# Patient Record
Sex: MALE | Race: WHITE | NOT HISPANIC OR LATINO | Employment: OTHER | URBAN - NONMETROPOLITAN AREA
[De-identification: names, ages, dates, MRNs, and addresses within clinical notes are randomized per-mention and may not be internally consistent; named-entity substitution may affect disease eponyms.]

---

## 2017-05-01 ENCOUNTER — OFFICE VISIT (OUTPATIENT)
Dept: GASTROENTEROLOGY | Facility: CLINIC | Age: 67
End: 2017-05-01

## 2017-05-01 VITALS
BODY MASS INDEX: 32.29 KG/M2 | WEIGHT: 218 LBS | DIASTOLIC BLOOD PRESSURE: 80 MMHG | HEIGHT: 69 IN | OXYGEN SATURATION: 94 % | SYSTOLIC BLOOD PRESSURE: 138 MMHG | HEART RATE: 62 BPM

## 2017-05-01 DIAGNOSIS — I10 HTN (HYPERTENSION), BENIGN: ICD-10-CM

## 2017-05-01 DIAGNOSIS — E11.9 CONTROLLED TYPE 2 DIABETES MELLITUS WITHOUT COMPLICATION, WITHOUT LONG-TERM CURRENT USE OF INSULIN (HCC): ICD-10-CM

## 2017-05-01 DIAGNOSIS — Z86.010 HX OF COLONIC POLYPS: ICD-10-CM

## 2017-05-01 DIAGNOSIS — Z85.038 HX OF COLON CANCER, STAGE I: Primary | ICD-10-CM

## 2017-05-01 PROCEDURE — S0260 H&P FOR SURGERY: HCPCS | Performed by: CLINICAL NURSE SPECIALIST

## 2017-05-01 RX ORDER — LISINOPRIL AND HYDROCHLOROTHIAZIDE 25; 20 MG/1; MG/1
1 TABLET ORAL DAILY
COMMUNITY
Start: 2017-04-13 | End: 2019-01-01 | Stop reason: HOSPADM

## 2017-05-01 RX ORDER — LOSARTAN POTASSIUM 100 MG/1
100 TABLET ORAL DAILY
COMMUNITY
Start: 2017-04-13 | End: 2019-01-01 | Stop reason: HOSPADM

## 2017-05-01 RX ORDER — METOPROLOL TARTRATE 50 MG/1
TABLET, FILM COATED ORAL
COMMUNITY
Start: 2017-04-13 | End: 2018-05-14

## 2017-05-01 RX ORDER — ESOMEPRAZOLE MAGNESIUM 40 MG/1
40 CAPSULE, DELAYED RELEASE ORAL
COMMUNITY
Start: 2017-03-30 | End: 2019-01-01 | Stop reason: HOSPADM

## 2017-05-01 RX ORDER — ATORVASTATIN CALCIUM 40 MG/1
40 TABLET, FILM COATED ORAL DAILY
COMMUNITY
Start: 2017-04-13

## 2017-05-01 RX ORDER — SITAGLIPTIN 50 MG/1
50 TABLET, FILM COATED ORAL DAILY
COMMUNITY
Start: 2017-04-21

## 2017-05-05 ENCOUNTER — TELEPHONE (OUTPATIENT)
Dept: GASTROENTEROLOGY | Facility: CLINIC | Age: 67
End: 2017-05-05

## 2017-06-26 ENCOUNTER — HOSPITAL ENCOUNTER (OUTPATIENT)
Facility: HOSPITAL | Age: 67
Setting detail: HOSPITAL OUTPATIENT SURGERY
Discharge: HOME | End: 2017-06-26
Attending: INTERNAL MEDICINE | Admitting: INTERNAL MEDICINE

## 2017-06-26 ENCOUNTER — ANESTHESIA EVENT (OUTPATIENT)
Dept: GASTROENTEROLOGY | Facility: HOSPITAL | Age: 67
End: 2017-06-26

## 2017-06-26 ENCOUNTER — ANESTHESIA (OUTPATIENT)
Dept: GASTROENTEROLOGY | Facility: HOSPITAL | Age: 67
End: 2017-06-26

## 2017-06-26 VITALS
HEART RATE: 70 BPM | OXYGEN SATURATION: 93 % | TEMPERATURE: 98 F | HEIGHT: 69 IN | RESPIRATION RATE: 14 BRPM | SYSTOLIC BLOOD PRESSURE: 121 MMHG | DIASTOLIC BLOOD PRESSURE: 58 MMHG

## 2017-06-26 DIAGNOSIS — Z86.010 HX OF COLONIC POLYPS: ICD-10-CM

## 2017-06-26 PROCEDURE — 88305 TISSUE EXAM BY PATHOLOGIST: CPT | Performed by: INTERNAL MEDICINE

## 2017-06-26 PROCEDURE — 45385 COLONOSCOPY W/LESION REMOVAL: CPT | Performed by: INTERNAL MEDICINE

## 2017-06-26 PROCEDURE — 25010000002 PROPOFOL 10 MG/ML EMULSION: Performed by: NURSE ANESTHETIST, CERTIFIED REGISTERED

## 2017-06-26 PROCEDURE — 25010000002 GLUCAGON (HUMAN RECOMBINANT) 1 MG RECONSTITUTED SOLUTION: Performed by: INTERNAL MEDICINE

## 2017-06-26 RX ORDER — PROPOFOL 10 MG/ML
VIAL (ML) INTRAVENOUS AS NEEDED
Status: DISCONTINUED | OUTPATIENT
Start: 2017-06-26 | End: 2017-06-26 | Stop reason: SURG

## 2017-06-26 RX ORDER — MELATONIN
1000 DAILY
COMMUNITY
End: 2019-01-01 | Stop reason: DRUGHIGH

## 2017-06-26 RX ORDER — SODIUM CHLORIDE 9 MG/ML
500 INJECTION, SOLUTION INTRAVENOUS CONTINUOUS PRN
Status: DISCONTINUED | OUTPATIENT
Start: 2017-06-26 | End: 2017-06-26 | Stop reason: HOSPADM

## 2017-06-26 RX ORDER — LIDOCAINE HYDROCHLORIDE 10 MG/ML
0.5 INJECTION, SOLUTION INFILTRATION; PERINEURAL ONCE AS NEEDED
Status: DISCONTINUED | OUTPATIENT
Start: 2017-06-26 | End: 2017-06-26 | Stop reason: HOSPADM

## 2017-06-26 RX ORDER — SODIUM CHLORIDE 0.9 % (FLUSH) 0.9 %
3 SYRINGE (ML) INJECTION AS NEEDED
Status: DISCONTINUED | OUTPATIENT
Start: 2017-06-26 | End: 2017-06-26 | Stop reason: HOSPADM

## 2017-06-26 RX ADMIN — PROPOFOL 25 MG: 10 INJECTION, EMULSION INTRAVENOUS at 08:38

## 2017-06-26 RX ADMIN — SODIUM CHLORIDE: 0.9 INJECTION, SOLUTION INTRAVENOUS at 08:19

## 2017-06-26 RX ADMIN — PROPOFOL 25 MG: 10 INJECTION, EMULSION INTRAVENOUS at 08:33

## 2017-06-26 RX ADMIN — PROPOFOL 25 MG: 10 INJECTION, EMULSION INTRAVENOUS at 08:36

## 2017-06-26 RX ADMIN — PROPOFOL 25 MG: 10 INJECTION, EMULSION INTRAVENOUS at 08:27

## 2017-06-26 RX ADMIN — PROPOFOL 50 MG: 10 INJECTION, EMULSION INTRAVENOUS at 08:24

## 2017-06-26 RX ADMIN — PROPOFOL 50 MG: 10 INJECTION, EMULSION INTRAVENOUS at 08:23

## 2017-06-26 RX ADMIN — PROPOFOL 25 MG: 10 INJECTION, EMULSION INTRAVENOUS at 08:30

## 2017-06-26 RX ADMIN — PROPOFOL 25 MG: 10 INJECTION, EMULSION INTRAVENOUS at 08:41

## 2017-06-26 RX ADMIN — PROPOFOL 50 MG: 10 INJECTION, EMULSION INTRAVENOUS at 08:21

## 2017-06-26 NOTE — ANESTHESIA PREPROCEDURE EVALUATION
Anesthesia Evaluation     NPO Solid Status: > 8 hours  NPO Liquid Status: > 2 hours     Airway   Mallampati: II  TM distance: >3 FB  Neck ROM: full  no difficulty expected  Dental    (+) edentulous    Pulmonary - normal exam   Cardiovascular - normal exam  Exercise tolerance: good (4-7 METS)    (+) hypertension,       Neuro/Psych  GI/Hepatic/Renal/Endo    (+) obesity,  GERD well controlled, PUD, diabetes mellitus well controlled,     Musculoskeletal     Abdominal  - normal exam   Substance History      OB/GYN          Other                                        Anesthesia Plan    ASA 3     MAC     intravenous induction   Anesthetic plan and risks discussed with patient.    Plan discussed with CRNA.

## 2017-06-26 NOTE — ANESTHESIA POSTPROCEDURE EVALUATION
Patient: Romel Rosario    Procedure Summary     Date Anesthesia Start Anesthesia Stop Room / Location    06/26/17 0819 0847  [facility] /  [facility]       Procedure Diagnosis Surgeon Provider    COLONOSCOPY WITH ANESTHESIA (N/A ) Hx of colonic polyps  (Hx of colonic polyps [Z86.010]) MD Stephon Ochoa CRNA          Anesthesia Type: MAC  Last vitals  /58 (06/26/17 0916)    Temp      Pulse 70 (06/26/17 0916)   Resp 14 (06/26/17 0916)    SpO2 93 % (06/26/17 0916)      Post Anesthesia Care and Evaluation    Patient location during evaluation: PHASE II  Patient participation: complete - patient participated  Level of consciousness: awake and sleepy but conscious  Pain score: 0  Pain management: adequate  Airway patency: patent  Anesthetic complications: No anesthetic complications    Cardiovascular status: acceptable  Respiratory status: acceptable  Hydration status: acceptable

## 2017-06-26 NOTE — H&P
"[facility]  Pre Procedure History & Physical    Chief Complaint:   History of polyps    Subjective     HPI:   Here for colonoscopy.  History of polyps.  See office note dated 5/1/2017    Past Medical History:   Past Medical History:   Diagnosis Date   • Cancer     Colon cancer   • Cervical disc disease    • GERD (gastroesophageal reflux disease)    • Hx of colon cancer, stage I    • Hx of colonic polyps    • Hypertension    • PUD (peptic ulcer disease)        Past Surgical History:  [unfilled]    Family History:  Family History   Problem Relation Age of Onset   • Heart disease Mother    • Diabetes Mother    • Alzheimer's disease Father    • Colon cancer Neg Hx    • Colon polyps Neg Hx        Social History:   reports that he has quit smoking. He has never used smokeless tobacco. He reports that he does not drink alcohol or use illicit drugs.    Medications:   Prior to Admission medications    Medication Sig Start Date End Date Taking? Authorizing Provider   atorvastatin (LIPITOR) 40 MG tablet  4/13/17  Yes Historical Provider, MD   cholecalciferol (VITAMIN D3) 1000 UNITS tablet Take 1,000 Units by mouth Daily.   Yes Historical Provider, MD   esomeprazole (nexIUM) 40 MG capsule  3/30/17  Yes Historical Provider, MD   JANUVIA 50 MG tablet  4/21/17  Yes Historical Provider, MD   lisinopril-hydrochlorothiazide (PRINZIDE,ZESTORETIC) 20-25 MG per tablet  4/13/17  Yes Historical Provider, MD   losartan (COZAAR) 100 MG tablet  4/13/17  Yes Historical Provider, MD   metoprolol tartrate (LOPRESSOR) 50 MG tablet  4/13/17  Yes Historical Provider, MD   polyethylene glycol (GoLYTELY) 236 G solution As directed 5/1/17  Yes MING Fritz       Allergies:  Review of patient's allergies indicates no known allergies.    Objective     Blood pressure 126/66, pulse 68, temperature 98 °F (36.7 °C), temperature source Temporal Artery , resp. rate 22, height 69\" (175.3 cm), SpO2 93 %.    Physical Exam "   Constitutional: Pt is oriented to person, place, and in no distress.   HENT: Mouth/Throat: Oropharynx is clear.   Cardiovascular: Normal rate, regular rhythm.    Pulmonary/Chest: Effort normal. No respiratory distress. No  wheezes.   Abdominal: Soft. Non-distended.  Skin: Skin is warm and dry.   Psychiatric: Mood, memory, affect and judgment appear normal.     Assessment/Plan     Diagnosis:  History of polyps    Anticipated Surgical Procedure:    Proceed with colonoscopy as scheduled    The risks, benefits, and alternatives of this procedure have been discussed with the patient or the responsible party- the patient understands and agrees to proceed.    EMR Dragon/transcription disclaimer: Much of this encounter note is an electronic transcription/translation of spoken language to printed text.  The electronic translation of spoken language may permit erroneous, or at times, nonsensical words or phrases to be inadvertently transcribed.  Although I have reviewed the note for such errors, some may still exist.    Barry Banks MD  8:17 AM  6/26/2017

## 2017-06-26 NOTE — PLAN OF CARE
Problem: Patient Care Overview (Adult)  Goal: Plan of Care Review  Outcome: Ongoing (interventions implemented as appropriate)    06/26/17 0832   Coping/Psychosocial Response Interventions   Plan Of Care Reviewed With patient   Patient Care Overview   Progress no change   Outcome Evaluation   Outcome Summary/Follow up Plan tolerating well         Problem: GI Endoscopy (Adult)  Goal: Signs and Symptoms of Listed Potential Problems Will be Absent or Manageable (GI Endoscopy)  Outcome: Ongoing (interventions implemented as appropriate)    06/26/17 0832   GI Endoscopy   Problems Assessed (GI Endoscopy) all   Problems Present (GI Endoscopy) none

## 2017-06-26 NOTE — PLAN OF CARE
Problem: GI Endoscopy (Adult)  Goal: Signs and Symptoms of Listed Potential Problems Will be Absent or Manageable (GI Endoscopy)  Outcome: Outcome(s) achieved Date Met:  06/26/17 06/26/17 0805   GI Endoscopy   Problems Assessed (GI Endoscopy) all   Problems Present (GI Endoscopy) none

## 2017-06-26 NOTE — PLAN OF CARE
Problem: Patient Care Overview (Adult)  Goal: Adult Individualization and Mutuality  Outcome: Ongoing (interventions implemented as appropriate)    06/26/17 0724   Individualization   Patient Specific Preferences NONE   Patient Specific Goals NONE   Patient Specific Interventions NONE   Mutuality/Individual Preferences   What Anxieties, Fears or Concerns Do You Have About Your Health or Care? NONE   What Questions Do You Have About Your Health or Care? NONE   What Information Would Help Us Give You More Personalized Care? NONE

## 2017-06-26 NOTE — PLAN OF CARE
Problem: Patient Care Overview (Adult)  Goal: Plan of Care Review  Outcome: Outcome(s) achieved Date Met:  06/26/17 06/26/17 0855   Coping/Psychosocial Response Interventions   Plan Of Care Reviewed With patient;spouse   Patient Care Overview   Progress improving   Outcome Evaluation   Outcome Summary/Follow up Plan discharge nima meet

## 2017-06-27 LAB
CYTO UR: NORMAL
LAB AP CASE REPORT: NORMAL
LAB AP CLINICAL INFORMATION: NORMAL
Lab: NORMAL
PATH REPORT.FINAL DX SPEC: NORMAL
PATH REPORT.GROSS SPEC: NORMAL

## 2018-05-14 ENCOUNTER — PROCEDURE VISIT (OUTPATIENT)
Dept: OTOLARYNGOLOGY | Facility: CLINIC | Age: 68
End: 2018-05-14

## 2018-05-14 ENCOUNTER — OFFICE VISIT (OUTPATIENT)
Dept: OTOLARYNGOLOGY | Facility: CLINIC | Age: 68
End: 2018-05-14

## 2018-05-14 VITALS
WEIGHT: 217 LBS | DIASTOLIC BLOOD PRESSURE: 81 MMHG | TEMPERATURE: 98.3 F | SYSTOLIC BLOOD PRESSURE: 148 MMHG | HEIGHT: 69 IN | BODY MASS INDEX: 32.14 KG/M2 | HEART RATE: 65 BPM

## 2018-05-14 DIAGNOSIS — H90.3 SENSORINEURAL HEARING LOSS (SNHL) OF BOTH EARS: ICD-10-CM

## 2018-05-14 DIAGNOSIS — H69.81 DYSFUNCTION OF RIGHT EUSTACHIAN TUBE: Primary | ICD-10-CM

## 2018-05-14 DIAGNOSIS — H69.81 DYSFUNCTION OF RIGHT EUSTACHIAN TUBE: ICD-10-CM

## 2018-05-14 DIAGNOSIS — IMO0001 ASYMMETRICAL RIGHT SENSORINEURAL HEARING LOSS: Primary | ICD-10-CM

## 2018-05-14 PROCEDURE — 99203 OFFICE O/P NEW LOW 30 MIN: CPT | Performed by: NURSE PRACTITIONER

## 2018-05-14 RX ORDER — FLUTICASONE PROPIONATE 50 MCG
2 SPRAY, SUSPENSION (ML) NASAL DAILY
Qty: 1 BOTTLE | Refills: 5 | Status: SHIPPED | OUTPATIENT
Start: 2018-05-14 | End: 2019-01-01

## 2018-05-14 RX ORDER — FLUTICASONE PROPIONATE 50 MCG
SPRAY, SUSPENSION (ML) NASAL
COMMUNITY
Start: 2018-04-10 | End: 2018-05-14 | Stop reason: SDUPTHER

## 2018-05-14 RX ORDER — AZELASTINE 1 MG/ML
2 SPRAY, METERED NASAL 2 TIMES DAILY
Qty: 30 ML | Refills: 5 | Status: SHIPPED | OUTPATIENT
Start: 2018-05-14 | End: 2019-01-01

## 2018-05-14 NOTE — PROGRESS NOTES
YOB: 1950  Location: [facility]  Location Address: [address] [place] [address], [facility], [address] [place] [address]  Location Phone: [phone]    Chief Complaint   Patient presents with   • Ear Problem       History of Present Illness  Romel Rosario is a 67 y.o. male.  Romel Rosario is here for evaluation of ENT complaints. The patient has had problems with ear pressure  The symptoms are localized to the right side. The patient has had moderate symptoms. The symptoms have been present for the last 1 month The symptoms are aggravated by  no identifiable factors. The symptoms are improved by no identifiable factors.  He reports right sided ear pressure.  The pressure seems to resolve when he lies down.  When he pops his ear, it temporarily relieves it.  He was on Flonase.  Denies vertigo.  He reports that he sneezes a lot with allergies.    Procedure visit     2018  [facility]   LATRICE Trotter   Audiology   Asymmetrical right sensorineural hearing loss +1 more   Dx   Ear Problem, Hearing Loss   Reason for Visit    Progress Notes                     Past Medical History:   Diagnosis Date   • Cancer     Colon cancer   • Cerumen impaction    • Cervical disc disease    • Diabetes    • GERD (gastroesophageal reflux disease)    • Hx of colonic polyps    • Hypertension    • PUD (peptic ulcer disease)        Past Surgical History:   Procedure Laterality Date   • COLON SURGERY      Due to colon cancer   • COLONOSCOPY N/A 2017    Procedure: COLONOSCOPY WITH ANESTHESIA;  Surgeon: Barry Banks MD;  Location: [facility] END[facility]COPY;  Service:    • COLONOSCOPY W/ POLYPECTOMY  2013    Tubular adenoma transverse x 2, Diverticulosis repeat exam in 3 years   • ENDOSCOPY  2013    HH, 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     • esomeprazole (nexIUM) 40 MG capsule      • fluticasone (FLONASE) 50 MCG/ACT nasal spray 2 sprays into each nostril Daily. 1 bottle 5   • JANUVIA 50 MG tablet      • lisinopril-hydrochlorothiazide (PRINZIDE,ZESTORETIC) 20-25 MG per tablet      • losartan (COZAAR) 100 MG tablet      • [DISCONTINUED] fluticasone (FLONASE) 50 MCG/ACT nasal spray          Review of patient's allergies indicates no known allergies.    Family History   Problem Relation Age of Onset   • Heart disease Mother    • Diabetes Mother    • Alzheimer's disease Father    • Colon cancer Neg Hx    • Colon polyps Neg Hx        Social History     Social History   • Marital status:      Spouse name: N/A   • Number of children: N/A   • Years of education: N/A     Occupational History   • Not on file.     Social History Main Topics   • Smoking status: Former Smoker   • Smokeless tobacco: Never Used   • Alcohol use No   • Drug use: No   • Sexual activity: Defer     Other Topics Concern   • Not on file     Social History Narrative   • No narrative on file       Review of Systems   Constitutional: Negative.    HENT:        SEE HPI   Eyes: Negative.    Respiratory: Negative.    Cardiovascular: Negative.    Gastrointestinal: Negative.    Endocrine: Negative.    Genitourinary: Negative.    Musculoskeletal: Negative.    Skin: Negative.    Allergic/Immunologic: Negative.    Neurological: Negative.    Hematological: Negative.    Psychiatric/Behavioral: Negative.        Vitals:    05/14/18 1021   BP: 148/81   Pulse: 65   Temp: 98.3 °F (36.8 °C)       Body mass index is 32.05 kg/m².    Objective     Physical Exam  CONSTITUTIONAL: well nourished, alert, oriented, in no acute distress     COMMUNICATION AND VOICE: able to communicate normally, normal voice quality    HEAD:  normocephalic, no lesions, atraumatic, no tenderness, no masses     FACE: appearance normal, no lesions, no tenderness, no deformities, facial motion symmetric    SALIVARY GLANDS: parotid glands with no tenderness, no swelling, no masses, submandibular glands with normal size, nontender    EYES: ocular motility normal, eyelids normal, orbits normal, no proptosis, conjunctiva normal , pupils equal, round     EARS:  Hearing: response to conversational voice normal bilaterally   External Ears: auricles without lesions  Otoscopic: tympanic membrane appearance normal, no lesions, no perforation, normal mobility, no fluid    NOSE:  External Nose: structure normal, no tenderness on palpation, no nasal discharge, no lesions, no evidence of trauma, nostrils patent   Intranasal Exam: nasal mucosa  edema, vestibule within normal limits, inferior turbinate hypertrophy, nasal septum midline     ORAL:  Lips: upper and lower lips without lesion   Teeth: dentition within normal limits for age   Gums: gingivae healthy   Oral Mucosa: oral mucosa normal, no mucosal lesions   Floor of Mouth: Warthin’s duct patent, mucosa normal  Tongue: lingual mucosa normal without lesions, normal tongue mobility   Palate: soft and hard palates with normal mucosa and structure  Oropharynx: oropharyngeal mucosa normal    NECK: neck appearance normal, no mass,  noted without erythema or tenderness    THYROID: no overt thyromegaly, no tenderness, nodules or mass present on palpation, position midline     LYMPH NODES: no lymphadenopathy    CHEST/RESPIRATORY: respiratory effort normal,     CARDIOVASCULAR: extremities without cyanosis or edema      NEUROLOGIC/PSYCHIATRIC: oriented to time, place and person, mood normal, affect appropriate, CN II-XII intact grossly    Assessment/Plan   Romel was seen today for ear problem.    Diagnoses and all orders for this visit:    Dysfunction of right eustachian tube    Sensorineural hearing loss (SNHL) of both  ears    Other orders  -     azelastine (ASTELIN) 0.1 % nasal spray; 2 sprays into each nostril 2 (Two) Times a Day. Use in each nostril as directed  -     fluticasone (FLONASE) 50 MCG/ACT nasal spray; 2 sprays into each nostril Daily.      * Surgery not found *  No orders of the defined types were placed in this encounter.    Return in about 3 months (around 8/14/2018).       Patient Instructions   Call for problems or worsening symptoms    Medical vs surgical options discussed    Add Carlos    Cannot conclude that placement of a tube may improve his symptoms, but will add Astelin to Flonase and reevaluate.

## 2018-05-14 NOTE — PATIENT INSTRUCTIONS
Call for problems or worsening symptoms    Medical vs surgical options discussed    Add Carlos    Cannot conclude that placement of a tube may improve his symptoms, but will add Astelin to Flonase and reevaluate.

## 2018-05-14 NOTE — PATIENT INSTRUCTIONS
(1) See the medical provider as scheduled due to patient complaints.  (2) Receive audiological testing as needed.  (3) Amplification was briefly discussed as a possible option for the hearing loss. Pt did not want to pursue at this time.

## 2018-08-20 ENCOUNTER — OFFICE VISIT (OUTPATIENT)
Dept: OTOLARYNGOLOGY | Facility: CLINIC | Age: 68
End: 2018-08-20

## 2018-08-20 VITALS
TEMPERATURE: 98.2 F | BODY MASS INDEX: 32.29 KG/M2 | SYSTOLIC BLOOD PRESSURE: 147 MMHG | HEIGHT: 69 IN | DIASTOLIC BLOOD PRESSURE: 87 MMHG | WEIGHT: 218 LBS | HEART RATE: 86 BPM

## 2018-08-20 DIAGNOSIS — H90.3 SENSORINEURAL HEARING LOSS (SNHL) OF BOTH EARS: Primary | ICD-10-CM

## 2018-08-20 DIAGNOSIS — H69.83 DYSFUNCTION OF BOTH EUSTACHIAN TUBES: ICD-10-CM

## 2018-08-20 PROCEDURE — 99213 OFFICE O/P EST LOW 20 MIN: CPT | Performed by: NURSE PRACTITIONER

## 2018-08-20 RX ORDER — MECLIZINE HYDROCHLORIDE 25 MG/1
TABLET ORAL
COMMUNITY
Start: 2018-08-13 | End: 2019-01-01

## 2018-08-20 RX ORDER — METOPROLOL TARTRATE 50 MG/1
50 TABLET, FILM COATED ORAL 2 TIMES DAILY
COMMUNITY
Start: 2018-08-13 | End: 2019-01-01 | Stop reason: HOSPADM

## 2018-08-20 NOTE — PROGRESS NOTES
YOB: 1950  Location: [facility]  Location Address: [address] [place] [address], [facility] [address], [address] [place] [address]  Location Phone: [phone]    Chief Complaint   Patient presents with   • Ear Problem       History of Present Illness  Romel Rosario is a 68 y.o. male.  Romel Rosario is here for follow up of ENT complaints. The patient has had problems with ear fullness, ear pressure and popping and cracking of the ear  The symptoms are localized to the right side. The patient has had worsening symptoms. The symptoms have been present for the last several months The symptoms are aggravated by  no identifiable factors. The symptoms are improved by no identifiable factors.  Nasal sprays have not helped.  He is kindly upset and becoming more frustrated.       Past Medical History:   Diagnosis Date   • Cancer (CMS/HCC)     Colon cancer   • Cerumen impaction    • Cervical disc disease    • Diabetes (CMS/HCC)    • Eustachian tube dysfunction    • GERD (gastroesophageal reflux disease)    • Hx of colonic polyps    • Hypertension    • PUD (peptic ulcer disease)    • Sensorineural hearing loss        Past Surgical History:   Procedure Laterality Date   • COLON SURGERY      Due to colon cancer   • COLONOSCOPY N/A 2017    Procedure: COLONOSCOPY WITH ANESTHESIA;  Surgeon: Barry Banks MD;  Location: [facility] ENDOSCOPY;  Service:    • COLONOSCOPY W/ POLYPECTOMY  2013    Tubular adenoma transverse x 2, Diverticulosis repeat exam in 3 years   • ENDOSCOPY  2013    HH, 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 Use in each nostril as directed 30 mL 5   • cholecalciferol  (VITAMIN D3) 1000 UNITS tablet Take 1,000 Units by mouth Daily.     • esomeprazole (nexIUM) 40 MG capsule      • fluticasone (FLONASE) 50 MCG/ACT nasal spray 2 sprays into each nostril Daily. 1 bottle 5   • JANUVIA 50 MG tablet      • lisinopril-hydrochlorothiazide (PRINZIDE,ZESTORETIC) 20-25 MG per tablet      • losartan (COZAAR) 100 MG tablet      • meclizine (ANTIVERT) 25 MG tablet      • metoprolol tartrate (LOPRESSOR) 50 MG tablet          Patient has no known allergies.    Family History   Problem Relation Age of Onset   • Heart disease Mother    • Diabetes Mother    • Alzheimer's disease Father    • Colon cancer Neg Hx    • Colon polyps Neg Hx        Social History     Social History   • Marital status:      Spouse name: N/A   • Number of children: N/A   • Years of education: N/A     Occupational History   • Not on file.     Social History Main Topics   • Smoking status: Former Smoker   • Smokeless tobacco: Never Used   • Alcohol use No   • Drug use: No   • Sexual activity: Defer     Other Topics Concern   • Not on file     Social History Narrative   • No narrative on file       Review of Systems   Constitutional: Negative.    HENT:        SEE HPI   Eyes: Negative.    Respiratory: Negative.    Cardiovascular: Negative.    Gastrointestinal: Negative.    Endocrine: Negative.    Genitourinary: Negative.    Musculoskeletal: Negative.    Skin: Negative.    Allergic/Immunologic: Negative.    Neurological: Negative.    Hematological: Negative.    Psychiatric/Behavioral: Negative.        Vitals:    08/20/18 0858   BP: 147/87   Pulse: 86   Temp: 98.2 °F (36.8 °C)       Body mass index is 32.19 kg/m².    Objective     Physical Exam  CONSTITUTIONAL: well nourished, alert, oriented, in no acute distress     COMMUNICATION AND VOICE: able to communicate normally, normal voice quality    HEAD: normocephalic, no lesions, atraumatic, no tenderness, no masses     FACE: appearance normal, no lesions, no tenderness, no  deformities, facial motion symmetric    SALIVARY GLANDS: parotid glands with no tenderness, no swelling, no masses, submandibular glands with normal size, nontender    EYES: ocular motility normal, eyelids normal, orbits normal, no proptosis, conjunctiva normal , pupils equal, round     EARS:  Hearing: response to conversational voice normal bilaterally   External Ears: auricles without lesions  Otoscopic: tympanic membrane appearance normal, no lesions, no perforation, normal mobility, no fluid  Right eac with cerumen removed with suction    NOSE:  External Nose: structure normal, no tenderness on palpation, no nasal discharge, no lesions, no evidence of trauma, nostrils patent   Intranasal Exam: nasal mucosa normal, vestibule within normal limits, inferior turbinate normal, nasal septum midline     ORAL:  Lips: upper and lower lips without lesion   Teeth: dentition within normal limits for age   Gums: gingivae healthy   Oral Mucosa: oral mucosa normal, no mucosal lesions   Floor of Mouth: Warthin’s duct patent, mucosa normal  Tongue: lingual mucosa normal without lesions, normal tongue mobility   Palate: soft and hard palates with normal mucosa and structure  Oropharynx: oropharyngeal mucosa normal    NECK: neck appearance normal, no mass,  noted without erythema or tenderness    LYMPH NODES: no lymphadenopathy    CHEST/RESPIRATORY: respiratory effort normal    CARDIOVASCULAR:  extremities without cyanosis or edema      NEUROLOGIC/PSYCHIATRIC: oriented to time, place and person, mood normal, affect appropriate, CN II-XII intact grossly    Assessment/Plan   Romel was seen today for ear problem.    Diagnoses and all orders for this visit:    Sensorineural hearing loss (SNHL) of both ears    Dysfunction of both eustachian tubes      * Surgery not found *  No orders of the defined types were placed in this encounter.    Return if symptoms worsen or fail to improve.       Patient Instructions   Medical vs surgical  "options discussed    We have discussed a possible tube, however I suspect that this potentially will reduce his hearing to a level that will be much more frustrating than what he is experiencing now.  Our Dr of Audiology has offered to do further testing possible repeat audiogram however he is too upset to continue.  He wishes to see the \"Boss\".  We will make him an appt with Dr Reginaldo morales to discuss further options.    "

## 2018-08-20 NOTE — PATIENT INSTRUCTIONS
"Medical vs surgical options discussed    We have discussed a possible tube, however I suspect that this potentially will reduce his hearing to a level that will be much more frustrating than what he is experiencing now.  Our Dr of Audiology has offered to do further testing possible repeat audiogram however he is too upset to continue.  He wishes to see the \"Boss\".  We will make him an appt with Dr Reginaldo morales to discuss further options.  "

## 2018-10-02 ENCOUNTER — OFFICE VISIT (OUTPATIENT)
Dept: OTOLARYNGOLOGY | Facility: CLINIC | Age: 68
End: 2018-10-02

## 2018-10-02 VITALS
SYSTOLIC BLOOD PRESSURE: 120 MMHG | HEIGHT: 69 IN | BODY MASS INDEX: 31.84 KG/M2 | TEMPERATURE: 98 F | DIASTOLIC BLOOD PRESSURE: 80 MMHG | WEIGHT: 215 LBS

## 2018-10-02 DIAGNOSIS — H93.11 TINNITUS OF RIGHT EAR: ICD-10-CM

## 2018-10-02 DIAGNOSIS — H69.81 DYSFUNCTION OF RIGHT EUSTACHIAN TUBE: ICD-10-CM

## 2018-10-02 DIAGNOSIS — H90.3 SENSORINEURAL HEARING LOSS (SNHL) OF BOTH EARS: Primary | ICD-10-CM

## 2018-10-02 PROBLEM — H90.5 SENSORINEURAL HEARING LOSS: Status: ACTIVE | Noted: 2018-10-02

## 2018-10-02 PROCEDURE — 99213 OFFICE O/P EST LOW 20 MIN: CPT | Performed by: OTOLARYNGOLOGY

## 2018-10-02 NOTE — PATIENT INSTRUCTIONS
Risks benefits and options regarding mindfulness and an acceptance approach to his changes that he perceives in the right ear were discussed with the patient at length    Recommended things he continues such as chewing, and other options that would possibly improve his eustachian tube dysfunction and have his symptoms alleviated would allow him to detach emotionally from the response he is having to the change in his right ear status    He was recalcitrant at first but later softened to the suggestions and would give this a try as opposed to considering surgical intervention with the tube which is unlikely to improve his symptoms    Recommended he continue intranasal steroid sprays and follow up in 1-2 months with repeat audiometric evaluation to ensure that he is not having a progression of his hearing loss  Will be followed up in 2-3 months with audio

## 2019-01-01 ENCOUNTER — HOSPITAL ENCOUNTER (OUTPATIENT)
Dept: RADIATION ONCOLOGY | Facility: HOSPITAL | Age: 69
Setting detail: RADIATION/ONCOLOGY SERIES
End: 2019-01-01

## 2019-01-01 ENCOUNTER — ANESTHESIA (OUTPATIENT)
Dept: PERIOP | Facility: HOSPITAL | Age: 69
End: 2019-01-01

## 2019-01-01 ENCOUNTER — APPOINTMENT (OUTPATIENT)
Dept: GENERAL RADIOLOGY | Facility: HOSPITAL | Age: 69
End: 2019-01-01

## 2019-01-01 ENCOUNTER — HOSPITAL ENCOUNTER (INPATIENT)
Facility: HOSPITAL | Age: 69
LOS: 8 days | Discharge: HOME | End: 2019-07-25
Attending: FAMILY MEDICINE | Admitting: INTERNAL MEDICINE

## 2019-01-01 ENCOUNTER — HOSPITAL ENCOUNTER (OUTPATIENT)
Dept: MRI IMAGING | Facility: HOSPITAL | Age: 69
Discharge: HOME | End: 2019-08-09
Admitting: PHYSICIAN ASSISTANT

## 2019-01-01 ENCOUNTER — APPOINTMENT (OUTPATIENT)
Dept: RADIATION ONCOLOGY | Facility: HOSPITAL | Age: 69
End: 2019-01-01

## 2019-01-01 ENCOUNTER — HOSPITAL ENCOUNTER (OUTPATIENT)
Dept: CT IMAGING | Facility: HOSPITAL | Age: 69
Discharge: HOME | End: 2019-09-04
Admitting: INTERNAL MEDICINE

## 2019-01-01 ENCOUNTER — READMISSION MANAGEMENT (OUTPATIENT)
Dept: CALL CENTER | Facility: HOSPITAL | Age: 69
End: 2019-01-01

## 2019-01-01 ENCOUNTER — APPOINTMENT (OUTPATIENT)
Dept: CARDIOLOGY | Facility: HOSPITAL | Age: 69
End: 2019-01-01

## 2019-01-01 ENCOUNTER — CONSULT (OUTPATIENT)
Dept: RADIATION ONCOLOGY | Facility: HOSPITAL | Age: 69
End: 2019-01-01

## 2019-01-01 ENCOUNTER — HOSPITAL ENCOUNTER (OUTPATIENT)
Dept: NUCLEAR MEDICINE | Facility: HOSPITAL | Age: 69
Discharge: HOME | End: 2019-07-03

## 2019-01-01 ENCOUNTER — APPOINTMENT (OUTPATIENT)
Dept: PREADMISSION TESTING | Facility: HOSPITAL | Age: 69
End: 2019-01-01

## 2019-01-01 ENCOUNTER — HOSPITAL ENCOUNTER (OUTPATIENT)
Facility: HOSPITAL | Age: 69
Discharge: HOME | End: 2019-08-09
Attending: INTERNAL MEDICINE | Admitting: INTERNAL MEDICINE

## 2019-01-01 ENCOUNTER — TRANSCRIBE ORDERS (OUTPATIENT)
Dept: ADMINISTRATIVE | Facility: HOSPITAL | Age: 69
End: 2019-01-01

## 2019-01-01 ENCOUNTER — APPOINTMENT (OUTPATIENT)
Dept: CT IMAGING | Facility: HOSPITAL | Age: 69
End: 2019-01-01

## 2019-01-01 ENCOUNTER — ANESTHESIA EVENT (OUTPATIENT)
Dept: PERIOP | Facility: HOSPITAL | Age: 69
End: 2019-01-01

## 2019-01-01 ENCOUNTER — HOSPITAL ENCOUNTER (OUTPATIENT)
Dept: CT IMAGING | Facility: HOSPITAL | Age: 69
Discharge: HOME | End: 2019-07-03
Admitting: INTERNAL MEDICINE

## 2019-01-01 ENCOUNTER — HOSPITAL ENCOUNTER (OUTPATIENT)
Dept: CT IMAGING | Facility: HOSPITAL | Age: 69
End: 2019-01-01

## 2019-01-01 ENCOUNTER — HOSPITAL ENCOUNTER (OUTPATIENT)
Facility: HOSPITAL | Age: 69
Setting detail: HOSPITAL OUTPATIENT SURGERY
Discharge: HOME | End: 2019-09-03
Attending: SPECIALIST | Admitting: SPECIALIST

## 2019-01-01 ENCOUNTER — TELEPHONE (OUTPATIENT)
Dept: PULMONOLOGY | Facility: CLINIC | Age: 69
End: 2019-01-01

## 2019-01-01 ENCOUNTER — DOCUMENTATION (OUTPATIENT)
Dept: RADIATION ONCOLOGY | Facility: HOSPITAL | Age: 69
End: 2019-01-01

## 2019-01-01 ENCOUNTER — HOSPITAL ENCOUNTER (OUTPATIENT)
Dept: CT IMAGING | Facility: HOSPITAL | Age: 69
Discharge: HOME | End: 2019-06-13
Admitting: NURSE PRACTITIONER

## 2019-01-01 ENCOUNTER — NURSE TRIAGE (OUTPATIENT)
Dept: CALL CENTER | Facility: HOSPITAL | Age: 69
End: 2019-01-01

## 2019-01-01 ENCOUNTER — HOSPITAL ENCOUNTER (INPATIENT)
Facility: HOSPITAL | Age: 69
LOS: 6 days | Discharge: HOME HEALTH | End: 2019-12-06
Attending: INTERNAL MEDICINE | Admitting: INTERNAL MEDICINE

## 2019-01-01 VITALS
RESPIRATION RATE: 20 BRPM | DIASTOLIC BLOOD PRESSURE: 45 MMHG | OXYGEN SATURATION: 92 % | SYSTOLIC BLOOD PRESSURE: 129 MMHG | TEMPERATURE: 98.1 F | HEART RATE: 80 BPM | HEIGHT: 69 IN | WEIGHT: 212.4 LBS | BODY MASS INDEX: 31.46 KG/M2

## 2019-01-01 VITALS
OXYGEN SATURATION: 92 % | HEIGHT: 69 IN | SYSTOLIC BLOOD PRESSURE: 158 MMHG | DIASTOLIC BLOOD PRESSURE: 76 MMHG | BODY MASS INDEX: 30.96 KG/M2 | WEIGHT: 209 LBS

## 2019-01-01 VITALS
DIASTOLIC BLOOD PRESSURE: 55 MMHG | RESPIRATION RATE: 18 BRPM | HEART RATE: 72 BPM | OXYGEN SATURATION: 93 % | BODY MASS INDEX: 30.35 KG/M2 | SYSTOLIC BLOOD PRESSURE: 105 MMHG | WEIGHT: 193.34 LBS | HEIGHT: 67 IN

## 2019-01-01 VITALS
DIASTOLIC BLOOD PRESSURE: 69 MMHG | SYSTOLIC BLOOD PRESSURE: 140 MMHG | TEMPERATURE: 98.9 F | OXYGEN SATURATION: 94 % | HEART RATE: 55 BPM | RESPIRATION RATE: 20 BRPM

## 2019-01-01 VITALS
BODY MASS INDEX: 28.07 KG/M2 | SYSTOLIC BLOOD PRESSURE: 121 MMHG | OXYGEN SATURATION: 91 % | RESPIRATION RATE: 18 BRPM | DIASTOLIC BLOOD PRESSURE: 62 MMHG | WEIGHT: 189.5 LBS | HEART RATE: 74 BPM | HEIGHT: 69 IN | TEMPERATURE: 97.8 F

## 2019-01-01 VITALS
WEIGHT: 196 LBS | SYSTOLIC BLOOD PRESSURE: 108 MMHG | HEIGHT: 67 IN | DIASTOLIC BLOOD PRESSURE: 60 MMHG | BODY MASS INDEX: 30.76 KG/M2

## 2019-01-01 VITALS — BODY MASS INDEX: 28.8 KG/M2 | WEIGHT: 195 LBS

## 2019-01-01 VITALS — BODY MASS INDEX: 31.04 KG/M2 | WEIGHT: 210.2 LBS

## 2019-01-01 DIAGNOSIS — R06.02 SHORTNESS OF BREATH: Primary | ICD-10-CM

## 2019-01-01 DIAGNOSIS — Z74.09 IMPAIRED MOBILITY: ICD-10-CM

## 2019-01-01 DIAGNOSIS — R09.02 HYPOXIA: Primary | ICD-10-CM

## 2019-01-01 DIAGNOSIS — C34.12 MALIGNANT NEOPLASM OF UPPER LOBE OF LEFT LUNG (HCC): Primary | ICD-10-CM

## 2019-01-01 DIAGNOSIS — C34.90 SMALL CELL LUNG CANCER (HCC): Primary | ICD-10-CM

## 2019-01-01 DIAGNOSIS — Z87.891 FORMER SMOKER: ICD-10-CM

## 2019-01-01 DIAGNOSIS — R51.9 PERSISTENT HEADACHES: Primary | ICD-10-CM

## 2019-01-01 DIAGNOSIS — R91.8 LUNG MASS: Primary | ICD-10-CM

## 2019-01-01 DIAGNOSIS — R91.8 MASS OF UPPER LOBE OF LEFT LUNG: ICD-10-CM

## 2019-01-01 DIAGNOSIS — Z85.038 HX OF COLON CANCER, STAGE I: ICD-10-CM

## 2019-01-01 DIAGNOSIS — R93.89 ABNORMAL FINDINGS ON DIAGNOSTIC IMAGING OF OTHER SPECIFIED BODY STRUCTURES: ICD-10-CM

## 2019-01-01 DIAGNOSIS — C34.90 ADENOCARCINOMA OF LUNG, UNSPECIFIED LATERALITY (HCC): ICD-10-CM

## 2019-01-01 DIAGNOSIS — R51.9 PERSISTENT HEADACHES: ICD-10-CM

## 2019-01-01 DIAGNOSIS — R09.02 HYPOXIA: ICD-10-CM

## 2019-01-01 DIAGNOSIS — J96.21 ACUTE ON CHRONIC RESPIRATORY FAILURE WITH HYPOXIA (HCC): ICD-10-CM

## 2019-01-01 DIAGNOSIS — J90 PLEURAL EFFUSION: ICD-10-CM

## 2019-01-01 DIAGNOSIS — R06.02 SHORTNESS OF BREATH: ICD-10-CM

## 2019-01-01 DIAGNOSIS — Z71.9 ENCOUNTER FOR CONSULTATION: ICD-10-CM

## 2019-01-01 DIAGNOSIS — R93.89 ABNORMAL FINDINGS ON DIAGNOSTIC IMAGING OF OTHER SPECIFIED BODY STRUCTURES: Primary | ICD-10-CM

## 2019-01-01 DIAGNOSIS — R91.8 LUNG MASS: ICD-10-CM

## 2019-01-01 DIAGNOSIS — D64.9 ANEMIA, UNSPECIFIED TYPE: ICD-10-CM

## 2019-01-01 DIAGNOSIS — R05.9 COUGH: ICD-10-CM

## 2019-01-01 DIAGNOSIS — J18.9 PNEUMONIA OF RIGHT LUNG DUE TO INFECTIOUS ORGANISM, UNSPECIFIED PART OF LUNG: ICD-10-CM

## 2019-01-01 DIAGNOSIS — R91.8 MASS OF UPPER LOBE OF LEFT LUNG: Primary | ICD-10-CM

## 2019-01-01 DIAGNOSIS — R06.03 RESPIRATORY DISTRESS, ACUTE: ICD-10-CM

## 2019-01-01 LAB
A-A DO2: 544.5 MMHG
ABO + RH BLD: NORMAL
ABO GROUP BLD: NORMAL
ADV 40+41 DNA STL QL NAA+NON-PROBE: NOT DETECTED
ALBUMIN SERPL-MCNC: 2.8 G/DL (ref 3.5–5)
ALBUMIN SERPL-MCNC: 3.1 G/DL (ref 3.5–5)
ALBUMIN SERPL-MCNC: 3.3 G/DL (ref 3.5–5.2)
ALBUMIN SERPL-MCNC: 3.4 G/DL (ref 3.5–5)
ALBUMIN SERPL-MCNC: 3.9 G/DL (ref 3.5–5)
ALBUMIN SERPL-MCNC: 3.9 G/DL (ref 3.5–5)
ALBUMIN/GLOB SERPL: 0.9 G/DL (ref 1.1–2.5)
ALBUMIN/GLOB SERPL: 1 G/DL (ref 1.1–2.5)
ALBUMIN/GLOB SERPL: 1 G/DL (ref 1.1–2.5)
ALBUMIN/GLOB SERPL: 1.1 G/DL
ALBUMIN/GLOB SERPL: 1.1 G/DL (ref 1.1–2.5)
ALP SERPL-CCNC: 102 U/L (ref 24–120)
ALP SERPL-CCNC: 104 U/L (ref 39–117)
ALP SERPL-CCNC: 105 U/L (ref 24–120)
ALP SERPL-CCNC: 106 U/L (ref 24–120)
ALP SERPL-CCNC: 110 U/L (ref 24–120)
ALP SERPL-CCNC: 114 U/L (ref 24–120)
ALP SERPL-CCNC: 130 U/L (ref 24–120)
ALP SERPL-CCNC: 85 U/L (ref 24–120)
ALT SERPL W P-5'-P-CCNC: 12 U/L (ref 1–41)
ALT SERPL W P-5'-P-CCNC: 18 U/L (ref 0–54)
ALT SERPL W P-5'-P-CCNC: 18 U/L (ref 0–54)
ALT SERPL W P-5'-P-CCNC: 19 U/L (ref 0–54)
ALT SERPL W P-5'-P-CCNC: 21 U/L (ref 0–54)
ALT SERPL W P-5'-P-CCNC: 23 U/L (ref 0–54)
ALT SERPL W P-5'-P-CCNC: 29 U/L (ref 0–54)
ALT SERPL W P-5'-P-CCNC: 35 U/L (ref 0–54)
AMMONIA BLD-SCNC: 13 UMOL/L (ref 9–33)
ANION GAP SERPL CALCULATED.3IONS-SCNC: 10 MMOL/L (ref 4–13)
ANION GAP SERPL CALCULATED.3IONS-SCNC: 10 MMOL/L (ref 5–15)
ANION GAP SERPL CALCULATED.3IONS-SCNC: 11 MMOL/L (ref 5–15)
ANION GAP SERPL CALCULATED.3IONS-SCNC: 13 MMOL/L (ref 5–15)
ANION GAP SERPL CALCULATED.3IONS-SCNC: 14 MMOL/L (ref 5–15)
ANION GAP SERPL CALCULATED.3IONS-SCNC: 4 MMOL/L (ref 4–13)
ANION GAP SERPL CALCULATED.3IONS-SCNC: 5 MMOL/L (ref 4–13)
ANION GAP SERPL CALCULATED.3IONS-SCNC: 6 MMOL/L (ref 4–13)
ANION GAP SERPL CALCULATED.3IONS-SCNC: 6 MMOL/L (ref 4–13)
ANION GAP SERPL CALCULATED.3IONS-SCNC: 6 MMOL/L (ref 5–15)
ANION GAP SERPL CALCULATED.3IONS-SCNC: 8 MMOL/L (ref 5–15)
ANION GAP SERPL CALCULATED.3IONS-SCNC: 9 MMOL/L (ref 4–13)
ANION GAP SERPL CALCULATED.3IONS-SCNC: 9 MMOL/L (ref 5–15)
ANION GAP SERPL CALCULATED.3IONS-SCNC: ABNORMAL MMOL/L (ref 4–13)
ANISOCYTOSIS BLD QL: ABNORMAL
ARTERIAL PATENCY WRIST A: POSITIVE
ARTICHOKE IGE QN: 67 MG/DL (ref 0–99)
AST SERPL-CCNC: 12 U/L (ref 1–40)
AST SERPL-CCNC: 13 U/L (ref 7–45)
AST SERPL-CCNC: 15 U/L (ref 7–45)
AST SERPL-CCNC: 16 U/L (ref 7–45)
AST SERPL-CCNC: 17 U/L (ref 7–45)
AST SERPL-CCNC: 19 U/L (ref 7–45)
AST SERPL-CCNC: 19 U/L (ref 7–45)
AST SERPL-CCNC: 24 U/L (ref 7–45)
ASTRO TYP 1-8 RNA STL QL NAA+NON-PROBE: NOT DETECTED
ATMOSPHERIC PRESS: 741 MMHG
ATMOSPHERIC PRESS: 748 MMHG
ATMOSPHERIC PRESS: 751 MMHG
BACTERIA SPEC AEROBE CULT: NORMAL
BACTERIA SPEC RESP CULT: NORMAL
BASE EXCESS BLDA CALC-SCNC: 1.3 MMOL/L (ref 0–2)
BASE EXCESS BLDA CALC-SCNC: 1.8 MMOL/L (ref 0–2)
BASE EXCESS BLDA CALC-SCNC: 3.8 MMOL/L (ref 0–2)
BASE EXCESS BLDA CALC-SCNC: 4.5 MMOL/L (ref 0–2)
BASE EXCESS BLDA CALC-SCNC: 9.2 MMOL/L (ref 0–2)
BASOPHILS # BLD AUTO: 0.01 10*3/MM3 (ref 0–0.2)
BASOPHILS # BLD AUTO: 0.04 10*3/MM3 (ref 0–0.2)
BASOPHILS # BLD AUTO: 0.05 10*3/MM3 (ref 0–0.2)
BASOPHILS # BLD AUTO: 0.05 10*3/MM3 (ref 0–0.2)
BASOPHILS # BLD AUTO: 0.06 10*3/MM3 (ref 0–0.2)
BASOPHILS # BLD AUTO: 0.08 10*3/MM3 (ref 0–0.2)
BASOPHILS # BLD MANUAL: 0.05 10*3/MM3 (ref 0–0.2)
BASOPHILS NFR BLD AUTO: 0.1 % (ref 0–2)
BASOPHILS NFR BLD AUTO: 0.4 % (ref 0–1.5)
BASOPHILS NFR BLD AUTO: 0.4 % (ref 0–1.5)
BASOPHILS NFR BLD AUTO: 0.4 % (ref 0–2)
BASOPHILS NFR BLD AUTO: 0.5 % (ref 0–2)
BASOPHILS NFR BLD AUTO: 0.6 % (ref 0–2)
BASOPHILS NFR BLD AUTO: 1 % (ref 0–1.5)
BDY SITE: ABNORMAL
BH BB BLOOD EXPIRATION DATE: NORMAL
BH BB BLOOD TYPE BARCODE: 9500
BH BB DISPENSE STATUS: NORMAL
BH BB PRODUCT CODE: NORMAL
BH BB UNIT NUMBER: NORMAL
BH CV ECHO MEAS - AO MAX PG (FULL): 1.5 MMHG
BH CV ECHO MEAS - AO MAX PG (FULL): 2.7 MMHG
BH CV ECHO MEAS - AO MAX PG: 6.5 MMHG
BH CV ECHO MEAS - AO MAX PG: 9.1 MMHG
BH CV ECHO MEAS - AO MEAN PG (FULL): 0 MMHG
BH CV ECHO MEAS - AO MEAN PG (FULL): 2 MMHG
BH CV ECHO MEAS - AO MEAN PG: 4 MMHG
BH CV ECHO MEAS - AO MEAN PG: 4 MMHG
BH CV ECHO MEAS - AO ROOT AREA (BSA CORRECTED): 1.5
BH CV ECHO MEAS - AO ROOT AREA (BSA CORRECTED): 1.9
BH CV ECHO MEAS - AO ROOT AREA: 10.8 CM^2
BH CV ECHO MEAS - AO ROOT AREA: 8 CM^2
BH CV ECHO MEAS - AO ROOT DIAM: 3.2 CM
BH CV ECHO MEAS - AO ROOT DIAM: 3.7 CM
BH CV ECHO MEAS - AO V2 MAX: 127 CM/SEC
BH CV ECHO MEAS - AO V2 MAX: 151 CM/SEC
BH CV ECHO MEAS - AO V2 MEAN: 87.2 CM/SEC
BH CV ECHO MEAS - AO V2 MEAN: 91.8 CM/SEC
BH CV ECHO MEAS - AO V2 VTI: 23.9 CM
BH CV ECHO MEAS - AO V2 VTI: 24.6 CM
BH CV ECHO MEAS - AVA(I,A): 2.5 CM^2
BH CV ECHO MEAS - AVA(I,A): 2.8 CM^2
BH CV ECHO MEAS - AVA(I,D): 2.5 CM^2
BH CV ECHO MEAS - AVA(I,D): 2.8 CM^2
BH CV ECHO MEAS - AVA(V,A): 2.4 CM^2
BH CV ECHO MEAS - AVA(V,A): 2.9 CM^2
BH CV ECHO MEAS - AVA(V,D): 2.4 CM^2
BH CV ECHO MEAS - AVA(V,D): 2.9 CM^2
BH CV ECHO MEAS - BSA(HAYCOCK): 2 M^2
BH CV ECHO MEAS - BSA(HAYCOCK): 2.2 M^2
BH CV ECHO MEAS - BSA: 1.9 M^2
BH CV ECHO MEAS - BSA: 2.1 M^2
BH CV ECHO MEAS - BZI_BMI: 27.2 KILOGRAMS/M^2
BH CV ECHO MEAS - BZI_BMI: 30.4 KILOGRAMS/M^2
BH CV ECHO MEAS - BZI_METRIC_HEIGHT: 172.7 CM
BH CV ECHO MEAS - BZI_METRIC_HEIGHT: 175.3 CM
BH CV ECHO MEAS - BZI_METRIC_WEIGHT: 81.2 KG
BH CV ECHO MEAS - BZI_METRIC_WEIGHT: 93.4 KG
BH CV ECHO MEAS - EDV(CUBED): 106.5 ML
BH CV ECHO MEAS - EDV(CUBED): 116.9 ML
BH CV ECHO MEAS - EDV(MOD-SP4): 130 ML
BH CV ECHO MEAS - EDV(MOD-SP4): 79 ML
BH CV ECHO MEAS - EDV(TEICH): 104.4 ML
BH CV ECHO MEAS - EDV(TEICH): 112.3 ML
BH CV ECHO MEAS - EF(CUBED): 69 %
BH CV ECHO MEAS - EF(CUBED): 73.1 %
BH CV ECHO MEAS - EF(MOD-SP4): 61.3 %
BH CV ECHO MEAS - EF(MOD-SP4): 73.3 %
BH CV ECHO MEAS - EF(TEICH): 60.4 %
BH CV ECHO MEAS - EF(TEICH): 64.8 %
BH CV ECHO MEAS - ESV(CUBED): 28.7 ML
BH CV ECHO MEAS - ESV(CUBED): 36.3 ML
BH CV ECHO MEAS - ESV(MOD-SP4): 21.1 ML
BH CV ECHO MEAS - ESV(MOD-SP4): 50.3 ML
BH CV ECHO MEAS - ESV(TEICH): 36.7 ML
BH CV ECHO MEAS - ESV(TEICH): 44.5 ML
BH CV ECHO MEAS - FS: 32.3 %
BH CV ECHO MEAS - FS: 35.4 %
BH CV ECHO MEAS - IVS/LVPW: 1.3
BH CV ECHO MEAS - IVS/LVPW: 1.4
BH CV ECHO MEAS - IVSD: 0.94 CM
BH CV ECHO MEAS - IVSD: 1.5 CM
BH CV ECHO MEAS - LA DIMENSION: 3.3 CM
BH CV ECHO MEAS - LA DIMENSION: 3.5 CM
BH CV ECHO MEAS - LA/AO: 0.89
BH CV ECHO MEAS - LA/AO: 1.1
BH CV ECHO MEAS - LAT PEAK E' VEL: 5.6 CM/SEC
BH CV ECHO MEAS - LAT PEAK E' VEL: 6.7 CM/SEC
BH CV ECHO MEAS - LV DIASTOLIC VOL/BSA (35-75): 37.8 ML/M^2
BH CV ECHO MEAS - LV DIASTOLIC VOL/BSA (35-75): 66.7 ML/M^2
BH CV ECHO MEAS - LV MASS(C)D: 131.5 GRAMS
BH CV ECHO MEAS - LV MASS(C)D: 248.7 GRAMS
BH CV ECHO MEAS - LV MASS(C)DI: 127.6 GRAMS/M^2
BH CV ECHO MEAS - LV MASS(C)DI: 62.9 GRAMS/M^2
BH CV ECHO MEAS - LV MAX PG: 3.8 MMHG
BH CV ECHO MEAS - LV MAX PG: 7.6 MMHG
BH CV ECHO MEAS - LV MEAN PG: 2 MMHG
BH CV ECHO MEAS - LV MEAN PG: 4 MMHG
BH CV ECHO MEAS - LV SYSTOLIC VOL/BSA (12-30): 10.1 ML/M^2
BH CV ECHO MEAS - LV SYSTOLIC VOL/BSA (12-30): 25.8 ML/M^2
BH CV ECHO MEAS - LV V1 MAX: 138 CM/SEC
BH CV ECHO MEAS - LV V1 MAX: 97 CM/SEC
BH CV ECHO MEAS - LV V1 MEAN: 63.1 CM/SEC
BH CV ECHO MEAS - LV V1 MEAN: 88.7 CM/SEC
BH CV ECHO MEAS - LV V1 VTI: 19.2 CM
BH CV ECHO MEAS - LV V1 VTI: 21.8 CM
BH CV ECHO MEAS - LVIDD: 4.7 CM
BH CV ECHO MEAS - LVIDD: 4.9 CM
BH CV ECHO MEAS - LVIDS: 3.1 CM
BH CV ECHO MEAS - LVIDS: 3.3 CM
BH CV ECHO MEAS - LVLD AP4: 6.7 CM
BH CV ECHO MEAS - LVLD AP4: 7.6 CM
BH CV ECHO MEAS - LVLS AP4: 5.7 CM
BH CV ECHO MEAS - LVLS AP4: 6.4 CM
BH CV ECHO MEAS - LVOT AREA (M): 3.1 CM^2
BH CV ECHO MEAS - LVOT AREA (M): 3.1 CM^2
BH CV ECHO MEAS - LVOT AREA: 3.1 CM^2
BH CV ECHO MEAS - LVOT AREA: 3.1 CM^2
BH CV ECHO MEAS - LVOT DIAM: 2 CM
BH CV ECHO MEAS - LVOT DIAM: 2 CM
BH CV ECHO MEAS - LVPWD: 0.73 CM
BH CV ECHO MEAS - LVPWD: 1.2 CM
BH CV ECHO MEAS - MED PEAK E' VEL: 5.44 CM/SEC
BH CV ECHO MEAS - MED PEAK E' VEL: 7.4 CM/SEC
BH CV ECHO MEAS - MV A MAX VEL: 78.3 CM/SEC
BH CV ECHO MEAS - MV A MAX VEL: 82.6 CM/SEC
BH CV ECHO MEAS - MV DEC SLOPE: 391 CM/SEC^2
BH CV ECHO MEAS - MV DEC TIME: 0.27 SEC
BH CV ECHO MEAS - MV DEC TIME: 0.31 SEC
BH CV ECHO MEAS - MV E MAX VEL: 105 CM/SEC
BH CV ECHO MEAS - MV E MAX VEL: 86.7 CM/SEC
BH CV ECHO MEAS - MV E/A: 1
BH CV ECHO MEAS - MV E/A: 1.3
BH CV ECHO MEAS - RAP SYSTOLE: 5 MMHG
BH CV ECHO MEAS - RAP SYSTOLE: 5 MMHG
BH CV ECHO MEAS - RVSP: 12.2 MMHG
BH CV ECHO MEAS - RVSP: 24.5 MMHG
BH CV ECHO MEAS - SI(AO): 131.8 ML/M^2
BH CV ECHO MEAS - SI(AO): 94.6 ML/M^2
BH CV ECHO MEAS - SI(CUBED): 37.2 ML/M^2
BH CV ECHO MEAS - SI(CUBED): 41.4 ML/M^2
BH CV ECHO MEAS - SI(LVOT): 30.9 ML/M^2
BH CV ECHO MEAS - SI(LVOT): 32.7 ML/M^2
BH CV ECHO MEAS - SI(MOD-SP4): 27.7 ML/M^2
BH CV ECHO MEAS - SI(MOD-SP4): 40.9 ML/M^2
BH CV ECHO MEAS - SI(TEICH): 32.4 ML/M^2
BH CV ECHO MEAS - SI(TEICH): 34.8 ML/M^2
BH CV ECHO MEAS - SV(AO): 197.8 ML
BH CV ECHO MEAS - SV(AO): 257 ML
BH CV ECHO MEAS - SV(CUBED): 77.8 ML
BH CV ECHO MEAS - SV(CUBED): 80.7 ML
BH CV ECHO MEAS - SV(LVOT): 60.3 ML
BH CV ECHO MEAS - SV(LVOT): 68.5 ML
BH CV ECHO MEAS - SV(MOD-SP4): 57.9 ML
BH CV ECHO MEAS - SV(MOD-SP4): 79.7 ML
BH CV ECHO MEAS - SV(TEICH): 67.7 ML
BH CV ECHO MEAS - SV(TEICH): 67.8 ML
BH CV ECHO MEAS - TR MAX VEL: 134 CM/SEC
BH CV ECHO MEAS - TR MAX VEL: 221 CM/SEC
BH CV ECHO MEASUREMENTS AVERAGE E/E' RATIO: 13.34
BH CV ECHO MEASUREMENTS AVERAGE E/E' RATIO: 17.3
BILIRUB SERPL-MCNC: 0.3 MG/DL (ref 0.1–1)
BILIRUB SERPL-MCNC: 0.3 MG/DL (ref 0.2–1.2)
BILIRUB SERPL-MCNC: 0.4 MG/DL (ref 0.1–1)
BILIRUB SERPL-MCNC: 0.5 MG/DL (ref 0.1–1)
BILIRUB SERPL-MCNC: 0.5 MG/DL (ref 0.1–1)
BILIRUB SERPL-MCNC: 0.6 MG/DL (ref 0.1–1)
BILIRUB SERPL-MCNC: 0.6 MG/DL (ref 0.1–1)
BILIRUB SERPL-MCNC: 0.7 MG/DL (ref 0.1–1)
BLD GP AB SCN SERPL QL: NEGATIVE
BODY TEMPERATURE: 37 C
BUN BLD-MCNC: 12 MG/DL (ref 8–23)
BUN BLD-MCNC: 13 MG/DL (ref 8–23)
BUN BLD-MCNC: 13 MG/DL (ref 8–23)
BUN BLD-MCNC: 15 MG/DL (ref 5–21)
BUN BLD-MCNC: 15 MG/DL (ref 5–21)
BUN BLD-MCNC: 15 MG/DL (ref 8–23)
BUN BLD-MCNC: 16 MG/DL (ref 5–21)
BUN BLD-MCNC: 16 MG/DL (ref 8–23)
BUN BLD-MCNC: 17 MG/DL (ref 5–21)
BUN BLD-MCNC: 18 MG/DL (ref 5–21)
BUN BLD-MCNC: 18 MG/DL (ref 8–23)
BUN BLD-MCNC: 19 MG/DL (ref 8–23)
BUN BLD-MCNC: 24 MG/DL (ref 5–21)
BUN BLD-MCNC: 24 MG/DL (ref 5–21)
BUN BLD-MCNC: 25 MG/DL (ref 5–21)
BUN BLD-MCNC: 30 MG/DL (ref 5–21)
BUN BLD-MCNC: 32 MG/DL (ref 5–21)
BUN BLD-MCNC: 41 MG/DL (ref 5–21)
BUN/CREAT SERPL: 14.5 (ref 7–25)
BUN/CREAT SERPL: 14.6 (ref 7–25)
BUN/CREAT SERPL: 16.3 (ref 7–25)
BUN/CREAT SERPL: 17.2 (ref 7–25)
BUN/CREAT SERPL: 17.6 (ref 7–25)
BUN/CREAT SERPL: 17.9 (ref 7–25)
BUN/CREAT SERPL: 18.4 (ref 7–25)
BUN/CREAT SERPL: 18.9 (ref 7–25)
BUN/CREAT SERPL: 19.3 (ref 7–25)
BUN/CREAT SERPL: 19.5 (ref 7–25)
BUN/CREAT SERPL: 20.7 (ref 7–25)
BUN/CREAT SERPL: 21 (ref 7–25)
BUN/CREAT SERPL: 21.4 (ref 7–25)
BUN/CREAT SERPL: 22.2 (ref 7–25)
BUN/CREAT SERPL: 24.4 (ref 7–25)
BUN/CREAT SERPL: 24.7 (ref 7–25)
BUN/CREAT SERPL: 26.6 (ref 7–25)
BUN/CREAT SERPL: 28.8 (ref 7–25)
BUN/CREAT SERPL: 29.9 (ref 7–25)
BUN/CREAT SERPL: 30.8 (ref 7–25)
C CAYETANENSIS DNA STL QL NAA+NON-PROBE: NOT DETECTED
C DIFF TOX GENS STL QL NAA+PROBE: NORMAL
CALCIUM SPEC-SCNC: 10 MG/DL (ref 8.6–10.5)
CALCIUM SPEC-SCNC: 10.1 MG/DL (ref 8.4–10.4)
CALCIUM SPEC-SCNC: 10.1 MG/DL (ref 8.6–10.5)
CALCIUM SPEC-SCNC: 10.2 MG/DL (ref 8.4–10.4)
CALCIUM SPEC-SCNC: 10.3 MG/DL (ref 8.4–10.4)
CALCIUM SPEC-SCNC: 10.5 MG/DL (ref 8.4–10.4)
CALCIUM SPEC-SCNC: 9.2 MG/DL (ref 8.6–10.5)
CALCIUM SPEC-SCNC: 9.4 MG/DL (ref 8.4–10.4)
CALCIUM SPEC-SCNC: 9.4 MG/DL (ref 8.6–10.5)
CALCIUM SPEC-SCNC: 9.6 MG/DL (ref 8.4–10.4)
CALCIUM SPEC-SCNC: 9.7 MG/DL (ref 8.4–10.4)
CALCIUM SPEC-SCNC: 9.7 MG/DL (ref 8.4–10.4)
CALCIUM SPEC-SCNC: 9.7 MG/DL (ref 8.6–10.5)
CALCIUM SPEC-SCNC: 9.8 MG/DL (ref 8.4–10.4)
CALCIUM SPEC-SCNC: 9.8 MG/DL (ref 8.4–10.4)
CALCIUM SPEC-SCNC: 9.8 MG/DL (ref 8.6–10.5)
CALCIUM SPEC-SCNC: 9.9 MG/DL (ref 8.4–10.4)
CALCIUM SPEC-SCNC: 9.9 MG/DL (ref 8.6–10.5)
CAMPY SP DNA.DIARRHEA STL QL NAA+PROBE: NOT DETECTED
CHLORIDE SERPL-SCNC: 100 MMOL/L (ref 98–110)
CHLORIDE SERPL-SCNC: 100 MMOL/L (ref 98–110)
CHLORIDE SERPL-SCNC: 101 MMOL/L (ref 98–110)
CHLORIDE SERPL-SCNC: 104 MMOL/L (ref 98–110)
CHLORIDE SERPL-SCNC: 87 MMOL/L (ref 98–110)
CHLORIDE SERPL-SCNC: 89 MMOL/L (ref 98–110)
CHLORIDE SERPL-SCNC: 91 MMOL/L (ref 98–110)
CHLORIDE SERPL-SCNC: 93 MMOL/L (ref 98–107)
CHLORIDE SERPL-SCNC: 94 MMOL/L (ref 98–107)
CHLORIDE SERPL-SCNC: 94 MMOL/L (ref 98–110)
CHLORIDE SERPL-SCNC: 95 MMOL/L (ref 98–107)
CHLORIDE SERPL-SCNC: 95 MMOL/L (ref 98–107)
CHLORIDE SERPL-SCNC: 97 MMOL/L (ref 98–110)
CHLORIDE SERPL-SCNC: 97 MMOL/L (ref 98–110)
CHLORIDE SERPL-SCNC: 98 MMOL/L (ref 98–107)
CHLORIDE SERPL-SCNC: 98 MMOL/L (ref 98–110)
CHLORIDE SERPL-SCNC: 98 MMOL/L (ref 98–110)
CHLORIDE SERPL-SCNC: 99 MMOL/L (ref 98–110)
CHOLEST SERPL-MCNC: 101 MG/DL (ref 130–200)
CLUMPED PLATELETS: PRESENT
CO2 SERPL-SCNC: 27 MMOL/L (ref 24–31)
CO2 SERPL-SCNC: 28 MMOL/L (ref 22–29)
CO2 SERPL-SCNC: 30 MMOL/L (ref 22–29)
CO2 SERPL-SCNC: 30 MMOL/L (ref 24–31)
CO2 SERPL-SCNC: 32 MMOL/L (ref 22–29)
CO2 SERPL-SCNC: 32 MMOL/L (ref 24–31)
CO2 SERPL-SCNC: 33 MMOL/L (ref 22–29)
CO2 SERPL-SCNC: 33 MMOL/L (ref 22–29)
CO2 SERPL-SCNC: 34 MMOL/L (ref 22–29)
CO2 SERPL-SCNC: 34 MMOL/L (ref 24–31)
CO2 SERPL-SCNC: 34 MMOL/L (ref 24–31)
CO2 SERPL-SCNC: 35 MMOL/L (ref 24–31)
CO2 SERPL-SCNC: 35 MMOL/L (ref 24–31)
CO2 SERPL-SCNC: 36 MMOL/L (ref 24–31)
CO2 SERPL-SCNC: 37 MMOL/L (ref 22–29)
CO2 SERPL-SCNC: 39 MMOL/L (ref 24–31)
CO2 SERPL-SCNC: 39 MMOL/L (ref 24–31)
CO2 SERPL-SCNC: >40 MMOL/L (ref 24–31)
COHGB MFR BLD: 0.5 % (ref 0–5)
CREAT BLD-MCNC: 0.74 MG/DL (ref 0.76–1.27)
CREAT BLD-MCNC: 0.78 MG/DL (ref 0.5–1.4)
CREAT BLD-MCNC: 0.78 MG/DL (ref 0.76–1.27)
CREAT BLD-MCNC: 0.81 MG/DL (ref 0.5–1.4)
CREAT BLD-MCNC: 0.81 MG/DL (ref 0.5–1.4)
CREAT BLD-MCNC: 0.83 MG/DL (ref 0.5–1.4)
CREAT BLD-MCNC: 0.83 MG/DL (ref 0.76–1.27)
CREAT BLD-MCNC: 0.84 MG/DL (ref 0.76–1.27)
CREAT BLD-MCNC: 0.84 MG/DL (ref 0.76–1.27)
CREAT BLD-MCNC: 0.87 MG/DL (ref 0.5–1.4)
CREAT BLD-MCNC: 0.87 MG/DL (ref 0.5–1.4)
CREAT BLD-MCNC: 0.87 MG/DL (ref 0.76–1.27)
CREAT BLD-MCNC: 0.89 MG/DL (ref 0.76–1.27)
CREAT BLD-MCNC: 0.9 MG/DL (ref 0.5–1.4)
CREAT BLD-MCNC: 0.92 MG/DL (ref 0.5–1.4)
CREAT BLD-MCNC: 0.97 MG/DL (ref 0.5–1.4)
CREAT BLD-MCNC: 1.04 MG/DL (ref 0.5–1.4)
CREAT BLD-MCNC: 1.07 MG/DL (ref 0.5–1.4)
CREAT BLD-MCNC: 1.21 MG/DL (ref 0.5–1.4)
CREAT BLD-MCNC: 1.54 MG/DL (ref 0.5–1.4)
CREAT BLDA-MCNC: 1.1 MG/DL (ref 0.6–1.3)
CREAT BLDA-MCNC: 1.5 MG/DL (ref 0.6–1.3)
CROSSMATCH INTERPRETATION: NORMAL
CRYPTOSP STL CULT: NOT DETECTED
D-LACTATE SERPL-SCNC: 1 MMOL/L (ref 0.5–2)
D-LACTATE SERPL-SCNC: 1.7 MMOL/L (ref 0.5–2)
D-LACTATE SERPL-SCNC: 2.1 MMOL/L (ref 0.5–2)
DACRYOCYTES BLD QL SMEAR: ABNORMAL
DEPRECATED RDW RBC AUTO: 43.3 FL (ref 40–54)
DEPRECATED RDW RBC AUTO: 43.8 FL (ref 40–54)
DEPRECATED RDW RBC AUTO: 45.5 FL (ref 40–54)
DEPRECATED RDW RBC AUTO: 45.9 FL (ref 40–54)
DEPRECATED RDW RBC AUTO: 46.2 FL (ref 40–54)
DEPRECATED RDW RBC AUTO: 46.2 FL (ref 40–54)
DEPRECATED RDW RBC AUTO: 46.3 FL (ref 40–54)
DEPRECATED RDW RBC AUTO: 46.7 FL (ref 37–54)
DEPRECATED RDW RBC AUTO: 46.9 FL (ref 40–54)
DEPRECATED RDW RBC AUTO: 47.3 FL (ref 40–54)
DEPRECATED RDW RBC AUTO: 48 FL (ref 40–54)
DEPRECATED RDW RBC AUTO: 48.3 FL (ref 37–54)
DEPRECATED RDW RBC AUTO: 48.7 FL (ref 40–54)
DEPRECATED RDW RBC AUTO: 59.4 FL (ref 37–54)
DEPRECATED RDW RBC AUTO: 59.9 FL (ref 37–54)
DEPRECATED RDW RBC AUTO: 62.3 FL (ref 37–54)
DEPRECATED RDW RBC AUTO: 63.4 FL (ref 37–54)
DEPRECATED RDW RBC AUTO: 66.2 FL (ref 37–54)
DEPRECATED RDW RBC AUTO: 67.4 FL (ref 37–54)
E COLI DNA SPEC QL NAA+PROBE: NOT DETECTED
E HISTOLYT AG STL-ACNC: NOT DETECTED
EAEC PAA PLAS AGGR+AATA ST NAA+NON-PRB: NOT DETECTED
EC STX1 + STX2 GENES STL NAA+PROBE: NOT DETECTED
EOSINOPHIL # BLD AUTO: 0 10*3/MM3 (ref 0–0.7)
EOSINOPHIL # BLD AUTO: 0.08 10*3/MM3 (ref 0–0.7)
EOSINOPHIL # BLD AUTO: 0.11 10*3/MM3 (ref 0–0.4)
EOSINOPHIL # BLD AUTO: 0.11 10*3/MM3 (ref 0–0.7)
EOSINOPHIL # BLD AUTO: 0.18 10*3/MM3 (ref 0–0.7)
EOSINOPHIL # BLD AUTO: 0.19 10*3/MM3 (ref 0–0.4)
EOSINOPHIL # BLD AUTO: 0.23 10*3/MM3 (ref 0–0.7)
EOSINOPHIL # BLD AUTO: 0.24 10*3/MM3 (ref 0–0.7)
EOSINOPHIL # BLD AUTO: 0.28 10*3/MM3 (ref 0–0.7)
EOSINOPHIL # BLD AUTO: 0.3 10*3/MM3 (ref 0–0.7)
EOSINOPHIL # BLD AUTO: 0.36 10*3/MM3 (ref 0–0.7)
EOSINOPHIL # BLD AUTO: 0.49 10*3/MM3 (ref 0–0.7)
EOSINOPHIL # BLD MANUAL: 0.1 10*3/MM3 (ref 0–0.4)
EOSINOPHIL # BLD MANUAL: 0.11 10*3/MM3 (ref 0–0.4)
EOSINOPHIL NFR BLD AUTO: 0 % (ref 0–4)
EOSINOPHIL NFR BLD AUTO: 0.8 % (ref 0–4)
EOSINOPHIL NFR BLD AUTO: 0.9 % (ref 0.3–6.2)
EOSINOPHIL NFR BLD AUTO: 0.9 % (ref 0–4)
EOSINOPHIL NFR BLD AUTO: 1.8 % (ref 0.3–6.2)
EOSINOPHIL NFR BLD AUTO: 1.8 % (ref 0–4)
EOSINOPHIL NFR BLD AUTO: 2 % (ref 0–4)
EOSINOPHIL NFR BLD AUTO: 2.2 % (ref 0–4)
EOSINOPHIL NFR BLD AUTO: 2.4 % (ref 0–4)
EOSINOPHIL NFR BLD AUTO: 3.3 % (ref 0–4)
EOSINOPHIL NFR BLD AUTO: 3.6 % (ref 0–4)
EOSINOPHIL NFR BLD AUTO: 4.5 % (ref 0–4)
EOSINOPHIL NFR BLD MANUAL: 2 % (ref 0.3–6.2)
EOSINOPHIL NFR BLD MANUAL: 2 % (ref 0.3–6.2)
EPAP: 8
EPEC EAE GENE STL QL NAA+NON-PROBE: NOT DETECTED
ERYTHROCYTE [DISTWIDTH] IN BLOOD BY AUTOMATED COUNT: 13.7 % (ref 12–15)
ERYTHROCYTE [DISTWIDTH] IN BLOOD BY AUTOMATED COUNT: 13.7 % (ref 12–15)
ERYTHROCYTE [DISTWIDTH] IN BLOOD BY AUTOMATED COUNT: 13.9 % (ref 12–15)
ERYTHROCYTE [DISTWIDTH] IN BLOOD BY AUTOMATED COUNT: 14 % (ref 12–15)
ERYTHROCYTE [DISTWIDTH] IN BLOOD BY AUTOMATED COUNT: 14.1 % (ref 12–15)
ERYTHROCYTE [DISTWIDTH] IN BLOOD BY AUTOMATED COUNT: 14.2 % (ref 12–15)
ERYTHROCYTE [DISTWIDTH] IN BLOOD BY AUTOMATED COUNT: 14.3 % (ref 12–15)
ERYTHROCYTE [DISTWIDTH] IN BLOOD BY AUTOMATED COUNT: 14.6 % (ref 12.3–15.4)
ERYTHROCYTE [DISTWIDTH] IN BLOOD BY AUTOMATED COUNT: 15.1 % (ref 12.3–15.4)
ERYTHROCYTE [DISTWIDTH] IN BLOOD BY AUTOMATED COUNT: 18.6 % (ref 12.3–15.4)
ERYTHROCYTE [DISTWIDTH] IN BLOOD BY AUTOMATED COUNT: 18.6 % (ref 12.3–15.4)
ERYTHROCYTE [DISTWIDTH] IN BLOOD BY AUTOMATED COUNT: 18.7 % (ref 12.3–15.4)
ERYTHROCYTE [DISTWIDTH] IN BLOOD BY AUTOMATED COUNT: 19 % (ref 12.3–15.4)
ERYTHROCYTE [DISTWIDTH] IN BLOOD BY AUTOMATED COUNT: 19.1 % (ref 12.3–15.4)
ERYTHROCYTE [DISTWIDTH] IN BLOOD BY AUTOMATED COUNT: 19.3 % (ref 12.3–15.4)
ETEC LTA+ST1A+ST1B TOX ST NAA+NON-PROBE: NOT DETECTED
FUNGUS WND CULT: NORMAL
G LAMBLIA DNA SPEC QL NAA+PROBE: NOT DETECTED
GAS FLOW AIRWAY: 15 LPM
GAS FLOW AIRWAY: 4 LPM
GAS FLOW AIRWAY: 8 LPM
GFR SERPL CREATININE-BSD FRML MDRD: 104 ML/MIN/1.73
GFR SERPL CREATININE-BSD FRML MDRD: 45 ML/MIN/1.73
GFR SERPL CREATININE-BSD FRML MDRD: 59 ML/MIN/1.73
GFR SERPL CREATININE-BSD FRML MDRD: 68 ML/MIN/1.73
GFR SERPL CREATININE-BSD FRML MDRD: 71 ML/MIN/1.73
GFR SERPL CREATININE-BSD FRML MDRD: 77 ML/MIN/1.73
GFR SERPL CREATININE-BSD FRML MDRD: 81 ML/MIN/1.73
GFR SERPL CREATININE-BSD FRML MDRD: 83 ML/MIN/1.73
GFR SERPL CREATININE-BSD FRML MDRD: 84 ML/MIN/1.73
GFR SERPL CREATININE-BSD FRML MDRD: 87 ML/MIN/1.73
GFR SERPL CREATININE-BSD FRML MDRD: 90 ML/MIN/1.73
GFR SERPL CREATININE-BSD FRML MDRD: 90 ML/MIN/1.73
GFR SERPL CREATININE-BSD FRML MDRD: 91 ML/MIN/1.73
GFR SERPL CREATININE-BSD FRML MDRD: 92 ML/MIN/1.73
GFR SERPL CREATININE-BSD FRML MDRD: 94 ML/MIN/1.73
GFR SERPL CREATININE-BSD FRML MDRD: 94 ML/MIN/1.73
GFR SERPL CREATININE-BSD FRML MDRD: 98 ML/MIN/1.73
GFR SERPL CREATININE-BSD FRML MDRD: 98 ML/MIN/1.73
GIANT PLATELETS: ABNORMAL
GIANT PLATELETS: ABNORMAL
GLOBULIN UR ELPH-MCNC: 2.6 GM/DL
GLOBULIN UR ELPH-MCNC: 2.7 GM/DL
GLOBULIN UR ELPH-MCNC: 2.8 GM/DL
GLOBULIN UR ELPH-MCNC: 3 GM/DL
GLOBULIN UR ELPH-MCNC: 3.1 GM/DL
GLOBULIN UR ELPH-MCNC: 3.3 GM/DL
GLOBULIN UR ELPH-MCNC: 3.5 GM/DL
GLOBULIN UR ELPH-MCNC: 3.6 GM/DL
GLUCOSE BLD-MCNC: 108 MG/DL (ref 70–100)
GLUCOSE BLD-MCNC: 133 MG/DL (ref 70–100)
GLUCOSE BLD-MCNC: 134 MG/DL (ref 65–99)
GLUCOSE BLD-MCNC: 134 MG/DL (ref 70–100)
GLUCOSE BLD-MCNC: 134 MG/DL (ref 70–100)
GLUCOSE BLD-MCNC: 135 MG/DL (ref 70–100)
GLUCOSE BLD-MCNC: 137 MG/DL (ref 70–100)
GLUCOSE BLD-MCNC: 138 MG/DL (ref 70–100)
GLUCOSE BLD-MCNC: 142 MG/DL (ref 65–99)
GLUCOSE BLD-MCNC: 143 MG/DL (ref 70–100)
GLUCOSE BLD-MCNC: 153 MG/DL (ref 65–99)
GLUCOSE BLD-MCNC: 165 MG/DL (ref 65–99)
GLUCOSE BLD-MCNC: 174 MG/DL (ref 65–99)
GLUCOSE BLD-MCNC: 175 MG/DL (ref 65–99)
GLUCOSE BLD-MCNC: 187 MG/DL (ref 70–100)
GLUCOSE BLD-MCNC: 194 MG/DL (ref 70–100)
GLUCOSE BLD-MCNC: 196 MG/DL (ref 70–100)
GLUCOSE BLD-MCNC: 237 MG/DL (ref 70–100)
GLUCOSE BLD-MCNC: 254 MG/DL (ref 70–100)
GLUCOSE BLD-MCNC: 379 MG/DL (ref 65–99)
GLUCOSE BLDC GLUCOMTR-MCNC: 112 MG/DL (ref 70–130)
GLUCOSE BLDC GLUCOMTR-MCNC: 115 MG/DL (ref 70–130)
GLUCOSE BLDC GLUCOMTR-MCNC: 117 MG/DL (ref 70–130)
GLUCOSE BLDC GLUCOMTR-MCNC: 120 MG/DL (ref 70–130)
GLUCOSE BLDC GLUCOMTR-MCNC: 122 MG/DL (ref 70–130)
GLUCOSE BLDC GLUCOMTR-MCNC: 127 MG/DL (ref 70–130)
GLUCOSE BLDC GLUCOMTR-MCNC: 128 MG/DL (ref 70–130)
GLUCOSE BLDC GLUCOMTR-MCNC: 128 MG/DL (ref 70–130)
GLUCOSE BLDC GLUCOMTR-MCNC: 131 MG/DL (ref 70–130)
GLUCOSE BLDC GLUCOMTR-MCNC: 131 MG/DL (ref 70–130)
GLUCOSE BLDC GLUCOMTR-MCNC: 133 MG/DL (ref 70–130)
GLUCOSE BLDC GLUCOMTR-MCNC: 133 MG/DL (ref 70–130)
GLUCOSE BLDC GLUCOMTR-MCNC: 137 MG/DL (ref 70–130)
GLUCOSE BLDC GLUCOMTR-MCNC: 140 MG/DL (ref 70–130)
GLUCOSE BLDC GLUCOMTR-MCNC: 141 MG/DL (ref 70–130)
GLUCOSE BLDC GLUCOMTR-MCNC: 142 MG/DL (ref 70–130)
GLUCOSE BLDC GLUCOMTR-MCNC: 142 MG/DL (ref 70–130)
GLUCOSE BLDC GLUCOMTR-MCNC: 145 MG/DL (ref 70–130)
GLUCOSE BLDC GLUCOMTR-MCNC: 145 MG/DL (ref 70–130)
GLUCOSE BLDC GLUCOMTR-MCNC: 146 MG/DL (ref 70–130)
GLUCOSE BLDC GLUCOMTR-MCNC: 147 MG/DL (ref 70–130)
GLUCOSE BLDC GLUCOMTR-MCNC: 148 MG/DL (ref 70–130)
GLUCOSE BLDC GLUCOMTR-MCNC: 149 MG/DL (ref 70–130)
GLUCOSE BLDC GLUCOMTR-MCNC: 150 MG/DL (ref 70–130)
GLUCOSE BLDC GLUCOMTR-MCNC: 151 MG/DL (ref 70–130)
GLUCOSE BLDC GLUCOMTR-MCNC: 153 MG/DL (ref 70–130)
GLUCOSE BLDC GLUCOMTR-MCNC: 154 MG/DL (ref 70–130)
GLUCOSE BLDC GLUCOMTR-MCNC: 154 MG/DL (ref 70–130)
GLUCOSE BLDC GLUCOMTR-MCNC: 155 MG/DL (ref 70–130)
GLUCOSE BLDC GLUCOMTR-MCNC: 155 MG/DL (ref 70–130)
GLUCOSE BLDC GLUCOMTR-MCNC: 156 MG/DL (ref 70–130)
GLUCOSE BLDC GLUCOMTR-MCNC: 158 MG/DL (ref 70–130)
GLUCOSE BLDC GLUCOMTR-MCNC: 161 MG/DL (ref 70–130)
GLUCOSE BLDC GLUCOMTR-MCNC: 161 MG/DL (ref 70–130)
GLUCOSE BLDC GLUCOMTR-MCNC: 162 MG/DL (ref 70–130)
GLUCOSE BLDC GLUCOMTR-MCNC: 163 MG/DL (ref 70–130)
GLUCOSE BLDC GLUCOMTR-MCNC: 163 MG/DL (ref 70–130)
GLUCOSE BLDC GLUCOMTR-MCNC: 166 MG/DL (ref 70–130)
GLUCOSE BLDC GLUCOMTR-MCNC: 167 MG/DL (ref 70–130)
GLUCOSE BLDC GLUCOMTR-MCNC: 168 MG/DL (ref 70–130)
GLUCOSE BLDC GLUCOMTR-MCNC: 168 MG/DL (ref 70–130)
GLUCOSE BLDC GLUCOMTR-MCNC: 169 MG/DL (ref 70–130)
GLUCOSE BLDC GLUCOMTR-MCNC: 170 MG/DL (ref 70–130)
GLUCOSE BLDC GLUCOMTR-MCNC: 172 MG/DL (ref 70–130)
GLUCOSE BLDC GLUCOMTR-MCNC: 172 MG/DL (ref 70–130)
GLUCOSE BLDC GLUCOMTR-MCNC: 176 MG/DL (ref 70–130)
GLUCOSE BLDC GLUCOMTR-MCNC: 180 MG/DL (ref 70–130)
GLUCOSE BLDC GLUCOMTR-MCNC: 184 MG/DL (ref 70–130)
GLUCOSE BLDC GLUCOMTR-MCNC: 186 MG/DL (ref 70–130)
GLUCOSE BLDC GLUCOMTR-MCNC: 186 MG/DL (ref 70–130)
GLUCOSE BLDC GLUCOMTR-MCNC: 189 MG/DL (ref 70–130)
GLUCOSE BLDC GLUCOMTR-MCNC: 190 MG/DL (ref 70–130)
GLUCOSE BLDC GLUCOMTR-MCNC: 192 MG/DL (ref 70–130)
GLUCOSE BLDC GLUCOMTR-MCNC: 192 MG/DL (ref 70–130)
GLUCOSE BLDC GLUCOMTR-MCNC: 194 MG/DL (ref 70–130)
GLUCOSE BLDC GLUCOMTR-MCNC: 194 MG/DL (ref 70–130)
GLUCOSE BLDC GLUCOMTR-MCNC: 196 MG/DL (ref 70–130)
GLUCOSE BLDC GLUCOMTR-MCNC: 198 MG/DL (ref 70–130)
GLUCOSE BLDC GLUCOMTR-MCNC: 198 MG/DL (ref 70–130)
GLUCOSE BLDC GLUCOMTR-MCNC: 200 MG/DL (ref 70–130)
GLUCOSE BLDC GLUCOMTR-MCNC: 203 MG/DL (ref 70–130)
GLUCOSE BLDC GLUCOMTR-MCNC: 204 MG/DL (ref 70–130)
GLUCOSE BLDC GLUCOMTR-MCNC: 208 MG/DL (ref 70–130)
GLUCOSE BLDC GLUCOMTR-MCNC: 213 MG/DL (ref 70–130)
GLUCOSE BLDC GLUCOMTR-MCNC: 216 MG/DL (ref 70–130)
GLUCOSE BLDC GLUCOMTR-MCNC: 219 MG/DL (ref 70–130)
GLUCOSE BLDC GLUCOMTR-MCNC: 219 MG/DL (ref 70–130)
GLUCOSE BLDC GLUCOMTR-MCNC: 224 MG/DL (ref 70–130)
GLUCOSE BLDC GLUCOMTR-MCNC: 226 MG/DL (ref 70–130)
GLUCOSE BLDC GLUCOMTR-MCNC: 232 MG/DL (ref 70–130)
GLUCOSE BLDC GLUCOMTR-MCNC: 234 MG/DL (ref 70–130)
GLUCOSE BLDC GLUCOMTR-MCNC: 236 MG/DL (ref 70–130)
GLUCOSE BLDC GLUCOMTR-MCNC: 242 MG/DL (ref 70–130)
GLUCOSE BLDC GLUCOMTR-MCNC: 246 MG/DL (ref 70–130)
GLUCOSE BLDC GLUCOMTR-MCNC: 255 MG/DL (ref 70–130)
GLUCOSE BLDC GLUCOMTR-MCNC: 268 MG/DL (ref 70–130)
GLUCOSE BLDC GLUCOMTR-MCNC: 273 MG/DL (ref 70–130)
GLUCOSE BLDC GLUCOMTR-MCNC: 277 MG/DL (ref 70–130)
GLUCOSE BLDC GLUCOMTR-MCNC: 281 MG/DL (ref 70–130)
GLUCOSE BLDC GLUCOMTR-MCNC: 290 MG/DL (ref 70–130)
GLUCOSE BLDC GLUCOMTR-MCNC: 311 MG/DL (ref 70–130)
GLUCOSE BLDC GLUCOMTR-MCNC: 314 MG/DL (ref 70–130)
GLUCOSE BLDC GLUCOMTR-MCNC: 316 MG/DL (ref 70–130)
GLUCOSE BLDC GLUCOMTR-MCNC: 340 MG/DL (ref 70–130)
GLUCOSE BLDC GLUCOMTR-MCNC: 344 MG/DL (ref 70–130)
GLUCOSE BLDC GLUCOMTR-MCNC: 357 MG/DL (ref 70–130)
GLUCOSE BLDC GLUCOMTR-MCNC: 359 MG/DL (ref 70–130)
GRAM STN SPEC: NORMAL
HBA1C MFR BLD: 7.7 %
HCO3 BLDA-SCNC: 27.3 MMOL/L (ref 20–26)
HCO3 BLDA-SCNC: 27.4 MMOL/L (ref 20–26)
HCO3 BLDA-SCNC: 29.1 MMOL/L (ref 20–26)
HCO3 BLDA-SCNC: 32.4 MMOL/L (ref 20–26)
HCO3 BLDA-SCNC: 37.6 MMOL/L (ref 20–26)
HCT VFR BLD AUTO: 20.7 % (ref 37.5–51)
HCT VFR BLD AUTO: 21.5 % (ref 37.5–51)
HCT VFR BLD AUTO: 23.9 % (ref 37.5–51)
HCT VFR BLD AUTO: 27.1 % (ref 37.5–51)
HCT VFR BLD AUTO: 28.1 % (ref 37.5–51)
HCT VFR BLD AUTO: 28.3 % (ref 37.5–51)
HCT VFR BLD AUTO: 29.4 % (ref 37.5–51)
HCT VFR BLD AUTO: 34.3 % (ref 40–52)
HCT VFR BLD AUTO: 37.5 % (ref 37.5–51)
HCT VFR BLD AUTO: 37.5 % (ref 40–52)
HCT VFR BLD AUTO: 37.9 % (ref 37.5–51)
HCT VFR BLD AUTO: 38.3 % (ref 40–52)
HCT VFR BLD AUTO: 38.3 % (ref 40–52)
HCT VFR BLD AUTO: 38.8 % (ref 40–52)
HCT VFR BLD AUTO: 39.1 % (ref 40–52)
HCT VFR BLD AUTO: 40.9 % (ref 40–52)
HCT VFR BLD AUTO: 41.4 % (ref 40–52)
HCT VFR BLD AUTO: 44.9 % (ref 40–52)
HCT VFR BLD CALC: 25.2 % (ref 38–51)
HDLC SERPL-MCNC: 28 MG/DL
HGB BLD-MCNC: 10.7 G/DL (ref 14–18)
HGB BLD-MCNC: 11.3 G/DL (ref 14–18)
HGB BLD-MCNC: 11.4 G/DL (ref 14–18)
HGB BLD-MCNC: 11.6 G/DL (ref 14–18)
HGB BLD-MCNC: 11.6 G/DL (ref 14–18)
HGB BLD-MCNC: 11.8 G/DL (ref 13–17.7)
HGB BLD-MCNC: 11.9 G/DL (ref 14–18)
HGB BLD-MCNC: 12 G/DL (ref 14–18)
HGB BLD-MCNC: 12.2 G/DL (ref 14–18)
HGB BLD-MCNC: 12.2 G/DL (ref 14–18)
HGB BLD-MCNC: 12.3 G/DL (ref 13–17.7)
HGB BLD-MCNC: 12.7 G/DL (ref 14–18)
HGB BLD-MCNC: 14.4 G/DL (ref 14–18)
HGB BLD-MCNC: 6.9 G/DL (ref 13–17.7)
HGB BLD-MCNC: 7 G/DL (ref 13–17.7)
HGB BLD-MCNC: 7.6 G/DL (ref 13–17.7)
HGB BLD-MCNC: 8.8 G/DL (ref 13–17.7)
HGB BLD-MCNC: 8.8 G/DL (ref 13–17.7)
HGB BLD-MCNC: 9 G/DL (ref 13–17.7)
HGB BLD-MCNC: 9.3 G/DL (ref 13–17.7)
HGB BLDA-MCNC: 8.2 G/DL (ref 14–18)
HOLD SPECIMEN: NORMAL
HOROWITZ INDEX BLD+IHG-RTO: 100 %
HOROWITZ INDEX BLD+IHG-RTO: 50 %
IMM GRANULOCYTES # BLD AUTO: 0.07 10*3/MM3 (ref 0–0.05)
IMM GRANULOCYTES # BLD AUTO: 0.08 10*3/MM3 (ref 0–0.05)
IMM GRANULOCYTES # BLD AUTO: 0.09 10*3/MM3 (ref 0–0.05)
IMM GRANULOCYTES # BLD AUTO: 0.12 10*3/MM3 (ref 0–0.05)
IMM GRANULOCYTES # BLD AUTO: 0.13 10*3/MM3 (ref 0–0.05)
IMM GRANULOCYTES # BLD AUTO: 0.21 10*3/MM3 (ref 0–0.05)
IMM GRANULOCYTES NFR BLD AUTO: 0.6 % (ref 0–5)
IMM GRANULOCYTES NFR BLD AUTO: 0.6 % (ref 0–5)
IMM GRANULOCYTES NFR BLD AUTO: 0.7 % (ref 0–5)
IMM GRANULOCYTES NFR BLD AUTO: 0.8 % (ref 0–5)
IMM GRANULOCYTES NFR BLD AUTO: 0.9 % (ref 0–5)
IMM GRANULOCYTES NFR BLD AUTO: 1 % (ref 0–5)
IMM GRANULOCYTES NFR BLD AUTO: 1.2 % (ref 0–0.5)
IMM GRANULOCYTES NFR BLD AUTO: 1.4 % (ref 0–5)
IMM GRANULOCYTES NFR BLD AUTO: 1.4 % (ref 0–5)
IMM GRANULOCYTES NFR BLD AUTO: 1.8 % (ref 0–0.5)
INR PPP: 0.96 (ref 0.91–1.09)
IPAP: 16
IRON 24H UR-MRATE: 60 MCG/DL (ref 59–158)
IRON SATN MFR SERPL: 20 % (ref 20–50)
KOH PREP NAIL: NORMAL
LAB AP CASE REPORT: NORMAL
LAB AP CLINICAL INFORMATION: NORMAL
LAB AP DIAGNOSIS COMMENT: NORMAL
LDLC/HDLC SERPL: 2.26 {RATIO}
LEFT ATRIUM VOLUME INDEX: 21.8 ML/M2
LEFT ATRIUM VOLUME INDEX: 28.8 ML/M2
LEFT ATRIUM VOLUME: 45.5 CM3
LEFT ATRIUM VOLUME: 56.2 CM3
LV EF 2D ECHO EST: 55 %
LV EF 2D ECHO EST: 55 %
LYMPHOCYTES # BLD AUTO: 1.02 10*3/MM3 (ref 0.72–4.86)
LYMPHOCYTES # BLD AUTO: 1.05 10*3/MM3 (ref 0.72–4.86)
LYMPHOCYTES # BLD AUTO: 1.09 10*3/MM3 (ref 0.72–4.86)
LYMPHOCYTES # BLD AUTO: 1.15 10*3/MM3 (ref 0.72–4.86)
LYMPHOCYTES # BLD AUTO: 1.17 10*3/MM3 (ref 0.7–3.1)
LYMPHOCYTES # BLD AUTO: 1.21 10*3/MM3 (ref 0.72–4.86)
LYMPHOCYTES # BLD AUTO: 1.37 10*3/MM3 (ref 0.72–4.86)
LYMPHOCYTES # BLD AUTO: 1.41 10*3/MM3 (ref 0.72–4.86)
LYMPHOCYTES # BLD AUTO: 1.41 10*3/MM3 (ref 0.7–3.1)
LYMPHOCYTES # BLD AUTO: 1.74 10*3/MM3 (ref 0.72–4.86)
LYMPHOCYTES # BLD AUTO: 1.81 10*3/MM3 (ref 0.72–4.86)
LYMPHOCYTES # BLD AUTO: 2.04 10*3/MM3 (ref 0.72–4.86)
LYMPHOCYTES # BLD MANUAL: 0.38 10*3/MM3 (ref 0.7–3.1)
LYMPHOCYTES # BLD MANUAL: 0.83 10*3/MM3 (ref 0.7–3.1)
LYMPHOCYTES # BLD MANUAL: 1.06 10*3/MM3 (ref 0.7–3.1)
LYMPHOCYTES NFR BLD AUTO: 10.8 % (ref 15–45)
LYMPHOCYTES NFR BLD AUTO: 11.1 % (ref 15–45)
LYMPHOCYTES NFR BLD AUTO: 11.4 % (ref 15–45)
LYMPHOCYTES NFR BLD AUTO: 12.2 % (ref 15–45)
LYMPHOCYTES NFR BLD AUTO: 12.2 % (ref 15–45)
LYMPHOCYTES NFR BLD AUTO: 12.6 % (ref 15–45)
LYMPHOCYTES NFR BLD AUTO: 13.6 % (ref 15–45)
LYMPHOCYTES NFR BLD AUTO: 13.6 % (ref 19.6–45.3)
LYMPHOCYTES NFR BLD AUTO: 15.4 % (ref 15–45)
LYMPHOCYTES NFR BLD AUTO: 15.8 % (ref 15–45)
LYMPHOCYTES NFR BLD AUTO: 15.9 % (ref 15–45)
LYMPHOCYTES NFR BLD AUTO: 9.9 % (ref 19.6–45.3)
LYMPHOCYTES NFR BLD MANUAL: 14.3 % (ref 5–12)
LYMPHOCYTES NFR BLD MANUAL: 15 % (ref 19.6–45.3)
LYMPHOCYTES NFR BLD MANUAL: 17 % (ref 19.6–45.3)
LYMPHOCYTES NFR BLD MANUAL: 19.4 % (ref 19.6–45.3)
LYMPHOCYTES NFR BLD MANUAL: 3 % (ref 5–12)
LYMPHOCYTES NFR BLD MANUAL: 34 % (ref 5–12)
Lab: ABNORMAL
Lab: NORMAL
MAGNESIUM SERPL-MCNC: 0.7 MG/DL (ref 1.6–2.4)
MAGNESIUM SERPL-MCNC: 1.1 MG/DL (ref 1.4–2.2)
MAGNESIUM SERPL-MCNC: 1.3 MG/DL (ref 1.6–2.4)
MAGNESIUM SERPL-MCNC: 1.4 MG/DL (ref 1.6–2.4)
MAGNESIUM SERPL-MCNC: 1.7 MG/DL (ref 1.6–2.4)
MAGNESIUM SERPL-MCNC: 1.8 MG/DL (ref 1.4–2.2)
MAGNESIUM SERPL-MCNC: 2 MG/DL (ref 1.6–2.4)
MAGNESIUM SERPL-MCNC: 2.1 MG/DL (ref 1.6–2.4)
MAGNESIUM SERPL-MCNC: 2.6 MG/DL (ref 1.6–2.4)
MAXIMAL PREDICTED HEART RATE: 149 BPM
MAXIMAL PREDICTED HEART RATE: 150 BPM
MCH RBC QN AUTO: 26.9 PG (ref 28–32)
MCH RBC QN AUTO: 27.2 PG (ref 28–32)
MCH RBC QN AUTO: 27.4 PG (ref 28–32)
MCH RBC QN AUTO: 27.5 PG (ref 28–32)
MCH RBC QN AUTO: 27.5 PG (ref 28–32)
MCH RBC QN AUTO: 27.6 PG (ref 28–32)
MCH RBC QN AUTO: 27.7 PG (ref 26.6–33)
MCH RBC QN AUTO: 27.7 PG (ref 28–32)
MCH RBC QN AUTO: 27.9 PG (ref 28–32)
MCH RBC QN AUTO: 28.6 PG (ref 26.6–33)
MCH RBC QN AUTO: 30.8 PG (ref 26.6–33)
MCH RBC QN AUTO: 31 PG (ref 26.6–33)
MCH RBC QN AUTO: 31.1 PG (ref 26.6–33)
MCH RBC QN AUTO: 31.3 PG (ref 26.6–33)
MCH RBC QN AUTO: 31.3 PG (ref 26.6–33)
MCH RBC QN AUTO: 31.7 PG (ref 26.6–33)
MCHC RBC AUTO-ENTMCNC: 29.5 G/DL (ref 33–36)
MCHC RBC AUTO-ENTMCNC: 29.8 G/DL (ref 33–36)
MCHC RBC AUTO-ENTMCNC: 29.9 G/DL (ref 33–36)
MCHC RBC AUTO-ENTMCNC: 30.3 G/DL (ref 33–36)
MCHC RBC AUTO-ENTMCNC: 30.4 G/DL (ref 33–36)
MCHC RBC AUTO-ENTMCNC: 30.4 G/DL (ref 33–36)
MCHC RBC AUTO-ENTMCNC: 30.7 G/DL (ref 33–36)
MCHC RBC AUTO-ENTMCNC: 30.9 G/DL (ref 33–36)
MCHC RBC AUTO-ENTMCNC: 31.1 G/DL (ref 31.5–35.7)
MCHC RBC AUTO-ENTMCNC: 31.1 G/DL (ref 31.5–35.7)
MCHC RBC AUTO-ENTMCNC: 31.2 G/DL (ref 33–36)
MCHC RBC AUTO-ENTMCNC: 31.4 G/DL (ref 33–36)
MCHC RBC AUTO-ENTMCNC: 31.6 G/DL (ref 31.5–35.7)
MCHC RBC AUTO-ENTMCNC: 31.8 G/DL (ref 31.5–35.7)
MCHC RBC AUTO-ENTMCNC: 32 G/DL (ref 31.5–35.7)
MCHC RBC AUTO-ENTMCNC: 32.1 G/DL (ref 33–36)
MCHC RBC AUTO-ENTMCNC: 32.5 G/DL (ref 31.5–35.7)
MCHC RBC AUTO-ENTMCNC: 32.6 G/DL (ref 31.5–35.7)
MCHC RBC AUTO-ENTMCNC: 32.8 G/DL (ref 31.5–35.7)
MCV RBC AUTO: 86.8 FL (ref 82–95)
MCV RBC AUTO: 87.2 FL (ref 79–97)
MCV RBC AUTO: 87.2 FL (ref 82–95)
MCV RBC AUTO: 88.2 FL (ref 82–95)
MCV RBC AUTO: 88.6 FL (ref 82–95)
MCV RBC AUTO: 89 FL (ref 79–97)
MCV RBC AUTO: 89.8 FL (ref 82–95)
MCV RBC AUTO: 90.1 FL (ref 82–95)
MCV RBC AUTO: 90.1 FL (ref 82–95)
MCV RBC AUTO: 91 FL (ref 82–95)
MCV RBC AUTO: 91.1 FL (ref 82–95)
MCV RBC AUTO: 91.7 FL (ref 82–95)
MCV RBC AUTO: 92.3 FL (ref 82–95)
MCV RBC AUTO: 95.8 FL (ref 79–97)
MCV RBC AUTO: 96.9 FL (ref 79–97)
MCV RBC AUTO: 97.3 FL (ref 79–97)
MCV RBC AUTO: 98.4 FL (ref 79–97)
MCV RBC AUTO: 99 FL (ref 79–97)
MCV RBC AUTO: 99 FL (ref 79–97)
METAMYELOCYTES NFR BLD MANUAL: 1 % (ref 0–0)
METAMYELOCYTES NFR BLD MANUAL: 1 % (ref 0–0)
METHGB BLD QL: 1.2 % (ref 0–3)
MICROCYTES BLD QL: ABNORMAL
MODALITY: ABNORMAL
MONOCYTES # BLD AUTO: 0.08 10*3/MM3 (ref 0.1–0.9)
MONOCYTES # BLD AUTO: 0.38 10*3/MM3 (ref 0.19–1.3)
MONOCYTES # BLD AUTO: 0.78 10*3/MM3 (ref 0.1–0.9)
MONOCYTES # BLD AUTO: 0.9 10*3/MM3 (ref 0.19–1.3)
MONOCYTES # BLD AUTO: 0.91 10*3/MM3 (ref 0.19–1.3)
MONOCYTES # BLD AUTO: 0.92 10*3/MM3 (ref 0.19–1.3)
MONOCYTES # BLD AUTO: 0.99 10*3/MM3 (ref 0.19–1.3)
MONOCYTES # BLD AUTO: 1.06 10*3/MM3 (ref 0.19–1.3)
MONOCYTES # BLD AUTO: 1.06 10*3/MM3 (ref 0.1–0.9)
MONOCYTES # BLD AUTO: 1.08 10*3/MM3 (ref 0.1–0.9)
MONOCYTES # BLD AUTO: 1.1 10*3/MM3 (ref 0.19–1.3)
MONOCYTES # BLD AUTO: 1.11 10*3/MM3 (ref 0.19–1.3)
MONOCYTES # BLD AUTO: 1.16 10*3/MM3 (ref 0.19–1.3)
MONOCYTES # BLD AUTO: 1.27 10*3/MM3 (ref 0.19–1.3)
MONOCYTES # BLD AUTO: 1.67 10*3/MM3 (ref 0.1–0.9)
MONOCYTES NFR BLD AUTO: 10.1 % (ref 4–12)
MONOCYTES NFR BLD AUTO: 10.2 % (ref 5–12)
MONOCYTES NFR BLD AUTO: 10.5 % (ref 4–12)
MONOCYTES NFR BLD AUTO: 10.8 % (ref 4–12)
MONOCYTES NFR BLD AUTO: 10.9 % (ref 4–12)
MONOCYTES NFR BLD AUTO: 11 % (ref 4–12)
MONOCYTES NFR BLD AUTO: 11.6 % (ref 4–12)
MONOCYTES NFR BLD AUTO: 4.1 % (ref 4–12)
MONOCYTES NFR BLD AUTO: 7 % (ref 4–12)
MONOCYTES NFR BLD AUTO: 8.3 % (ref 4–12)
MONOCYTES NFR BLD AUTO: 9.2 % (ref 5–12)
MONOCYTES NFR BLD AUTO: 9.5 % (ref 4–12)
MYCOBACTERIUM SPEC CULT: NORMAL
MYELOCYTES NFR BLD MANUAL: 1 % (ref 0–0)
MYELOCYTES NFR BLD MANUAL: 2 % (ref 0–0)
MYELOCYTES NFR BLD MANUAL: 3 % (ref 0–0)
NEUTROPHILS # BLD AUTO: 1.99 10*3/MM3 (ref 1.7–7)
NEUTROPHILS # BLD AUTO: 2.16 10*3/MM3 (ref 1.7–7)
NEUTROPHILS # BLD AUTO: 3.18 10*3/MM3 (ref 1.7–7)
NEUTROPHILS # BLD AUTO: 6.42 10*3/MM3 (ref 1.87–8.4)
NEUTROPHILS # BLD AUTO: 7.01 10*3/MM3 (ref 1.87–8.4)
NEUTROPHILS # BLD AUTO: 7.22 10*3/MM3 (ref 1.87–8.4)
NEUTROPHILS # BLD AUTO: 7.37 10*3/MM3 (ref 1.87–8.4)
NEUTROPHILS # BLD AUTO: 7.46 10*3/MM3 (ref 1.87–8.4)
NEUTROPHILS # BLD AUTO: 7.56 10*3/MM3 (ref 1.7–7)
NEUTROPHILS # BLD AUTO: 7.72 10*3/MM3 (ref 1.87–8.4)
NEUTROPHILS # BLD AUTO: 7.73 10*3/MM3 (ref 1.87–8.4)
NEUTROPHILS # BLD AUTO: 8.38 10*3/MM3 (ref 1.87–8.4)
NEUTROPHILS # BLD AUTO: 8.61 10*3/MM3 (ref 1.87–8.4)
NEUTROPHILS # BLD AUTO: 9.18 10*3/MM3 (ref 1.7–7)
NEUTROPHILS # BLD AUTO: 9.39 10*3/MM3 (ref 1.87–8.4)
NEUTROPHILS NFR BLD AUTO: 70.3 % (ref 39–78)
NEUTROPHILS NFR BLD AUTO: 71.6 % (ref 39–78)
NEUTROPHILS NFR BLD AUTO: 71.7 % (ref 39–78)
NEUTROPHILS NFR BLD AUTO: 71.9 % (ref 39–78)
NEUTROPHILS NFR BLD AUTO: 72.8 % (ref 42.7–76)
NEUTROPHILS NFR BLD AUTO: 73.2 % (ref 39–78)
NEUTROPHILS NFR BLD AUTO: 73.3 % (ref 39–78)
NEUTROPHILS NFR BLD AUTO: 73.3 % (ref 39–78)
NEUTROPHILS NFR BLD AUTO: 74.6 % (ref 39–78)
NEUTROPHILS NFR BLD AUTO: 76.7 % (ref 39–78)
NEUTROPHILS NFR BLD AUTO: 77.8 % (ref 42.7–76)
NEUTROPHILS NFR BLD AUTO: 83.8 % (ref 39–78)
NEUTROPHILS NFR BLD MANUAL: 44 % (ref 42.7–76)
NEUTROPHILS NFR BLD MANUAL: 57.1 % (ref 42.7–76)
NEUTROPHILS NFR BLD MANUAL: 78 % (ref 42.7–76)
NEUTS BAND NFR BLD MANUAL: 1 % (ref 0–5)
NIGHT BLUE STAIN TISS: NORMAL
NIGHT BLUE STAIN TISS: NORMAL
NOROVIRUS GI+II RNA STL QL NAA+NON-PROBE: NOT DETECTED
NOTE: ABNORMAL
NOTIFIED BY: ABNORMAL
NOTIFIED WHO: ABNORMAL
NRBC BLD AUTO-RTO: 0 /100 WBC (ref 0–0.2)
NRBC SPEC MANUAL: 1 /100 WBC (ref 0–0.2)
NRBC SPEC MANUAL: 5 /100 WBC (ref 0–0.2)
NRBC SPEC MANUAL: 8 /100 WBC (ref 0–0.2)
NT-PROBNP SERPL-MCNC: 2065 PG/ML (ref 5–900)
NT-PROBNP SERPL-MCNC: 369 PG/ML (ref 0–900)
NT-PROBNP SERPL-MCNC: 504 PG/ML (ref 0–900)
NT-PROBNP SERPL-MCNC: 61.6 PG/ML (ref 0–900)
NT-PROBNP SERPL-MCNC: 850 PG/ML (ref 0–900)
OXYHGB MFR BLDV: 94.6 % (ref 94–99)
P SHIGELLOIDES DNA STL QL NAA+PROBE: NOT DETECTED
PATH REPORT.FINAL DX SPEC: NORMAL
PATH REPORT.GROSS SPEC: NORMAL
PCO2 BLDA: 45.2 MM HG (ref 35–45)
PCO2 BLDA: 47 MM HG (ref 35–45)
PCO2 BLDA: 47.7 MM HG (ref 35–45)
PCO2 BLDA: 63.4 MM HG (ref 35–45)
PCO2 BLDA: 71.2 MM HG (ref 35–45)
PCO2 TEMP ADJ BLD: ABNORMAL MM[HG]
PH BLDA: 7.32 PH UNITS (ref 7.35–7.45)
PH BLDA: 7.33 PH UNITS (ref 7.35–7.45)
PH BLDA: 7.37 PH UNITS (ref 7.35–7.45)
PH BLDA: 7.37 PH UNITS (ref 7.35–7.45)
PH BLDA: 7.42 PH UNITS (ref 7.35–7.45)
PH, TEMP CORRECTED: ABNORMAL
PLAT MORPH BLD: NORMAL
PLATELET # BLD AUTO: 239 10*3/MM3 (ref 140–450)
PLATELET # BLD AUTO: 239 10*3/MM3 (ref 140–450)
PLATELET # BLD AUTO: 263 10*3/MM3 (ref 140–450)
PLATELET # BLD AUTO: 279 10*3/MM3 (ref 130–400)
PLATELET # BLD AUTO: 301 10*3/MM3 (ref 140–450)
PLATELET # BLD AUTO: 303 10*3/MM3 (ref 140–450)
PLATELET # BLD AUTO: 309 10*3/MM3 (ref 130–400)
PLATELET # BLD AUTO: 317 10*3/MM3 (ref 130–400)
PLATELET # BLD AUTO: 334 10*3/MM3 (ref 140–450)
PLATELET # BLD AUTO: 341 10*3/MM3 (ref 130–400)
PLATELET # BLD AUTO: 341 10*3/MM3 (ref 130–400)
PLATELET # BLD AUTO: 357 10*3/MM3 (ref 130–400)
PLATELET # BLD AUTO: 369 10*3/MM3 (ref 130–400)
PLATELET # BLD AUTO: 370 10*3/MM3 (ref 130–400)
PLATELET # BLD AUTO: 393 10*3/MM3 (ref 130–400)
PLATELET # BLD AUTO: 394 10*3/MM3 (ref 140–450)
PLATELET # BLD AUTO: 396 10*3/MM3 (ref 130–400)
PLATELET # BLD AUTO: 455 10*3/MM3 (ref 130–400)
PLATELET # BLD AUTO: 508 10*3/MM3 (ref 140–450)
PMV BLD AUTO: 10.1 FL (ref 6–12)
PMV BLD AUTO: 10.2 FL (ref 6–12)
PMV BLD AUTO: 10.4 FL (ref 6–12)
PMV BLD AUTO: 10.5 FL (ref 6–12)
PMV BLD AUTO: 11 FL (ref 6–12)
PMV BLD AUTO: 11.3 FL (ref 6–12)
PMV BLD AUTO: 9.1 FL (ref 6–12)
PMV BLD AUTO: 9.3 FL (ref 6–12)
PMV BLD AUTO: 9.3 FL (ref 6–12)
PMV BLD AUTO: 9.5 FL (ref 6–12)
PMV BLD AUTO: 9.7 FL (ref 6–12)
PMV BLD AUTO: 9.7 FL (ref 6–12)
PMV BLD AUTO: 9.8 FL (ref 6–12)
PMV BLD AUTO: 9.9 FL (ref 6–12)
PO2 BLDA: 102 MM HG (ref 83–108)
PO2 BLDA: 58.3 MM HG (ref 83–108)
PO2 BLDA: 65.9 MM HG (ref 83–108)
PO2 BLDA: 74.4 MM HG (ref 83–108)
PO2 BLDA: 82 MM HG (ref 83–108)
PO2 TEMP ADJ BLD: ABNORMAL MM[HG]
POIKILOCYTOSIS BLD QL SMEAR: ABNORMAL
POLYCHROMASIA BLD QL SMEAR: ABNORMAL
POTASSIUM BLD-SCNC: 2.9 MMOL/L (ref 3.5–5.2)
POTASSIUM BLD-SCNC: 3.1 MMOL/L (ref 3.5–5.2)
POTASSIUM BLD-SCNC: 3.4 MMOL/L (ref 3.5–5.3)
POTASSIUM BLD-SCNC: 3.4 MMOL/L (ref 3.5–5.3)
POTASSIUM BLD-SCNC: 3.5 MMOL/L (ref 3.5–5.3)
POTASSIUM BLD-SCNC: 3.6 MMOL/L (ref 3.5–5.3)
POTASSIUM BLD-SCNC: 3.6 MMOL/L (ref 3.5–5.3)
POTASSIUM BLD-SCNC: 3.7 MMOL/L (ref 3.5–5.3)
POTASSIUM BLD-SCNC: 3.8 MMOL/L (ref 3.5–5.3)
POTASSIUM BLD-SCNC: 3.9 MMOL/L (ref 3.5–5.2)
POTASSIUM BLD-SCNC: 3.9 MMOL/L (ref 3.5–5.2)
POTASSIUM BLD-SCNC: 4 MMOL/L (ref 3.5–5.2)
POTASSIUM BLD-SCNC: 4.1 MMOL/L (ref 3.5–5.2)
POTASSIUM BLD-SCNC: 4.1 MMOL/L (ref 3.5–5.3)
POTASSIUM BLD-SCNC: 4.2 MMOL/L (ref 3.5–5.2)
POTASSIUM BLD-SCNC: 4.3 MMOL/L (ref 3.5–5.2)
POTASSIUM BLD-SCNC: 4.3 MMOL/L (ref 3.5–5.3)
POTASSIUM BLD-SCNC: 4.5 MMOL/L (ref 3.5–5.2)
POTASSIUM BLDA-SCNC: 4 MMOL/L (ref 3.5–5.2)
PREALB SERPL-MCNC: 17.8 MG/DL (ref 18–36)
PROCALCITONIN SERPL-MCNC: 0.16 NG/ML (ref 0.1–0.25)
PROCALCITONIN SERPL-MCNC: <0.25 NG/ML
PROT SERPL-MCNC: 5.4 G/DL (ref 6.3–8.7)
PROT SERPL-MCNC: 5.5 G/DL (ref 6.3–8.7)
PROT SERPL-MCNC: 5.6 G/DL (ref 6.3–8.7)
PROT SERPL-MCNC: 6.3 G/DL (ref 6–8.5)
PROT SERPL-MCNC: 6.4 G/DL (ref 6.3–8.7)
PROT SERPL-MCNC: 6.5 G/DL (ref 6.3–8.7)
PROT SERPL-MCNC: 7.4 G/DL (ref 6.3–8.7)
PROT SERPL-MCNC: 7.5 G/DL (ref 6.3–8.7)
PROTHROMBIN TIME: 13.1 SECONDS (ref 11.9–14.6)
RBC # BLD AUTO: 2.21 10*6/MM3 (ref 4.14–5.8)
RBC # BLD AUTO: 2.43 10*6/MM3 (ref 4.14–5.8)
RBC # BLD AUTO: 2.83 10*6/MM3 (ref 4.14–5.8)
RBC # BLD AUTO: 2.86 10*6/MM3 (ref 4.14–5.8)
RBC # BLD AUTO: 2.9 10*6/MM3 (ref 4.14–5.8)
RBC # BLD AUTO: 2.97 10*6/MM3 (ref 4.14–5.8)
RBC # BLD AUTO: 3.89 10*6/MM3 (ref 4.8–5.9)
RBC # BLD AUTO: 4.15 10*6/MM3 (ref 4.8–5.9)
RBC # BLD AUTO: 4.16 10*6/MM3 (ref 4.8–5.9)
RBC # BLD AUTO: 4.21 10*6/MM3 (ref 4.8–5.9)
RBC # BLD AUTO: 4.23 10*6/MM3 (ref 4.8–5.9)
RBC # BLD AUTO: 4.26 10*6/MM3 (ref 4.14–5.8)
RBC # BLD AUTO: 4.29 10*6/MM3 (ref 4.8–5.9)
RBC # BLD AUTO: 4.3 10*6/MM3 (ref 4.14–5.8)
RBC # BLD AUTO: 4.38 10*6/MM3 (ref 4.8–5.9)
RBC # BLD AUTO: 4.45 10*6/MM3 (ref 4.8–5.9)
RBC # BLD AUTO: 4.54 10*6/MM3 (ref 4.8–5.9)
RBC # BLD AUTO: 4.61 10*6/MM3 (ref 4.8–5.9)
RBC # BLD AUTO: 5.17 10*6/MM3 (ref 4.8–5.9)
RH BLD: POSITIVE
RV RNA STL NAA+PROBE: NOT DETECTED
SALMONELLA DNA SPEC QL NAA+PROBE: NOT DETECTED
SAO2 % BLDCOA: 90.4 % (ref 94–99)
SAO2 % BLDCOA: 92.5 % (ref 94–99)
SAO2 % BLDCOA: 94.2 % (ref 94–99)
SAO2 % BLDCOA: 95.8 % (ref 94–99)
SAO2 % BLDCOA: 96.2 % (ref 94–99)
SAPO I+II+IV+V RNA STL QL NAA+NON-PROBE: NOT DETECTED
SET MECH RESP RATE: 14
SHIGELLA SP+EIEC IPAH STL QL NAA+PROBE: NOT DETECTED
SODIUM BLD-SCNC: 135 MMOL/L (ref 136–145)
SODIUM BLD-SCNC: 135 MMOL/L (ref 136–145)
SODIUM BLD-SCNC: 136 MMOL/L (ref 135–145)
SODIUM BLD-SCNC: 136 MMOL/L (ref 136–145)
SODIUM BLD-SCNC: 137 MMOL/L (ref 135–145)
SODIUM BLD-SCNC: 137 MMOL/L (ref 135–145)
SODIUM BLD-SCNC: 137 MMOL/L (ref 136–145)
SODIUM BLD-SCNC: 137 MMOL/L (ref 136–145)
SODIUM BLD-SCNC: 138 MMOL/L (ref 135–145)
SODIUM BLD-SCNC: 138 MMOL/L (ref 136–145)
SODIUM BLD-SCNC: 139 MMOL/L (ref 135–145)
SODIUM BLD-SCNC: 139 MMOL/L (ref 135–145)
SODIUM BLD-SCNC: 140 MMOL/L (ref 135–145)
SODIUM BLD-SCNC: 140 MMOL/L (ref 135–145)
SODIUM BLD-SCNC: 141 MMOL/L (ref 135–145)
SODIUM BLD-SCNC: 141 MMOL/L (ref 136–145)
SODIUM BLDA-SCNC: 138 MMOL/L (ref 136–145)
STRESS TARGET HR: 127 BPM
STRESS TARGET HR: 128 BPM
T&S EXPIRATION DATE: NORMAL
T4 FREE SERPL-MCNC: 1.24 NG/DL (ref 0.78–2.19)
TIBC SERPL-MCNC: 304 MCG/DL (ref 298–536)
TRANSFERRIN SERPL-MCNC: 204 MG/DL (ref 200–360)
TRIGL SERPL-MCNC: 48 MG/DL (ref 0–149)
TROPONIN I SERPL-MCNC: <0.012 NG/ML (ref 0–0.03)
TROPONIN T SERPL-MCNC: 0.01 NG/ML (ref 0–0.03)
TROPONIN T SERPL-MCNC: <0.01 NG/ML (ref 0–0.03)
TSH SERPL DL<=0.05 MIU/L-ACNC: 0.23 MIU/ML (ref 0.47–4.68)
TSH SERPL DL<=0.05 MIU/L-ACNC: 0.64 MIU/ML (ref 0.47–4.68)
UNIT  ABO: NORMAL
UNIT  RH: NORMAL
V CHOLERAE DNA SPEC QL NAA+PROBE: NOT DETECTED
VARIANT LYMPHS NFR BLD MANUAL: 1 % (ref 0–5)
VARIANT LYMPHS NFR BLD MANUAL: 3.1 % (ref 0–5)
VENTILATOR MODE: ABNORMAL
VIBRIO DNA SPEC NAA+PROBE: NOT DETECTED
WBC MORPH BLD: NORMAL
WBC NRBC COR # BLD: 10.05 10*3/MM3 (ref 4.8–10.8)
WBC NRBC COR # BLD: 10.06 10*3/MM3 (ref 4.8–10.8)
WBC NRBC COR # BLD: 10.38 10*3/MM3 (ref 3.4–10.8)
WBC NRBC COR # BLD: 11 10*3/MM3 (ref 4.8–10.8)
WBC NRBC COR # BLD: 11.54 10*3/MM3 (ref 4.8–10.8)
WBC NRBC COR # BLD: 11.72 10*3/MM3 (ref 4.8–10.8)
WBC NRBC COR # BLD: 11.8 10*3/MM3 (ref 3.4–10.8)
WBC NRBC COR # BLD: 12.82 10*3/MM3 (ref 4.8–10.8)
WBC NRBC COR # BLD: 2.55 10*3/MM3 (ref 3.4–10.8)
WBC NRBC COR # BLD: 4.75 10*3/MM3 (ref 3.4–10.8)
WBC NRBC COR # BLD: 4.91 10*3/MM3 (ref 3.4–10.8)
WBC NRBC COR # BLD: 5.01 10*3/MM3 (ref 3.4–10.8)
WBC NRBC COR # BLD: 5.34 10*3/MM3 (ref 3.4–10.8)
WBC NRBC COR # BLD: 5.46 10*3/MM3 (ref 3.4–10.8)
WBC NRBC COR # BLD: 8.97 10*3/MM3 (ref 4.8–10.8)
WBC NRBC COR # BLD: 9.21 10*3/MM3 (ref 4.8–10.8)
WBC NRBC COR # BLD: 9.58 10*3/MM3 (ref 4.8–10.8)
WBC NRBC COR # BLD: 9.73 10*3/MM3 (ref 4.8–10.8)
WBC NRBC COR # BLD: 9.92 10*3/MM3 (ref 4.8–10.8)
WHOLE BLOOD HOLD SPECIMEN: NORMAL
WHOLE BLOOD HOLD SPECIMEN: NORMAL
YERSINIA STL CULT: NOT DETECTED

## 2019-01-01 PROCEDURE — 63710000001 INSULIN LISPRO (HUMAN) PER 5 UNITS: Performed by: NURSE PRACTITIONER

## 2019-01-01 PROCEDURE — 82962 GLUCOSE BLOOD TEST: CPT

## 2019-01-01 PROCEDURE — 36600 WITHDRAWAL OF ARTERIAL BLOOD: CPT

## 2019-01-01 PROCEDURE — 84132 ASSAY OF SERUM POTASSIUM: CPT | Performed by: INTERNAL MEDICINE

## 2019-01-01 PROCEDURE — 71046 X-RAY EXAM CHEST 2 VIEWS: CPT

## 2019-01-01 PROCEDURE — DB021ZZ BEAM RADIATION OF LUNG USING PHOTONS 1 - 10 MEV: ICD-10-PCS | Performed by: RADIOLOGY

## 2019-01-01 PROCEDURE — 25010000002 FUROSEMIDE PER 20 MG: Performed by: INTERNAL MEDICINE

## 2019-01-01 PROCEDURE — 25010000002 FUROSEMIDE PER 20 MG: Performed by: NURSE PRACTITIONER

## 2019-01-01 PROCEDURE — 94760 N-INVAS EAR/PLS OXIMETRY 1: CPT

## 2019-01-01 PROCEDURE — 25010000002 MAGNESIUM SULFATE 2 GM/50ML SOLUTION: Performed by: NURSE PRACTITIONER

## 2019-01-01 PROCEDURE — 74177 CT ABD & PELVIS W/CONTRAST: CPT

## 2019-01-01 PROCEDURE — 80048 BASIC METABOLIC PNL TOTAL CA: CPT | Performed by: INTERNAL MEDICINE

## 2019-01-01 PROCEDURE — 77290 THER RAD SIMULAJ FIELD CPLX: CPT | Performed by: RADIOLOGY

## 2019-01-01 PROCEDURE — 80053 COMPREHEN METABOLIC PANEL: CPT | Performed by: FAMILY MEDICINE

## 2019-01-01 PROCEDURE — 97535 SELF CARE MNGMENT TRAINING: CPT

## 2019-01-01 PROCEDURE — 85014 HEMATOCRIT: CPT | Performed by: INTERNAL MEDICINE

## 2019-01-01 PROCEDURE — 93005 ELECTROCARDIOGRAM TRACING: CPT

## 2019-01-01 PROCEDURE — 99223 1ST HOSP IP/OBS HIGH 75: CPT | Performed by: INTERNAL MEDICINE

## 2019-01-01 PROCEDURE — 63710000001 INSULIN DETEMIR PER 5 UNITS: Performed by: NURSE PRACTITIONER

## 2019-01-01 PROCEDURE — 99232 SBSQ HOSP IP/OBS MODERATE 35: CPT | Performed by: INTERNAL MEDICINE

## 2019-01-01 PROCEDURE — 85025 COMPLETE CBC W/AUTO DIFF WBC: CPT | Performed by: INTERNAL MEDICINE

## 2019-01-01 PROCEDURE — 07D78ZX EXTRACTION OF THORAX LYMPHATIC, VIA NATURAL OR ARTIFICIAL OPENING ENDOSCOPIC, DIAGNOSTIC: ICD-10-PCS | Performed by: INTERNAL MEDICINE

## 2019-01-01 PROCEDURE — 25010000002 VANCOMYCIN 1 G RECONSTITUTED SOLUTION: Performed by: PHYSICIAN ASSISTANT

## 2019-01-01 PROCEDURE — 87116 MYCOBACTERIA CULTURE: CPT | Performed by: INTERNAL MEDICINE

## 2019-01-01 PROCEDURE — 94799 UNLISTED PULMONARY SVC/PX: CPT

## 2019-01-01 PROCEDURE — 80053 COMPREHEN METABOLIC PANEL: CPT | Performed by: SPECIALIST

## 2019-01-01 PROCEDURE — 0 IOHEXOL 300 MG/ML SOLUTION: Performed by: INTERNAL MEDICINE

## 2019-01-01 PROCEDURE — 84484 ASSAY OF TROPONIN QUANT: CPT | Performed by: INTERNAL MEDICINE

## 2019-01-01 PROCEDURE — 31652 BRONCH EBUS SAMPLNG 1/2 NODE: CPT | Performed by: INTERNAL MEDICINE

## 2019-01-01 PROCEDURE — 71045 X-RAY EXAM CHEST 1 VIEW: CPT

## 2019-01-01 PROCEDURE — 63710000001 PREDNISONE PER 1 MG: Performed by: INTERNAL MEDICINE

## 2019-01-01 PROCEDURE — 25010000003 LIDOCAINE 1 % SOLUTION: Performed by: SPECIALIST

## 2019-01-01 PROCEDURE — 85007 BL SMEAR W/DIFF WBC COUNT: CPT | Performed by: PHYSICIAN ASSISTANT

## 2019-01-01 PROCEDURE — 86900 BLOOD TYPING SEROLOGIC ABO: CPT

## 2019-01-01 PROCEDURE — 77300 RADIATION THERAPY DOSE PLAN: CPT | Performed by: RADIOLOGY

## 2019-01-01 PROCEDURE — 25010000002 ONDANSETRON PER 1 MG: Performed by: NURSE PRACTITIONER

## 2019-01-01 PROCEDURE — 94660 CPAP INITIATION&MGMT: CPT

## 2019-01-01 PROCEDURE — 25010000002 FENTANYL CITRATE (PF) 100 MCG/2ML SOLUTION: Performed by: NURSE ANESTHETIST, CERTIFIED REGISTERED

## 2019-01-01 PROCEDURE — 25010000002 METHYLPREDNISOLONE PER 125 MG: Performed by: NURSE PRACTITIONER

## 2019-01-01 PROCEDURE — 25010000002 MAGNESIUM SULFATE 2 GM/50ML SOLUTION: Performed by: INTERNAL MEDICINE

## 2019-01-01 PROCEDURE — 25010000002 ENOXAPARIN PER 10 MG: Performed by: INTERNAL MEDICINE

## 2019-01-01 PROCEDURE — 71260 CT THORAX DX C+: CPT

## 2019-01-01 PROCEDURE — 83605 ASSAY OF LACTIC ACID: CPT | Performed by: PHYSICIAN ASSISTANT

## 2019-01-01 PROCEDURE — 25010000002 IOPAMIDOL 61 % SOLUTION: Performed by: INTERNAL MEDICINE

## 2019-01-01 PROCEDURE — 83735 ASSAY OF MAGNESIUM: CPT | Performed by: INTERNAL MEDICINE

## 2019-01-01 PROCEDURE — 87205 SMEAR GRAM STAIN: CPT | Performed by: INTERNAL MEDICINE

## 2019-01-01 PROCEDURE — 25010000002 ONDANSETRON PER 1 MG: Performed by: INTERNAL MEDICINE

## 2019-01-01 PROCEDURE — 97530 THERAPEUTIC ACTIVITIES: CPT

## 2019-01-01 PROCEDURE — 94640 AIRWAY INHALATION TREATMENT: CPT

## 2019-01-01 PROCEDURE — 85025 COMPLETE CBC W/AUTO DIFF WBC: CPT | Performed by: NURSE PRACTITIONER

## 2019-01-01 PROCEDURE — 93005 ELECTROCARDIOGRAM TRACING: CPT | Performed by: INTERNAL MEDICINE

## 2019-01-01 PROCEDURE — 85007 BL SMEAR W/DIFF WBC COUNT: CPT | Performed by: INTERNAL MEDICINE

## 2019-01-01 PROCEDURE — 77412 RADIATION TX DELIVERY LVL 3: CPT | Performed by: RADIOLOGY

## 2019-01-01 PROCEDURE — 77334 RADIATION TREATMENT AID(S): CPT | Performed by: RADIOLOGY

## 2019-01-01 PROCEDURE — 93306 TTE W/DOPPLER COMPLETE: CPT

## 2019-01-01 PROCEDURE — A9561 TC99M OXIDRONATE: HCPCS | Performed by: INTERNAL MEDICINE

## 2019-01-01 PROCEDURE — 36415 COLL VENOUS BLD VENIPUNCTURE: CPT

## 2019-01-01 PROCEDURE — 97162 PT EVAL MOD COMPLEX 30 MIN: CPT | Performed by: PHYSICAL THERAPIST

## 2019-01-01 PROCEDURE — 25010000002 METHYLPREDNISOLONE PER 125 MG: Performed by: PHYSICIAN ASSISTANT

## 2019-01-01 PROCEDURE — 84484 ASSAY OF TROPONIN QUANT: CPT | Performed by: PHYSICIAN ASSISTANT

## 2019-01-01 PROCEDURE — 76000 FLUOROSCOPY <1 HR PHYS/QHP: CPT

## 2019-01-01 PROCEDURE — 63710000001 INSULIN LISPRO (HUMAN) PER 5 UNITS: Performed by: INTERNAL MEDICINE

## 2019-01-01 PROCEDURE — 82803 BLOOD GASES ANY COMBINATION: CPT

## 2019-01-01 PROCEDURE — 85025 COMPLETE CBC W/AUTO DIFF WBC: CPT | Performed by: PHYSICIAN ASSISTANT

## 2019-01-01 PROCEDURE — 99233 SBSQ HOSP IP/OBS HIGH 50: CPT | Performed by: INTERNAL MEDICINE

## 2019-01-01 PROCEDURE — 84134 ASSAY OF PREALBUMIN: CPT | Performed by: INTERNAL MEDICINE

## 2019-01-01 PROCEDURE — 25010000002 PROPOFOL 1000 MG/ML EMULSION: Performed by: NURSE ANESTHETIST, CERTIFIED REGISTERED

## 2019-01-01 PROCEDURE — 82375 ASSAY CARBOXYHB QUANT: CPT

## 2019-01-01 PROCEDURE — 93010 ELECTROCARDIOGRAM REPORT: CPT | Performed by: INTERNAL MEDICINE

## 2019-01-01 PROCEDURE — 80053 COMPREHEN METABOLIC PANEL: CPT | Performed by: NURSE PRACTITIONER

## 2019-01-01 PROCEDURE — 77417 THER RADIOLOGY PORT IMAGE(S): CPT | Performed by: RADIOLOGY

## 2019-01-01 PROCEDURE — 86900 BLOOD TYPING SEROLOGIC ABO: CPT | Performed by: NURSE PRACTITIONER

## 2019-01-01 PROCEDURE — 88172 CYTP DX EVAL FNA 1ST EA SITE: CPT | Performed by: FAMILY MEDICINE

## 2019-01-01 PROCEDURE — 77336 RADIATION PHYSICS CONSULT: CPT | Performed by: RADIOLOGY

## 2019-01-01 PROCEDURE — 25010000002 MIDAZOLAM PER 1 MG: Performed by: ANESTHESIOLOGY

## 2019-01-01 PROCEDURE — 78306 BONE IMAGING WHOLE BODY: CPT

## 2019-01-01 PROCEDURE — 84145 PROCALCITONIN (PCT): CPT | Performed by: PHYSICIAN ASSISTANT

## 2019-01-01 PROCEDURE — A9577 INJ MULTIHANCE: HCPCS | Performed by: PHYSICIAN ASSISTANT

## 2019-01-01 PROCEDURE — 0 TECHNETIUM OXIDRONATE KIT: Performed by: INTERNAL MEDICINE

## 2019-01-01 PROCEDURE — 97110 THERAPEUTIC EXERCISES: CPT

## 2019-01-01 PROCEDURE — 80061 LIPID PANEL: CPT | Performed by: NURSE PRACTITIONER

## 2019-01-01 PROCEDURE — 83880 ASSAY OF NATRIURETIC PEPTIDE: CPT | Performed by: PHYSICIAN ASSISTANT

## 2019-01-01 PROCEDURE — 0097U HC BIOFIRE FILMARRAY GI PANEL: CPT | Performed by: INTERNAL MEDICINE

## 2019-01-01 PROCEDURE — 97116 GAIT TRAINING THERAPY: CPT | Performed by: PHYSICAL THERAPIST

## 2019-01-01 PROCEDURE — 85025 COMPLETE CBC W/AUTO DIFF WBC: CPT | Performed by: FAMILY MEDICINE

## 2019-01-01 PROCEDURE — 82140 ASSAY OF AMMONIA: CPT | Performed by: INTERNAL MEDICINE

## 2019-01-01 PROCEDURE — 83880 ASSAY OF NATRIURETIC PEPTIDE: CPT | Performed by: INTERNAL MEDICINE

## 2019-01-01 PROCEDURE — 84443 ASSAY THYROID STIM HORMONE: CPT | Performed by: PHYSICIAN ASSISTANT

## 2019-01-01 PROCEDURE — 97162 PT EVAL MOD COMPLEX 30 MIN: CPT

## 2019-01-01 PROCEDURE — 25010000002 PROPOFOL 10 MG/ML EMULSION: Performed by: NURSE ANESTHETIST, CERTIFIED REGISTERED

## 2019-01-01 PROCEDURE — 97116 GAIT TRAINING THERAPY: CPT

## 2019-01-01 PROCEDURE — 86901 BLOOD TYPING SEROLOGIC RH(D): CPT

## 2019-01-01 PROCEDURE — 25010000002 HYDROMORPHONE 1 MG/ML SOLUTION: Performed by: NURSE PRACTITIONER

## 2019-01-01 PROCEDURE — 85027 COMPLETE CBC AUTOMATED: CPT | Performed by: INTERNAL MEDICINE

## 2019-01-01 PROCEDURE — 36415 COLL VENOUS BLD VENIPUNCTURE: CPT | Performed by: NURSE PRACTITIONER

## 2019-01-01 PROCEDURE — 99222 1ST HOSP IP/OBS MODERATE 55: CPT | Performed by: THORACIC SURGERY (CARDIOTHORACIC VASCULAR SURGERY)

## 2019-01-01 PROCEDURE — 80053 COMPREHEN METABOLIC PANEL: CPT | Performed by: PHYSICIAN ASSISTANT

## 2019-01-01 PROCEDURE — 84439 ASSAY OF FREE THYROXINE: CPT | Performed by: INTERNAL MEDICINE

## 2019-01-01 PROCEDURE — 25010000002 PROMETHAZINE PER 50 MG: Performed by: NURSE PRACTITIONER

## 2019-01-01 PROCEDURE — 87220 TISSUE EXAM FOR FUNGI: CPT | Performed by: INTERNAL MEDICINE

## 2019-01-01 PROCEDURE — 87206 SMEAR FLUORESCENT/ACID STAI: CPT | Performed by: INTERNAL MEDICINE

## 2019-01-01 PROCEDURE — 94669 MECHANICAL CHEST WALL OSCILL: CPT

## 2019-01-01 PROCEDURE — 99232 SBSQ HOSP IP/OBS MODERATE 35: CPT | Performed by: THORACIC SURGERY (CARDIOTHORACIC VASCULAR SURGERY)

## 2019-01-01 PROCEDURE — 77295 3-D RADIOTHERAPY PLAN: CPT | Performed by: RADIOLOGY

## 2019-01-01 PROCEDURE — 86901 BLOOD TYPING SEROLOGIC RH(D): CPT | Performed by: NURSE PRACTITIONER

## 2019-01-01 PROCEDURE — 93306 TTE W/DOPPLER COMPLETE: CPT | Performed by: INTERNAL MEDICINE

## 2019-01-01 PROCEDURE — 87040 BLOOD CULTURE FOR BACTERIA: CPT | Performed by: PHYSICIAN ASSISTANT

## 2019-01-01 PROCEDURE — 70450 CT HEAD/BRAIN W/O DYE: CPT

## 2019-01-01 PROCEDURE — 70553 MRI BRAIN STEM W/O & W/DYE: CPT

## 2019-01-01 PROCEDURE — 99222 1ST HOSP IP/OBS MODERATE 55: CPT | Performed by: INTERNAL MEDICINE

## 2019-01-01 PROCEDURE — 82565 ASSAY OF CREATININE: CPT

## 2019-01-01 PROCEDURE — 85027 COMPLETE CBC AUTOMATED: CPT | Performed by: NURSE PRACTITIONER

## 2019-01-01 PROCEDURE — C1788 PORT, INDWELLING, IMP: HCPCS | Performed by: SPECIALIST

## 2019-01-01 PROCEDURE — 87077 CULTURE AEROBIC IDENTIFY: CPT | Performed by: INTERNAL MEDICINE

## 2019-01-01 PROCEDURE — 0 IOPAMIDOL PER 1 ML: Performed by: PHYSICIAN ASSISTANT

## 2019-01-01 PROCEDURE — 0 GADOBENATE DIMEGLUMINE 529 MG/ML SOLUTION: Performed by: PHYSICIAN ASSISTANT

## 2019-01-01 PROCEDURE — 85610 PROTHROMBIN TIME: CPT | Performed by: INTERNAL MEDICINE

## 2019-01-01 PROCEDURE — 93005 ELECTROCARDIOGRAM TRACING: CPT | Performed by: PHYSICIAN ASSISTANT

## 2019-01-01 PROCEDURE — 83735 ASSAY OF MAGNESIUM: CPT | Performed by: NURSE PRACTITIONER

## 2019-01-01 PROCEDURE — 97535 SELF CARE MNGMENT TRAINING: CPT | Performed by: OCCUPATIONAL THERAPIST

## 2019-01-01 PROCEDURE — 88112 CYTOPATH CELL ENHANCE TECH: CPT | Performed by: FAMILY MEDICINE

## 2019-01-01 PROCEDURE — 25010000002 PIPERACILLIN SOD-TAZOBACTAM PER 1 G: Performed by: PHYSICIAN ASSISTANT

## 2019-01-01 PROCEDURE — 86920 COMPATIBILITY TEST SPIN: CPT

## 2019-01-01 PROCEDURE — 83735 ASSAY OF MAGNESIUM: CPT | Performed by: PHYSICIAN ASSISTANT

## 2019-01-01 PROCEDURE — P9016 RBC LEUKOCYTES REDUCED: HCPCS

## 2019-01-01 PROCEDURE — 80053 COMPREHEN METABOLIC PANEL: CPT | Performed by: INTERNAL MEDICINE

## 2019-01-01 PROCEDURE — P9040 RBC LEUKOREDUCED IRRADIATED: HCPCS

## 2019-01-01 PROCEDURE — 84145 PROCALCITONIN (PCT): CPT | Performed by: INTERNAL MEDICINE

## 2019-01-01 PROCEDURE — C1726 CATH, BAL DIL, NON-VASCULAR: HCPCS | Performed by: INTERNAL MEDICINE

## 2019-01-01 PROCEDURE — 85018 HEMOGLOBIN: CPT | Performed by: INTERNAL MEDICINE

## 2019-01-01 PROCEDURE — 82805 BLOOD GASES W/O2 SATURATION: CPT

## 2019-01-01 PROCEDURE — 93005 ELECTROCARDIOGRAM TRACING: CPT | Performed by: FAMILY MEDICINE

## 2019-01-01 PROCEDURE — 25010000002 IOPAMIDOL 61 % SOLUTION: Performed by: NURSE PRACTITIONER

## 2019-01-01 PROCEDURE — 25010000002 ADENOSINE PER 6 MG

## 2019-01-01 PROCEDURE — 87102 FUNGUS ISOLATION CULTURE: CPT | Performed by: INTERNAL MEDICINE

## 2019-01-01 PROCEDURE — 99285 EMERGENCY DEPT VISIT HI MDM: CPT

## 2019-01-01 PROCEDURE — 84466 ASSAY OF TRANSFERRIN: CPT | Performed by: INTERNAL MEDICINE

## 2019-01-01 PROCEDURE — 86850 RBC ANTIBODY SCREEN: CPT | Performed by: NURSE PRACTITIONER

## 2019-01-01 PROCEDURE — 25010000002 PERFLUTREN 6.52 MG/ML SUSPENSION: Performed by: INTERNAL MEDICINE

## 2019-01-01 PROCEDURE — 99284 EMERGENCY DEPT VISIT MOD MDM: CPT

## 2019-01-01 PROCEDURE — 25010000002 LORAZEPAM PER 2 MG: Performed by: EMERGENCY MEDICINE

## 2019-01-01 PROCEDURE — 71275 CT ANGIOGRAPHY CHEST: CPT

## 2019-01-01 PROCEDURE — 25010000002 FUROSEMIDE PER 20 MG

## 2019-01-01 PROCEDURE — G0463 HOSPITAL OUTPT CLINIC VISIT: HCPCS | Performed by: RADIOLOGY

## 2019-01-01 PROCEDURE — 25010000003 CEFAZOLIN PER 500 MG: Performed by: SPECIALIST

## 2019-01-01 PROCEDURE — 84443 ASSAY THYROID STIM HORMONE: CPT | Performed by: INTERNAL MEDICINE

## 2019-01-01 PROCEDURE — 83050 HGB METHEMOGLOBIN QUAN: CPT

## 2019-01-01 PROCEDURE — 83540 ASSAY OF IRON: CPT | Performed by: INTERNAL MEDICINE

## 2019-01-01 PROCEDURE — 97166 OT EVAL MOD COMPLEX 45 MIN: CPT | Performed by: OCCUPATIONAL THERAPIST

## 2019-01-01 PROCEDURE — 87070 CULTURE OTHR SPECIMN AEROBIC: CPT | Performed by: INTERNAL MEDICINE

## 2019-01-01 PROCEDURE — 36430 TRANSFUSION BLD/BLD COMPNT: CPT

## 2019-01-01 PROCEDURE — 85025 COMPLETE CBC W/AUTO DIFF WBC: CPT | Performed by: SPECIALIST

## 2019-01-01 PROCEDURE — 63710000001 DIPHENHYDRAMINE PER 50 MG: Performed by: NURSE PRACTITIONER

## 2019-01-01 PROCEDURE — 25010000002 PERFLUTREN 6.52 MG/ML SUSPENSION: Performed by: FAMILY MEDICINE

## 2019-01-01 PROCEDURE — 99231 SBSQ HOSP IP/OBS SF/LOW 25: CPT | Performed by: INTERNAL MEDICINE

## 2019-01-01 PROCEDURE — 74018 RADEX ABDOMEN 1 VIEW: CPT

## 2019-01-01 PROCEDURE — 88173 CYTOPATH EVAL FNA REPORT: CPT | Performed by: FAMILY MEDICINE

## 2019-01-01 PROCEDURE — 83036 HEMOGLOBIN GLYCOSYLATED A1C: CPT | Performed by: NURSE PRACTITIONER

## 2019-01-01 DEVICE — POWERPORT M.R.I. IMPLANTABLE PORT WITH ATTACHABLE 9.6F OPEN-ENDED SINGLE-LUMEN VENOUS CATHETER INTERMEDIATE KIT (WITH SUTURE PLUGS)
Type: IMPLANTABLE DEVICE | Status: FUNCTIONAL
Brand: POWERPORT M.R.I.

## 2019-01-01 RX ORDER — POTASSIUM CHLORIDE 750 MG/1
20 CAPSULE, EXTENDED RELEASE ORAL DAILY
Status: DISCONTINUED | OUTPATIENT
Start: 2019-01-01 | End: 2019-01-01 | Stop reason: HOSPADM

## 2019-01-01 RX ORDER — METOPROLOL TARTRATE 5 MG/5ML
INJECTION INTRAVENOUS
Status: COMPLETED
Start: 2019-01-01 | End: 2019-01-01

## 2019-01-01 RX ORDER — METHYLPREDNISOLONE SODIUM SUCCINATE 125 MG/2ML
80 INJECTION, POWDER, LYOPHILIZED, FOR SOLUTION INTRAMUSCULAR; INTRAVENOUS ONCE
Status: COMPLETED | OUTPATIENT
Start: 2019-01-01 | End: 2019-01-01

## 2019-01-01 RX ORDER — ONDANSETRON 2 MG/ML
4 INJECTION INTRAMUSCULAR; INTRAVENOUS EVERY 6 HOURS PRN
Status: DISCONTINUED | OUTPATIENT
Start: 2019-01-01 | End: 2019-01-01 | Stop reason: HOSPADM

## 2019-01-01 RX ORDER — ALUMINA, MAGNESIA, AND SIMETHICONE 2400; 2400; 240 MG/30ML; MG/30ML; MG/30ML
15 SUSPENSION ORAL EVERY 6 HOURS PRN
Status: DISCONTINUED | OUTPATIENT
Start: 2019-01-01 | End: 2019-01-01 | Stop reason: HOSPADM

## 2019-01-01 RX ORDER — HYDROXYZINE HYDROCHLORIDE 10 MG/1
10 TABLET, FILM COATED ORAL EVERY 4 HOURS PRN
Status: DISCONTINUED | OUTPATIENT
Start: 2019-01-01 | End: 2019-01-01 | Stop reason: HOSPADM

## 2019-01-01 RX ORDER — OXYCODONE AND ACETAMINOPHEN 7.5; 325 MG/1; MG/1
1 TABLET ORAL EVERY 4 HOURS PRN
Start: 2019-01-01 | End: 2019-01-01

## 2019-01-01 RX ORDER — DEXTROSE MONOHYDRATE 25 G/50ML
25 INJECTION, SOLUTION INTRAVENOUS
Status: DISCONTINUED | OUTPATIENT
Start: 2019-01-01 | End: 2019-01-01 | Stop reason: HOSPADM

## 2019-01-01 RX ORDER — BISACODYL 10 MG
10 SUPPOSITORY, RECTAL RECTAL ONCE
Status: COMPLETED | OUTPATIENT
Start: 2019-01-01 | End: 2019-01-01

## 2019-01-01 RX ORDER — ALBUTEROL SULFATE 2.5 MG/3ML
2.5 SOLUTION RESPIRATORY (INHALATION) EVERY 4 HOURS PRN
Status: DISCONTINUED | OUTPATIENT
Start: 2019-01-01 | End: 2019-01-01 | Stop reason: HOSPADM

## 2019-01-01 RX ORDER — BUTALBITAL, ACETAMINOPHEN AND CAFFEINE 50; 325; 40 MG/1; MG/1; MG/1
1 TABLET ORAL EVERY 4 HOURS PRN
Status: DISCONTINUED | OUTPATIENT
Start: 2019-01-01 | End: 2019-01-01 | Stop reason: HOSPADM

## 2019-01-01 RX ORDER — METOPROLOL TARTRATE 5 MG/5ML
5 INJECTION INTRAVENOUS ONCE
Status: COMPLETED | OUTPATIENT
Start: 2019-01-01 | End: 2019-01-01

## 2019-01-01 RX ORDER — IPRATROPIUM BROMIDE AND ALBUTEROL SULFATE 2.5; .5 MG/3ML; MG/3ML
3 SOLUTION RESPIRATORY (INHALATION)
Status: DISCONTINUED | OUTPATIENT
Start: 2019-01-01 | End: 2019-01-01

## 2019-01-01 RX ORDER — MAGNESIUM SULFATE HEPTAHYDRATE 40 MG/ML
2 INJECTION, SOLUTION INTRAVENOUS ONCE
Status: COMPLETED | OUTPATIENT
Start: 2019-01-01 | End: 2019-01-01

## 2019-01-01 RX ORDER — SODIUM CHLORIDE 0.9 % (FLUSH) 0.9 %
10 SYRINGE (ML) INJECTION AS NEEDED
Status: DISCONTINUED | OUTPATIENT
Start: 2019-01-01 | End: 2019-01-01 | Stop reason: HOSPADM

## 2019-01-01 RX ORDER — DEXTROMETHORPHAN POLISTIREX 30 MG/5ML
60 SUSPENSION ORAL EVERY 12 HOURS SCHEDULED
Status: DISCONTINUED | OUTPATIENT
Start: 2019-01-01 | End: 2019-01-01 | Stop reason: HOSPADM

## 2019-01-01 RX ORDER — NALOXONE HCL 0.4 MG/ML
0.4 VIAL (ML) INJECTION AS NEEDED
Status: DISCONTINUED | OUTPATIENT
Start: 2019-01-01 | End: 2019-01-01 | Stop reason: HOSPADM

## 2019-01-01 RX ORDER — FUROSEMIDE 20 MG/1
20 TABLET ORAL
Status: DISCONTINUED | OUTPATIENT
Start: 2019-01-01 | End: 2019-01-01 | Stop reason: HOSPADM

## 2019-01-01 RX ORDER — LOSARTAN POTASSIUM 100 MG/1
100 TABLET ORAL DAILY
Start: 2019-01-01 | End: 2019-01-01 | Stop reason: ALTCHOICE

## 2019-01-01 RX ORDER — METHYLPREDNISOLONE SODIUM SUCCINATE 125 MG/2ML
125 INJECTION, POWDER, LYOPHILIZED, FOR SOLUTION INTRAMUSCULAR; INTRAVENOUS ONCE
Status: COMPLETED | OUTPATIENT
Start: 2019-01-01 | End: 2019-01-01

## 2019-01-01 RX ORDER — BENZONATATE 100 MG/1
200 CAPSULE ORAL 3 TIMES DAILY PRN
Status: DISCONTINUED | OUTPATIENT
Start: 2019-01-01 | End: 2019-01-01 | Stop reason: HOSPADM

## 2019-01-01 RX ORDER — DEXTROMETHORPHAN POLISTIREX 30 MG/5ML
60 SUSPENSION ORAL EVERY 12 HOURS SCHEDULED
Status: DISCONTINUED | OUTPATIENT
Start: 2019-01-01 | End: 2019-01-01

## 2019-01-01 RX ORDER — OXYCODONE HYDROCHLORIDE AND ACETAMINOPHEN 5; 325 MG/1; MG/1
1 TABLET ORAL ONCE AS NEEDED
Status: COMPLETED | OUTPATIENT
Start: 2019-01-01 | End: 2019-01-01

## 2019-01-01 RX ORDER — NICOTINE POLACRILEX 4 MG
15 LOZENGE BUCCAL
Status: DISCONTINUED | OUTPATIENT
Start: 2019-01-01 | End: 2019-01-01 | Stop reason: HOSPADM

## 2019-01-01 RX ORDER — ACETAMINOPHEN 325 MG/1
650 TABLET ORAL EVERY 4 HOURS PRN
Start: 2019-01-01 | End: 2020-01-01 | Stop reason: HOSPADM

## 2019-01-01 RX ORDER — IBUPROFEN 600 MG/1
600 TABLET ORAL ONCE AS NEEDED
Status: DISCONTINUED | OUTPATIENT
Start: 2019-01-01 | End: 2019-01-01 | Stop reason: HOSPADM

## 2019-01-01 RX ORDER — GUAIFENESIN 600 MG/1
1200 TABLET, EXTENDED RELEASE ORAL 2 TIMES DAILY
Status: DISCONTINUED | OUTPATIENT
Start: 2019-01-01 | End: 2019-01-01 | Stop reason: HOSPADM

## 2019-01-01 RX ORDER — ALPRAZOLAM 1 MG/1
1 TABLET ORAL EVERY 6 HOURS PRN
Start: 2019-01-01 | End: 2020-01-01 | Stop reason: HOSPADM

## 2019-01-01 RX ORDER — PHENYLEPHRINE HCL IN 0.9% NACL 0.8MG/10ML
SYRINGE (ML) INTRAVENOUS AS NEEDED
Status: DISCONTINUED | OUTPATIENT
Start: 2019-01-01 | End: 2019-01-01 | Stop reason: SURG

## 2019-01-01 RX ORDER — SUCRALFATE 1 G/1
1 TABLET ORAL
Status: DISCONTINUED | OUTPATIENT
Start: 2019-01-01 | End: 2019-01-01 | Stop reason: HOSPADM

## 2019-01-01 RX ORDER — PANTOPRAZOLE SODIUM 40 MG/1
40 TABLET, DELAYED RELEASE ORAL EVERY MORNING
Start: 2019-01-01 | End: 2019-01-01

## 2019-01-01 RX ORDER — FENTANYL CITRATE 50 UG/ML
25 INJECTION, SOLUTION INTRAMUSCULAR; INTRAVENOUS AS NEEDED
Status: DISCONTINUED | OUTPATIENT
Start: 2019-01-01 | End: 2019-01-01 | Stop reason: HOSPADM

## 2019-01-01 RX ORDER — ALBUTEROL SULFATE 2.5 MG/3ML
2.5 SOLUTION RESPIRATORY (INHALATION) EVERY 4 HOURS PRN
COMMUNITY
End: 2019-01-01

## 2019-01-01 RX ORDER — MAGNESIUM SULFATE HEPTAHYDRATE 40 MG/ML
2 INJECTION, SOLUTION INTRAVENOUS
Status: DISCONTINUED | OUTPATIENT
Start: 2019-01-01 | End: 2019-01-01

## 2019-01-01 RX ORDER — IPRATROPIUM BROMIDE AND ALBUTEROL SULFATE 2.5; .5 MG/3ML; MG/3ML
3 SOLUTION RESPIRATORY (INHALATION)
Qty: 360 ML
Start: 2019-01-01 | End: 2019-01-01

## 2019-01-01 RX ORDER — SODIUM CHLORIDE 0.9 % (FLUSH) 0.9 %
3-10 SYRINGE (ML) INJECTION AS NEEDED
Status: DISCONTINUED | OUTPATIENT
Start: 2019-01-01 | End: 2019-01-01 | Stop reason: HOSPADM

## 2019-01-01 RX ORDER — FUROSEMIDE 10 MG/ML
40 INJECTION INTRAMUSCULAR; INTRAVENOUS EVERY 12 HOURS
Status: COMPLETED | OUTPATIENT
Start: 2019-01-01 | End: 2019-01-01

## 2019-01-01 RX ORDER — PREDNISONE 20 MG/1
20 TABLET ORAL
Status: DISCONTINUED | OUTPATIENT
Start: 2019-01-01 | End: 2019-01-01 | Stop reason: HOSPADM

## 2019-01-01 RX ORDER — OXYCODONE AND ACETAMINOPHEN 7.5; 325 MG/1; MG/1
1 TABLET ORAL EVERY 4 HOURS PRN
Status: CANCELLED | OUTPATIENT
Start: 2019-01-01 | End: 2019-01-01

## 2019-01-01 RX ORDER — BISACODYL 10 MG
10 SUPPOSITORY, RECTAL RECTAL DAILY PRN
Status: DISCONTINUED | OUTPATIENT
Start: 2019-01-01 | End: 2019-01-01 | Stop reason: HOSPADM

## 2019-01-01 RX ORDER — FUROSEMIDE 20 MG/1
20 TABLET ORAL DAILY
COMMUNITY
End: 2019-01-01 | Stop reason: ALTCHOICE

## 2019-01-01 RX ORDER — HYDROCODONE BITARTRATE AND ACETAMINOPHEN 7.5; 325 MG/1; MG/1
1 TABLET ORAL EVERY 4 HOURS PRN
Qty: 15 TABLET | Refills: 0 | Status: SHIPPED | OUTPATIENT
Start: 2019-01-01 | End: 2019-01-01

## 2019-01-01 RX ORDER — IPRATROPIUM BROMIDE AND ALBUTEROL SULFATE 2.5; .5 MG/3ML; MG/3ML
3 SOLUTION RESPIRATORY (INHALATION) ONCE AS NEEDED
Status: DISCONTINUED | OUTPATIENT
Start: 2019-01-01 | End: 2019-01-01 | Stop reason: HOSPADM

## 2019-01-01 RX ORDER — ACETYLCYSTEINE 200 MG/ML
2 SOLUTION ORAL; RESPIRATORY (INHALATION)
Status: DISCONTINUED | OUTPATIENT
Start: 2019-01-01 | End: 2019-01-01

## 2019-01-01 RX ORDER — METHYLPREDNISOLONE SODIUM SUCCINATE 125 MG/2ML
125 INJECTION, POWDER, LYOPHILIZED, FOR SOLUTION INTRAMUSCULAR; INTRAVENOUS
Status: ACTIVE | OUTPATIENT
Start: 2019-01-01 | End: 2019-01-01

## 2019-01-01 RX ORDER — SUCRALFATE 1 G/1
1 TABLET ORAL 4 TIMES DAILY
COMMUNITY

## 2019-01-01 RX ORDER — ONDANSETRON 2 MG/ML
4 INJECTION INTRAMUSCULAR; INTRAVENOUS ONCE AS NEEDED
Status: DISCONTINUED | OUTPATIENT
Start: 2019-01-01 | End: 2019-01-01 | Stop reason: HOSPADM

## 2019-01-01 RX ORDER — FUROSEMIDE 10 MG/ML
40 INJECTION INTRAMUSCULAR; INTRAVENOUS EVERY 12 HOURS
Status: DISCONTINUED | OUTPATIENT
Start: 2019-01-01 | End: 2019-01-01

## 2019-01-01 RX ORDER — OXYCODONE AND ACETAMINOPHEN 7.5; 325 MG/1; MG/1
1 TABLET ORAL EVERY 4 HOURS PRN
Status: DISCONTINUED | OUTPATIENT
Start: 2019-01-01 | End: 2019-01-01 | Stop reason: HOSPADM

## 2019-01-01 RX ORDER — FUROSEMIDE 10 MG/ML
20 INJECTION INTRAMUSCULAR; INTRAVENOUS EVERY 12 HOURS
Status: DISCONTINUED | OUTPATIENT
Start: 2019-01-01 | End: 2019-01-01

## 2019-01-01 RX ORDER — LABETALOL HYDROCHLORIDE 5 MG/ML
5 INJECTION, SOLUTION INTRAVENOUS
Status: DISCONTINUED | OUTPATIENT
Start: 2019-01-01 | End: 2019-01-01 | Stop reason: HOSPADM

## 2019-01-01 RX ORDER — SODIUM CHLORIDE 0.9 % (FLUSH) 0.9 %
3 SYRINGE (ML) INJECTION EVERY 12 HOURS SCHEDULED
Status: DISCONTINUED | OUTPATIENT
Start: 2019-01-01 | End: 2019-01-01 | Stop reason: HOSPADM

## 2019-01-01 RX ORDER — DIPHENHYDRAMINE HCL 25 MG
25 CAPSULE ORAL ONCE
Status: DISCONTINUED | OUTPATIENT
Start: 2019-01-01 | End: 2019-01-01

## 2019-01-01 RX ORDER — LORAZEPAM 2 MG/ML
1 INJECTION INTRAMUSCULAR ONCE
Status: COMPLETED | OUTPATIENT
Start: 2019-01-01 | End: 2019-01-01

## 2019-01-01 RX ORDER — MAGNESIUM SULFATE HEPTAHYDRATE 40 MG/ML
4 INJECTION, SOLUTION INTRAVENOUS AS NEEDED
Status: DISCONTINUED | OUTPATIENT
Start: 2019-01-01 | End: 2019-01-01 | Stop reason: HOSPADM

## 2019-01-01 RX ORDER — CHOLECALCIFEROL (VITAMIN D3) 125 MCG
1 TABLET ORAL DAILY
COMMUNITY

## 2019-01-01 RX ORDER — SODIUM CHLORIDE, SODIUM LACTATE, POTASSIUM CHLORIDE, CALCIUM CHLORIDE 600; 310; 30; 20 MG/100ML; MG/100ML; MG/100ML; MG/100ML
100 INJECTION, SOLUTION INTRAVENOUS CONTINUOUS
Status: DISCONTINUED | OUTPATIENT
Start: 2019-01-01 | End: 2019-01-01 | Stop reason: HOSPADM

## 2019-01-01 RX ORDER — SODIUM CHLORIDE, SODIUM LACTATE, POTASSIUM CHLORIDE, CALCIUM CHLORIDE 600; 310; 30; 20 MG/100ML; MG/100ML; MG/100ML; MG/100ML
1000 INJECTION, SOLUTION INTRAVENOUS CONTINUOUS
Status: DISCONTINUED | OUTPATIENT
Start: 2019-01-01 | End: 2019-01-01 | Stop reason: HOSPADM

## 2019-01-01 RX ORDER — IPRATROPIUM BROMIDE AND ALBUTEROL SULFATE 2.5; .5 MG/3ML; MG/3ML
3 SOLUTION RESPIRATORY (INHALATION)
Status: DISCONTINUED | OUTPATIENT
Start: 2019-01-01 | End: 2019-01-01 | Stop reason: HOSPADM

## 2019-01-01 RX ORDER — FLUTICASONE PROPIONATE 50 MCG
2 SPRAY, SUSPENSION (ML) NASAL DAILY
Status: DISCONTINUED | OUTPATIENT
Start: 2019-01-01 | End: 2019-01-01 | Stop reason: HOSPADM

## 2019-01-01 RX ORDER — PROMETHAZINE HYDROCHLORIDE 25 MG/ML
12.5 INJECTION, SOLUTION INTRAMUSCULAR; INTRAVENOUS ONCE
Status: DISCONTINUED | OUTPATIENT
Start: 2019-01-01 | End: 2019-01-01

## 2019-01-01 RX ORDER — MAGNESIUM SULFATE HEPTAHYDRATE 40 MG/ML
2 INJECTION, SOLUTION INTRAVENOUS AS NEEDED
Status: DISCONTINUED | OUTPATIENT
Start: 2019-01-01 | End: 2019-01-01 | Stop reason: HOSPADM

## 2019-01-01 RX ORDER — SACCHAROMYCES BOULARDII 250 MG
250 CAPSULE ORAL 2 TIMES DAILY
Status: DISCONTINUED | OUTPATIENT
Start: 2019-01-01 | End: 2019-01-01 | Stop reason: HOSPADM

## 2019-01-01 RX ORDER — ACETAMINOPHEN 325 MG/1
650 TABLET ORAL EVERY 4 HOURS PRN
Status: DISCONTINUED | OUTPATIENT
Start: 2019-01-01 | End: 2019-01-01 | Stop reason: HOSPADM

## 2019-01-01 RX ORDER — FUROSEMIDE 10 MG/ML
40 INJECTION INTRAMUSCULAR; INTRAVENOUS EVERY 12 HOURS
Status: DISCONTINUED | OUTPATIENT
Start: 2019-01-01 | End: 2019-01-01 | Stop reason: HOSPADM

## 2019-01-01 RX ORDER — CETIRIZINE HYDROCHLORIDE 10 MG/1
10 TABLET ORAL NIGHTLY
Status: DISCONTINUED | OUTPATIENT
Start: 2019-01-01 | End: 2019-01-01 | Stop reason: HOSPADM

## 2019-01-01 RX ORDER — ALPRAZOLAM 0.5 MG/1
1 TABLET ORAL EVERY 6 HOURS PRN
Status: DISCONTINUED | OUTPATIENT
Start: 2019-01-01 | End: 2019-01-01 | Stop reason: HOSPADM

## 2019-01-01 RX ORDER — ALBUTEROL SULFATE 90 UG/1
2 AEROSOL, METERED RESPIRATORY (INHALATION) EVERY 4 HOURS PRN
Status: ON HOLD | COMMUNITY
End: 2019-01-01

## 2019-01-01 RX ORDER — ROCURONIUM BROMIDE 10 MG/ML
INJECTION, SOLUTION INTRAVENOUS AS NEEDED
Status: DISCONTINUED | OUTPATIENT
Start: 2019-01-01 | End: 2019-01-01

## 2019-01-01 RX ORDER — LIDOCAINE HYDROCHLORIDE 20 MG/ML
INJECTION, SOLUTION INFILTRATION; PERINEURAL AS NEEDED
Status: DISCONTINUED | OUTPATIENT
Start: 2019-01-01 | End: 2019-01-01 | Stop reason: SURG

## 2019-01-01 RX ORDER — MIDAZOLAM HYDROCHLORIDE 1 MG/ML
2 INJECTION INTRAMUSCULAR; INTRAVENOUS
Status: DISCONTINUED | OUTPATIENT
Start: 2019-01-01 | End: 2019-01-01 | Stop reason: HOSPADM

## 2019-01-01 RX ORDER — METHYLPREDNISOLONE SODIUM SUCCINATE 125 MG/2ML
60 INJECTION, POWDER, LYOPHILIZED, FOR SOLUTION INTRAMUSCULAR; INTRAVENOUS ONCE
Status: DISCONTINUED | OUTPATIENT
Start: 2019-01-01 | End: 2019-01-01

## 2019-01-01 RX ORDER — PROPOFOL 10 MG/ML
VIAL (ML) INTRAVENOUS AS NEEDED
Status: DISCONTINUED | OUTPATIENT
Start: 2019-01-01 | End: 2019-01-01 | Stop reason: SURG

## 2019-01-01 RX ORDER — SODIUM CHLORIDE 0.9 % (FLUSH) 0.9 %
3 SYRINGE (ML) INJECTION AS NEEDED
Status: DISCONTINUED | OUTPATIENT
Start: 2019-01-01 | End: 2019-01-01 | Stop reason: HOSPADM

## 2019-01-01 RX ORDER — BUDESONIDE AND FORMOTEROL FUMARATE DIHYDRATE 160; 4.5 UG/1; UG/1
2 AEROSOL RESPIRATORY (INHALATION)
Refills: 12
Start: 2019-01-01

## 2019-01-01 RX ORDER — SODIUM CHLORIDE 9 MG/ML
75 INJECTION, SOLUTION INTRAVENOUS CONTINUOUS
Status: DISCONTINUED | OUTPATIENT
Start: 2019-01-01 | End: 2019-01-01

## 2019-01-01 RX ORDER — FENTANYL CITRATE 50 UG/ML
INJECTION, SOLUTION INTRAMUSCULAR; INTRAVENOUS AS NEEDED
Status: DISCONTINUED | OUTPATIENT
Start: 2019-01-01 | End: 2019-01-01 | Stop reason: SURG

## 2019-01-01 RX ORDER — POTASSIUM CHLORIDE 7.45 MG/ML
10 INJECTION INTRAVENOUS
Status: DISCONTINUED | OUTPATIENT
Start: 2019-01-01 | End: 2019-01-01 | Stop reason: HOSPADM

## 2019-01-01 RX ORDER — MIDAZOLAM HYDROCHLORIDE 1 MG/ML
1 INJECTION INTRAMUSCULAR; INTRAVENOUS
Status: DISCONTINUED | OUTPATIENT
Start: 2019-01-01 | End: 2019-01-01 | Stop reason: HOSPADM

## 2019-01-01 RX ORDER — ONDANSETRON HYDROCHLORIDE 8 MG/1
8 TABLET, FILM COATED ORAL EVERY 8 HOURS PRN
COMMUNITY

## 2019-01-01 RX ORDER — ATORVASTATIN CALCIUM 40 MG/1
40 TABLET, FILM COATED ORAL DAILY
Status: DISCONTINUED | OUTPATIENT
Start: 2019-01-01 | End: 2019-01-01 | Stop reason: HOSPADM

## 2019-01-01 RX ORDER — LIDOCAINE HYDROCHLORIDE 10 MG/ML
INJECTION, SOLUTION INFILTRATION; PERINEURAL AS NEEDED
Status: DISCONTINUED | OUTPATIENT
Start: 2019-01-01 | End: 2019-01-01 | Stop reason: HOSPADM

## 2019-01-01 RX ORDER — POTASSIUM CHLORIDE 750 MG/1
40 CAPSULE, EXTENDED RELEASE ORAL DAILY
Status: DISCONTINUED | OUTPATIENT
Start: 2019-01-01 | End: 2019-01-01

## 2019-01-01 RX ORDER — DILTIAZEM HYDROCHLORIDE 5 MG/ML
INJECTION INTRAVENOUS
Status: COMPLETED
Start: 2019-01-01 | End: 2019-01-01

## 2019-01-01 RX ORDER — PANTOPRAZOLE SODIUM 40 MG/1
40 TABLET, DELAYED RELEASE ORAL EVERY MORNING
Status: DISCONTINUED | OUTPATIENT
Start: 2019-01-01 | End: 2019-01-01 | Stop reason: HOSPADM

## 2019-01-01 RX ORDER — SODIUM CHLORIDE 0.9 % (FLUSH) 0.9 %
1-10 SYRINGE (ML) INJECTION AS NEEDED
Status: DISCONTINUED | OUTPATIENT
Start: 2019-01-01 | End: 2019-01-01 | Stop reason: HOSPADM

## 2019-01-01 RX ORDER — METOPROLOL SUCCINATE 50 MG/1
50 TABLET, EXTENDED RELEASE ORAL DAILY
COMMUNITY
End: 2019-01-01 | Stop reason: ALTCHOICE

## 2019-01-01 RX ORDER — ATORVASTATIN CALCIUM 40 MG/1
40 TABLET, FILM COATED ORAL NIGHTLY
Status: DISCONTINUED | OUTPATIENT
Start: 2019-01-01 | End: 2019-01-01 | Stop reason: HOSPADM

## 2019-01-01 RX ORDER — OXYCODONE HYDROCHLORIDE AND ACETAMINOPHEN 5; 325 MG/1; MG/1
1 TABLET ORAL ONCE AS NEEDED
Status: DISCONTINUED | OUTPATIENT
Start: 2019-01-01 | End: 2019-01-01 | Stop reason: HOSPADM

## 2019-01-01 RX ORDER — OXYCODONE AND ACETAMINOPHEN 7.5; 325 MG/1; MG/1
1 TABLET ORAL EVERY 4 HOURS PRN
Status: DISPENSED | OUTPATIENT
Start: 2019-01-01 | End: 2019-01-01

## 2019-01-01 RX ORDER — HYDROXYZINE HYDROCHLORIDE 10 MG/1
10 TABLET, FILM COATED ORAL 4 TIMES DAILY PRN
COMMUNITY

## 2019-01-01 RX ORDER — ASPIRIN 81 MG/1
324 TABLET, CHEWABLE ORAL ONCE
Status: COMPLETED | OUTPATIENT
Start: 2019-01-01 | End: 2019-01-01

## 2019-01-01 RX ORDER — ALPRAZOLAM 0.5 MG/1
0.5 TABLET ORAL EVERY 8 HOURS PRN
Status: DISCONTINUED | OUTPATIENT
Start: 2019-01-01 | End: 2019-01-01

## 2019-01-01 RX ORDER — DEXTROMETHORPHAN POLISTIREX 30 MG/5ML
60 SUSPENSION ORAL EVERY 12 HOURS SCHEDULED
Qty: 280 ML
Start: 2019-01-01 | End: 2019-01-01

## 2019-01-01 RX ORDER — ALPRAZOLAM 0.25 MG/1
0.25 TABLET ORAL ONCE
Status: COMPLETED | OUTPATIENT
Start: 2019-01-01 | End: 2019-01-01

## 2019-01-01 RX ORDER — BUDESONIDE AND FORMOTEROL FUMARATE DIHYDRATE 160; 4.5 UG/1; UG/1
2 AEROSOL RESPIRATORY (INHALATION)
Status: DISCONTINUED | OUTPATIENT
Start: 2019-01-01 | End: 2019-01-01 | Stop reason: HOSPADM

## 2019-01-01 RX ORDER — DILTIAZEM HYDROCHLORIDE 5 MG/ML
10 INJECTION INTRAVENOUS ONCE
Status: COMPLETED | OUTPATIENT
Start: 2019-01-01 | End: 2019-01-01

## 2019-01-01 RX ORDER — ALBUTEROL SULFATE 90 UG/1
2 AEROSOL, METERED RESPIRATORY (INHALATION) EVERY 4 HOURS PRN
Status: DISCONTINUED | OUTPATIENT
Start: 2019-01-01 | End: 2019-01-01

## 2019-01-01 RX ORDER — BUDESONIDE AND FORMOTEROL FUMARATE DIHYDRATE 160; 4.5 UG/1; UG/1
2 AEROSOL RESPIRATORY (INHALATION)
COMMUNITY
End: 2019-01-01 | Stop reason: HOSPADM

## 2019-01-01 RX ORDER — LANOLIN ALCOHOL/MO/W.PET/CERES
400 CREAM (GRAM) TOPICAL DAILY
Status: DISCONTINUED | OUTPATIENT
Start: 2019-01-01 | End: 2019-01-01 | Stop reason: HOSPADM

## 2019-01-01 RX ORDER — IPRATROPIUM BROMIDE AND ALBUTEROL SULFATE 2.5; .5 MG/3ML; MG/3ML
3 SOLUTION RESPIRATORY (INHALATION) ONCE
Status: COMPLETED | OUTPATIENT
Start: 2019-01-01 | End: 2019-01-01

## 2019-01-01 RX ORDER — METOCLOPRAMIDE HYDROCHLORIDE 5 MG/ML
5 INJECTION INTRAMUSCULAR; INTRAVENOUS
Status: DISCONTINUED | OUTPATIENT
Start: 2019-01-01 | End: 2019-01-01 | Stop reason: HOSPADM

## 2019-01-01 RX ORDER — POTASSIUM CHLORIDE 1.5 G/1.77G
40 POWDER, FOR SOLUTION ORAL AS NEEDED
Status: DISCONTINUED | OUTPATIENT
Start: 2019-01-01 | End: 2019-01-01 | Stop reason: HOSPADM

## 2019-01-01 RX ORDER — LOPERAMIDE HYDROCHLORIDE 2 MG/1
2 CAPSULE ORAL EVERY 6 HOURS PRN
Status: DISCONTINUED | OUTPATIENT
Start: 2019-01-01 | End: 2019-01-01 | Stop reason: HOSPADM

## 2019-01-01 RX ORDER — HYDROMORPHONE HYDROCHLORIDE 1 MG/ML
0.5 INJECTION, SOLUTION INTRAMUSCULAR; INTRAVENOUS; SUBCUTANEOUS
Status: DISCONTINUED | OUTPATIENT
Start: 2019-01-01 | End: 2019-01-01

## 2019-01-01 RX ORDER — FUROSEMIDE 10 MG/ML
40 INJECTION INTRAMUSCULAR; INTRAVENOUS DAILY
Status: DISCONTINUED | OUTPATIENT
Start: 2019-01-01 | End: 2019-01-01

## 2019-01-01 RX ORDER — GUAIFENESIN 600 MG/1
1200 TABLET, EXTENDED RELEASE ORAL 2 TIMES DAILY
Status: ON HOLD
Start: 2019-01-01 | End: 2019-01-01

## 2019-01-01 RX ORDER — PANTOPRAZOLE SODIUM 40 MG/1
40 TABLET, DELAYED RELEASE ORAL
Status: DISCONTINUED | OUTPATIENT
Start: 2019-01-01 | End: 2019-01-01 | Stop reason: HOSPADM

## 2019-01-01 RX ORDER — HYDROXYZINE HYDROCHLORIDE 10 MG/1
10 TABLET, FILM COATED ORAL 4 TIMES DAILY PRN
Status: DISCONTINUED | OUTPATIENT
Start: 2019-01-01 | End: 2019-01-01 | Stop reason: HOSPADM

## 2019-01-01 RX ORDER — FUROSEMIDE 10 MG/ML
40 INJECTION INTRAMUSCULAR; INTRAVENOUS ONCE
Status: COMPLETED | OUTPATIENT
Start: 2019-01-01 | End: 2019-01-01

## 2019-01-01 RX ORDER — ALBUTEROL SULFATE 90 UG/1
2 AEROSOL, METERED RESPIRATORY (INHALATION) EVERY 4 HOURS PRN
COMMUNITY

## 2019-01-01 RX ORDER — LOSARTAN POTASSIUM 50 MG/1
50 TABLET ORAL DAILY
Status: DISCONTINUED | OUTPATIENT
Start: 2019-01-01 | End: 2019-01-01 | Stop reason: HOSPADM

## 2019-01-01 RX ORDER — FUROSEMIDE 10 MG/ML
INJECTION INTRAMUSCULAR; INTRAVENOUS
Status: COMPLETED
Start: 2019-01-01 | End: 2019-01-01

## 2019-01-01 RX ORDER — SODIUM CHLORIDE 0.9 % (FLUSH) 0.9 %
10 SYRINGE (ML) INJECTION EVERY 12 HOURS SCHEDULED
Status: DISCONTINUED | OUTPATIENT
Start: 2019-01-01 | End: 2019-01-01 | Stop reason: HOSPADM

## 2019-01-01 RX ORDER — SODIUM CHLORIDE, SODIUM LACTATE, POTASSIUM CHLORIDE, CALCIUM CHLORIDE 600; 310; 30; 20 MG/100ML; MG/100ML; MG/100ML; MG/100ML
100 INJECTION, SOLUTION INTRAVENOUS CONTINUOUS
Status: DISCONTINUED | OUTPATIENT
Start: 2019-01-01 | End: 2019-01-01

## 2019-01-01 RX ORDER — BUTALBITAL, ACETAMINOPHEN AND CAFFEINE 50; 325; 40 MG/1; MG/1; MG/1
1 TABLET ORAL EVERY 4 HOURS PRN
Start: 2019-01-01 | End: 2019-01-01

## 2019-01-01 RX ORDER — LORAZEPAM 0.5 MG/1
0.5 TABLET ORAL ONCE
Status: COMPLETED | OUTPATIENT
Start: 2019-01-01 | End: 2019-01-01

## 2019-01-01 RX ORDER — ONDANSETRON 4 MG/1
4 TABLET, FILM COATED ORAL EVERY 6 HOURS PRN
Status: DISCONTINUED | OUTPATIENT
Start: 2019-01-01 | End: 2019-01-01 | Stop reason: HOSPADM

## 2019-01-01 RX ORDER — FUROSEMIDE 10 MG/ML
40 INJECTION INTRAMUSCULAR; INTRAVENOUS EVERY 12 HOURS
Start: 2019-01-01 | End: 2019-01-01

## 2019-01-01 RX ORDER — PROMETHAZINE HYDROCHLORIDE 25 MG/ML
12.5 INJECTION, SOLUTION INTRAMUSCULAR; INTRAVENOUS
Status: DISPENSED | OUTPATIENT
Start: 2019-01-01 | End: 2019-01-01

## 2019-01-01 RX ORDER — MECLIZINE HYDROCHLORIDE 25 MG/1
25 TABLET ORAL 3 TIMES DAILY PRN
Status: DISCONTINUED | OUTPATIENT
Start: 2019-01-01 | End: 2019-01-01 | Stop reason: HOSPADM

## 2019-01-01 RX ORDER — DOCUSATE SODIUM 100 MG/1
100 CAPSULE, LIQUID FILLED ORAL 2 TIMES DAILY
Status: DISCONTINUED | OUTPATIENT
Start: 2019-01-01 | End: 2019-01-01

## 2019-01-01 RX ORDER — POTASSIUM CHLORIDE 750 MG/1
40 CAPSULE, EXTENDED RELEASE ORAL 2 TIMES DAILY
Status: DISPENSED | OUTPATIENT
Start: 2019-01-01 | End: 2019-01-01

## 2019-01-01 RX ORDER — FUROSEMIDE 10 MG/ML
INJECTION INTRAMUSCULAR; INTRAVENOUS
Status: DISPENSED
Start: 2019-01-01 | End: 2019-01-01

## 2019-01-01 RX ORDER — LOSARTAN POTASSIUM 50 MG/1
100 TABLET ORAL DAILY
Status: DISCONTINUED | OUTPATIENT
Start: 2019-01-01 | End: 2019-01-01

## 2019-01-01 RX ORDER — POTASSIUM CHLORIDE 750 MG/1
40 CAPSULE, EXTENDED RELEASE ORAL AS NEEDED
Status: DISCONTINUED | OUTPATIENT
Start: 2019-01-01 | End: 2019-01-01 | Stop reason: HOSPADM

## 2019-01-01 RX ORDER — HYDROMORPHONE HYDROCHLORIDE 1 MG/ML
0.5 INJECTION, SOLUTION INTRAMUSCULAR; INTRAVENOUS; SUBCUTANEOUS ONCE
Status: DISCONTINUED | OUTPATIENT
Start: 2019-01-01 | End: 2019-01-01

## 2019-01-01 RX ORDER — BENZONATATE 200 MG/1
200 CAPSULE ORAL 3 TIMES DAILY PRN
Start: 2019-01-01 | End: 2019-01-01

## 2019-01-01 RX ORDER — LOSARTAN POTASSIUM 50 MG/1
100 TABLET ORAL DAILY
Status: DISCONTINUED | OUTPATIENT
Start: 2019-01-01 | End: 2019-01-01 | Stop reason: HOSPADM

## 2019-01-01 RX ORDER — ESOMEPRAZOLE MAGNESIUM 40 MG/1
40 CAPSULE, DELAYED RELEASE ORAL
COMMUNITY

## 2019-01-01 RX ORDER — LANOLIN ALCOHOL/MO/W.PET/CERES
400 CREAM (GRAM) TOPICAL DAILY
COMMUNITY

## 2019-01-01 RX ORDER — FLUTICASONE PROPIONATE 50 MCG
2 SPRAY, SUSPENSION (ML) NASAL DAILY
Status: ON HOLD
Start: 2019-01-01 | End: 2019-01-01

## 2019-01-01 RX ADMIN — IPRATROPIUM BROMIDE AND ALBUTEROL SULFATE 3 ML: 2.5; .5 SOLUTION RESPIRATORY (INHALATION) at 14:39

## 2019-01-01 RX ADMIN — ALPRAZOLAM 0.5 MG: 0.5 TABLET ORAL at 16:32

## 2019-01-01 RX ADMIN — Medication 400 MCG: at 08:07

## 2019-01-01 RX ADMIN — SUCRALFATE 1 G: 1 TABLET ORAL at 11:12

## 2019-01-01 RX ADMIN — SUCRALFATE 1 G: 1 TABLET ORAL at 21:42

## 2019-01-01 RX ADMIN — INSULIN LISPRO 2 UNITS: 100 INJECTION, SOLUTION INTRAVENOUS; SUBCUTANEOUS at 16:40

## 2019-01-01 RX ADMIN — ALPRAZOLAM 1 MG: 0.5 TABLET ORAL at 14:22

## 2019-01-01 RX ADMIN — SODIUM CHLORIDE, PRESERVATIVE FREE 3 ML: 5 INJECTION INTRAVENOUS at 20:30

## 2019-01-01 RX ADMIN — IPRATROPIUM BROMIDE AND ALBUTEROL SULFATE 3 ML: 2.5; .5 SOLUTION RESPIRATORY (INHALATION) at 06:38

## 2019-01-01 RX ADMIN — HYDROXYZINE HYDROCHLORIDE 10 MG: 10 TABLET, FILM COATED ORAL at 03:14

## 2019-01-01 RX ADMIN — SUCRALFATE 1 G: 1 TABLET ORAL at 17:26

## 2019-01-01 RX ADMIN — INSULIN LISPRO 3 UNITS: 100 INJECTION, SOLUTION INTRAVENOUS; SUBCUTANEOUS at 08:11

## 2019-01-01 RX ADMIN — IPRATROPIUM BROMIDE AND ALBUTEROL SULFATE 3 ML: 2.5; .5 SOLUTION RESPIRATORY (INHALATION) at 03:04

## 2019-01-01 RX ADMIN — DESMOPRESSIN ACETATE 40 MG: 0.2 TABLET ORAL at 08:17

## 2019-01-01 RX ADMIN — BUDESONIDE AND FORMOTEROL FUMARATE DIHYDRATE 2 PUFF: 160; 4.5 AEROSOL RESPIRATORY (INHALATION) at 07:20

## 2019-01-01 RX ADMIN — ACETAMINOPHEN 650 MG: 325 TABLET, FILM COATED ORAL at 08:31

## 2019-01-01 RX ADMIN — IPRATROPIUM BROMIDE AND ALBUTEROL SULFATE 3 ML: 2.5; .5 SOLUTION RESPIRATORY (INHALATION) at 06:26

## 2019-01-01 RX ADMIN — LINAGLIPTIN 5 MG: 5 TABLET, FILM COATED ORAL at 09:52

## 2019-01-01 RX ADMIN — IPRATROPIUM BROMIDE AND ALBUTEROL SULFATE 3 ML: 2.5; .5 SOLUTION RESPIRATORY (INHALATION) at 06:46

## 2019-01-01 RX ADMIN — ALPRAZOLAM 1 MG: 0.5 TABLET ORAL at 13:55

## 2019-01-01 RX ADMIN — SODIUM CHLORIDE, PRESERVATIVE FREE 3 ML: 5 INJECTION INTRAVENOUS at 21:03

## 2019-01-01 RX ADMIN — PANTOPRAZOLE SODIUM 40 MG: 40 TABLET, DELAYED RELEASE ORAL at 06:28

## 2019-01-01 RX ADMIN — SODIUM CHLORIDE, PRESERVATIVE FREE 10 ML: 5 INJECTION INTRAVENOUS at 08:08

## 2019-01-01 RX ADMIN — PANTOPRAZOLE SODIUM 40 MG: 40 TABLET, DELAYED RELEASE ORAL at 06:53

## 2019-01-01 RX ADMIN — ALPRAZOLAM 0.5 MG: 0.5 TABLET ORAL at 20:15

## 2019-01-01 RX ADMIN — LORAZEPAM 1 MG: 2 INJECTION INTRAMUSCULAR; INTRAVENOUS at 15:36

## 2019-01-01 RX ADMIN — METOPROLOL TARTRATE 5 MG: 5 INJECTION INTRAVENOUS at 07:48

## 2019-01-01 RX ADMIN — DESMOPRESSIN ACETATE 40 MG: 0.2 TABLET ORAL at 10:53

## 2019-01-01 RX ADMIN — ACETAMINOPHEN 650 MG: 325 TABLET, FILM COATED ORAL at 11:22

## 2019-01-01 RX ADMIN — IPRATROPIUM BROMIDE AND ALBUTEROL SULFATE 3 ML: 2.5; .5 SOLUTION RESPIRATORY (INHALATION) at 10:00

## 2019-01-01 RX ADMIN — INSULIN LISPRO 6 UNITS: 100 INJECTION, SOLUTION INTRAVENOUS; SUBCUTANEOUS at 20:29

## 2019-01-01 RX ADMIN — BENZONATATE 200 MG: 100 CAPSULE ORAL at 08:53

## 2019-01-01 RX ADMIN — FUROSEMIDE 40 MG: 10 INJECTION, SOLUTION INTRAVENOUS at 08:10

## 2019-01-01 RX ADMIN — ACETYLCYSTEINE 2 ML: 200 SOLUTION ORAL; RESPIRATORY (INHALATION) at 11:27

## 2019-01-01 RX ADMIN — PANTOPRAZOLE SODIUM 40 MG: 40 TABLET, DELAYED RELEASE ORAL at 05:38

## 2019-01-01 RX ADMIN — SODIUM CHLORIDE, PRESERVATIVE FREE 10 ML: 5 INJECTION INTRAVENOUS at 08:14

## 2019-01-01 RX ADMIN — INSULIN LISPRO 2 UNITS: 100 INJECTION, SOLUTION INTRAVENOUS; SUBCUTANEOUS at 18:41

## 2019-01-01 RX ADMIN — SODIUM CHLORIDE, POTASSIUM CHLORIDE, SODIUM LACTATE AND CALCIUM CHLORIDE 1000 ML: 600; 310; 30; 20 INJECTION, SOLUTION INTRAVENOUS at 13:24

## 2019-01-01 RX ADMIN — SODIUM CHLORIDE, PRESERVATIVE FREE 10 ML: 5 INJECTION INTRAVENOUS at 20:41

## 2019-01-01 RX ADMIN — HYDROXYZINE HYDROCHLORIDE 10 MG: 10 TABLET, FILM COATED ORAL at 10:59

## 2019-01-01 RX ADMIN — IPRATROPIUM BROMIDE AND ALBUTEROL SULFATE 3 ML: 2.5; .5 SOLUTION RESPIRATORY (INHALATION) at 19:16

## 2019-01-01 RX ADMIN — ALPRAZOLAM 1 MG: 0.5 TABLET ORAL at 17:00

## 2019-01-01 RX ADMIN — FUROSEMIDE 40 MG: 10 INJECTION, SOLUTION INTRAVENOUS at 10:01

## 2019-01-01 RX ADMIN — MIDAZOLAM 1 MG: 1 INJECTION INTRAMUSCULAR; INTRAVENOUS at 13:26

## 2019-01-01 RX ADMIN — SUCRALFATE 1 G: 1 TABLET ORAL at 11:40

## 2019-01-01 RX ADMIN — ALPRAZOLAM 1 MG: 0.5 TABLET ORAL at 15:16

## 2019-01-01 RX ADMIN — BUDESONIDE AND FORMOTEROL FUMARATE DIHYDRATE 2 PUFF: 160; 4.5 AEROSOL RESPIRATORY (INHALATION) at 19:56

## 2019-01-01 RX ADMIN — SODIUM CHLORIDE 75 ML/HR: 9 INJECTION, SOLUTION INTRAVENOUS at 05:57

## 2019-01-01 RX ADMIN — IPRATROPIUM BROMIDE AND ALBUTEROL SULFATE 3 ML: 2.5; .5 SOLUTION RESPIRATORY (INHALATION) at 19:50

## 2019-01-01 RX ADMIN — ALPRAZOLAM 0.5 MG: 0.5 TABLET ORAL at 09:02

## 2019-01-01 RX ADMIN — SODIUM CHLORIDE, POTASSIUM CHLORIDE, SODIUM LACTATE AND CALCIUM CHLORIDE: 600; 310; 30; 20 INJECTION, SOLUTION INTRAVENOUS at 13:15

## 2019-01-01 RX ADMIN — PANTOPRAZOLE SODIUM 40 MG: 40 TABLET, DELAYED RELEASE ORAL at 06:45

## 2019-01-01 RX ADMIN — SUCRALFATE 1 G: 1 TABLET ORAL at 21:27

## 2019-01-01 RX ADMIN — ACETAMINOPHEN 650 MG: 325 TABLET, FILM COATED ORAL at 08:45

## 2019-01-01 RX ADMIN — SODIUM CHLORIDE, PRESERVATIVE FREE 10 ML: 5 INJECTION INTRAVENOUS at 09:33

## 2019-01-01 RX ADMIN — ALPRAZOLAM 1 MG: 0.5 TABLET ORAL at 06:50

## 2019-01-01 RX ADMIN — FENTANYL CITRATE 100 MCG: 50 INJECTION, SOLUTION INTRAMUSCULAR; INTRAVENOUS at 15:04

## 2019-01-01 RX ADMIN — BUDESONIDE AND FORMOTEROL FUMARATE DIHYDRATE 2 PUFF: 160; 4.5 AEROSOL RESPIRATORY (INHALATION) at 06:38

## 2019-01-01 RX ADMIN — BUDESONIDE AND FORMOTEROL FUMARATE DIHYDRATE 2 PUFF: 160; 4.5 AEROSOL RESPIRATORY (INHALATION) at 18:45

## 2019-01-01 RX ADMIN — IPRATROPIUM BROMIDE AND ALBUTEROL SULFATE 3 ML: 2.5; .5 SOLUTION RESPIRATORY (INHALATION) at 07:30

## 2019-01-01 RX ADMIN — DEXTROMETHORPHAN 60 MG: 30 SUSPENSION, EXTENDED RELEASE ORAL at 09:57

## 2019-01-01 RX ADMIN — INSULIN LISPRO 2 UNITS: 100 INJECTION, SOLUTION INTRAVENOUS; SUBCUTANEOUS at 08:43

## 2019-01-01 RX ADMIN — INSULIN LISPRO 2 UNITS: 100 INJECTION, SOLUTION INTRAVENOUS; SUBCUTANEOUS at 08:17

## 2019-01-01 RX ADMIN — SUCRALFATE 1 G: 1 TABLET ORAL at 17:05

## 2019-01-01 RX ADMIN — VANCOMYCIN HYDROCHLORIDE 1000 MG: 1 INJECTION, POWDER, LYOPHILIZED, FOR SOLUTION INTRAVENOUS at 16:26

## 2019-01-01 RX ADMIN — SODIUM CHLORIDE, PRESERVATIVE FREE 10 ML: 5 INJECTION INTRAVENOUS at 21:27

## 2019-01-01 RX ADMIN — LINAGLIPTIN 5 MG: 5 TABLET, FILM COATED ORAL at 08:07

## 2019-01-01 RX ADMIN — IPRATROPIUM BROMIDE AND ALBUTEROL SULFATE 3 ML: 2.5; .5 SOLUTION RESPIRATORY (INHALATION) at 19:05

## 2019-01-01 RX ADMIN — PANTOPRAZOLE SODIUM 40 MG: 40 TABLET, DELAYED RELEASE ORAL at 05:41

## 2019-01-01 RX ADMIN — ALPRAZOLAM 1 MG: 0.5 TABLET ORAL at 06:28

## 2019-01-01 RX ADMIN — ALPRAZOLAM 1 MG: 0.5 TABLET ORAL at 05:38

## 2019-01-01 RX ADMIN — INSULIN LISPRO 2 UNITS: 100 INJECTION, SOLUTION INTRAVENOUS; SUBCUTANEOUS at 12:15

## 2019-01-01 RX ADMIN — ACETAMINOPHEN 650 MG: 325 TABLET, FILM COATED ORAL at 17:52

## 2019-01-01 RX ADMIN — MAGNESIUM SULFATE HEPTAHYDRATE 2 G: 40 INJECTION, SOLUTION INTRAVENOUS at 19:58

## 2019-01-01 RX ADMIN — ALPRAZOLAM 1 MG: 0.5 TABLET ORAL at 01:48

## 2019-01-01 RX ADMIN — ALPRAZOLAM 0.5 MG: 0.5 TABLET ORAL at 04:54

## 2019-01-01 RX ADMIN — IPRATROPIUM BROMIDE AND ALBUTEROL SULFATE 3 ML: 2.5; .5 SOLUTION RESPIRATORY (INHALATION) at 18:58

## 2019-01-01 RX ADMIN — BUDESONIDE AND FORMOTEROL FUMARATE DIHYDRATE 2 PUFF: 160; 4.5 AEROSOL RESPIRATORY (INHALATION) at 06:30

## 2019-01-01 RX ADMIN — IPRATROPIUM BROMIDE AND ALBUTEROL SULFATE 3 ML: 2.5; .5 SOLUTION RESPIRATORY (INHALATION) at 07:01

## 2019-01-01 RX ADMIN — SUCRALFATE 1 G: 1 TABLET ORAL at 20:38

## 2019-01-01 RX ADMIN — MAGNESIUM SULFATE HEPTAHYDRATE 2 G: 40 INJECTION, SOLUTION INTRAVENOUS at 15:57

## 2019-01-01 RX ADMIN — INSULIN LISPRO 2 UNITS: 100 INJECTION, SOLUTION INTRAVENOUS; SUBCUTANEOUS at 21:04

## 2019-01-01 RX ADMIN — HYDROXYZINE HYDROCHLORIDE 10 MG: 10 TABLET, FILM COATED ORAL at 05:12

## 2019-01-01 RX ADMIN — IPRATROPIUM BROMIDE AND ALBUTEROL SULFATE 3 ML: 2.5; .5 SOLUTION RESPIRATORY (INHALATION) at 07:19

## 2019-01-01 RX ADMIN — INSULIN LISPRO 2 UNITS: 100 INJECTION, SOLUTION INTRAVENOUS; SUBCUTANEOUS at 13:03

## 2019-01-01 RX ADMIN — MAGNESIUM SULFATE HEPTAHYDRATE 2 G: 40 INJECTION, SOLUTION INTRAVENOUS at 07:48

## 2019-01-01 RX ADMIN — DEXTROMETHORPHAN 60 MG: 30 SUSPENSION, EXTENDED RELEASE ORAL at 10:53

## 2019-01-01 RX ADMIN — ALPRAZOLAM 0.5 MG: 0.5 TABLET ORAL at 17:07

## 2019-01-01 RX ADMIN — ALPRAZOLAM 0.5 MG: 0.5 TABLET ORAL at 09:52

## 2019-01-01 RX ADMIN — LOSARTAN POTASSIUM 100 MG: 50 TABLET, FILM COATED ORAL at 08:54

## 2019-01-01 RX ADMIN — LOSARTAN POTASSIUM 100 MG: 50 TABLET, FILM COATED ORAL at 08:44

## 2019-01-01 RX ADMIN — BUDESONIDE AND FORMOTEROL FUMARATE DIHYDRATE 2 PUFF: 160; 4.5 AEROSOL RESPIRATORY (INHALATION) at 19:05

## 2019-01-01 RX ADMIN — MAGNESIUM SULFATE HEPTAHYDRATE 2 G: 40 INJECTION, SOLUTION INTRAVENOUS at 22:16

## 2019-01-01 RX ADMIN — ALBUTEROL SULFATE 2.5 MG: 2.5 SOLUTION RESPIRATORY (INHALATION) at 00:46

## 2019-01-01 RX ADMIN — DEXTROMETHORPHAN 60 MG: 30 SUSPENSION, EXTENDED RELEASE ORAL at 08:26

## 2019-01-01 RX ADMIN — PANTOPRAZOLE SODIUM 40 MG: 40 TABLET, DELAYED RELEASE ORAL at 05:12

## 2019-01-01 RX ADMIN — DESMOPRESSIN ACETATE 40 MG: 0.2 TABLET ORAL at 08:44

## 2019-01-01 RX ADMIN — METHYLPREDNISOLONE SODIUM SUCCINATE 125 MG: 125 INJECTION, POWDER, FOR SOLUTION INTRAMUSCULAR; INTRAVENOUS at 12:26

## 2019-01-01 RX ADMIN — DEXTROMETHORPHAN 60 MG: 30 SUSPENSION, EXTENDED RELEASE ORAL at 08:53

## 2019-01-01 RX ADMIN — LOSARTAN POTASSIUM 100 MG: 50 TABLET, FILM COATED ORAL at 09:52

## 2019-01-01 RX ADMIN — SODIUM CHLORIDE, PRESERVATIVE FREE 3 ML: 5 INJECTION INTRAVENOUS at 08:55

## 2019-01-01 RX ADMIN — MAGNESIUM SULFATE HEPTAHYDRATE 2 G: 40 INJECTION, SOLUTION INTRAVENOUS at 02:36

## 2019-01-01 RX ADMIN — ALPRAZOLAM 0.5 MG: 0.5 TABLET ORAL at 03:00

## 2019-01-01 RX ADMIN — BENZONATATE 200 MG: 100 CAPSULE ORAL at 09:41

## 2019-01-01 RX ADMIN — BENZONATATE 200 MG: 100 CAPSULE ORAL at 20:41

## 2019-01-01 RX ADMIN — DEXTROMETHORPHAN 60 MG: 30 SUSPENSION, EXTENDED RELEASE ORAL at 20:25

## 2019-01-01 RX ADMIN — ALPRAZOLAM 0.5 MG: 0.5 TABLET ORAL at 17:39

## 2019-01-01 RX ADMIN — IPRATROPIUM BROMIDE AND ALBUTEROL SULFATE 3 ML: 2.5; .5 SOLUTION RESPIRATORY (INHALATION) at 14:40

## 2019-01-01 RX ADMIN — ALPRAZOLAM 1 MG: 0.5 TABLET ORAL at 12:20

## 2019-01-01 RX ADMIN — INSULIN LISPRO 4 UNITS: 100 INJECTION, SOLUTION INTRAVENOUS; SUBCUTANEOUS at 17:57

## 2019-01-01 RX ADMIN — Medication 400 MCG: at 08:25

## 2019-01-01 RX ADMIN — DEXTROMETHORPHAN 60 MG: 30 SUSPENSION, EXTENDED RELEASE ORAL at 14:56

## 2019-01-01 RX ADMIN — ACETAMINOPHEN 650 MG: 325 TABLET, FILM COATED ORAL at 09:22

## 2019-01-01 RX ADMIN — IPRATROPIUM BROMIDE AND ALBUTEROL SULFATE 3 ML: 2.5; .5 SOLUTION RESPIRATORY (INHALATION) at 11:12

## 2019-01-01 RX ADMIN — ENOXAPARIN SODIUM 40 MG: 40 INJECTION SUBCUTANEOUS at 21:26

## 2019-01-01 RX ADMIN — ACETYLCYSTEINE 2 ML: 200 SOLUTION ORAL; RESPIRATORY (INHALATION) at 07:32

## 2019-01-01 RX ADMIN — SODIUM CHLORIDE, POTASSIUM CHLORIDE, SODIUM LACTATE AND CALCIUM CHLORIDE 100 ML/HR: 600; 310; 30; 20 INJECTION, SOLUTION INTRAVENOUS at 02:54

## 2019-01-01 RX ADMIN — DESMOPRESSIN ACETATE 40 MG: 0.2 TABLET ORAL at 08:53

## 2019-01-01 RX ADMIN — INSULIN LISPRO 3 UNITS: 100 INJECTION, SOLUTION INTRAVENOUS; SUBCUTANEOUS at 12:36

## 2019-01-01 RX ADMIN — ENOXAPARIN SODIUM 80 MG: 80 INJECTION SUBCUTANEOUS at 06:29

## 2019-01-01 RX ADMIN — SODIUM CHLORIDE, PRESERVATIVE FREE 3 ML: 5 INJECTION INTRAVENOUS at 21:49

## 2019-01-01 RX ADMIN — LINAGLIPTIN 5 MG: 5 TABLET, FILM COATED ORAL at 08:10

## 2019-01-01 RX ADMIN — PANTOPRAZOLE SODIUM 40 MG: 40 TABLET, DELAYED RELEASE ORAL at 06:00

## 2019-01-01 RX ADMIN — ENOXAPARIN SODIUM 40 MG: 80 INJECTION SUBCUTANEOUS at 07:19

## 2019-01-01 RX ADMIN — IPRATROPIUM BROMIDE AND ALBUTEROL SULFATE 3 ML: 2.5; .5 SOLUTION RESPIRATORY (INHALATION) at 19:45

## 2019-01-01 RX ADMIN — IPRATROPIUM BROMIDE AND ALBUTEROL SULFATE 3 ML: 2.5; .5 SOLUTION RESPIRATORY (INHALATION) at 12:57

## 2019-01-01 RX ADMIN — INSULIN LISPRO 2 UNITS: 100 INJECTION, SOLUTION INTRAVENOUS; SUBCUTANEOUS at 17:16

## 2019-01-01 RX ADMIN — IPRATROPIUM BROMIDE AND ALBUTEROL SULFATE 3 ML: 2.5; .5 SOLUTION RESPIRATORY (INHALATION) at 07:09

## 2019-01-01 RX ADMIN — ACETYLCYSTEINE 2 ML: 200 SOLUTION ORAL; RESPIRATORY (INHALATION) at 07:01

## 2019-01-01 RX ADMIN — IOPAMIDOL 100 ML: 612 INJECTION, SOLUTION INTRAVENOUS at 07:34

## 2019-01-01 RX ADMIN — ACETYLCYSTEINE 2 ML: 200 SOLUTION ORAL; RESPIRATORY (INHALATION) at 15:10

## 2019-01-01 RX ADMIN — SUCRALFATE 1 G: 1 TABLET ORAL at 20:40

## 2019-01-01 RX ADMIN — BUDESONIDE AND FORMOTEROL FUMARATE DIHYDRATE 2 PUFF: 160; 4.5 AEROSOL RESPIRATORY (INHALATION) at 19:16

## 2019-01-01 RX ADMIN — SUCRALFATE 1 G: 1 TABLET ORAL at 11:22

## 2019-01-01 RX ADMIN — GUAIFENESIN 1200 MG: 600 TABLET, EXTENDED RELEASE ORAL at 10:45

## 2019-01-01 RX ADMIN — DESMOPRESSIN ACETATE 40 MG: 0.2 TABLET ORAL at 10:40

## 2019-01-01 RX ADMIN — Medication 400 MCG: at 08:10

## 2019-01-01 RX ADMIN — HYDROXYZINE HYDROCHLORIDE 10 MG: 10 TABLET, FILM COATED ORAL at 20:30

## 2019-01-01 RX ADMIN — DEXTROMETHORPHAN 60 MG: 30 SUSPENSION, EXTENDED RELEASE ORAL at 08:43

## 2019-01-01 RX ADMIN — INSULIN LISPRO 4 UNITS: 100 INJECTION, SOLUTION INTRAVENOUS; SUBCUTANEOUS at 20:48

## 2019-01-01 RX ADMIN — PIPERACILLIN SODIUM AND TAZOBACTAM SODIUM 3.38 G: 3; .375 INJECTION, POWDER, LYOPHILIZED, FOR SOLUTION INTRAVENOUS at 14:52

## 2019-01-01 RX ADMIN — ATORVASTATIN CALCIUM 40 MG: 40 TABLET, FILM COATED ORAL at 20:52

## 2019-01-01 RX ADMIN — ENOXAPARIN SODIUM 40 MG: 40 INJECTION SUBCUTANEOUS at 20:40

## 2019-01-01 RX ADMIN — INSULIN LISPRO 2 UNITS: 100 INJECTION, SOLUTION INTRAVENOUS; SUBCUTANEOUS at 20:30

## 2019-01-01 RX ADMIN — MAGNESIUM SULFATE HEPTAHYDRATE 2 G: 40 INJECTION, SOLUTION INTRAVENOUS at 11:06

## 2019-01-01 RX ADMIN — HYDROXYZINE HYDROCHLORIDE 10 MG: 10 TABLET, FILM COATED ORAL at 16:10

## 2019-01-01 RX ADMIN — IPRATROPIUM BROMIDE AND ALBUTEROL SULFATE 3 ML: 2.5; .5 SOLUTION RESPIRATORY (INHALATION) at 07:10

## 2019-01-01 RX ADMIN — SODIUM CHLORIDE, PRESERVATIVE FREE 3 ML: 5 INJECTION INTRAVENOUS at 03:00

## 2019-01-01 RX ADMIN — SODIUM CHLORIDE, PRESERVATIVE FREE 10 ML: 5 INJECTION INTRAVENOUS at 09:01

## 2019-01-01 RX ADMIN — IPRATROPIUM BROMIDE AND ALBUTEROL SULFATE 3 ML: 2.5; .5 SOLUTION RESPIRATORY (INHALATION) at 18:45

## 2019-01-01 RX ADMIN — PANTOPRAZOLE SODIUM 40 MG: 40 TABLET, DELAYED RELEASE ORAL at 06:46

## 2019-01-01 RX ADMIN — SUCRALFATE 1 G: 1 TABLET ORAL at 20:52

## 2019-01-01 RX ADMIN — DEXTROMETHORPHAN 60 MG: 30 SUSPENSION, EXTENDED RELEASE ORAL at 21:41

## 2019-01-01 RX ADMIN — ALPRAZOLAM 0.5 MG: 0.5 TABLET ORAL at 12:40

## 2019-01-01 RX ADMIN — BUTALBITAL, ACETAMINOPHEN, AND CAFFEINE 1 TABLET: 50; 325; 40 TABLET ORAL at 23:19

## 2019-01-01 RX ADMIN — ENOXAPARIN SODIUM 80 MG: 80 INJECTION SUBCUTANEOUS at 17:26

## 2019-01-01 RX ADMIN — SUCRALFATE 1 G: 1 TABLET ORAL at 12:12

## 2019-01-01 RX ADMIN — FLUTICASONE PROPIONATE 2 SPRAY: 50 SPRAY, METERED NASAL at 10:39

## 2019-01-01 RX ADMIN — SODIUM CHLORIDE, PRESERVATIVE FREE 3 ML: 5 INJECTION INTRAVENOUS at 08:12

## 2019-01-01 RX ADMIN — ACETYLCYSTEINE 2 ML: 200 SOLUTION ORAL; RESPIRATORY (INHALATION) at 07:10

## 2019-01-01 RX ADMIN — ASPIRIN 324 MG: 81 TABLET, CHEWABLE ORAL at 07:26

## 2019-01-01 RX ADMIN — INSULIN LISPRO 2 UNITS: 100 INJECTION, SOLUTION INTRAVENOUS; SUBCUTANEOUS at 20:33

## 2019-01-01 RX ADMIN — IPRATROPIUM BROMIDE AND ALBUTEROL SULFATE 3 ML: 2.5; .5 SOLUTION RESPIRATORY (INHALATION) at 18:51

## 2019-01-01 RX ADMIN — FUROSEMIDE 40 MG: 10 INJECTION, SOLUTION INTRAVENOUS at 21:26

## 2019-01-01 RX ADMIN — ACETYLCYSTEINE 2 ML: 200 SOLUTION ORAL; RESPIRATORY (INHALATION) at 11:33

## 2019-01-01 RX ADMIN — BUDESONIDE AND FORMOTEROL FUMARATE DIHYDRATE 2 PUFF: 160; 4.5 AEROSOL RESPIRATORY (INHALATION) at 07:48

## 2019-01-01 RX ADMIN — INSULIN LISPRO 2 UNITS: 100 INJECTION, SOLUTION INTRAVENOUS; SUBCUTANEOUS at 11:22

## 2019-01-01 RX ADMIN — DEXTROMETHORPHAN 60 MG: 30 SUSPENSION, EXTENDED RELEASE ORAL at 10:39

## 2019-01-01 RX ADMIN — IPRATROPIUM BROMIDE AND ALBUTEROL SULFATE 3 ML: 2.5; .5 SOLUTION RESPIRATORY (INHALATION) at 15:10

## 2019-01-01 RX ADMIN — IPRATROPIUM BROMIDE AND ALBUTEROL SULFATE 3 ML: 2.5; .5 SOLUTION RESPIRATORY (INHALATION) at 07:35

## 2019-01-01 RX ADMIN — FUROSEMIDE 40 MG: 10 INJECTION, SOLUTION INTRAMUSCULAR; INTRAVENOUS at 08:55

## 2019-01-01 RX ADMIN — ATORVASTATIN CALCIUM 40 MG: 40 TABLET, FILM COATED ORAL at 20:39

## 2019-01-01 RX ADMIN — INSULIN LISPRO 2 UNITS: 100 INJECTION, SOLUTION INTRAVENOUS; SUBCUTANEOUS at 08:10

## 2019-01-01 RX ADMIN — SODIUM CHLORIDE, PRESERVATIVE FREE 3 ML: 5 INJECTION INTRAVENOUS at 10:54

## 2019-01-01 RX ADMIN — INSULIN LISPRO 7 UNITS: 100 INJECTION, SOLUTION INTRAVENOUS; SUBCUTANEOUS at 21:26

## 2019-01-01 RX ADMIN — LINAGLIPTIN 5 MG: 5 TABLET, FILM COATED ORAL at 10:39

## 2019-01-01 RX ADMIN — DOCUSATE SODIUM 100 MG: 100 CAPSULE, LIQUID FILLED ORAL at 20:52

## 2019-01-01 RX ADMIN — ENOXAPARIN SODIUM 40 MG: 80 INJECTION SUBCUTANEOUS at 05:38

## 2019-01-01 RX ADMIN — SODIUM CHLORIDE, PRESERVATIVE FREE 10 ML: 5 INJECTION INTRAVENOUS at 22:01

## 2019-01-01 RX ADMIN — ONDANSETRON HYDROCHLORIDE 4 MG: 2 SOLUTION INTRAMUSCULAR; INTRAVENOUS at 08:42

## 2019-01-01 RX ADMIN — SUCRALFATE 1 G: 1 TABLET ORAL at 17:23

## 2019-01-01 RX ADMIN — METOPROLOL TARTRATE 25 MG: 25 TABLET, FILM COATED ORAL at 09:00

## 2019-01-01 RX ADMIN — IPRATROPIUM BROMIDE AND ALBUTEROL SULFATE 3 ML: 2.5; .5 SOLUTION RESPIRATORY (INHALATION) at 06:48

## 2019-01-01 RX ADMIN — IOHEXOL 50 ML: 300 INJECTION, SOLUTION INTRAVENOUS at 07:34

## 2019-01-01 RX ADMIN — SODIUM CHLORIDE, PRESERVATIVE FREE 3 ML: 5 INJECTION INTRAVENOUS at 09:59

## 2019-01-01 RX ADMIN — INSULIN LISPRO 5 UNITS: 100 INJECTION, SOLUTION INTRAVENOUS; SUBCUTANEOUS at 20:27

## 2019-01-01 RX ADMIN — INSULIN LISPRO 2 UNITS: 100 INJECTION, SOLUTION INTRAVENOUS; SUBCUTANEOUS at 08:26

## 2019-01-01 RX ADMIN — INSULIN LISPRO 4 UNITS: 100 INJECTION, SOLUTION INTRAVENOUS; SUBCUTANEOUS at 11:56

## 2019-01-01 RX ADMIN — HYDROXYZINE HYDROCHLORIDE 10 MG: 10 TABLET, FILM COATED ORAL at 12:03

## 2019-01-01 RX ADMIN — DILTIAZEM HYDROCHLORIDE 10 MG: 5 INJECTION INTRAVENOUS at 07:47

## 2019-01-01 RX ADMIN — SUCRALFATE 1 G: 1 TABLET ORAL at 07:34

## 2019-01-01 RX ADMIN — IPRATROPIUM BROMIDE AND ALBUTEROL SULFATE 3 ML: 2.5; .5 SOLUTION RESPIRATORY (INHALATION) at 14:07

## 2019-01-01 RX ADMIN — OXYCODONE HYDROCHLORIDE AND ACETAMINOPHEN 1 TABLET: 5; 325 TABLET ORAL at 15:57

## 2019-01-01 RX ADMIN — PERFLUTREN: 6.52 INJECTION, SUSPENSION INTRAVENOUS at 11:14

## 2019-01-01 RX ADMIN — LINAGLIPTIN 5 MG: 5 TABLET, FILM COATED ORAL at 08:13

## 2019-01-01 RX ADMIN — HYDROXYZINE HYDROCHLORIDE 10 MG: 10 TABLET, FILM COATED ORAL at 08:55

## 2019-01-01 RX ADMIN — LIDOCAINE HYDROCHLORIDE 50 MG: 20 INJECTION, SOLUTION INFILTRATION; PERINEURAL at 13:40

## 2019-01-01 RX ADMIN — ALPRAZOLAM 1 MG: 0.5 TABLET ORAL at 20:25

## 2019-01-01 RX ADMIN — Medication 160 MCG: at 13:44

## 2019-01-01 RX ADMIN — BUTALBITAL, ACETAMINOPHEN, AND CAFFEINE 1 TABLET: 50; 325; 40 TABLET ORAL at 12:14

## 2019-01-01 RX ADMIN — PROPOFOL 120 MG: 10 INJECTION, EMULSION INTRAVENOUS at 13:40

## 2019-01-01 RX ADMIN — POTASSIUM CHLORIDE 40 MEQ: 750 CAPSULE, EXTENDED RELEASE ORAL at 06:26

## 2019-01-01 RX ADMIN — ALPRAZOLAM 0.5 MG: 0.5 TABLET ORAL at 21:40

## 2019-01-01 RX ADMIN — LOSARTAN POTASSIUM 100 MG: 50 TABLET, FILM COATED ORAL at 08:26

## 2019-01-01 RX ADMIN — FENTANYL CITRATE 50 MCG: 50 INJECTION, SOLUTION INTRAMUSCULAR; INTRAVENOUS at 13:40

## 2019-01-01 RX ADMIN — ACETAMINOPHEN 650 MG: 325 TABLET, FILM COATED ORAL at 06:53

## 2019-01-01 RX ADMIN — IPRATROPIUM BROMIDE AND ALBUTEROL SULFATE 3 ML: 2.5; .5 SOLUTION RESPIRATORY (INHALATION) at 15:13

## 2019-01-01 RX ADMIN — BUDESONIDE AND FORMOTEROL FUMARATE DIHYDRATE 2 PUFF: 160; 4.5 AEROSOL RESPIRATORY (INHALATION) at 18:52

## 2019-01-01 RX ADMIN — DEXTROMETHORPHAN 60 MG: 30 SUSPENSION, EXTENDED RELEASE ORAL at 20:29

## 2019-01-01 RX ADMIN — ALPRAZOLAM 0.5 MG: 0.5 TABLET ORAL at 06:00

## 2019-01-01 RX ADMIN — PERFLUTREN 8.48 MG: 6.52 INJECTION, SUSPENSION INTRAVENOUS at 16:25

## 2019-01-01 RX ADMIN — BUDESONIDE AND FORMOTEROL FUMARATE DIHYDRATE 2 PUFF: 160; 4.5 AEROSOL RESPIRATORY (INHALATION) at 06:56

## 2019-01-01 RX ADMIN — LINAGLIPTIN 5 MG: 5 TABLET, FILM COATED ORAL at 09:03

## 2019-01-01 RX ADMIN — ACETYLCYSTEINE 2 ML: 200 SOLUTION ORAL; RESPIRATORY (INHALATION) at 18:45

## 2019-01-01 RX ADMIN — INSULIN LISPRO 2 UNITS: 100 INJECTION, SOLUTION INTRAVENOUS; SUBCUTANEOUS at 21:41

## 2019-01-01 RX ADMIN — ALPRAZOLAM 1 MG: 0.5 TABLET ORAL at 11:09

## 2019-01-01 RX ADMIN — SODIUM CHLORIDE, PRESERVATIVE FREE 3 ML: 5 INJECTION INTRAVENOUS at 08:41

## 2019-01-01 RX ADMIN — HYDROXYZINE HYDROCHLORIDE 10 MG: 10 TABLET, FILM COATED ORAL at 17:40

## 2019-01-01 RX ADMIN — IPRATROPIUM BROMIDE AND ALBUTEROL SULFATE 3 ML: 2.5; .5 SOLUTION RESPIRATORY (INHALATION) at 20:45

## 2019-01-01 RX ADMIN — BUDESONIDE AND FORMOTEROL FUMARATE DIHYDRATE 2 PUFF: 160; 4.5 AEROSOL RESPIRATORY (INHALATION) at 19:50

## 2019-01-01 RX ADMIN — ALPRAZOLAM 0.5 MG: 0.5 TABLET ORAL at 20:26

## 2019-01-01 RX ADMIN — HYDROXYZINE HYDROCHLORIDE 10 MG: 10 TABLET, FILM COATED ORAL at 21:04

## 2019-01-01 RX ADMIN — ACETYLCYSTEINE 2 ML: 200 SOLUTION ORAL; RESPIRATORY (INHALATION) at 19:05

## 2019-01-01 RX ADMIN — LINAGLIPTIN 5 MG: 5 TABLET, FILM COATED ORAL at 10:52

## 2019-01-01 RX ADMIN — METHYLPREDNISOLONE SODIUM SUCCINATE 125 MG: 125 INJECTION, POWDER, FOR SOLUTION INTRAMUSCULAR; INTRAVENOUS at 14:52

## 2019-01-01 RX ADMIN — BENZONATATE 200 MG: 100 CAPSULE ORAL at 23:46

## 2019-01-01 RX ADMIN — FUROSEMIDE 40 MG: 10 INJECTION, SOLUTION INTRAVENOUS at 08:13

## 2019-01-01 RX ADMIN — METOPROLOL TARTRATE 25 MG: 25 TABLET, FILM COATED ORAL at 08:25

## 2019-01-01 RX ADMIN — SUCRALFATE 1 G: 1 TABLET ORAL at 13:02

## 2019-01-01 RX ADMIN — LINAGLIPTIN 5 MG: 5 TABLET, FILM COATED ORAL at 08:17

## 2019-01-01 RX ADMIN — HYDROXYZINE HYDROCHLORIDE 10 MG: 10 TABLET, FILM COATED ORAL at 02:11

## 2019-01-01 RX ADMIN — INSULIN LISPRO 2 UNITS: 100 INJECTION, SOLUTION INTRAVENOUS; SUBCUTANEOUS at 10:37

## 2019-01-01 RX ADMIN — LINAGLIPTIN 5 MG: 5 TABLET, FILM COATED ORAL at 08:54

## 2019-01-01 RX ADMIN — INSULIN LISPRO 2 UNITS: 100 INJECTION, SOLUTION INTRAVENOUS; SUBCUTANEOUS at 09:03

## 2019-01-01 RX ADMIN — PROPOFOL 60 MG: 10 INJECTION, EMULSION INTRAVENOUS at 15:16

## 2019-01-01 RX ADMIN — TECHNETIUM TC 99M OXIDRONATE 1 DOSE: 3.15 INJECTION, POWDER, LYOPHILIZED, FOR SOLUTION INTRAVENOUS at 07:59

## 2019-01-01 RX ADMIN — SUCRALFATE 1 G: 1 TABLET ORAL at 08:25

## 2019-01-01 RX ADMIN — METOPROLOL TARTRATE 25 MG: 25 TABLET, FILM COATED ORAL at 12:50

## 2019-01-01 RX ADMIN — ALPRAZOLAM 1 MG: 0.5 TABLET ORAL at 17:34

## 2019-01-01 RX ADMIN — HYDROXYZINE HYDROCHLORIDE 10 MG: 10 TABLET, FILM COATED ORAL at 08:54

## 2019-01-01 RX ADMIN — INSULIN LISPRO 2 UNITS: 100 INJECTION, SOLUTION INTRAVENOUS; SUBCUTANEOUS at 20:39

## 2019-01-01 RX ADMIN — DEXTROMETHORPHAN 60 MG: 30 SUSPENSION, EXTENDED RELEASE ORAL at 21:04

## 2019-01-01 RX ADMIN — INSULIN LISPRO 2 UNITS: 100 INJECTION, SOLUTION INTRAVENOUS; SUBCUTANEOUS at 13:07

## 2019-01-01 RX ADMIN — SUCRALFATE 1 G: 1 TABLET ORAL at 08:07

## 2019-01-01 RX ADMIN — POTASSIUM CHLORIDE 40 MEQ: 750 CAPSULE, EXTENDED RELEASE ORAL at 07:38

## 2019-01-01 RX ADMIN — IPRATROPIUM BROMIDE AND ALBUTEROL SULFATE 3 ML: 2.5; .5 SOLUTION RESPIRATORY (INHALATION) at 21:00

## 2019-01-01 RX ADMIN — IPRATROPIUM BROMIDE AND ALBUTEROL SULFATE 3 ML: 2.5; .5 SOLUTION RESPIRATORY (INHALATION) at 15:53

## 2019-01-01 RX ADMIN — LINAGLIPTIN 5 MG: 5 TABLET, FILM COATED ORAL at 09:00

## 2019-01-01 RX ADMIN — IPRATROPIUM BROMIDE AND ALBUTEROL SULFATE 3 ML: 2.5; .5 SOLUTION RESPIRATORY (INHALATION) at 06:30

## 2019-01-01 RX ADMIN — INSULIN LISPRO 2 UNITS: 100 INJECTION, SOLUTION INTRAVENOUS; SUBCUTANEOUS at 22:08

## 2019-01-01 RX ADMIN — GUAIFENESIN 1200 MG: 600 TABLET, EXTENDED RELEASE ORAL at 22:50

## 2019-01-01 RX ADMIN — SUCRALFATE 1 G: 1 TABLET ORAL at 11:56

## 2019-01-01 RX ADMIN — LINAGLIPTIN 5 MG: 5 TABLET, FILM COATED ORAL at 08:42

## 2019-01-01 RX ADMIN — IOPAMIDOL 100 ML: 755 INJECTION, SOLUTION INTRAVENOUS at 17:40

## 2019-01-01 RX ADMIN — IPRATROPIUM BROMIDE AND ALBUTEROL SULFATE 3 ML: 2.5; .5 SOLUTION RESPIRATORY (INHALATION) at 15:22

## 2019-01-01 RX ADMIN — BUDESONIDE AND FORMOTEROL FUMARATE DIHYDRATE 2 PUFF: 160; 4.5 AEROSOL RESPIRATORY (INHALATION) at 19:30

## 2019-01-01 RX ADMIN — ACETAMINOPHEN 650 MG: 325 TABLET, FILM COATED ORAL at 03:00

## 2019-01-01 RX ADMIN — DEXTROMETHORPHAN 60 MG: 30 SUSPENSION, EXTENDED RELEASE ORAL at 08:11

## 2019-01-01 RX ADMIN — BUDESONIDE AND FORMOTEROL FUMARATE DIHYDRATE 2 PUFF: 160; 4.5 AEROSOL RESPIRATORY (INHALATION) at 06:26

## 2019-01-01 RX ADMIN — MAGNESIUM SULFATE HEPTAHYDRATE 2 G: 40 INJECTION, SOLUTION INTRAVENOUS at 09:01

## 2019-01-01 RX ADMIN — INSULIN LISPRO 2 UNITS: 100 INJECTION, SOLUTION INTRAVENOUS; SUBCUTANEOUS at 17:52

## 2019-01-01 RX ADMIN — IPRATROPIUM BROMIDE AND ALBUTEROL SULFATE 3 ML: 2.5; .5 SOLUTION RESPIRATORY (INHALATION) at 10:11

## 2019-01-01 RX ADMIN — LORAZEPAM 0.5 MG: 0.5 TABLET ORAL at 14:50

## 2019-01-01 RX ADMIN — BUDESONIDE AND FORMOTEROL FUMARATE DIHYDRATE 2 PUFF: 160; 4.5 AEROSOL RESPIRATORY (INHALATION) at 22:30

## 2019-01-01 RX ADMIN — BENZONATATE 200 MG: 100 CAPSULE ORAL at 09:52

## 2019-01-01 RX ADMIN — GUAIFENESIN 1200 MG: 600 TABLET, EXTENDED RELEASE ORAL at 08:26

## 2019-01-01 RX ADMIN — MIDAZOLAM HYDROCHLORIDE 1 MG: 1 INJECTION, SOLUTION INTRAMUSCULAR; INTRAVENOUS at 14:32

## 2019-01-01 RX ADMIN — IPRATROPIUM BROMIDE AND ALBUTEROL SULFATE 3 ML: 2.5; .5 SOLUTION RESPIRATORY (INHALATION) at 10:38

## 2019-01-01 RX ADMIN — MAGNESIUM SULFATE HEPTAHYDRATE 2 G: 40 INJECTION, SOLUTION INTRAVENOUS at 11:23

## 2019-01-01 RX ADMIN — HYDROXYZINE HYDROCHLORIDE 10 MG: 10 TABLET, FILM COATED ORAL at 07:17

## 2019-01-01 RX ADMIN — Medication 10 MG: at 09:14

## 2019-01-01 RX ADMIN — ACETYLCYSTEINE 2 ML: 200 SOLUTION ORAL; RESPIRATORY (INHALATION) at 10:53

## 2019-01-01 RX ADMIN — MIDAZOLAM HYDROCHLORIDE 1 MG: 1 INJECTION, SOLUTION INTRAMUSCULAR; INTRAVENOUS at 13:47

## 2019-01-01 RX ADMIN — METOPROLOL TARTRATE 25 MG: 25 TABLET, FILM COATED ORAL at 20:38

## 2019-01-01 RX ADMIN — ALPRAZOLAM 1 MG: 0.5 TABLET ORAL at 07:01

## 2019-01-01 RX ADMIN — INSULIN LISPRO 2 UNITS: 100 INJECTION, SOLUTION INTRAVENOUS; SUBCUTANEOUS at 13:33

## 2019-01-01 RX ADMIN — DOCUSATE SODIUM 100 MG: 100 CAPSULE, LIQUID FILLED ORAL at 08:07

## 2019-01-01 RX ADMIN — IPRATROPIUM BROMIDE AND ALBUTEROL SULFATE 3 ML: 2.5; .5 SOLUTION RESPIRATORY (INHALATION) at 10:13

## 2019-01-01 RX ADMIN — SODIUM CHLORIDE, PRESERVATIVE FREE 10 ML: 5 INJECTION INTRAVENOUS at 22:55

## 2019-01-01 RX ADMIN — ONDANSETRON HYDROCHLORIDE 4 MG: 2 SOLUTION INTRAMUSCULAR; INTRAVENOUS at 09:52

## 2019-01-01 RX ADMIN — ATORVASTATIN CALCIUM 40 MG: 40 TABLET, FILM COATED ORAL at 21:20

## 2019-01-01 RX ADMIN — ACETYLCYSTEINE 2 ML: 200 SOLUTION ORAL; RESPIRATORY (INHALATION) at 07:09

## 2019-01-01 RX ADMIN — ALPRAZOLAM 1 MG: 0.5 TABLET ORAL at 11:57

## 2019-01-01 RX ADMIN — PANTOPRAZOLE SODIUM 40 MG: 40 TABLET, DELAYED RELEASE ORAL at 08:42

## 2019-01-01 RX ADMIN — MAGNESIUM SULFATE HEPTAHYDRATE 2 G: 40 INJECTION, SOLUTION INTRAVENOUS at 19:25

## 2019-01-01 RX ADMIN — IOPAMIDOL 100 ML: 612 INJECTION, SOLUTION INTRAVENOUS at 10:55

## 2019-01-01 RX ADMIN — DOCUSATE SODIUM 100 MG: 100 CAPSULE, LIQUID FILLED ORAL at 21:42

## 2019-01-01 RX ADMIN — DESMOPRESSIN ACETATE 40 MG: 0.2 TABLET ORAL at 09:57

## 2019-01-01 RX ADMIN — SODIUM CHLORIDE, PRESERVATIVE FREE 3 ML: 5 INJECTION INTRAVENOUS at 20:34

## 2019-01-01 RX ADMIN — LINAGLIPTIN 5 MG: 5 TABLET, FILM COATED ORAL at 08:25

## 2019-01-01 RX ADMIN — LOSARTAN POTASSIUM 100 MG: 50 TABLET, FILM COATED ORAL at 08:11

## 2019-01-01 RX ADMIN — FUROSEMIDE 40 MG: 10 INJECTION, SOLUTION INTRAVENOUS at 09:02

## 2019-01-01 RX ADMIN — DILTIAZEM HYDROCHLORIDE 5 MG/HR: 5 INJECTION INTRAVENOUS at 08:29

## 2019-01-01 RX ADMIN — MAGNESIUM SULFATE HEPTAHYDRATE 2 G: 40 INJECTION, SOLUTION INTRAVENOUS at 08:04

## 2019-01-01 RX ADMIN — SUCRALFATE 1 G: 1 TABLET ORAL at 06:30

## 2019-01-01 RX ADMIN — ATORVASTATIN CALCIUM 40 MG: 40 TABLET, FILM COATED ORAL at 20:40

## 2019-01-01 RX ADMIN — DESMOPRESSIN ACETATE 40 MG: 0.2 TABLET ORAL at 08:11

## 2019-01-01 RX ADMIN — INSULIN LISPRO 2 UNITS: 100 INJECTION, SOLUTION INTRAVENOUS; SUBCUTANEOUS at 19:41

## 2019-01-01 RX ADMIN — SODIUM CHLORIDE 75 ML/HR: 9 INJECTION, SOLUTION INTRAVENOUS at 15:57

## 2019-01-01 RX ADMIN — ALPRAZOLAM 1 MG: 0.5 TABLET ORAL at 05:59

## 2019-01-01 RX ADMIN — INSULIN LISPRO 2 UNITS: 100 INJECTION, SOLUTION INTRAVENOUS; SUBCUTANEOUS at 17:40

## 2019-01-01 RX ADMIN — DILTIAZEM HYDROCHLORIDE 15 MG/HR: 5 INJECTION INTRAVENOUS at 14:49

## 2019-01-01 RX ADMIN — BUDESONIDE AND FORMOTEROL FUMARATE DIHYDRATE 2 PUFF: 160; 4.5 AEROSOL RESPIRATORY (INHALATION) at 07:39

## 2019-01-01 RX ADMIN — IPRATROPIUM BROMIDE AND ALBUTEROL SULFATE 3 ML: 2.5; .5 SOLUTION RESPIRATORY (INHALATION) at 10:52

## 2019-01-01 RX ADMIN — ACETYLCYSTEINE 2 ML: 200 SOLUTION ORAL; RESPIRATORY (INHALATION) at 18:51

## 2019-01-01 RX ADMIN — ACETYLCYSTEINE 2 ML: 200 SOLUTION ORAL; RESPIRATORY (INHALATION) at 14:52

## 2019-01-01 RX ADMIN — IOPAMIDOL 150 ML: 612 INJECTION, SOLUTION INTRAVENOUS at 10:12

## 2019-01-01 RX ADMIN — BUDESONIDE AND FORMOTEROL FUMARATE DIHYDRATE 2 PUFF: 160; 4.5 AEROSOL RESPIRATORY (INHALATION) at 07:30

## 2019-01-01 RX ADMIN — GADOBENATE DIMEGLUMINE 20 ML: 529 INJECTION, SOLUTION INTRAVENOUS at 15:54

## 2019-01-01 RX ADMIN — SODIUM CHLORIDE, PRESERVATIVE FREE 10 ML: 5 INJECTION INTRAVENOUS at 20:40

## 2019-01-01 RX ADMIN — INSULIN LISPRO 2 UNITS: 100 INJECTION, SOLUTION INTRAVENOUS; SUBCUTANEOUS at 17:07

## 2019-01-01 RX ADMIN — ACETYLCYSTEINE 2 ML: 200 SOLUTION ORAL; RESPIRATORY (INHALATION) at 00:48

## 2019-01-01 RX ADMIN — BUDESONIDE AND FORMOTEROL FUMARATE DIHYDRATE 2 PUFF: 160; 4.5 AEROSOL RESPIRATORY (INHALATION) at 19:18

## 2019-01-01 RX ADMIN — ACETAMINOPHEN 650 MG: 325 TABLET, FILM COATED ORAL at 14:01

## 2019-01-01 RX ADMIN — FUROSEMIDE 40 MG: 10 INJECTION INTRAMUSCULAR; INTRAVENOUS at 08:55

## 2019-01-01 RX ADMIN — PANTOPRAZOLE SODIUM 40 MG: 40 TABLET, DELAYED RELEASE ORAL at 06:34

## 2019-01-01 RX ADMIN — BUDESONIDE AND FORMOTEROL FUMARATE DIHYDRATE 2 PUFF: 160; 4.5 AEROSOL RESPIRATORY (INHALATION) at 07:10

## 2019-01-01 RX ADMIN — SUCRALFATE 1 G: 1 TABLET ORAL at 08:13

## 2019-01-01 RX ADMIN — SODIUM CHLORIDE, PRESERVATIVE FREE 3 ML: 5 INJECTION INTRAVENOUS at 22:51

## 2019-01-01 RX ADMIN — CEFAZOLIN 1 G: 1 INJECTION, POWDER, FOR SOLUTION INTRAMUSCULAR; INTRAVENOUS; PARENTERAL at 15:06

## 2019-01-01 RX ADMIN — IPRATROPIUM BROMIDE AND ALBUTEROL SULFATE 3 ML: 2.5; .5 SOLUTION RESPIRATORY (INHALATION) at 14:19

## 2019-01-01 RX ADMIN — IPRATROPIUM BROMIDE AND ALBUTEROL SULFATE 3 ML: 2.5; .5 SOLUTION RESPIRATORY (INHALATION) at 10:53

## 2019-01-01 RX ADMIN — Medication 400 MCG: at 08:13

## 2019-01-01 RX ADMIN — DEXTROMETHORPHAN 60 MG: 30 SUSPENSION, EXTENDED RELEASE ORAL at 20:41

## 2019-01-01 RX ADMIN — HYDROXYZINE HYDROCHLORIDE 10 MG: 10 TABLET, FILM COATED ORAL at 10:47

## 2019-01-01 RX ADMIN — IPRATROPIUM BROMIDE AND ALBUTEROL SULFATE 3 ML: 2.5; .5 SOLUTION RESPIRATORY (INHALATION) at 14:10

## 2019-01-01 RX ADMIN — INSULIN LISPRO 2 UNITS: 100 INJECTION, SOLUTION INTRAVENOUS; SUBCUTANEOUS at 17:34

## 2019-01-01 RX ADMIN — IPRATROPIUM BROMIDE AND ALBUTEROL SULFATE 3 ML: 2.5; .5 SOLUTION RESPIRATORY (INHALATION) at 10:28

## 2019-01-01 RX ADMIN — METHYLPREDNISOLONE SODIUM SUCCINATE 80 MG: 125 INJECTION, POWDER, FOR SOLUTION INTRAMUSCULAR; INTRAVENOUS at 08:03

## 2019-01-01 RX ADMIN — IPRATROPIUM BROMIDE AND ALBUTEROL SULFATE 3 ML: 2.5; .5 SOLUTION RESPIRATORY (INHALATION) at 19:30

## 2019-01-01 RX ADMIN — BUDESONIDE AND FORMOTEROL FUMARATE DIHYDRATE 2 PUFF: 160; 4.5 AEROSOL RESPIRATORY (INHALATION) at 06:48

## 2019-01-01 RX ADMIN — FLUTICASONE PROPIONATE 2 SPRAY: 50 SPRAY, METERED NASAL at 10:53

## 2019-01-01 RX ADMIN — METOPROLOL TARTRATE 25 MG: 25 TABLET, FILM COATED ORAL at 08:07

## 2019-01-01 RX ADMIN — SODIUM CHLORIDE, PRESERVATIVE FREE 10 ML: 5 INJECTION INTRAVENOUS at 09:11

## 2019-01-01 RX ADMIN — FENTANYL CITRATE 50 MCG: 50 INJECTION, SOLUTION INTRAMUSCULAR; INTRAVENOUS at 13:38

## 2019-01-01 RX ADMIN — HYDROXYZINE HYDROCHLORIDE 10 MG: 10 TABLET, FILM COATED ORAL at 14:56

## 2019-01-01 RX ADMIN — GUAIFENESIN 1200 MG: 600 TABLET, EXTENDED RELEASE ORAL at 10:52

## 2019-01-01 RX ADMIN — ATORVASTATIN CALCIUM 40 MG: 40 TABLET, FILM COATED ORAL at 21:27

## 2019-01-01 RX ADMIN — ACETYLCYSTEINE 2 ML: 200 SOLUTION ORAL; RESPIRATORY (INHALATION) at 14:39

## 2019-01-01 RX ADMIN — FLUTICASONE PROPIONATE 2 SPRAY: 50 SPRAY, METERED NASAL at 08:25

## 2019-01-01 RX ADMIN — DEXTROMETHORPHAN 60 MG: 30 SUSPENSION, EXTENDED RELEASE ORAL at 20:30

## 2019-01-01 RX ADMIN — MAGNESIUM SULFATE HEPTAHYDRATE 2 G: 40 INJECTION, SOLUTION INTRAVENOUS at 00:21

## 2019-01-01 RX ADMIN — SODIUM CHLORIDE, PRESERVATIVE FREE 10 ML: 5 INJECTION INTRAVENOUS at 21:22

## 2019-01-01 RX ADMIN — ALPRAZOLAM 1 MG: 0.5 TABLET ORAL at 11:00

## 2019-01-01 RX ADMIN — FUROSEMIDE 40 MG: 10 INJECTION, SOLUTION INTRAVENOUS at 09:00

## 2019-01-01 RX ADMIN — LOSARTAN POTASSIUM 100 MG: 50 TABLET, FILM COATED ORAL at 10:52

## 2019-01-01 RX ADMIN — FUROSEMIDE 40 MG: 10 INJECTION, SOLUTION INTRAVENOUS at 14:56

## 2019-01-01 RX ADMIN — BUDESONIDE AND FORMOTEROL FUMARATE DIHYDRATE 2 PUFF: 160; 4.5 AEROSOL RESPIRATORY (INHALATION) at 07:19

## 2019-01-01 RX ADMIN — ATORVASTATIN CALCIUM 40 MG: 40 TABLET, FILM COATED ORAL at 21:42

## 2019-01-01 RX ADMIN — SODIUM CHLORIDE, PRESERVATIVE FREE 10 ML: 5 INJECTION INTRAVENOUS at 08:11

## 2019-01-01 RX ADMIN — SUCRALFATE 1 G: 1 TABLET ORAL at 06:34

## 2019-01-01 RX ADMIN — HYDROXYZINE HYDROCHLORIDE 10 MG: 10 TABLET, FILM COATED ORAL at 15:11

## 2019-01-01 RX ADMIN — ALPRAZOLAM 1 MG: 0.5 TABLET ORAL at 05:41

## 2019-01-01 RX ADMIN — ALPRAZOLAM 0.5 MG: 0.5 TABLET ORAL at 06:51

## 2019-01-01 RX ADMIN — FUROSEMIDE 40 MG: 10 INJECTION, SOLUTION INTRAVENOUS at 20:40

## 2019-01-01 RX ADMIN — BUDESONIDE AND FORMOTEROL FUMARATE DIHYDRATE 2 PUFF: 160; 4.5 AEROSOL RESPIRATORY (INHALATION) at 07:35

## 2019-01-01 RX ADMIN — POTASSIUM CHLORIDE 40 MEQ: 750 CAPSULE, EXTENDED RELEASE ORAL at 01:36

## 2019-01-01 RX ADMIN — SUCRALFATE 1 G: 1 TABLET ORAL at 16:46

## 2019-01-01 RX ADMIN — ALPRAZOLAM 0.5 MG: 0.5 TABLET ORAL at 00:56

## 2019-01-01 RX ADMIN — INSULIN LISPRO 3 UNITS: 100 INJECTION, SOLUTION INTRAVENOUS; SUBCUTANEOUS at 08:43

## 2019-01-01 RX ADMIN — LOSARTAN POTASSIUM 100 MG: 50 TABLET, FILM COATED ORAL at 08:17

## 2019-01-01 RX ADMIN — ENOXAPARIN SODIUM 40 MG: 40 INJECTION SUBCUTANEOUS at 21:43

## 2019-01-01 RX ADMIN — BUDESONIDE AND FORMOTEROL FUMARATE DIHYDRATE 2 PUFF: 160; 4.5 AEROSOL RESPIRATORY (INHALATION) at 06:53

## 2019-01-01 RX ADMIN — ACETYLCYSTEINE 2 ML: 200 SOLUTION ORAL; RESPIRATORY (INHALATION) at 19:50

## 2019-01-01 RX ADMIN — BENZONATATE 200 MG: 100 CAPSULE ORAL at 08:42

## 2019-01-01 RX ADMIN — LOSARTAN POTASSIUM 100 MG: 50 TABLET, FILM COATED ORAL at 10:40

## 2019-01-01 RX ADMIN — LINAGLIPTIN 5 MG: 5 TABLET, FILM COATED ORAL at 08:11

## 2019-01-01 RX ADMIN — LINAGLIPTIN 5 MG: 5 TABLET, FILM COATED ORAL at 08:26

## 2019-01-01 RX ADMIN — PANTOPRAZOLE SODIUM 40 MG: 40 TABLET, DELAYED RELEASE ORAL at 08:26

## 2019-01-01 RX ADMIN — DESMOPRESSIN ACETATE 40 MG: 0.2 TABLET ORAL at 08:26

## 2019-01-01 RX ADMIN — FUROSEMIDE 40 MG: 10 INJECTION, SOLUTION INTRAMUSCULAR; INTRAVENOUS at 14:42

## 2019-01-01 RX ADMIN — PROPOFOL 40 MG: 10 INJECTION, EMULSION INTRAVENOUS at 15:08

## 2019-01-01 RX ADMIN — Medication 400 MCG: at 09:02

## 2019-01-01 RX ADMIN — INSULIN LISPRO 3 UNITS: 100 INJECTION, SOLUTION INTRAVENOUS; SUBCUTANEOUS at 17:01

## 2019-01-01 RX ADMIN — GUAIFENESIN 1200 MG: 600 TABLET, EXTENDED RELEASE ORAL at 20:25

## 2019-01-01 RX ADMIN — HYDROXYZINE HYDROCHLORIDE 10 MG: 10 TABLET, FILM COATED ORAL at 20:29

## 2019-01-01 RX ADMIN — SODIUM CHLORIDE 75 ML/HR: 9 INJECTION, SOLUTION INTRAVENOUS at 17:14

## 2019-01-01 RX ADMIN — IPRATROPIUM BROMIDE AND ALBUTEROL SULFATE 3 ML: 2.5; .5 SOLUTION RESPIRATORY (INHALATION) at 06:18

## 2019-01-01 RX ADMIN — IPRATROPIUM BROMIDE AND ALBUTEROL SULFATE 3 ML: 2.5; .5 SOLUTION RESPIRATORY (INHALATION) at 11:32

## 2019-01-01 RX ADMIN — METOPROLOL TARTRATE 25 MG: 25 TABLET, FILM COATED ORAL at 21:20

## 2019-01-01 RX ADMIN — INSULIN LISPRO 6 UNITS: 100 INJECTION, SOLUTION INTRAVENOUS; SUBCUTANEOUS at 17:27

## 2019-01-01 RX ADMIN — BUDESONIDE AND FORMOTEROL FUMARATE DIHYDRATE 2 PUFF: 160; 4.5 AEROSOL RESPIRATORY (INHALATION) at 18:59

## 2019-01-01 RX ADMIN — HYDROXYZINE HYDROCHLORIDE 10 MG: 10 TABLET, FILM COATED ORAL at 08:26

## 2019-01-01 RX ADMIN — ALPRAZOLAM 0.25 MG: 0.25 TABLET ORAL at 08:26

## 2019-01-01 RX ADMIN — PROPOFOL 150 MCG/KG/MIN: 10 INJECTION, EMULSION INTRAVENOUS at 15:06

## 2019-01-01 RX ADMIN — MAGNESIUM SULFATE HEPTAHYDRATE 2 G: 40 INJECTION, SOLUTION INTRAVENOUS at 06:28

## 2019-01-01 RX ADMIN — HYDROXYZINE HYDROCHLORIDE 10 MG: 10 TABLET, FILM COATED ORAL at 11:09

## 2019-01-01 RX ADMIN — HYDROXYZINE HYDROCHLORIDE 10 MG: 10 TABLET, FILM COATED ORAL at 20:45

## 2019-01-01 RX ADMIN — SUCRALFATE 1 G: 1 TABLET ORAL at 21:21

## 2019-01-01 RX ADMIN — BENZONATATE 200 MG: 100 CAPSULE ORAL at 20:14

## 2019-01-01 RX ADMIN — INSULIN LISPRO 2 UNITS: 100 INJECTION, SOLUTION INTRAVENOUS; SUBCUTANEOUS at 12:00

## 2019-01-01 RX ADMIN — IPRATROPIUM BROMIDE AND ALBUTEROL SULFATE 3 ML: 2.5; .5 SOLUTION RESPIRATORY (INHALATION) at 14:51

## 2019-01-01 RX ADMIN — DOCUSATE SODIUM 100 MG: 100 CAPSULE, LIQUID FILLED ORAL at 08:13

## 2019-01-01 RX ADMIN — ACETYLCYSTEINE 2 ML: 200 SOLUTION ORAL; RESPIRATORY (INHALATION) at 10:52

## 2019-06-28 ENCOUNTER — HOSPITAL ENCOUNTER (OUTPATIENT)
Dept: INFUSION THERAPY | Age: 69
Discharge: HOME | End: 2019-06-28
Payer: MEDICARE

## 2019-06-28 PROCEDURE — 36415 COLL VENOUS BLD VENIPUNCTURE: CPT

## 2019-06-28 PROCEDURE — 85025 COMPLETE CBC W/AUTO DIFF WBC: CPT

## 2019-06-28 PROCEDURE — 80053 COMPREHEN METABOLIC PANEL: CPT

## 2019-07-02 NOTE — TELEPHONE ENCOUNTER
You spoke to Dr. Long regarding this patient and said he should be a priority NP appointment.  He has a 6.3x5.6x7-cm RUL mass.  Your first availlable today is 07/22/19.  Will this be okay or does he need be brought in sooner?

## 2019-07-03 NOTE — TELEPHONE ENCOUNTER
Have him come in when I get a cancellation, or a NP can see him if there is an opening or cancellation.

## 2019-07-08 ENCOUNTER — HOSPITAL ENCOUNTER (OUTPATIENT)
Dept: INFUSION THERAPY | Age: 69
Discharge: HOME | End: 2019-07-08
Payer: MEDICARE

## 2019-07-08 PROCEDURE — 85025 COMPLETE CBC W/AUTO DIFF WBC: CPT

## 2019-07-16 PROBLEM — Z87.891 FORMER SMOKER: Status: ACTIVE | Noted: 2019-01-01

## 2019-07-16 PROBLEM — Z71.9 ENCOUNTER FOR CONSULTATION: Status: ACTIVE | Noted: 2019-01-01

## 2019-07-16 NOTE — PROGRESS NOTES
"[facility]  MD Anamika Noel, WARDN, PA-C  ____________________________________________________________               [facility]  Department of Radiation Oncology  [address] [place] [id number]  Office:  [phone]  Fax: [phone]    DATE:  07/17/2019    PATIENT:   Romel Rosario 1950                                   MEDICAL RECORD #:  [id number]                                                       REASON FOR CONSULTATION:  Romel Rosario is a very pleasant 68 y.o. male that has been referred to our clinic by  to discuss radiotherapy recommendations. He is here today in clinic with his family. Reports SOB, Uses O2 at home as needed but did not bring with him.oxygen saturation rate was 81% on arrival.     Denies activity change, appetite change, unexpected weight change,nausea/vomiting, diarrhea, light-headedness, weakness, and headaches. Initial visits scheduled with  and , no biopsy obtained as of yet, \"appointments keep getting rescheduled on me\". He follows  who is awaiting tissue pathology.            HISTORY OF PRESENT ILLNESS  Presented to primary care physician with complaints of SOB. Chest CT was ordered revealing lung mass and mediastinal adenopathy. He was referred to  for further evaluation.     06/13/2019 - CT Chest:  • 7 cm left upper lobe lung mass highly suspicious for malignancy.  • Enlarged mediastinal lymph nodes, some of which appear centrally necrotic suggest mediastinal metastasis.   • There is a questionable satellite nodule within the lingula measuring 7 mm.   • A noncalcified 11 mm left lower lobe nodule is concerning for pulmonary metastasis.   • 5 mm subpleural right lower lobe nodule could be incidental granuloma as there are granulomatous calcifications of the right hemithorax.  • Small hiatal hernia.  • No adrenal nodules.    07/03/2019 - CT Abdomen/Pelvis:  • No " evidence of intra-abdominal metastatic disease.  • Segments of narrowing in the distal descending colon, proximal sigmoid colon likely related to peristalsis. Correlation with the most recent colonoscopy is suggested.  • Previous ventral hernia repair with mesh placement, there is a recurrent ventral hernia inferior to the mesh and has a maximum diameter 9.8 cm and contains fat and nonobstructed small bowel loops. This hernia has a wide fascial defect of up to 5.6 cm.  • Bilateral fat-containing inguinal hernias are noted.  • Small probably benign 4.3 mm noncalcified nodule in the right middle Lobe.    07/03/2019 - Bone Scan:  • No convincing evidence of osseous metastases.    07/08/2019 - Appointment with :  • Likely lung cancer - patient will need a biopsy - appointment with pulmonary clinic on 07/19/2018 and also thoracic surgery   • Awaiting bronchoscopy with biopsy to further determine treatment plan.   • Referral to  for consideration of RT to the thoracic mass.     07/19/2019 - Appointment scheduled with  (Cardiothoracic Surgery):    07/22/2019 - Appointment scheduled with  (Pulmonary Disease):            History obtained from  PATIENT, FAMILY and CHART    PAST MEDICAL HISTORY   Past Medical History:   Diagnosis Date   • Cerumen impaction    • Cervical disc disease    • Colon cancer (CMS/HCC)    • Diabetes (CMS/HCC)    • Eustachian tube dysfunction    • GERD (gastroesophageal reflux disease)    • Hx of colonic polyps    • Hypertension    • Lung mass    • PUD (peptic ulcer disease)    • Sensorineural hearing loss       PAST SURGICAL HISTORY   Past Surgical History:   Procedure Laterality Date   • COLON SURGERY      Due to colon cancer   • COLONOSCOPY N/A 6/26/2017    Procedure: COLONOSCOPY WITH ANESTHESIA;  Surgeon: Barry Banks MD;  Location: [facility] ENDOSCOPY;  Service:    • COLONOSCOPY W/ POLYPECTOMY  08/05/2013    Tubular adenoma transverse x 2, Diverticulosis repeat  exam in 3 years   • ENDOSCOPY  08/05/2013    HH, Billroth anastomisis in the gastric antrum   • KNEE SURGERY     • STOMACH SURGERY      Due to ulcer      FAMILY HISTORY  family history includes Alzheimer's disease in his father; Diabetes in his mother; Heart disease in his mother.     SOCIAL HISTORY   Social History     Tobacco Use   • Smoking status: Former Smoker   • Smokeless tobacco: Never Used   Substance Use Topics   • Alcohol use: No   • Drug use: No      ALLERGIES  Patient has no known allergies.     MEDICATIONS   No current facility-administered medications for this visit.      No current outpatient medications on file.     Facility-Administered Medications Ordered in Other Visits   Medication Dose Route Frequency Provider Last Rate Last Dose   • acetaminophen (TYLENOL) tablet 650 mg  650 mg Oral Q4H PRN Chelsea Duran, APRN       • ALPRAZolam (XANAX) tablet 0.5 mg  0.5 mg Oral Q8H PRN Chelsea Duran APRN   0.5 mg at 07/18/19 0600   • atorvastatin (LIPITOR) tablet 40 mg  40 mg Oral Daily Chelsea Duran APRN   40 mg at 07/18/19 0817   • benzonatate (TESSALON) capsule 200 mg  200 mg Oral TID PRN Conor Smith MD   200 mg at 07/18/19 0941   • budesonide-formoterol (SYMBICORT) 160-4.5 MCG/ACT inhaler 2 puff  2 puff Inhalation BID - RT Chelsea Duran, APRN   2 puff at 07/18/19 0739   • dextromethorphan polistirex ER (DELSYM) 30 MG/5ML oral suspension 60 mg  60 mg Oral Q12H Yari Jackson DO   60 mg at 07/18/19 1456   • dextrose (D50W) 25 g/ 50mL Intravenous Solution 25 g  25 g Intravenous Q15 Min PRN Chelsea Duran, APRN       • dextrose (GLUTOSE) oral gel 15 g  15 g Oral Q15 Min PRN Chelsea Duran, APRN       • glucagon (human recombinant) (GLUCAGEN DIAGNOSTIC) injection 1 mg  1 mg Subcutaneous PRN Chelsea Duran, APRN       • hydrOXYzine (ATARAX) tablet 10 mg  10 mg Oral Q4H PRN Chelsea Duran APRN   10 mg at 07/18/19 1610   • insulin lispro (humaLOG) injection 2-7 Units  2-7  "Units Subcutaneous 4x Daily With Meals & Nightly Chelsea Duran APRN   3 Units at 07/18/19 1701   • linagliptin (TRADJENTA) tablet 5 mg  5 mg Oral Daily Chelsea Duran APRN   5 mg at 07/18/19 0817   • losartan (COZAAR) tablet 100 mg  100 mg Oral Daily Chelsea Duran APRN   100 mg at 07/18/19 0817   • ondansetron (ZOFRAN) tablet 4 mg  4 mg Oral Q6H PRN Chelsea Duran APRN        Or   • ondansetron (ZOFRAN) injection 4 mg  4 mg Intravenous Q6H PRN Chelsea Duran APRN       • pantoprazole (PROTONIX) EC tablet 40 mg  40 mg Oral QAM Chelsea Duran APRN   40 mg at 07/18/19 0600   • sodium chloride 0.9 % flush 3 mL  3 mL Intravenous Q12H Chelsea Duran APRN       • sodium chloride 0.9 % flush 3-10 mL  3-10 mL Intravenous PRN Chelsea Duran APRN       • sodium chloride 0.9 % infusion  75 mL/hr Intravenous Continuous Chelsea Duran APRN 75 mL/hr at 07/18/19 1714 75 mL/hr at 07/18/19 1714      The following portions of the patient's history were reviewed and updated as appropriate: allergies, current medications, past family history, past medical history, past social history, past surgical history and problem list.    REVIEW OF SYSTEMS  Review of Systems   Constitutional: Positive for activity change and fatigue. Negative for appetite change, fever and unexpected weight change.   HENT: Negative for facial swelling, trouble swallowing and voice change.    Respiratory: Negative for chest tightness.    Endocrine: Negative.    Genitourinary: Negative.       PHYSICAL EXAM  VITAL SIGNS:   Vitals:    07/17/19 0900 07/17/19 1100   BP:  158/76   SpO2: (!) 81% 92%   Weight:  94.8 kg (209 lb 0 oz)   Height:  175.3 cm (69.02\")     General:  Alert and oriented, no acute distress, well developed, Vitals reviewed.  Head/Neck:  Normocephalic, without obvious abnormality, atraumatic.  The trachea is midline.   Nose/Sinuses:  Nares normal. Septum midline. Mucosa normal. No drainage or sinus " tenderness.  Mouth/Throat:  Mucosa moist, no lesions; pharynx without erythema, edema or exudate.  Neck:  supple, no mass, non-tender  Eyes: No gross abnormalities,  no scleral jaundice or erythema.    Ears: Ears appear intact with no abnormalities noted, hearing grossly normal  Chest:  Respiratory efforts are normal and unlabored, chest is clear to auscultation. There are no wheezes, rhonchi, rales.  Cardiovascular: Regular rate and rhythm without murmurs, rubs, or gallops.   Abdomen:  Soft, non-tender, normal bowel sounds; no noted organomegaly or masses. no CVA tenderness   Extremities:  RICHARD well, warm to touch, no evidence of cyanosis or edema.  Lymphatics:  No evidence of adenopathy in the cervical or supraclavicular areas.  Skin: No suspicious lesions or rashes of concern, skin color, texture, turgor are normal  Neurologic:  Alert and oriented, gait normal, strength and sensation grossly normal  Psych: Mood and affect are appropriate for circumstance. Eye contact is appropriate.     Performance Status: ECOG (0) Fully active, able to carry on all predisease performance without restriction    Clinical Quality Measures  -Pain Documented by Standardized Tool, FPS  Pain severity quantified; no pain present, no followup plan required.0 Plan: No pain.   -Advanced Care Planning Care plan discussed, no care plan provided  -Body Mass Index Screening and Follow-Up Plan Patient's Body mass index is 30.85 kg/m².   -Tobacco Use: Screening and Cessation Intervention Social History    Tobacco Use      Smoking status: Former Smoker      Smokeless tobacco: Never Used    ASSESSMENT AND PLAN  1. Mass of upper lobe of left lung    2. Respiratory distress, acute    3. Hypoxia    4. Hx of colon cancer, stage I    5. Former smoker    6. Encounter for consultation      Orders Placed This Encounter   Procedures   • MRI Brain With & Without Contrast     Standing Status:   Future     Standing Expiration Date:   7/17/2020      RECOMMENDATIONS: Romel Rosario has been referred to our office today to discuss radiotherapy recommendations. The indications and rationale of radiation therapy according to the NCCN Guidelines to the lung has been discussed with the patient and his family today. I have extensively reviewed the risks, benefits and alternatives of therapy with them. The risks of radiation therapy includes but is not limited to radiation induced pulmonary fibrosis, progression of disease in spite of therapy with either local or systemic failure.   I have seen, examined and reviewed this patient's medication list, appropriate labs and imaging studies.  I reviewed relevant medical records and other physicians notes.  I discussed the goals and plans of care with the patient and family and answered all questions. After careful consideration of the available clinical diagnostic data and extensive examination and evaluation of the patient, it is my recommendation that this patient be treated with palliative vs definitive radiation therapy, final course to be determined with staging an planning. The patient and his family verbalizes understanding of this discussion, voices no further questions and wishes to proceed with recommended therapy. Brain MRI will be needed to complete staging. Pt says he has had a PET scan but I cannot see the results.     We planned to proceed with CT simulation to initiate treatment planning today although with the short walk to the simulation room, Mr. Rosario became cyanotic in the face, was very short of breath. We were able to complete the simulation scan to prepare for radiation but Mr. Rosario was struggling.  Upon arrival to this appointment today, his O2 sat was 81%, oxygen per nasal cannula placed at 2 liters, his sat robert to 91-93%.     I do not feel comfortable sending him home as this mass is growing and now has bilateral pleural effusions noted on the CT simulation scan compromising his oxygen  saturation. Patient states he becomes dizzy easily.  I spoke to he and his wife and I plan to start radiation ASAP due to how symptomatic he is and the CT findings of a 6.3 x 5.6 x 7.0 cm left upper lobe lung mass with compressive atelectasis suspected at the left lung apex and constriction of bronchus of before compared to his simulation scan today, he warrants immediate attention to prevent further compromise.     I will transfer him to the ER so that he can be admitted to [facility], hopefully a biopsy will be easier facilitated and we can start radiation in the next 1-2 days. We called ER and report was given to Gerson CHAWLA and to Gerry ELLIS after we took the patient over there by wheelchair.     Thank you for allowing me to assist in this patients care.     Todays appointment time was spent in counseling and coordination of care as follows: diagnosis, intent of treatment discussing radiation therapy specifics: logistics, possible and probable side effects and after effects, staging of cancer, standard of care in for this stage of this cancer and treatment options  Eddie Bell III, MD  07/17/2019

## 2019-07-17 PROBLEM — J96.01 ACUTE RESPIRATORY FAILURE WITH HYPOXIA AND HYPERCAPNIA (HCC): Status: ACTIVE | Noted: 2019-01-01

## 2019-07-17 PROBLEM — R06.02 SHORTNESS OF BREATH: Status: ACTIVE | Noted: 2019-01-01

## 2019-07-17 PROBLEM — J96.02 ACUTE RESPIRATORY FAILURE WITH HYPOXIA AND HYPERCAPNIA (HCC): Status: ACTIVE | Noted: 2019-01-01

## 2019-07-17 PROBLEM — R09.02 HYPOXIA: Status: ACTIVE | Noted: 2019-01-01

## 2019-07-17 PROBLEM — E11.65 CONTROLLED TYPE 2 DIABETES MELLITUS WITH HYPERGLYCEMIA, WITHOUT LONG-TERM CURRENT USE OF INSULIN (HCC): Status: ACTIVE | Noted: 2017-05-01

## 2019-07-17 PROBLEM — R06.03 RESPIRATORY DISTRESS, ACUTE: Status: ACTIVE | Noted: 2019-01-01

## 2019-07-17 NOTE — PATIENT INSTRUCTIONS
1.  See Dr. Patel (chest surgeon) on 07/19/2019 as scheduled  2.  See Dr. Brumfield (for bronchoscopy) on 07/22/2019 as scheduled  3.  MRI of brain requested, they will call you  4.  Possibly start radiation if no surgery, we will call you.

## 2019-07-19 PROBLEM — R59.0 MEDIASTINAL ADENOPATHY: Status: ACTIVE | Noted: 2019-01-01

## 2019-07-19 NOTE — ANESTHESIA PROCEDURE NOTES
Airway  Airway not difficult    General Information and Staff    Patient location during procedure: OR  CRNA: Oren Alfonso CRNA    Indications and Patient Condition  Indications for airway management: airway protection  Mask difficulty assessment: 1 - vent by mask    Final Airway Details  Final airway type: endotracheal airway      Successful airway: ETT  Cuffed: yes   Successful intubation technique: direct laryngoscopy  Blade: Mike  Blade size: 2  ETT size (mm): 8.0  Cormack-Lehane Classification: grade I - full view of glottis  Placement verified by: chest auscultation and capnometry   Cuff volume (mL): 8  Measured from: gums  ETT to gums (cm): 22  Number of attempts at approach: 1

## 2019-07-19 NOTE — ANESTHESIA PREPROCEDURE EVALUATION
Anesthesia Evaluation     Patient summary reviewed and Nursing notes reviewed   no history of anesthetic complications:  NPO Solid Status: > 8 hours  NPO Liquid Status: > 8 hours           Airway   Mallampati: I  TM distance: >3 FB  Neck ROM: full  No difficulty expected  Dental      Pulmonary    (+) a smoker (quit 12 years ago) Former, shortness of breath,     ROS comment: Admitted with dyspnea, soa. Known RADU mass for which he was seeing Dr Bell and Dr Brumfield. Presents today for EBUS. Currently on 6L NC, breathing comfortably. Reports he started home o2 four days ago.   Cardiovascular   Exercise tolerance: poor (<4 METS)    Patient on routine beta blocker and Beta blocker given within 24 hours of surgery    (+) hypertension,     ROS comment: Beta blocker held on admission, two days prior. Will give iv dose in preop    Neuro/Psych  (-) seizures, TIA, CVA  GI/Hepatic/Renal/Endo    (+) obesity,  GERD, PUD,  diabetes mellitus type 2 using insulin,   (-) liver disease, no renal disease    Musculoskeletal     Abdominal    Substance History      OB/GYN          Other      history of cancer (colon cancer) remission                    Anesthesia Plan    ASA 4     general   (Pt on 6L NC, spo2 88-90%. Counseled re: high risk post op pulmonary complications including prolonged intubation. He reports he had a duoneb 1 hour ago. )  intravenous induction   Anesthetic plan, all risks, benefits, and alternatives have been provided, discussed and informed consent has been obtained with: patient.

## 2019-07-19 NOTE — ANESTHESIA POSTPROCEDURE EVALUATION
"Patient: Romel Rosario    Procedure Summary     Date:  07/19/19 Room / Location:   [facility] OR 02 /  [facility] OR    Anesthesia Start:  1337 Anesthesia Stop:  1437    Procedure:  BRONCHOSCOPY WITH ENDOBRONCHIAL ULTRASOUND AND TRANSBRONCHIAL BIOPSY at 1: 30 (Left Bronchus) Diagnosis:       Mass of upper lobe of left lung      (Mass of upper lobe of left lung [R91.8])    Surgeon:  Jere Brumfield MD Provider:  Oren Alfonso CRNA    Anesthesia Type:  general ASA Status:  4          Anesthesia Type: general  Last vitals  BP   124/71 (07/19/19 1430)   Temp   97.8 °F (36.6 °C) (07/19/19 1430)   Pulse   93 (07/19/19 1430)   Resp   19 (07/19/19 1430)     SpO2   (!) 89 % (07/19/19 1430)     Post Anesthesia Care and Evaluation    Patient location during evaluation: PACU  Patient participation: complete - patient participated  Level of consciousness: awake and alert  Pain management: adequate  Airway patency: patent  Anesthetic complications: No anesthetic complications  PONV Status: none  Cardiovascular status: acceptable and hemodynamically stable  Respiratory status: acceptable  Hydration status: acceptable    Comments: Blood pressure 124/71, pulse 93, temperature 97.8 °F (36.6 °C), temperature source Temporal, resp. rate 19, height 175.3 cm (69\"), weight 94.4 kg (208 lb 1.6 oz), SpO2 (!) 89 %.    Called to room shortly after extubation by CRNA. Pt awake, strong hand , reports his breathing is good, on 8L NC with spo2 67%. I retrieved non rebreather while CRNA gave cpap via circuit. Sats recovered, pt taken to recovery and placed on cpap by RT. Will try to wean back to nasal canula. If unable will check CXR to rule out PTX after lung biopsy.     16:30: Pt weaned to 6L high flow nasal cannula. Awake, spo2 consistently in 90s. Ok to transport to radiation.      "

## 2019-08-01 NOTE — PROGRESS NOTES
[facility]  Nathan Zamudio M.D.  TERRENCE Zamudio M.D.  MING Anders APRN      Internal Medicine Progress Note    8/1/2019   12:20 PM    Name:  Romel Rosario  MRN:    [id number]     Acct:     [id number]   Room:  [address] [place] [address] Day: 0     Admit Date: 7/25/2019  6:42 PM  PCP: No primary care provider on file.    Subjective:     C/C: Shortness of air.  New diagnosis of lung carcinoma    Interval History: Status:  Improved. Resting in bed. Wife at bedside. Progressing with therapy. C/o headache. Patient with increased stomach upset today. Increased nausea and loose BM x 2-3 this morning. Patient feels that breathing is worse today. Afebrile. Counts stable. CXR with no evidence of pneumonia.     Review of Systems   Constitution: Positive for weakness and malaise/fatigue. Negative for chills, decreased appetite and fever.   HENT: Negative for congestion, hoarse voice, nosebleeds and sore throat.    Eyes: Negative for blurred vision, discharge, pain and visual disturbance.   Cardiovascular: Positive for dyspnea on exertion. Negative for chest pain, irregular heartbeat, leg swelling, orthopnea and palpitations.   Respiratory: Positive for cough, shortness of breath, sleep disturbances due to breathing and wheezing. Negative for sputum production.    Hematologic/Lymphatic: Negative for bleeding problem. Does not bruise/bleed easily.   Skin: Negative for dry skin, flushing, itching, poor wound healing, rash and suspicious lesions.   Musculoskeletal: Positive for muscle weakness. Negative for arthritis, back pain, falls, joint pain, joint swelling and myalgias.   Gastrointestinal: Positive for diarrhea and nausea. Negative for bloating, abdominal pain, change in bowel habit, flatus, heartburn and melena.   Genitourinary: Negative for frequency, hesitancy, incomplete emptying, pelvic pain and urgency.   Neurological: Negative for difficulty with concentration, disturbances in coordination,  dizziness, headaches, numbness, paresthesias and tremors.   Psychiatric/Behavioral: Negative for altered mental status, hallucinations and memory loss. The patient is not nervous/anxious.        Medications:     Allergies:   Allergies   Allergen Reactions   • Lactose Intolerance (Gi) Diarrhea       Current Meds:   Current Facility-Administered Medications:   •  acetaminophen (TYLENOL) tablet 650 mg, 650 mg, Oral, Q4H PRN, Kermit Zamudio MD  •  albuterol (PROVENTIL) nebulizer solution 0.083% 2.5 mg/3mL, 2.5 mg, Nebulization, Q4H PRN, Kermit Zamudio MD  •  ALPRAZolam (XANAX) tablet 1 mg, 1 mg, Oral, Q6H PRN, Kermit Zamudio MD  •  atorvastatin (LIPITOR) tablet 40 mg, 40 mg, Oral, Daily, Kermit Zamudio MD  •  benzonatate (TESSALON) capsule 200 mg, 200 mg, Oral, TID PRN, Kermit Zamudio MD  •  budesonide-formoterol (SYMBICORT) 160-4.5 MCG/ACT inhaler 2 puff, 2 puff, Inhalation, BID - RT, Kermit Zamudio MD  •  butalbital-acetaminophen-caffeine (FIORICET, ESGIC) -40 MG per tablet 1 tablet, 1 tablet, Oral, Q4H PRN, Kermit Zamudio MD  •  cetirizine (zyrTEC) tablet 10 mg, 10 mg, Oral, Nightly, Tavon Fofanauna Yoly, APRN  •  dextromethorphan polistirex ER (DELSYM) 30 MG/5ML oral suspension 60 mg, 60 mg, Oral, Q12H, Kermit Zamudio MD  •  dextrose (D50W) 25 g/ 50mL Intravenous Solution 25 g, 25 g, Intravenous, Q15 Min PRN, Kermit Zamudio MD  •  dextrose (GLUTOSE) oral gel 15 g, 15 g, Oral, Q15 Min PRN, Kermit Zamudio MD  •  diphenhydrAMINE (BENADRYL) capsule 25 mg, 25 mg, Oral, Once, Tavon Fofanauna Yoly, APRN  •  fluticasone (FLONASE) 50 MCG/ACT nasal spray 2 spray, 2 spray, Each Nare, Daily, Kermit Zamudio MD  •  furosemide (LASIX) injection 40 mg, 40 mg, Intravenous, Q12H, Kermit Zamudio MD  •  glucagon (human recombinant) (GLUCAGEN DIAGNOSTIC) injection 1 mg, 1 mg, Subcutaneous, PRN, Kermit Zamudio MD  •  guaiFENesin  (MUCINEX) 12 hr tablet 1,200 mg, 1,200 mg, Oral, BID, Kermit Zamudio MD  •  HYDROmorphone (DILAUDID) injection 0.5 mg, 0.5 mg, Intravenous, Once, Camilla Fofana, APRN  •  hydrOXYzine (ATARAX) tablet 10 mg, 10 mg, Oral, Q4H PRN, Kermit Zamudio MD  •  insulin lispro (humaLOG) injection 2-9 Units, 2-9 Units, Subcutaneous, 4x Daily With Meals & Nightly, Shanice Nair, APRN  •  ipratropium-albuterol (DUO-NEB) nebulizer solution 3 mL, 3 mL, Nebulization, Q12H - RT, Jere Brumfield MD  •  linagliptin (TRADJENTA) tablet 5 mg, 5 mg, Oral, Daily, Kermit Zamudio MD  •  losartan (COZAAR) tablet 100 mg, 100 mg, Oral, Daily, Kermit Zamudio MD  •  meclizine (ANTIVERT) tablet 25 mg, 25 mg, Oral, TID PRN, Camilla Fofana, APRN  •  ondansetron (ZOFRAN) injection 4 mg, 4 mg, Intravenous, Q6H PRN, Kermit Zamudio MD  •  ondansetron (ZOFRAN) tablet 4 mg, 4 mg, Oral, Q6H PRN, Kermit Zamudio MD  •  oxyCODONE-acetaminophen (PERCOCET) 7.5-325 MG per tablet 1 tablet, 1 tablet, Oral, Q4H PRN, Kermit Zamudio MD  •  pantoprazole (PROTONIX) EC tablet 40 mg, 40 mg, Oral, QAM, Kermit Zamudio MD  •  potassium chloride (MICRO-K) CR capsule 20 mEq, 20 mEq, Oral, Daily, Kermit Zamudio MD  •  promethazine (PHENERGAN) injection 12.5 mg, 12.5 mg, Intravenous, Once, Camilla Fofana, APRN  •  sodium chloride 0.9 % flush 3 mL, 3 mL, Intravenous, Q12H, Kermit Zamudio MD  •  sodium chloride 0.9 % flush 3-10 mL, 3-10 mL, Intravenous, PRN, Kermit Zamudio MD    Data:     Code Status:    There are no questions and answers to display.       Family History   Problem Relation Age of Onset   • Heart disease Mother    • Diabetes Mother    • Alzheimer's disease Father    • Colon cancer Neg Hx    • Colon polyps Neg Hx        Social History     Socioeconomic History   • Marital status:      Spouse name: Not on file   • Number of children: Not on file   •  Years of education: Not on file   • Highest education level: Not on file   Tobacco Use   • Smoking status: Former Smoker   • Smokeless tobacco: Never Used   Substance and Sexual Activity   • Alcohol use: No   • Drug use: No   • Sexual activity: Defer       Vitals:    T 98.8 P 89 R 25 BP 93/57 Sp02 86% (15 lpm hi flow)    I/O (24Hr):  No intake or output data in the 24 hours ending 08/01/19 1220    Labs and imaging:      Lab Results (last 24 hours)     Procedure Component Value Units Date/Time    Ammonia [[id number]]  (Normal) Collected:  08/01/19 1201    Specimen:  Blood Updated:  08/01/19 1217     Ammonia 13 umol/L     CBC & Differential [[id number]] Collected:  08/01/19 1201    Specimen:  Blood Updated:  08/01/19 1208    Narrative:       The following orders were created for panel order CBC & Differential.  Procedure                               Abnormality         Status                     ---------                               -----------         ------                     CBC Auto Differential[[id number]]        Abnormal            Final result                 Please view results for these tests on the individual orders.    CBC Auto Differential [[id number]]  (Abnormal) Collected:  08/01/19 1201    Specimen:  Blood Updated:  08/01/19 1208     WBC 9.73 10*3/mm3      RBC 4.38 10*6/mm3      Hemoglobin 12.0 g/dL      Hematocrit 38.8 %      MCV 88.6 fL      MCH 27.4 pg      MCHC 30.9 g/dL      RDW 14.3 %      RDW-SD 45.9 fl      MPV 9.5 fL      Platelets 317 10*3/mm3      Neutrophil % 76.7 %      Lymphocyte % 10.8 %      Monocyte % 9.5 %      Eosinophil % 1.8 %      Basophil % 0.4 %      Immature Grans % 0.8 %      Neutrophils, Absolute 7.46 10*3/mm3      Lymphocytes, Absolute 1.05 10*3/mm3      Monocytes, Absolute 0.92 10*3/mm3      Eosinophils, Absolute 0.18 10*3/mm3      Basophils, Absolute 0.04 10*3/mm3      Immature Grans, Absolute 0.08 10*3/mm3      nRBC 0.0 /100 WBC     T4, Free [[id number]] Collected:   08/01/19 1201    Specimen:  Blood Updated:  08/01/19 1205    TSH [[id number]] Collected:  08/01/19 1201    Specimen:  Blood Updated:  08/01/19 1205    Comprehensive Metabolic Panel [[id number]] Collected:  08/01/19 1201    Specimen:  Blood Updated:  08/01/19 1205    BNP [[id number]]  (Normal) Collected:  08/01/19 0626    Specimen:  Blood Updated:  08/01/19 0706     proBNP 61.6 pg/mL     Basic Metabolic Panel [[id number]]  (Abnormal) Collected:  08/01/19 0626    Specimen:  Blood Updated:  08/01/19 0702     Glucose 134 mg/dL      BUN 24 mg/dL      Creatinine 0.97 mg/dL      Sodium 137 mmol/L      Potassium 3.6 mmol/L      Chloride 89 mmol/L      CO2 >40.0 mmol/L      Calcium 10.3 mg/dL      eGFR Non African Amer 77 mL/min/1.73      BUN/Creatinine Ratio 24.7     Anion Gap -- mmol/L      Comment: Unable to calculate Anion Gap.       Narrative:       GFR Normal >60  Chronic Kidney Disease <60  Kidney Failure <15    CBC & Differential [[id number]] Collected:  08/01/19 0626    Specimen:  Blood Updated:  08/01/19 0646    Narrative:       The following orders were created for panel order CBC & Differential.  Procedure                               Abnormality         Status                     ---------                               -----------         ------                     CBC Auto Differential[[id number]]        Abnormal            Final result                 Please view results for these tests on the individual orders.    CBC Auto Differential [[id number]]  (Abnormal) Collected:  08/01/19 0626    Specimen:  Blood Updated:  08/01/19 0646     WBC 8.97 10*3/mm3      RBC 4.61 10*6/mm3      Hemoglobin 12.7 g/dL      Hematocrit 41.4 %      MCV 89.8 fL      MCH 27.5 pg      MCHC 30.7 g/dL      RDW 14.3 %      RDW-SD 46.2 fl      MPV 9.7 fL      Platelets 341 10*3/mm3      Neutrophil % 71.7 %      Lymphocyte % 12.2 %      Monocyte % 11.0 %      Eosinophil % 3.3 %      Basophil % 0.4 %      Immature Grans % 1.4 %      Neutrophils,  Absolute 6.42 10*3/mm3      Lymphocytes, Absolute 1.09 10*3/mm3      Monocytes, Absolute 0.99 10*3/mm3      Eosinophils, Absolute 0.30 10*3/mm3      Basophils, Absolute 0.04 10*3/mm3      Immature Grans, Absolute 0.13 10*3/mm3      nRBC 0.0 /100 WBC     POC Glucose Once [[id number]]  (Abnormal) Collected:  08/01/19 0507    Specimen:  Blood Updated:  08/01/19 0519     Glucose 140 mg/dL      Comment: : DENA Echavarria KathyMeter ID: [id number]       POC Glucose Once [[id number]]  (Abnormal) Collected:  07/31/19 2024    Specimen:  Blood Updated:  07/31/19 2109     Glucose 151 mg/dL      Comment: : DENA Echavarria KathyMeter ID: [id number]       POC Glucose Once [[id number]]  (Abnormal) Collected:  07/31/19 1707    Specimen:  Blood Updated:  07/31/19 1742     Glucose 242 mg/dL      Comment: : RAFIQ Yepez PaulaMeter ID: [id number]       POC Glucose Once [[id number]]  (Abnormal) Collected:  07/31/19 1151    Specimen:  Blood Updated:  07/31/19 1238     Glucose 192 mg/dL      Comment: : RAFIQ Yepez PaulaMeter ID: [id number]               Nm Bone Scan Whole Body    Result Date: 7/3/2019  Narrative: EXAMINATION: NM BONE SCAN WHOLE BODY- 7/3/2019 12:06 PM CDT  HISTORY: R93.89; R93.89-Abnormal findings on diagnostic imaging of other specified body structures. HISTORY of colon cancer.  Dose: 19.1 mCi technetium 99m HDP intravenously.  REPORT: Scintigraphic whole body images were obtained in the anterior posterior dimensions approximately 3 hours after administration of the radiotracer. There is no previous bone scan.  Motion artifact is present. Increased activity in the region of the thyroid gland probably reflects pertechnetate contamination. There is arthritic uptake at the sternoclavicular joints slightly greater on the left. Distribution of activity within the skeleton is otherwise homogeneous and there is no convincing evidence of osseous metastatic disease.      Impression: No  convincing evidence of osseous metastases. This report was finalized on 07/03/2019 12:07 by Dr. Pavel Stevenson MD.    Ct Abdomen Pelvis With Contrast    Result Date: 7/3/2019  Narrative: EXAMINATION: CT ABDOMEN PELVIS W CONTRAST- 7/3/2019 7:54 AM CDT  HISTORY: R93.89; R93.89-Abnormal findings on diagnostic imaging of other specified body structures. Remote history of colon cancer, newly diagnosed lung cancer.  DOSE: 898 mGycm (Automatic exposure control technique was implemented in an effort to keep the radiation dose as low as possible without compromising image quality)  REPORT: Spiral CT of the abdomen and pelvis was performed after administration of intravenous contrast and oral contrast from the lung bases through the pubic symphysis. Reconstructed coronal and sagittal images are also reviewed.  COMPARISON: None.  Review of lung windows demonstrates a small noncalcified nodule right middle lobe image 10 measures 4.3 mm, likely benign. There are bilateral trace pleural effusions. There is a calcified granuloma in the right lower lobe. There is a small sliding-type hiatal hernia. The liver and spleen are homogeneous with no masses. The gallbladder is unremarkable. The stomach is decompressed. The pancreas and adrenal glands are within normal limits. The kidneys enhance normally, no renal mass or hydronephrosis is identified. There is mild cortical scarring of both kidneys. There is a small cyst at the upper pole the left kidney that appears benign and measures approximately 1 cm. The ureters are decompressed. The bladder is decompressed. There are bilateral fat-containing inguinal hernias, each measures about 3 cm in size. There is a midline ventral hernia that contains fat and nonobstructed small bowel loops, with a maximum diameter of 9.8 cm. There is wide fascial defect that measures up to 5.6 cm. There is evidence of previous ventral hernia repair with mesh placement. Current hernia is seen. To the mass.  Bowel loops are normal in caliber. A few segments of narrowing in the distal descending and proximal sigmoid colon appeared be related to peristalsis. Correlation with the most recent colonoscopy is suggested. The appendix is normal. No free fluid or free air is identified. No intra-abdominal or pelvic lymphadenopathy is identified. Review of bone windows shows moderate degenerative changes throughout the lumbar spine.      Impression: 1. No evidence of intra-abdominal metastatic disease. 2. Segments of narrowing in the distal descending colon, proximal sigmoid colon likely related to peristalsis. Correlation with the most recent colonoscopy is suggested. 3. Previous ventral hernia repair with mesh placement, there is a recurrent ventral hernia inferior to the mesh and has a maximum diameter 9.8 cm and contains fat and nonobstructed small bowel loops. This hernia has a wide fascial defect of up to 5.6 cm. 4. Bilateral fat-containing inguinal hernias are noted. 5. Small probably benign 4.3 mm noncalcified nodule in the right middle lobe.   This report was finalized on 07/03/2019 08:06 by Dr. Pavel Stevenson MD.    Xr Chest 1 View    Result Date: 8/1/2019  Narrative: EXAMINATION: XR CHEST 1 VW-  8/1/2019 8:29 AM CDT  HISTORY: worsening shortness of air, cough  1 view chest x-ray compared to 07/24/2019.  The large round left upper lobe mass currently measures 89 x 78 mm compared with 88 x 60 mm.  Mild right basilar atelectasis with trace pleural fluid.  Underlying chronic parenchymal change.  Stable heart size.  No pneumothorax or heart failure.  Summary: 1. Stable or slightly enlarged left upper lobe mass. 2. No pneumonia or pneumothorax is seen. This report was finalized on 08/01/2019 08:36 by Dr. Tyler Khan MD.    Xr Chest 1 View    Result Date: 7/24/2019  Narrative: Exam:   XR CHEST 1 VW-   Date:  07/24/2019  History:  Male, age  68 years;sob, ? Effusion right; R09.02-Hypoxemia; R91.8-Other nonspecific abnormal  finding of lung field; R91.8-Other nonspecific abnormal finding of lung field  COMPARISON:  Chest x-ray dated 07/23/2019  Findings :  The heart and mediastinum are unchanged in size. Known left upper lobe mass.  Small left pleural effusion and associated opacity. Improved aeration at the right lung base.       Impression: Impression:  1.  Improved aeration of the right lung base. 2.  Known left upper lobe mass.  This report was finalized on 07/24/2019 12:39 by Dr. Maryjo Cameron MD.    Xr Chest 1 View    Result Date: 7/23/2019  Narrative: EXAM: XR CHEST 1 VW- - 7/23/2019 9:20 AM CDT  HISTORY: dyspnea; R09.02-Hypoxemia; R91.8-Other nonspecific abnormal finding of lung field; R91.8-Other nonspecific abnormal finding of lung field   COMPARISON: 07/22/2019.  TECHNIQUE:  Line images.  Frontal view of the chest.  FINDINGS:  Known left upper lobe mass. Increased opacity at the right lower lobe. Prominence of the mediastinal and cardiac silhouette is likely exaggerated by portable AP technique. No acute bony findings.       Impression: 1. Increased opacity at the right lower lobe, could represent effusion with atelectasis, infection or in the appropriate clinical setting aspiration. 2. Known left upper lobe mass. This report was finalized on 07/23/2019 09:34 by Dr Mile Donnelly MD.    Xr Chest 1 View    Result Date: 7/21/2019  Narrative: HISTORY: Pulmonary edema, lung mass  CXR: Frontal view the chest obtained.  COMPARISON: 07/20/2019  FINDINGS: There is new opacification left hemithorax with associated volume loss suspected. This may relate to mucous plugging in addition to the large 9 cm left upper lobe lung mass in the small left pleural effusion. Mixed interstitial alveolar opacities the right lung are similar suggestive for underlying edema. Small right pleural effusion. No appreciable pneumothorax. No acute appearing bony pathology.      Impression: 1. New opacification of the left hemithorax with associated volume  loss. Mucous plugging in addition to the 9 cm left upper lobe lung mass and small pleural effusion suspected. Similar pulmonary edema of the aerated right lung with a very small right pleural effusion. This report was finalized on 07/21/2019 08:27 by Dr. Deborah Chávez MD.    Xr Chest 1 View    Result Date: 7/20/2019  Narrative: HISTORY: Shortness of breath  CXR: A frontal view the chest obtained.  COMPARISON: 07/17/2019  FINDINGS: There is a stable 9 cm left upper lobe lung mass. There are increasing diffuse bilateral mixed interstitial alveolar pulmonary opacities with small pleural effusions, left greater than right. Cardiac silhouette obscured by the pulmonary findings. There is no appreciable pneumothorax. There is no acute bony pathology.      Impression: 1. Increasing diffuse bilateral pulmonary opacities favorable for edema with small pleural effusions. Stable left upper lobe lung mass. This report was finalized on 07/20/2019 08:55 by Dr. Deborah Chávez MD.    Xr Chest 1 View    Result Date: 7/17/2019  Narrative: XR CHEST 1 VW- 7/17/2019 12:50 PM CDT  HISTORY: cough, soa   COMPARISON: CT scan dated 06/13/2019.  FINDINGS: Dense left upper lobe consolidation reflecting known left upper lobe mass lesion. This measures up to 9.8 cm in greatest diameter. Lung fields are otherwise clear. No pleural effusion or pneumothorax. Borderline cardiomegaly. The pulmonary vasculature are unremarkable.  The osseous structures and surrounding soft tissues demonstrate no acute abnormality.      Impression: 1. Known left upper lobe mass lesion, measuring up to 9.8 cm. Lung fields are otherwise clear.  This report was finalized on 07/17/2019 13:15 by Dr Bruce Rodriguez, .    Xr Chest Pa & Lateral    Result Date: 7/22/2019  Narrative: Exam:   XR CHEST PA AND LATERAL-   Date:  07/22/2019  History:  Male, age  68 years;abnormal chest xray; R09.02-Hypoxemia; R91.8-Other nonspecific abnormal finding of lung field; R91.8-Other  nonspecific abnormal finding of lung field  COMPARISON:  Chest x-ray dated 07/21/2019  Findings :  The heart and mediastinum are normal in size. Interval significant improvement in appearance of the left hemithorax with reexpansion of the lung. The known left upper lobe mass is redemonstrated. Trace bilateral pleural effusions, left greater than right.  The bones show no acute pathology.       Impression: Impression:  1.  Interval reexpansion of the left. 2.  Known left upper lobe mass.  This report was finalized on 07/22/2019 07:41 by Dr. Maryjo Cameron MD.      Physical Examination:        Physical Exam   Constitutional: Vital signs are normal. He appears well-developed and well-nourished. He is cooperative.   HENT:   Head: Normocephalic and atraumatic.   Nose: Nose normal.   Mouth/Throat: Oropharynx is clear and moist.   Eyes: Conjunctivae, EOM and lids are normal. Pupils are equal, round, and reactive to light.   Neck: Trachea normal and normal range of motion. Neck supple.   Cardiovascular: Normal rate, regular rhythm and normal heart sounds.   Pulmonary/Chest: Effort normal. Tachypnea noted. He has decreased breath sounds. He has no wheezes.   Abdominal: Soft. Normal appearance and bowel sounds are normal.   Musculoskeletal: Normal range of motion.   Generalized weakness   Lymphadenopathy:     He has no cervical adenopathy.     He has no axillary adenopathy.        Left: No supraclavicular adenopathy present.   Neurological: He is alert. He has normal strength. No cranial nerve deficit or sensory deficit.   Skin: Skin is warm, dry and intact. No petechiae and no rash noted. No cyanosis or erythema. Nails show no clubbing.   Psychiatric: He has a normal mood and affect. His speech is normal and behavior is normal. Judgment and thought content normal. Cognition and memory are normal.   Nursing note and vitals reviewed.    Assessment:             Adenocarcinoma, lung (CMS/HCC)    Past Medical History:    Diagnosis Date   • Adenocarcinoma, lung (CMS/HCC)    • Cerumen impaction    • Cervical disc disease    • Colon cancer (CMS/HCC)    • Diabetes (CMS/HCC)    • Eustachian tube dysfunction    • GERD (gastroesophageal reflux disease)    • Hx of colonic polyps    • Hypertension    • PUD (peptic ulcer disease)    • Sensorineural hearing loss         Plan:        1. Acute respiratory failure with hypoxia and hypercapnia  2. Mediastinal adenopathy  3. Left Upper Lobe Lung Mass  4. Diabetes Mellitus type 2 with hyperglycemia  5. Hx Colon Cancer stage 1  6. Pulmonary edema  7. Pleural effusion, bilateral  8. Anemia  9. GERD  10. Headache     Continue current treatment. Monitor counts. Increase activity. Wean oxygen as able. Radiation therapy per radiation oncology. Labs Monday. Encourage increased participation with therapy and ADLs. Glycemic control.  KUB/GI panel. Add florastor. Decrease lasix.         Electronically signed by MING Cedillo on 8/1/2019 at 12:20 PM     I have discussed the care of Romel Rosario, including pertinent history and exam findings, with the nurse practitioner.    I have seen and examined the patient and the key elements of all parts of the encounter have been performed by me.  I agree with the assessment, plan and orders as documented by MING Anders, after I modified the exam findings and the plan of treatments and the final version is my approved version of the assessment.        Electronically signed by Kermit Zamudio MD on 8/1/2019 at 2:59 PM

## 2019-08-01 NOTE — PROGRESS NOTES
"      PULMONARY AND CRITICAL CARE PROGRESS NOTE - [facility]  Patient: Romel oRsario    1950   Acct# [id number]  08/01/19   8:32 AM  Referring Provider: Kermit Zamudio MD  Chief Complaint: Hypoxemia  Interval history: The patient is awake this morning.  He he complains of coughing all night.  \"I feel like I have pneumonia\".  He does not appear to be in any distress.  Remains on 15 L No family at the bedside.      Review of Systems: Review of Systems   Constitutional: Negative for chills and fever.   Respiratory: Positive for shortness of breath. Negative for cough.    Cardiovascular: Negative for chest pain.   Gastrointestinal: Negative for diarrhea, nausea and vomiting.    Physical Exam:  Vital signs: Blood pressure 123/99, pulse 93, respirations 29, temperature 98.2, saturation 86 on 15 L      Physical Exam   Constitutional: He is oriented to person, place, and time. He appears well-developed and well-nourished. He has a sickly appearance. Nasal cannula in place.   HENT:   Head: Normocephalic and atraumatic.   Eyes: Conjunctivae and EOM are normal. Pupils are equal, round, and reactive to light. No scleral icterus.   Neck: Normal range of motion. Neck supple.   Cardiovascular: Normal rate, regular rhythm and normal heart sounds. Exam reveals no friction rub.   No murmur heard.  Pulmonary/Chest: Effort normal. No respiratory distress. He has decreased breath sounds. He has no wheezes. He has no rales.   Abdominal: Soft. Bowel sounds are normal. He exhibits no distension. There is no tenderness.   Musculoskeletal: Normal range of motion. He exhibits edema (mild).   Neurological: He is oriented to person, place, and time.   Skin: Skin is warm and dry.   Psychiatric:   Sleeping   Nursing note and vitals reviewed.    Results from last 7 days   Lab Units 08/01/19  0626 07/29/19  0519 07/26/19  0405   WBC 10*3/mm3 8.97 10.06 10.05   HEMOGLOBIN g/dL 12.7* 11.3* 11.6*   PLATELETS 10*3/mm3 341 " 279 309     Results from last 7 days   Lab Units 08/01/19  0626 07/29/19  0519 07/28/19  0407   SODIUM mmol/L 137 138 139   POTASSIUM mmol/L 3.6 3.8 4.3   BUN mg/dL 24* 30* 32*   CREATININE mg/dL 0.97 1.04 1.07         Recent films:  No radiology results for the last day  Films reviewed personally by me.  My interpretation: None today  Pulmonary Assessment:  1. Acute resp failure with hypoxia   2. lung cancer; deferred to others  3. Right lower lobe infiltrate and pulm edema, improving  4. mediastinal adenopathy; due to lung cancer  Recommend:   · Continue high flow nasal cannula and wean as tolerated.  · Continue BiPAP at HS and naps   · Monitoring off antibiotics  · Diuresis per attending  · Continue bronchodilators  · IS/OPEP   · PT/OT  · Start prednisone  · Continue Delsym  · Chest x-ray for worsening cough and shortness of breath  Electronically signed by MING Canada on 8/1/2019 at 8:32 AM     Physician substantive portion:  Patient remains on high flow oxygen this morning.  He says he feels poorly.  He has had several other medications given yesterday and has been started on Antivert.  He says he feels wiped out and that he may have pneumonia.  Exam shows him to be ill-appearing.  Chest has a few crackles good excursion no accessory muscle use.  High flow nasal cannula in place.  Abdomen soft.  Plan additional work-up for vague symptoms as above of malaise.  We will recheck chemistry panels including magnesium, ammonia level thyroid panel.  Check chest x-ray.  Otherwise continue all other levels of support.  Hold the next dose of Antivert.    Xray back showing no pneumonia.  RADU mass unchanged.    I have seen and examined patient personally, performing a face-to-face diagnostic evaluation with plan of care reviewed and developed with APRN and nursing staff. I have addended and/or modified the above history of present illness, physical examination, and assessment and plan to reflect my findings and  impressions. Essential elements of the care plan were discussed with APRN above.  Agree with findings and assessment/plan as documented above.    Electronically signed by Jere Brumfield MD, on 8/1/2019, 11:37 AM

## 2019-08-02 NOTE — PROGRESS NOTES
MEDICAL ONCOLOGY PROGRESS NOTE    Patient name: Romel Rosario  Patient : 1950  VISIT # [id number]  MR #[id number]   Room 590    SUBJECTIVE: Continues XRT # 10  of 13 due today.  He had nausea and some diarrhea yesterday but this morning feels okay.      INTERVAL HISTORY:  The patient is a 68 years old male who was recently referred to me for further workup of a lung mass.  He has also being referred to thoracic surgery and Dr. Jere Brumfield for further workup and possible bronchoscopy with biopsy  The patient was seen today by Dr. Eddie Bell and had worsening shortness breath, and therefore was sent to the emergency  He is being admitted with acute COPD exacerbation.     Cancer history  Mr Romel Rosario was first seen by me on 2019 referred by his primary care provider for further evaluation of a lung mass and mediastinal adenopathy. The patient had complains of shortness of breath at exertion and therefore was recommended further imaging.  · 2019-CT chest with contrast showed a 6.3 x 5.6 x 7 cm left upper lobe lung mass, centrally necrotic, with compressive atelectasis. Questionable satellite nodule within the lingula measured 7 mm. Noncalcified 11 mm left lower lobe nodule concerning for pulmonary metastases. 5 mm subpleural right lower lobe nodule of uncertain significance. A small hiatal hernia. No adrenal nodules. Mediastinal adenopathy measuring up to 2 cm. Subcarina lymph nodes measure up to 1.3 cm.   · 2019-he was first seen by Dr. Fishman.   · 7/3/2019-bone scan showed no convincing evidence of osseous metastasis.   · 7/3/2019- CT abdomen, pelvis showed no evidence of intra-abdominal metastatic disease. Noncalcified nodule in the right middle lobe     REVIEW OF SYSTEMS  CONSTITUTIONAL: no fever, no night sweats, significant shortness of air at rest. Activity change  HEENT: no blurring of vision, no double vision, no tinnitus, no ulceration, no dental caries, and no dysplasia, no  epistaxis;  LUNGS: no hemoptysis; positive for shortness of breath, cough, hypoxia, lung mass s/p bronch/EBUS. Wearing O2,  CARDIOVASCULAR: no palpitation, no chest pain; shortness of breath   GI: no abdominal pain, no vomiting, no constipation;  MARNI: no dysuria, no hematuria, no frequency or urgency, no nephrolithiasis;  MUSCULOSKELETAL: no joint pain, no swelling, no stiffness; generalized weakness  ENDOCRINE: positive for DM  HEMATOLOGY: no bleeding, no history of clotting disorder; positive for mild anemia  DERMATOLOGY: no skin rash, no eczema, no pruritus;  PSYCHIATRY: no suicidal ideation, no homicidal ideation;  NEUROLOGY: no seizures, no numbness or tingling of hands, no numbness or tingling of feet, no paresis;    OBJECTIVE:    There were no vitals filed for this visit.  No intake or output data in the 24 hours ending 08/02/19 0713    PHYSICAL EXAM:   CONSTITUTIONAL:  Nasal O2  EYES: Non icteric  ENT: Mucus membranes moist, no oral pharyngeal lesions, external inspection of ears and nose are normal  NECK: Supple, no masses.  No palpable thyroid mass  CHEST/LUNGS: diminished breath sounds throughout, no wheezes  CARDIOVASCULAR: RRR, no murmurs.  No lower extremity edema.  mild tachycardia with exertion  ABDOMEN: soft non-tender, active bowel sounds, no HSM; protuberant   EXTREMITIES: warm, full ROM in all 4 extremities, no focal weakness.  SKIN: warm, dry with no rashes or lesions  LYMPH: No cervical, clavicular, axillary, or inguinal lymphadenopathy  NEUROLOGIC: sleepy, follows commands, non focal   PSYCH: mood and affect appropriate.      CBC  Results from last 7 days   Lab Units 08/01/19  1201 08/01/19  0626 07/29/19  0519   WBC 10*3/mm3 9.73 8.97 10.06   HEMOGLOBIN g/dL 12.0* 12.7* 11.3*   HEMATOCRIT % 38.8* 41.4 38.3*   PLATELETS 10*3/mm3 317 341 279       Lab Results   Component Value Date     08/01/2019    K 3.4 (L) 08/01/2019    CL 87 (L) 08/01/2019    CO2 >40.0 (C) 08/01/2019    BUN 25 (H)  08/01/2019    CREATININE 1.21 08/01/2019    GLUCOSE 194 (H) 08/01/2019    CALCIUM 9.8 08/01/2019    BILITOT 0.6 08/01/2019    ALKPHOS 110 08/01/2019    AST 24 08/01/2019    ALT 29 08/01/2019    AGRATIO 1.1 08/01/2019    GLOB 3.1 08/01/2019       No results found for: INR, PROTIME    Cultures:    Lab Results   Component Value Date    BLOODCX No growth at 5 days 07/17/2019     No components found for: URINCX     XR CHEST 1 VW- 7/17/2019 12:50 PM CDT   COMPARISON: CT scan dated 06/13/2019.     FINDINGS:   Dense left upper lobe consolidation reflecting known left upper lobe  mass lesion. This measures up to 9.8 cm in greatest diameter. Lung  fields are otherwise clear. No pleural effusion or pneumothorax.  Borderline cardiomegaly. The pulmonary vasculature are unremarkable.      The osseous structures and surrounding soft tissues demonstrate no acute  abnormality.     IMPRESSION:  1. Known left upper lobe mass lesion, measuring up to 9.8 cm. Lung  fields are otherwise clear.     This report was finalized on 07/17/2019 13:15 by Dr Bruce Rodriguez, .      ASSESSMENT/PLAN:    Lung mass, s/p bronchoscopy with EBUS 7/19/19 by Dr. FARHAN Brumfield    - Status post bronchoscopy/EBUS 7/19/2019 by Dr. Jere Brumfield.  - Findings: no endobronchial lesions. multiple nodes in station 4R, measuring up to 2 cm, subcarinal nodes x2, measuring up to 2 cm. TBNA was collected from station 4 and station 7. Mild, intraprocedural controlled hemorrhage  -  FNA from bronch from 7/19/2019 revealed adenocarcinoma, anticipate chemotherapy/immunotherapy as outpatient when overall performance status has improved.  - KOH prep negative for yeast, respiratory culture negative  - PDL1 requested on 7/24/2019.  - Receiving Palliative radiation initiated 7/19/19 with # 8 of 13 due today.    - Chest x-ray on 7/22/2019 revealed interval expansion of the left lung.  Trace bilateral pleural effusions left greater than right.  - Chest x-ray on 7/23/2019 revealed increased  opacity in the RLL could represent effusion, atelectasis or infection or aspiration.  Known RADU mass  - Dr. Bell documented CT stimulation on 7/24/2019 revealed no change in tumor size. Progression of bilateral pleural effusions compared to 1 week ago was noted.    COPD exacerbation  -  Continue with O2 supplementation, bronchodilators and steroids  -  CO2 >40  -  Followed by pulmonology    Hypomagnesemia  -  Mg 1.8 on 7/18/2019  -  per IM    Anemia - stable (CBC 8/1/19)  · WBC 9.73  · Hgb 12  · ,000    Diabetes type 2, presently uncontrolled   -  per IM    Hypertension   -  per IM       Generalized weakness  - PT     Continue Palliative XRT - # 10 of 13 due today.  Continue with pulmonary support.  Anticipate systemic chemotherapy/immunotherapy as outpatient once overall performance status has improved.      DARIUS Pena    08/02/19  7:13 AM  8/2/2019 7:13 AM    DARIUS Pena  08/02/19   7:13 AM      I personally saw and examined this patient, performing a face-to-face diagnostic evaluation with plan of care reviewed and developed with  Sukhdev Escobar PA-C and nursing staff.   I have addended and/or modified the above history of present illness, physical examination, and assessment and plan to reflect my findings and impressions.   Essential elements of the care plan were discussed with  Sukhdev Escobar PA-C .   Agree with findings and assessment/plan as documented above.   Questions were encouraged, asked and answered to their understanding and satisfaction.    Claudio Nevarez MD  8/2/2019 8:38 AM

## 2019-08-02 NOTE — PROGRESS NOTES
Adult Nutrition  Assessment/PES    Patient Name:  Romel Rosario  YOB: 1950  MRN: [id number]  Admit Date:  7/25/2019    Assessment Date:  8/2/2019    Comments:  PO intake is fair 45% avg of 5 meals. Pt reports he is not hungry today and that he prefers to eat breakfast out of any meal. Wife is bringing in food for pt as well. Ordered Boost GC daily with dinner. Encouraged PO intake, will continue to follow.     Reason for Assessment     Row Name 08/02/19 1324          Reason for Assessment    Reason For Assessment  follow-up protocol         Nutrition/Diet History     Row Name 08/02/19 1324          Nutrition/Diet History    Typical Food/Fluid Intake  pt reports he is not hungry today. states he prefers to eat breakfast and often is not eating other meals. however pts wife is also bringing him in food.      Supplemental Drinks/Foods/Additives  will order boost GC daily      Factors Affecting Nutritional Intake  diarrhea         Anthropometrics     Row Name 08/02/19 1325          Usual Body Weight (UBW)    Weight Loss Time Frame  3# loss compared to admission wt        Body Mass Index (BMI)    BMI Assessment  BMI 30-34.9: obesity grade I         Labs/Tests/Procedures/Meds     Row Name 08/02/19 1326          Labs/Procedures/Meds    Lab Results Reviewed  reviewed, pertinent        Medications    Pertinent Medications Reviewed  reviewed         Physical Findings     Row Name 08/02/19 1326          Physical Findings    Overall Physical Appearance  on oxygen therapy HHF; at time of visit, pt had Bipap on      Gastrointestinal  diarrhea     Skin  other (see comments) Sharif Score 18           Nutrition Prescription Ordered     Row Name 08/02/19 1326          Nutrition Prescription PO    Current PO Diet  Regular     Common Modifiers  Consistent Carbohydrate;Cardiac         Evaluation of Received Nutrient/Fluid Intake     Row Name 08/02/19 1326          Nutrient/Fluid Evaluation    Number of Days Evaluated  2  days     Additional Documentation  Fluid Intake Evaluation (Group)        Fluid Intake Evaluation    Oral Fluid (mL)  1230        PO Evaluation    Number of Days PO Intake Evaluated  2 days     Number of Meals  5     % PO Intake  45               Problem/Interventions:  Problem 1     Row Name 08/02/19 1327          Nutrition Diagnoses Problem 1    Problem 1  Increased Nutrient Needs     Macronutrient  Kcal     Etiology (related to)  Medical Diagnosis     Oncology  Lung cancer     Pulmonary/Critical Care  COPD     Signs/Symptoms (evidenced by)  PO Intake;Other (comment) catabolic nature of disease     Percent (%) intake recorded  45 %     Over number of meals  5                 Intervention Goal     Row Name 08/02/19 1327          Intervention Goal    General  Reduce/improve symptoms;Meet nutritional needs for age/condition;Disease management/therapy     PO  Increase intake;Meet estimated needs     Weight  Maintain weight         Nutrition Intervention     Row Name 08/02/19 1327          Nutrition Intervention    RD/Tech Action  Follow Tx progress;Care plan reviewd;Encourage intake;Interview for preference;Recommend/ordered     Recommended/Ordered  Supplement         Nutrition Prescription     Row Name 08/02/19 1328          Nutrition Prescription PO    PO Prescription  Begin/change supplement     Supplement  Boost Glucose Control     Supplement Frequency  Daily     New PO Prescription Ordered?  Yes         Education/Evaluation     Row Name 08/02/19 1328          Education    Education  No discharge needs identified at this time        Monitor/Evaluation    Monitor  Per protocol           Electronically signed by:  Ciara Alston  08/02/19 1:29 PM

## 2019-08-02 NOTE — PROGRESS NOTES
[facility]  MIGDALIA Chan M.D.  MING Anders APRN      Internal Medicine Progress Note    8/2/2019   11:26 AM    Name:  Romel Rosario  MRN:    [id number]     Acct:     [id number]   Room:  [address] [place] [address] Day: 0     Admit Date: 7/25/2019  6:42 PM  PCP: Provider, No Known    Subjective:     C/C: Shortness of air.  New diagnosis of lung carcinoma    Interval History: Status:  Improved. Resting in bed. No family at bedside. Progressing with therapy. Patient feels that breathing is worse today. Placing back on Bipap. KUB non-specific gas patterns.     Review of Systems   Constitution: Positive for weakness and malaise/fatigue. Negative for chills, decreased appetite and fever.   HENT: Negative for congestion, hoarse voice, nosebleeds and sore throat.    Eyes: Negative for blurred vision, discharge, pain and visual disturbance.   Cardiovascular: Positive for dyspnea on exertion. Negative for chest pain, irregular heartbeat, leg swelling, orthopnea and palpitations.   Respiratory: Positive for cough, shortness of breath, sleep disturbances due to breathing and wheezing. Negative for sputum production.    Hematologic/Lymphatic: Negative for bleeding problem. Does not bruise/bleed easily.   Skin: Negative for dry skin, flushing, itching, poor wound healing, rash and suspicious lesions.   Musculoskeletal: Positive for muscle weakness. Negative for arthritis, back pain, falls, joint pain, joint swelling and myalgias.   Gastrointestinal: Positive for diarrhea and nausea. Negative for bloating, abdominal pain, change in bowel habit, flatus, heartburn and melena.   Genitourinary: Negative for frequency, hesitancy, incomplete emptying, pelvic pain and urgency.   Neurological: Negative for difficulty with concentration, disturbances in coordination, dizziness, headaches, numbness, paresthesias and tremors.   Psychiatric/Behavioral: Negative for altered mental status,  hallucinations and memory loss. The patient is not nervous/anxious.        Medications:     Allergies:   Allergies   Allergen Reactions   • Lactose Intolerance (Gi) Diarrhea       Current Meds:   Current Facility-Administered Medications:   •  acetaminophen (TYLENOL) tablet 650 mg, 650 mg, Oral, Q4H PRN, Kermit Zamudio MD  •  albuterol (PROVENTIL) nebulizer solution 0.083% 2.5 mg/3mL, 2.5 mg, Nebulization, Q4H PRN, Kermit Zamudio MD  •  ALPRAZolam (XANAX) tablet 1 mg, 1 mg, Oral, Q6H PRN, Kermit Zamudio MD  •  atorvastatin (LIPITOR) tablet 40 mg, 40 mg, Oral, Daily, Kermit Zamudio MD  •  benzonatate (TESSALON) capsule 200 mg, 200 mg, Oral, TID PRN, Kermit Zamudio MD  •  budesonide-formoterol (SYMBICORT) 160-4.5 MCG/ACT inhaler 2 puff, 2 puff, Inhalation, BID - RT, Kermit Zamudio MD  •  butalbital-acetaminophen-caffeine (FIORICET, ESGIC) -40 MG per tablet 1 tablet, 1 tablet, Oral, Q4H PRN, Kermit Zamudio MD  •  cetirizine (zyrTEC) tablet 10 mg, 10 mg, Oral, Nightly, Camilla Fofana APRN  •  dextromethorphan polistirex ER (DELSYM) 30 MG/5ML oral suspension 60 mg, 60 mg, Oral, Q12H, Kermit Zamudio MD  •  dextrose (D50W) 25 g/ 50mL Intravenous Solution 25 g, 25 g, Intravenous, Q15 Min PRN, Kermit Zamudio MD  •  dextrose (GLUTOSE) oral gel 15 g, 15 g, Oral, Q15 Min PRN, Kermit Zamudio MD  •  fluticasone (FLONASE) 50 MCG/ACT nasal spray 2 spray, 2 spray, Each Nare, Daily, Kermit Zamudio MD  •  furosemide (LASIX) injection 20 mg, 20 mg, Intravenous, Q12H, Camilla Fofana APRN  •  glucagon (human recombinant) (GLUCAGEN DIAGNOSTIC) injection 1 mg, 1 mg, Subcutaneous, PRN, Kermit Zamudio MD  •  guaiFENesin (MUCINEX) 12 hr tablet 1,200 mg, 1,200 mg, Oral, BID, Kermit Zamudio MD  •  hydrOXYzine (ATARAX) tablet 10 mg, 10 mg, Oral, Q4H PRN, Kermit Zamudio MD  •  insulin lispro (humaLOG) injection 2-9  Units, 2-9 Units, Subcutaneous, 4x Daily With Meals & Nightly, Shanice Nair, APRN  •  ipratropium-albuterol (DUO-NEB) nebulizer solution 3 mL, 3 mL, Nebulization, Q12H - RT, Jere Brumfield MD  •  linagliptin (TRADJENTA) tablet 5 mg, 5 mg, Oral, Daily, Kermit Zamudio MD  •  losartan (COZAAR) tablet 50 mg, 50 mg, Oral, Daily, Camilla Fofana, APRN  •  meclizine (ANTIVERT) tablet 25 mg, 25 mg, Oral, TID PRN, Camilla Fofana, APRN  •  ondansetron (ZOFRAN) injection 4 mg, 4 mg, Intravenous, Q6H PRN, Kermit Zamudio MD  •  ondansetron (ZOFRAN) tablet 4 mg, 4 mg, Oral, Q6H PRN, Kermit Zamudio MD  •  oxyCODONE-acetaminophen (PERCOCET) 7.5-325 MG per tablet 1 tablet, 1 tablet, Oral, Q4H PRN, Kermit Zamudio MD  •  pantoprazole (PROTONIX) EC tablet 40 mg, 40 mg, Oral, QAM, Kermit Zamudio MD  •  potassium chloride (MICRO-K) CR capsule 20 mEq, 20 mEq, Oral, Daily, Kermit Zamudio MD  •  predniSONE (DELTASONE) tablet 20 mg, 20 mg, Oral, Daily With Breakfast, Jere Brumfield MD  •  saccharomyces boulardii (FLORASTOR) capsule 250 mg, 250 mg, Oral, BID, Camilla Fofana, APRN  •  sodium chloride 0.9 % flush 3 mL, 3 mL, Intravenous, Q12H, Kermit Zamudio MD  •  sodium chloride 0.9 % flush 3-10 mL, 3-10 mL, Intravenous, PRN, Kermit Zamudio MD    Data:     Code Status:    There are no questions and answers to display.       Family History   Problem Relation Age of Onset   • Heart disease Mother    • Diabetes Mother    • Alzheimer's disease Father    • Colon cancer Neg Hx    • Colon polyps Neg Hx        Social History     Socioeconomic History   • Marital status:      Spouse name: Not on file   • Number of children: Not on file   • Years of education: Not on file   • Highest education level: Not on file   Tobacco Use   • Smoking status: Former Smoker   • Smokeless tobacco: Never Used   Substance and Sexual Activity   • Alcohol use: No   • Drug  use: No   • Sexual activity: Defer       Vitals:    T 97.6 P 82 R 18 BP 96/58 Sp02 94% (Bipap)    I/O (24Hr):  No intake or output data in the 24 hours ending 08/02/19 1126    Labs and imaging:      Lab Results (last 24 hours)     Procedure Component Value Units Date/Time    POC Glucose Once [[id number]]  (Abnormal) Collected:  08/02/19 0905    Specimen:  Blood Updated:  08/02/19 0920     Glucose 255 mg/dL      Comment: : [facility] ID: [id number]       POC Glucose Once [[id number]]  (Normal) Collected:  08/02/19 0527    Specimen:  Blood Updated:  08/02/19 0539     Glucose 127 mg/dL      Comment: : OSCAR Casiano ID: [id number]       POC Glucose Once [[id number]]  (Abnormal) Collected:  08/01/19 1723    Specimen:  Blood Updated:  08/01/19 1745     Glucose 168 mg/dL      Comment: : SARAH [facility] [employer] ID: [id number]       POC Glucose Once [[id number]]  (Abnormal) Collected:  08/01/19 1220    Specimen:  Blood Updated:  08/01/19 1252     Glucose 186 mg/dL      Comment: : SARAH [facility] [employer] ID: [id number]       TSH [[id number]]  (Abnormal) Collected:  08/01/19 1201    Specimen:  Blood Updated:  08/01/19 1249     TSH 0.234 mIU/mL     T4, Free [[id number]]  (Normal) Collected:  08/01/19 1201    Specimen:  Blood Updated:  08/01/19 1235     Free T4 1.24 ng/dL     Comprehensive Metabolic Panel [[id number]]  (Abnormal) Collected:  08/01/19 1201    Specimen:  Blood Updated:  08/01/19 1227     Glucose 194 mg/dL      BUN 25 mg/dL      Creatinine 1.21 mg/dL      Sodium 137 mmol/L      Potassium 3.4 mmol/L      Chloride 87 mmol/L      CO2 >40.0 mmol/L      Calcium 9.8 mg/dL      Total Protein 6.5 g/dL      Albumin 3.40 g/dL      ALT (SGPT) 29 U/L      AST (SGOT) 24 U/L      Alkaline Phosphatase 110 U/L      Total Bilirubin 0.6 mg/dL      eGFR Non African Amer 59 mL/min/1.73      Globulin 3.1 gm/dL      A/G Ratio 1.1 g/dL       BUN/Creatinine Ratio 20.7     Anion Gap -- mmol/L      Comment: Unable to calculate Anion Gap.       Narrative:       GFR Normal >60  Chronic Kidney Disease <60  Kidney Failure <15    Ammonia [[id number]]  (Normal) Collected:  08/01/19 1201    Specimen:  Blood Updated:  08/01/19 1217     Ammonia 13 umol/L     CBC & Differential [[id number]] Collected:  08/01/19 1201    Specimen:  Blood Updated:  08/01/19 1208    Narrative:       The following orders were created for panel order CBC & Differential.  Procedure                               Abnormality         Status                     ---------                               -----------         ------                     CBC Auto Differential[[id number]]        Abnormal            Final result                 Please view results for these tests on the individual orders.    CBC Auto Differential [[id number]]  (Abnormal) Collected:  08/01/19 1201    Specimen:  Blood Updated:  08/01/19 1208     WBC 9.73 10*3/mm3      RBC 4.38 10*6/mm3      Hemoglobin 12.0 g/dL      Hematocrit 38.8 %      MCV 88.6 fL      MCH 27.4 pg      MCHC 30.9 g/dL      RDW 14.3 %      RDW-SD 45.9 fl      MPV 9.5 fL      Platelets 317 10*3/mm3      Neutrophil % 76.7 %      Lymphocyte % 10.8 %      Monocyte % 9.5 %      Eosinophil % 1.8 %      Basophil % 0.4 %      Immature Grans % 0.8 %      Neutrophils, Absolute 7.46 10*3/mm3      Lymphocytes, Absolute 1.05 10*3/mm3      Monocytes, Absolute 0.92 10*3/mm3      Eosinophils, Absolute 0.18 10*3/mm3      Basophils, Absolute 0.04 10*3/mm3      Immature Grans, Absolute 0.08 10*3/mm3      nRBC 0.0 /100 WBC             Xr Chest 1 View    Result Date: 8/1/2019  Narrative: EXAMINATION: XR CHEST 1 VW-  8/1/2019 8:29 AM CDT  HISTORY: worsening shortness of air, cough  1 view chest x-ray compared to 07/24/2019.  The large round left upper lobe mass currently measures 89 x 78 mm compared with 88 x 60 mm.  Mild right basilar atelectasis with trace pleural fluid.   Underlying chronic parenchymal change.  Stable heart size.  No pneumothorax or heart failure.  Summary: 1. Stable or slightly enlarged left upper lobe mass. 2. No pneumonia or pneumothorax is seen. This report was finalized on 08/01/2019 08:36 by Dr. Tyler Khan MD.    Xr Chest 1 View    Result Date: 7/24/2019  Narrative: Exam:   XR CHEST 1 VW-   Date:  07/24/2019  History:  Male, age  68 years;sob, ? Effusion right; R09.02-Hypoxemia; R91.8-Other nonspecific abnormal finding of lung field; R91.8-Other nonspecific abnormal finding of lung field  COMPARISON:  Chest x-ray dated 07/23/2019  Findings :  The heart and mediastinum are unchanged in size. Known left upper lobe mass.  Small left pleural effusion and associated opacity. Improved aeration at the right lung base.       Impression: Impression:  1.  Improved aeration of the right lung base. 2.  Known left upper lobe mass.  This report was finalized on 07/24/2019 12:39 by Dr. Maryjo Cameron MD.    Xr Chest 1 View    Result Date: 7/23/2019  Narrative: EXAM: XR CHEST 1 VW- - 7/23/2019 9:20 AM CDT  HISTORY: dyspnea; R09.02-Hypoxemia; R91.8-Other nonspecific abnormal finding of lung field; R91.8-Other nonspecific abnormal finding of lung field   COMPARISON: 07/22/2019.  TECHNIQUE:  Line images.  Frontal view of the chest.  FINDINGS:  Known left upper lobe mass. Increased opacity at the right lower lobe. Prominence of the mediastinal and cardiac silhouette is likely exaggerated by portable AP technique. No acute bony findings.       Impression: 1. Increased opacity at the right lower lobe, could represent effusion with atelectasis, infection or in the appropriate clinical setting aspiration. 2. Known left upper lobe mass. This report was finalized on 07/23/2019 09:34 by Dr Mile Donnelly MD.    Xr Chest 1 View    Result Date: 7/21/2019  Narrative: HISTORY: Pulmonary edema, lung mass  CXR: Frontal view the chest obtained.  COMPARISON: 07/20/2019  FINDINGS: There is  new opacification left hemithorax with associated volume loss suspected. This may relate to mucous plugging in addition to the large 9 cm left upper lobe lung mass in the small left pleural effusion. Mixed interstitial alveolar opacities the right lung are similar suggestive for underlying edema. Small right pleural effusion. No appreciable pneumothorax. No acute appearing bony pathology.      Impression: 1. New opacification of the left hemithorax with associated volume loss. Mucous plugging in addition to the 9 cm left upper lobe lung mass and small pleural effusion suspected. Similar pulmonary edema of the aerated right lung with a very small right pleural effusion. This report was finalized on 07/21/2019 08:27 by Dr. Deborah Chávez MD.    Xr Chest 1 View    Result Date: 7/20/2019  Narrative: HISTORY: Shortness of breath  CXR: A frontal view the chest obtained.  COMPARISON: 07/17/2019  FINDINGS: There is a stable 9 cm left upper lobe lung mass. There are increasing diffuse bilateral mixed interstitial alveolar pulmonary opacities with small pleural effusions, left greater than right. Cardiac silhouette obscured by the pulmonary findings. There is no appreciable pneumothorax. There is no acute bony pathology.      Impression: 1. Increasing diffuse bilateral pulmonary opacities favorable for edema with small pleural effusions. Stable left upper lobe lung mass. This report was finalized on 07/20/2019 08:55 by Dr. Deborah Chávez MD.    Xr Chest 1 View    Result Date: 7/17/2019  Narrative: XR CHEST 1 VW- 7/17/2019 12:50 PM CDT  HISTORY: cough, soa   COMPARISON: CT scan dated 06/13/2019.  FINDINGS: Dense left upper lobe consolidation reflecting known left upper lobe mass lesion. This measures up to 9.8 cm in greatest diameter. Lung fields are otherwise clear. No pleural effusion or pneumothorax. Borderline cardiomegaly. The pulmonary vasculature are unremarkable.  The osseous structures and surrounding soft tissues  demonstrate no acute abnormality.      Impression: 1. Known left upper lobe mass lesion, measuring up to 9.8 cm. Lung fields are otherwise clear.  This report was finalized on 07/17/2019 13:15 by Dr Bruce Rodriguez, .    Xr Abdomen Kub    Result Date: 8/1/2019  Narrative: XR ABDOMEN KUB- 8/1/2019 2:30 PM CDT  HISTORY: abdominal pain & diarrhea   COMPARISON: None  FINDINGS: There is a nonspecific bowel gas pattern. Mesh is present on the anterior abdominal wall.. No soft tissue mass or pathologic calcification  is visualized.  No acute skeletal abnormality is identified.      Impression: 1. Non specific bowel gas pattern..   This report was finalized on 08/01/2019 15:02 by Dr. Robinson aBngura MD.    Xr Chest Pa & Lateral    Result Date: 7/22/2019  Narrative: Exam:   XR CHEST PA AND LATERAL-   Date:  07/22/2019  History:  Male, age  68 years;abnormal chest xray; R09.02-Hypoxemia; R91.8-Other nonspecific abnormal finding of lung field; R91.8-Other nonspecific abnormal finding of lung field  COMPARISON:  Chest x-ray dated 07/21/2019  Findings :  The heart and mediastinum are normal in size. Interval significant improvement in appearance of the left hemithorax with reexpansion of the lung. The known left upper lobe mass is redemonstrated. Trace bilateral pleural effusions, left greater than right.  The bones show no acute pathology.       Impression: Impression:  1.  Interval reexpansion of the left. 2.  Known left upper lobe mass.  This report was finalized on 07/22/2019 07:41 by Dr. Maryjo Cameron MD.      Physical Examination:        Physical Exam   Constitutional: Vital signs are normal. He appears well-developed and well-nourished. He is cooperative.   HENT:   Head: Normocephalic and atraumatic.   Nose: Nose normal.   Mouth/Throat: Oropharynx is clear and moist.   Eyes: Conjunctivae, EOM and lids are normal. Pupils are equal, round, and reactive to light.   Neck: Trachea normal and normal range of motion. Neck supple.    Cardiovascular: Normal rate, regular rhythm and normal heart sounds.   Pulmonary/Chest: Effort normal. Tachypnea noted. He has decreased breath sounds. He has no wheezes.   Abdominal: Soft. Normal appearance and bowel sounds are normal.   Musculoskeletal: Normal range of motion.   Generalized weakness   Lymphadenopathy:     He has no cervical adenopathy.     He has no axillary adenopathy.        Left: No supraclavicular adenopathy present.   Neurological: He is alert. He has normal strength. No cranial nerve deficit or sensory deficit.   Skin: Skin is warm, dry and intact. No petechiae and no rash noted. No cyanosis or erythema. Nails show no clubbing.   Psychiatric: He has a normal mood and affect. His speech is normal and behavior is normal. Judgment and thought content normal. Cognition and memory are normal.   Nursing note and vitals reviewed.    Assessment:             Adenocarcinoma, lung (CMS/HCC)    Past Medical History:   Diagnosis Date   • Adenocarcinoma, lung (CMS/HCC)    • Cerumen impaction    • Cervical disc disease    • Colon cancer (CMS/HCC)    • Diabetes (CMS/HCC)    • Eustachian tube dysfunction    • GERD (gastroesophageal reflux disease)    • Hx of colonic polyps    • Hypertension    • PUD (peptic ulcer disease)    • Sensorineural hearing loss         Plan:        1. Acute respiratory failure with hypoxia and hypercapnia  2. Mediastinal adenopathy  3. Left Upper Lobe Lung Mass  4. Diabetes Mellitus type 2 with hyperglycemia  5. Hx Colon Cancer stage 1  6. Pulmonary edema  7. Pleural effusion, bilateral  8. Anemia  9. GERD  10. Headache     Continue current treatment. Monitor counts. Increase activity. Wean oxygen as able. Radiation therapy per radiation oncology. Labs Monday. Encourage increased participation with therapy and ADLs. Glycemic control.  Waiting GI panel.       Electronically signed by MING Esteban on 8/2/2019 at 11:26 AM   I have discussed the care of Romel Rosario,  including pertinent history and exam findings, with the nurse practitioner.    I have seen and examined the patient and the key elements of all parts of the encounter have been performed by me.  I agree with the assessment, plan and orders as documented by MING Esteban, after I modified the exam findings and the plan of treatments and the final version is my approved version of the assessment.        Electronically signed by Kermit Zamudio MD on 8/2/2019 at 10:16 PM

## 2019-08-02 NOTE — PROGRESS NOTES
PULMONARY AND CRITICAL CARE PROGRESS NOTE - [facility]  Patient: Romel Rosario    1950   Acct# [id number]  08/02/19   8:55 AM  Referring Provider: Kermit Zamudio MD  Chief Complaint: Hypoxemia  Interval history: The patient is awake this morning.  He he complains of still feeling weak. He feels like he can not ambulate down to take a shower or work much with PT. Remains on 15 L No family at the bedside.      Review of Systems: Review of Systems   Constitutional: Positive for fatigue. Negative for chills and fever.   Respiratory: Positive for shortness of breath. Negative for cough.    Cardiovascular: Negative for chest pain.   Gastrointestinal: Negative for diarrhea, nausea and vomiting.    Physical Exam:  Vital signs: Blood pressure 95/57, pulse 92, respirations 21, temperature 98.2, saturation 89 on 15 L High Flow     Physical Exam   Constitutional: He is oriented to person, place, and time. He appears well-developed and well-nourished. He has a sickly appearance. Nasal cannula in place.   HENT:   Head: Normocephalic and atraumatic.   Eyes: Conjunctivae and EOM are normal. Pupils are equal, round, and reactive to light. No scleral icterus.   Neck: Normal range of motion. Neck supple.   Cardiovascular: Normal rate, regular rhythm and normal heart sounds. Exam reveals no friction rub.   No murmur heard.  Pulmonary/Chest: Effort normal. No respiratory distress. He has decreased breath sounds. He has no wheezes. He has no rales.   Abdominal: Soft. Bowel sounds are normal. He exhibits no distension. There is no tenderness.   Musculoskeletal: Normal range of motion. He exhibits edema (mild).   Neurological: He is oriented to person, place, and time.   Skin: Skin is warm and dry.   Psychiatric: He has a normal mood and affect.   Nursing note and vitals reviewed.    Results from last 7 days   Lab Units 08/01/19  1201 08/01/19  0626 07/29/19  0519   WBC 10*3/mm3 9.73 8.97 10.06    HEMOGLOBIN g/dL 12.0* 12.7* 11.3*   PLATELETS 10*3/mm3 317 341 279     Results from last 7 days   Lab Units 08/01/19  1201 08/01/19  0626 07/29/19  0519   SODIUM mmol/L 137 137 138   POTASSIUM mmol/L 3.4* 3.6 3.8   BUN mg/dL 25* 24* 30*   CREATININE mg/dL 1.21 0.97 1.04         Recent films:  Xr Abdomen Kub    Result Date: 8/1/2019  1. Non specific bowel gas pattern..   This report was finalized on 08/01/2019 15:02 by Dr. Robinson Bangura MD.    Films reviewed personally by me.  My interpretation: None today  Pulmonary Assessment:  1. Acute resp failure with hypoxia   2. lung cancer; deferred to others  3. Right lower lobe infiltrate and pulm edema, improving  4. mediastinal adenopathy; due to lung cancer  Recommend:   · Continue high flow nasal cannula and wean as tolerated.  · Continue BiPAP at HS and naps   · Monitoring off antibiotics  · Diuresis per attending  · Continue bronchodilators  · IS/OPEP   · PT/OT  · Continue prednisone  · Continue Delsym  · Discussed that he will feel weak and fatigued. He is encouraged to work with PT and to ambulate as tolerated.   Electronically signed by MING Romano on 8/2/2019 at 8:55 AM     Physician substantive portion:  Patient is without new complaints today but he still fatigued and short of breath and requiring high flow oxygen.  Exam shows him to be ill-appearing.  He has a few crackles diminished breath sounds no accessory muscle use and no respiratory distress.  I encouraged him to continue working with physical therapy and he indicates he has been struggling with that.  Continue high flow oxygen.    I have seen and examined patient personally, performing a face-to-face diagnostic evaluation with plan of care reviewed and developed with APRN and nursing staff. I have addended and/or modified the above history of present illness, physical examination, and assessment and plan to reflect my findings and impressions. Essential elements of the care plan were  discussed with APRN above.  Agree with findings and assessment/plan as documented above.    Electronically signed by Jere Brumfield MD, on 8/2/2019, 9:13 AM

## 2019-08-03 NOTE — PROGRESS NOTES
[facility]  MIGDALIA Chan M.D.  MING Anders APRN      Internal Medicine Progress Note    8/3/2019   11:35 AM    Name:  Romel Rosario  MRN:    [id number]     Acct:     [id number]   Room:  [address] [place] [address] Day: 0     Admit Date: 7/25/2019  6:42 PM  PCP: Provider, No Known    Subjective:     C/C: Shortness of air.  New diagnosis of lung carcinoma    Interval History: Status:  Improved. Resting in bed. Family at bedside. Progressing with therapy. Continues to alternate between high flow NC and Bipap.  No new concerns per patient at this time. [phone]    Review of Systems   Constitution: Positive for weakness and malaise/fatigue. Negative for chills, decreased appetite and fever.   HENT: Negative for congestion, hoarse voice, nosebleeds and sore throat.    Eyes: Negative for blurred vision, discharge, pain and visual disturbance.   Cardiovascular: Positive for dyspnea on exertion. Negative for chest pain, irregular heartbeat, leg swelling, orthopnea and palpitations.   Respiratory: Positive for cough, shortness of breath, sleep disturbances due to breathing and wheezing. Negative for sputum production.    Hematologic/Lymphatic: Negative for bleeding problem. Does not bruise/bleed easily.   Skin: Negative for dry skin, flushing, itching, poor wound healing, rash and suspicious lesions.   Musculoskeletal: Positive for muscle weakness. Negative for arthritis, back pain, falls, joint pain, joint swelling and myalgias.   Gastrointestinal: Positive for nausea. Negative for bloating, abdominal pain, change in bowel habit, diarrhea, flatus, heartburn and melena.   Genitourinary: Negative for frequency, hesitancy, incomplete emptying, pelvic pain and urgency.   Neurological: Negative for difficulty with concentration, disturbances in coordination, dizziness, headaches, numbness, paresthesias and tremors.   Psychiatric/Behavioral: Negative for altered mental status,  hallucinations and memory loss. The patient is not nervous/anxious.        Medications:     Allergies:   Allergies   Allergen Reactions   • Lactose Intolerance (Gi) Diarrhea       Current Meds:   Current Facility-Administered Medications:   •  acetaminophen (TYLENOL) tablet 650 mg, 650 mg, Oral, Q4H PRN, Kermit Zamudio MD  •  albuterol (PROVENTIL) nebulizer solution 0.083% 2.5 mg/3mL, 2.5 mg, Nebulization, Q4H PRN, Kermit Zamudio MD  •  ALPRAZolam (XANAX) tablet 1 mg, 1 mg, Oral, Q6H PRN, Kermit Zamudio MD  •  atorvastatin (LIPITOR) tablet 40 mg, 40 mg, Oral, Daily, Kermit Zamudio MD  •  benzonatate (TESSALON) capsule 200 mg, 200 mg, Oral, TID PRN, Kermit Zamudio MD  •  budesonide-formoterol (SYMBICORT) 160-4.5 MCG/ACT inhaler 2 puff, 2 puff, Inhalation, BID - RT, Kermit Zamudio MD  •  butalbital-acetaminophen-caffeine (FIORICET, ESGIC) -40 MG per tablet 1 tablet, 1 tablet, Oral, Q4H PRN, Kermit Zamudio MD  •  cetirizine (zyrTEC) tablet 10 mg, 10 mg, Oral, Nightly, Camilla Fofana APRN  •  dextromethorphan polistirex ER (DELSYM) 30 MG/5ML oral suspension 60 mg, 60 mg, Oral, Q12H, Kermit Zamudio MD  •  dextrose (D50W) 25 g/ 50mL Intravenous Solution 25 g, 25 g, Intravenous, Q15 Min PRN, Kermit Zamudio MD  •  dextrose (GLUTOSE) oral gel 15 g, 15 g, Oral, Q15 Min PRN, Kermit Zamudio MD  •  fluticasone (FLONASE) 50 MCG/ACT nasal spray 2 spray, 2 spray, Each Nare, Daily, Kermit Zamudio MD  •  furosemide (LASIX) injection 20 mg, 20 mg, Intravenous, Q12H, Camilla Fofana APRN  •  glucagon (human recombinant) (GLUCAGEN DIAGNOSTIC) injection 1 mg, 1 mg, Subcutaneous, PRN, Kermit Zamudio MD  •  guaiFENesin (MUCINEX) 12 hr tablet 1,200 mg, 1,200 mg, Oral, BID, Kermit Zamudio MD  •  hydrOXYzine (ATARAX) tablet 10 mg, 10 mg, Oral, Q4H PRN, Kermit Zamudio MD  •  insulin lispro (humaLOG) injection 2-9  Units, 2-9 Units, Subcutaneous, 4x Daily With Meals & Nightly, Shanice Nair, APRN  •  ipratropium-albuterol (DUO-NEB) nebulizer solution 3 mL, 3 mL, Nebulization, Q12H - RT, Jere Brumfield MD  •  linagliptin (TRADJENTA) tablet 5 mg, 5 mg, Oral, Daily, Kermit Zamudio MD  •  losartan (COZAAR) tablet 50 mg, 50 mg, Oral, Daily, Camilla Fofana, APRN  •  meclizine (ANTIVERT) tablet 25 mg, 25 mg, Oral, TID PRN, Camilla Fofana, APRN  •  ondansetron (ZOFRAN) injection 4 mg, 4 mg, Intravenous, Q6H PRN, Kermit Zamudio MD  •  ondansetron (ZOFRAN) tablet 4 mg, 4 mg, Oral, Q6H PRN, Kermit Zamudio MD  •  oxyCODONE-acetaminophen (PERCOCET) 7.5-325 MG per tablet 1 tablet, 1 tablet, Oral, Q4H PRN, Kermit Zamudio MD  •  pantoprazole (PROTONIX) EC tablet 40 mg, 40 mg, Oral, QAM, Kermit Zamudio MD  •  potassium chloride (MICRO-K) CR capsule 20 mEq, 20 mEq, Oral, Daily, Kermit Zamudio MD  •  predniSONE (DELTASONE) tablet 20 mg, 20 mg, Oral, Daily With Breakfast, Jere Brumfield MD  •  saccharomyces boulardii (FLORASTOR) capsule 250 mg, 250 mg, Oral, BID, Camilla Fofana, APRN  •  sodium chloride 0.9 % flush 3 mL, 3 mL, Intravenous, Q12H, Kermit Zamudio MD  •  sodium chloride 0.9 % flush 3-10 mL, 3-10 mL, Intravenous, PRN, Kermit Zamudio MD    Data:     Code Status:    There are no questions and answers to display.       Family History   Problem Relation Age of Onset   • Heart disease Mother    • Diabetes Mother    • Alzheimer's disease Father    • Colon cancer Neg Hx    • Colon polyps Neg Hx        Social History     Socioeconomic History   • Marital status:      Spouse name: Not on file   • Number of children: Not on file   • Years of education: Not on file   • Highest education level: Not on file   Tobacco Use   • Smoking status: Former Smoker   • Smokeless tobacco: Never Used   Substance and Sexual Activity   • Alcohol use: No   • Drug  use: No   • Sexual activity: Defer       Vitals:    T 97.9 P 76 R 17 /51 Sp02 92% (Bipap)    I/O (24Hr):  No intake or output data in the 24 hours ending 08/03/19 1135    Labs and imaging:      Lab Results (last 24 hours)     Procedure Component Value Units Date/Time    POC Glucose Once [[id number]]  (Abnormal) Collected:  08/03/19 0823    Specimen:  Blood Updated:  08/03/19 0845     Glucose 131 mg/dL      Comment: : CAROLYN Annalisa BriaMeter ID: [id number]       POC Glucose Once [[id number]]  (Abnormal) Collected:  08/02/19 2115    Specimen:  Blood Updated:  08/02/19 2135     Glucose 314 mg/dL      Comment: : CABRERADAYJasmine Allday Emmanuel MonicaMeter ID: [id number]       POC Glucose Once [[id number]]  (Abnormal) Collected:  08/02/19 1740    Specimen:  Blood Updated:  08/02/19 1753     Glucose 213 mg/dL      Comment: : SARAH Joseph ([place]) JennidyMeter ID: [id number]       POC Glucose Once [[id number]]  (Abnormal) Collected:  08/02/19 1144    Specimen:  Blood Updated:  08/02/19 1158     Glucose 166 mg/dL      Comment: : SARAH Joseph ([place]) CindyMeter ID: [id number]               Xr Chest 1 View    Result Date: 8/1/2019  Narrative: EXAMINATION: XR CHEST 1 VW-  8/1/2019 8:29 AM CDT  HISTORY: worsening shortness of air, cough  1 view chest x-ray compared to 07/24/2019.  The large round left upper lobe mass currently measures 89 x 78 mm compared with 88 x 60 mm.  Mild right basilar atelectasis with trace pleural fluid.  Underlying chronic parenchymal change.  Stable heart size.  No pneumothorax or heart failure.  Summary: 1. Stable or slightly enlarged left upper lobe mass. 2. No pneumonia or pneumothorax is seen. This report was finalized on 08/01/2019 08:36 by Dr. Tyler Khan MD.    Xr Chest 1 View    Result Date: 7/24/2019  Narrative: Exam:   XR CHEST 1 VW-   Date:  07/24/2019  History:  Male, age  68 years;sob, ? Effusion right; R09.02-Hypoxemia; R91.8-Other nonspecific  abnormal finding of lung field; R91.8-Other nonspecific abnormal finding of lung field  COMPARISON:  Chest x-ray dated 07/23/2019  Findings :  The heart and mediastinum are unchanged in size. Known left upper lobe mass.  Small left pleural effusion and associated opacity. Improved aeration at the right lung base.       Impression: Impression:  1.  Improved aeration of the right lung base. 2.  Known left upper lobe mass.  This report was finalized on 07/24/2019 12:39 by Dr. Maryjo Cameron MD.    Xr Chest 1 View    Result Date: 7/23/2019  Narrative: EXAM: XR CHEST 1 VW- - 7/23/2019 9:20 AM CDT  HISTORY: dyspnea; R09.02-Hypoxemia; R91.8-Other nonspecific abnormal finding of lung field; R91.8-Other nonspecific abnormal finding of lung field   COMPARISON: 07/22/2019.  TECHNIQUE:  Line images.  Frontal view of the chest.  FINDINGS:  Known left upper lobe mass. Increased opacity at the right lower lobe. Prominence of the mediastinal and cardiac silhouette is likely exaggerated by portable AP technique. No acute bony findings.       Impression: 1. Increased opacity at the right lower lobe, could represent effusion with atelectasis, infection or in the appropriate clinical setting aspiration. 2. Known left upper lobe mass. This report was finalized on 07/23/2019 09:34 by Dr Mile Donnelly MD.    Xr Chest 1 View    Result Date: 7/21/2019  Narrative: HISTORY: Pulmonary edema, lung mass  CXR: Frontal view the chest obtained.  COMPARISON: 07/20/2019  FINDINGS: There is new opacification left hemithorax with associated volume loss suspected. This may relate to mucous plugging in addition to the large 9 cm left upper lobe lung mass in the small left pleural effusion. Mixed interstitial alveolar opacities the right lung are similar suggestive for underlying edema. Small right pleural effusion. No appreciable pneumothorax. No acute appearing bony pathology.      Impression: 1. New opacification of the left hemithorax with  associated volume loss. Mucous plugging in addition to the 9 cm left upper lobe lung mass and small pleural effusion suspected. Similar pulmonary edema of the aerated right lung with a very small right pleural effusion. This report was finalized on 07/21/2019 08:27 by Dr. Deborah Chávez MD.    Xr Chest 1 View    Result Date: 7/20/2019  Narrative: HISTORY: Shortness of breath  CXR: A frontal view the chest obtained.  COMPARISON: 07/17/2019  FINDINGS: There is a stable 9 cm left upper lobe lung mass. There are increasing diffuse bilateral mixed interstitial alveolar pulmonary opacities with small pleural effusions, left greater than right. Cardiac silhouette obscured by the pulmonary findings. There is no appreciable pneumothorax. There is no acute bony pathology.      Impression: 1. Increasing diffuse bilateral pulmonary opacities favorable for edema with small pleural effusions. Stable left upper lobe lung mass. This report was finalized on 07/20/2019 08:55 by Dr. Deborah Chávez MD.    Xr Chest 1 View    Result Date: 7/17/2019  Narrative: XR CHEST 1 VW- 7/17/2019 12:50 PM CDT  HISTORY: cough, soa   COMPARISON: CT scan dated 06/13/2019.  FINDINGS: Dense left upper lobe consolidation reflecting known left upper lobe mass lesion. This measures up to 9.8 cm in greatest diameter. Lung fields are otherwise clear. No pleural effusion or pneumothorax. Borderline cardiomegaly. The pulmonary vasculature are unremarkable.  The osseous structures and surrounding soft tissues demonstrate no acute abnormality.      Impression: 1. Known left upper lobe mass lesion, measuring up to 9.8 cm. Lung fields are otherwise clear.  This report was finalized on 07/17/2019 13:15 by Dr Bruce Rodriguez, .    Xr Abdomen Kub    Result Date: 8/1/2019  Narrative: XR ABDOMEN KUB- 8/1/2019 2:30 PM CDT  HISTORY: abdominal pain & diarrhea   COMPARISON: None  FINDINGS: There is a nonspecific bowel gas pattern. Mesh is present on the anterior abdominal  wall.. No soft tissue mass or pathologic calcification  is visualized.  No acute skeletal abnormality is identified.      Impression: 1. Non specific bowel gas pattern..   This report was finalized on 08/01/2019 15:02 by Dr. Robinson Bangura MD.    Xr Chest Pa & Lateral    Result Date: 7/22/2019  Narrative: Exam:   XR CHEST PA AND LATERAL-   Date:  07/22/2019  History:  Male, age  68 years;abnormal chest xray; R09.02-Hypoxemia; R91.8-Other nonspecific abnormal finding of lung field; R91.8-Other nonspecific abnormal finding of lung field  COMPARISON:  Chest x-ray dated 07/21/2019  Findings :  The heart and mediastinum are normal in size. Interval significant improvement in appearance of the left hemithorax with reexpansion of the lung. The known left upper lobe mass is redemonstrated. Trace bilateral pleural effusions, left greater than right.  The bones show no acute pathology.       Impression: Impression:  1.  Interval reexpansion of the left. 2.  Known left upper lobe mass.  This report was finalized on 07/22/2019 07:41 by Dr. Maryjo Cameron MD.      Physical Examination:        Physical Exam   Constitutional: Vital signs are normal. He appears well-developed and well-nourished. He is cooperative.   HENT:   Head: Normocephalic and atraumatic.   Nose: Nose normal.   Mouth/Throat: Oropharynx is clear and moist.   Eyes: Conjunctivae, EOM and lids are normal. Pupils are equal, round, and reactive to light.   Neck: Trachea normal and normal range of motion. Neck supple.   Cardiovascular: Normal rate, regular rhythm and normal heart sounds.   Pulmonary/Chest: Effort normal. Tachypnea noted. He has decreased breath sounds. He has no wheezes.   Abdominal: Soft. Normal appearance and bowel sounds are normal.   Musculoskeletal: Normal range of motion.   Generalized weakness   Lymphadenopathy:     He has no cervical adenopathy.     He has no axillary adenopathy.        Left: No supraclavicular adenopathy present.    Neurological: He is alert. He has normal strength. No cranial nerve deficit or sensory deficit.   Skin: Skin is warm, dry and intact. No petechiae and no rash noted. No cyanosis or erythema. Nails show no clubbing.   Psychiatric: He has a normal mood and affect. His speech is normal and behavior is normal. Judgment and thought content normal. Cognition and memory are normal.   Nursing note and vitals reviewed.    Assessment:             Adenocarcinoma, lung (CMS/HCC)    Past Medical History:   Diagnosis Date   • Adenocarcinoma, lung (CMS/HCC)    • Cerumen impaction    • Cervical disc disease    • Colon cancer (CMS/HCC)    • Diabetes (CMS/HCC)    • Eustachian tube dysfunction    • GERD (gastroesophageal reflux disease)    • Hx of colonic polyps    • Hypertension    • PUD (peptic ulcer disease)    • Sensorineural hearing loss         Plan:        1. Acute respiratory failure with hypoxia and hypercapnia  2. Mediastinal adenopathy  3. Left Upper Lobe Lung Mass  4. Diabetes Mellitus type 2 with hyperglycemia  5. Hx Colon Cancer stage 1  6. Pulmonary edema  7. Pleural effusion, bilateral  8. Anemia  9. GERD  10. Headache     Continue current treatment. Monitor counts. Increase activity. Wean oxygen as able. Radiation therapy per radiation oncology. Labs Monday. Encourage increased participation with therapy and ADLs. Glycemic control.  Levemir 10units \[facility]\"".       Electronically signed by MING Esteban on 8/3/2019 at 11:35 AM   I have discussed the care of Romel Rosario, including pertinent history and exam findings, with the nurse practitioner.    I have seen and examined the patient and the key elements of all parts of the encounter have been performed by me.  I agree with the assessment, plan and orders as documented by MING Esteban, after I modified the exam findings and the plan of treatments and the final version is my approved version of the assessment.        Electronically signed by Kermit  Hardeep Zamudio MD on 8/3/2019 at 6:12 PM

## 2019-08-03 NOTE — PROGRESS NOTES
PULMONARY AND CRITICAL CARE PROGRESS NOTE - [facility]  Patient: Romel Rosario    1950   Acct# [id number]  08/03/19   11:22 AM  Referring Provider: Kermit Zamudio MD  Chief Complaint: Hypoxemia  Interval history: The patient is awake this morning. Remains on 15 L with a sat of 92%.  He feels slightly improved today.  No family at the bedside.      Review of Systems: Review of Systems   Constitutional: Positive for fatigue. Negative for chills and fever.   Respiratory: Positive for shortness of breath. Negative for cough.    Cardiovascular: Negative for chest pain.   Gastrointestinal: Negative for diarrhea, nausea and vomiting.    Physical Exam:  Vital signs: Blood pressure 123/54, pulse 82, respirations 17, temperature 97.9, saturation 92% on 15 L High Flow     Physical Exam   Constitutional: He is oriented to person, place, and time. He appears well-developed and well-nourished. He has a sickly appearance. Nasal cannula in place.   HENT:   Head: Normocephalic and atraumatic.   Eyes: Conjunctivae and EOM are normal. Pupils are equal, round, and reactive to light. No scleral icterus.   Neck: Normal range of motion. Neck supple.   Cardiovascular: Normal rate, regular rhythm and normal heart sounds. Exam reveals no friction rub.   No murmur heard.  Pulmonary/Chest: Effort normal. No respiratory distress. He has decreased breath sounds. He has no wheezes. He has no rales.   Abdominal: Soft. Bowel sounds are normal. He exhibits no distension. There is no tenderness.   Musculoskeletal: Normal range of motion. He exhibits edema (mild).   Neurological: He is oriented to person, place, and time.   Skin: Skin is warm and dry.   Psychiatric: He has a normal mood and affect.   Nursing note and vitals reviewed.    Results from last 7 days   Lab Units 08/01/19  1201 08/01/19  0626 07/29/19  0519   WBC 10*3/mm3 9.73 8.97 10.06   HEMOGLOBIN g/dL 12.0* 12.7* 11.3*   PLATELETS 10*3/mm3 317 456 917      Results from last 7 days   Lab Units 08/01/19  1201 08/01/19  0626 07/29/19  0519   SODIUM mmol/L 137 137 138   POTASSIUM mmol/L 3.4* 3.6 3.8   BUN mg/dL 25* 24* 30*   CREATININE mg/dL 1.21 0.97 1.04         Recent films:  Xr Abdomen Kub    Result Date: 8/1/2019  1. Non specific bowel gas pattern..   This report was finalized on 08/01/2019 15:02 by Dr. Robinson Bangura MD.    Films reviewed personally by me.  My interpretation: None today  Pulmonary Assessment:  1. Acute resp failure with hypoxia   2. lung cancer; deferred to others  3. Right lower lobe infiltrate and pulm edema, improving  4. mediastinal adenopathy; due to lung cancer  Recommend:   · Continue high flow nasal cannula and wean as tolerated.  · Continue BiPAP at HS and naps   · Monitoring off antibiotics  · Diuresis per attending  · Continue pulmonary regimen with Symbicort, Delsym, Mucinex and duo nebs  · IS/OPEP   · PT/OT  · Continue prednisone  Electronically signed by MING Saleem on 8/3/2019 at 11:22 AM     Physician substantive portion:  He continues on oxygen at 15 L/min.  He does not feel any worse.  He still has exertional dyspnea and hypoxia alleviated by oxygen with no change over the past couple of days.  Chest exam shows diminished breath sounds.  No wheezes at this point.  Plan to continue prednisone at 20 mg a day continue high flow oxygen.  Very slow progress.    I have seen and examined patient personally, performing a face-to-face diagnostic evaluation with plan of care reviewed and developed with APRN and nursing staff. I have addended and/or modified the above history of present illness, physical examination, and assessment and plan to reflect my findings and impressions. Essential elements of the care plan were discussed with APRN above.  Agree with findings and assessment/plan as documented above.    Electronically signed by Jere Brumfield MD, on 8/3/2019, 3:27 PM

## 2019-08-04 NOTE — PROGRESS NOTES
[facility]  MIGDALIA Chan M.D.  MING Anders APRN      Internal Medicine Progress Note    8/4/2019   10:30 AM    Name:  Romel Rosario  MRN:    [id number]     Acct:     [id number]   Room:  [address] [place] [address] Day: 0     Admit Date: 7/25/2019  6:42 PM  PCP: Provider, No Known    Subjective:     C/C: Shortness of air.  New diagnosis of lung carcinoma    Interval History: Status:  Improved. Sitting on side of bed. No family at bedside. Progressing with therapy. Continues to alternate between high flow NC and Bipap.  Continues to get headache in am, improved with current regimen for pain control.  -340    Review of Systems   Constitution: Positive for weakness and malaise/fatigue. Negative for chills, decreased appetite and fever.   HENT: Negative for congestion, hoarse voice, nosebleeds and sore throat.    Eyes: Negative for blurred vision, discharge, pain and visual disturbance.   Cardiovascular: Positive for dyspnea on exertion. Negative for chest pain, irregular heartbeat, leg swelling, orthopnea and palpitations.   Respiratory: Positive for cough, shortness of breath, sleep disturbances due to breathing and wheezing. Negative for sputum production.    Hematologic/Lymphatic: Negative for bleeding problem. Does not bruise/bleed easily.   Skin: Negative for dry skin, flushing, itching, poor wound healing, rash and suspicious lesions.   Musculoskeletal: Positive for muscle weakness. Negative for arthritis, back pain, falls, joint pain, joint swelling and myalgias.   Gastrointestinal: Positive for nausea. Negative for bloating, abdominal pain, change in bowel habit, diarrhea, flatus, heartburn and melena.   Genitourinary: Negative for frequency, hesitancy, incomplete emptying, pelvic pain and urgency.   Neurological: Negative for difficulty with concentration, disturbances in coordination, dizziness, headaches, numbness, paresthesias and tremors.    Psychiatric/Behavioral: Negative for altered mental status, hallucinations and memory loss. The patient is not nervous/anxious.        Medications:     Allergies:   Allergies   Allergen Reactions   • Lactose Intolerance (Gi) Diarrhea       Current Meds:   Current Facility-Administered Medications:   •  acetaminophen (TYLENOL) tablet 650 mg, 650 mg, Oral, Q4H PRN, Kermit Zamudio MD  •  albuterol (PROVENTIL) nebulizer solution 0.083% 2.5 mg/3mL, 2.5 mg, Nebulization, Q4H PRN, Kermit Zamudio MD  •  ALPRAZolam (XANAX) tablet 1 mg, 1 mg, Oral, Q6H PRN, Kermit Zamudio MD  •  atorvastatin (LIPITOR) tablet 40 mg, 40 mg, Oral, Daily, Kermit Zamudio MD  •  benzonatate (TESSALON) capsule 200 mg, 200 mg, Oral, TID PRN, Kermit Zamudio MD  •  budesonide-formoterol (SYMBICORT) 160-4.5 MCG/ACT inhaler 2 puff, 2 puff, Inhalation, BID - RT, Kermit Zamudio MD  •  butalbital-acetaminophen-caffeine (FIORICET, ESGIC) -40 MG per tablet 1 tablet, 1 tablet, Oral, Q4H PRN, Kermit Zamudio MD  •  cetirizine (zyrTEC) tablet 10 mg, 10 mg, Oral, Nightly, Camilla Fofana, APRN  •  dextromethorphan polistirex ER (DELSYM) 30 MG/5ML oral suspension 60 mg, 60 mg, Oral, Q12H, Kermit Zamudio MD  •  dextrose (D50W) 25 g/ 50mL Intravenous Solution 25 g, 25 g, Intravenous, Q15 Min PRN, Kermit Zamudio MD  •  dextrose (GLUTOSE) oral gel 15 g, 15 g, Oral, Q15 Min PRN, Kermit Zamudio MD  •  fluticasone (FLONASE) 50 MCG/ACT nasal spray 2 spray, 2 spray, Each Nare, Daily, Kermit Zamudio MD  •  furosemide (LASIX) injection 20 mg, 20 mg, Intravenous, Q12H, Camilla Fofana, MING  •  glucagon (human recombinant) (GLUCAGEN DIAGNOSTIC) injection 1 mg, 1 mg, Subcutaneous, PRN, Kermit Zamudio MD  •  guaiFENesin (MUCINEX) 12 hr tablet 1,200 mg, 1,200 mg, Oral, BID, Kermit Zamudio MD  •  hydrOXYzine (ATARAX) tablet 10 mg, 10 mg, Oral, Q4H PRN, Robb  Kermit Meier MD  •  insulin detemir (LEVEMIR) injection 10 Units, 10 Units, Subcutaneous, Nightly, Zuleika Lucia, APRN  •  insulin lispro (humaLOG) injection 2-9 Units, 2-9 Units, Subcutaneous, 4x Daily With Meals & Nightly, Shanice Nair, APRN  •  ipratropium-albuterol (DUO-NEB) nebulizer solution 3 mL, 3 mL, Nebulization, Q12H - RT, Jere Brumfield MD  •  linagliptin (TRADJENTA) tablet 5 mg, 5 mg, Oral, Daily, Kermit Zamudio MD  •  losartan (COZAAR) tablet 50 mg, 50 mg, Oral, Daily, Camilla Fofana, MING  •  meclizine (ANTIVERT) tablet 25 mg, 25 mg, Oral, TID PRN, Camilla Fofana, APRN  •  ondansetron (ZOFRAN) injection 4 mg, 4 mg, Intravenous, Q6H PRN, Kermit Zamudio MD  •  ondansetron (ZOFRAN) tablet 4 mg, 4 mg, Oral, Q6H PRN, Kermit Zamudio MD  •  oxyCODONE-acetaminophen (PERCOCET) 7.5-325 MG per tablet 1 tablet, 1 tablet, Oral, Q4H PRN, Kermit Zamudio MD  •  pantoprazole (PROTONIX) EC tablet 40 mg, 40 mg, Oral, QAM, Kermit Zamudio MD  •  potassium chloride (MICRO-K) CR capsule 20 mEq, 20 mEq, Oral, Daily, Kermit Zamudio MD  •  predniSONE (DELTASONE) tablet 20 mg, 20 mg, Oral, Daily With Breakfast, Jere Brumfield MD  •  saccharomyces boulardii (FLORASTOR) capsule 250 mg, 250 mg, Oral, BID, Camilla Fofana, APRN  •  sodium chloride 0.9 % flush 3 mL, 3 mL, Intravenous, Q12H, Kermit Zamudio MD  •  sodium chloride 0.9 % flush 3-10 mL, 3-10 mL, Intravenous, PRN, Kermit Zamudio MD    Data:     Code Status:    There are no questions and answers to display.       Family History   Problem Relation Age of Onset   • Heart disease Mother    • Diabetes Mother    • Alzheimer's disease Father    • Colon cancer Neg Hx    • Colon polyps Neg Hx        Social History     Socioeconomic History   • Marital status:      Spouse name: Not on file   • Number of children: Not on file   • Years of education: Not on file   • Highest  education level: Not on file   Tobacco Use   • Smoking status: Former Smoker   • Smokeless tobacco: Never Used   Substance and Sexual Activity   • Alcohol use: No   • Drug use: No   • Sexual activity: Defer       Vitals:    T 97.6 P 76 R 17 /51 Sp02 92% (Bipap)    I/O (24Hr):  No intake or output data in the 24 hours ending 08/04/19 1030    Labs and imaging:      Lab Results (last 24 hours)     Procedure Component Value Units Date/Time    POC Glucose Once [[id number]]  (Abnormal) Collected:  08/04/19 0929    Specimen:  Blood Updated:  08/04/19 0958     Glucose 142 mg/dL      Comment: : GENOVEVA KathleenenMeter ID: [id number]       POC Glucose Once [[id number]]  (Abnormal) Collected:  08/03/19 2057    Specimen:  Blood Updated:  08/03/19 2118     Glucose 340 mg/dL      Comment: : MALLDAY2 Allday Emmanuel WaldoelleMeter ID: [id number]       POC Glucose Once [[id number]]  (Abnormal) Collected:  08/03/19 1722    Specimen:  Blood Updated:  08/03/19 1733     Glucose 203 mg/dL      Comment: : TTRADHATacos Otto TammyMeter ID: [id number]       POC Glucose Once [[id number]]  (Abnormal) Collected:  08/03/19 1154    Specimen:  Blood Updated:  08/03/19 1206     Glucose 189 mg/dL      Comment: : GENOVEVA KathleenenMeter ID: [id number]               Xr Chest 1 View    Result Date: 8/1/2019  Narrative: EXAMINATION: XR CHEST 1 VW-  8/1/2019 8:29 AM CDT  HISTORY: worsening shortness of air, cough  1 view chest x-ray compared to 07/24/2019.  The large round left upper lobe mass currently measures 89 x 78 mm compared with 88 x 60 mm.  Mild right basilar atelectasis with trace pleural fluid.  Underlying chronic parenchymal change.  Stable heart size.  No pneumothorax or heart failure.  Summary: 1. Stable or slightly enlarged left upper lobe mass. 2. No pneumonia or pneumothorax is seen. This report was finalized on 08/01/2019 08:36 by Dr. Tyler Khan MD.    Xr Chest 1 View    Result Date:  7/24/2019  Narrative: Exam:   XR CHEST 1 VW-   Date:  07/24/2019  History:  Male, age  68 years;sob, ? Effusion right; R09.02-Hypoxemia; R91.8-Other nonspecific abnormal finding of lung field; R91.8-Other nonspecific abnormal finding of lung field  COMPARISON:  Chest x-ray dated 07/23/2019  Findings :  The heart and mediastinum are unchanged in size. Known left upper lobe mass.  Small left pleural effusion and associated opacity. Improved aeration at the right lung base.       Impression: Impression:  1.  Improved aeration of the right lung base. 2.  Known left upper lobe mass.  This report was finalized on 07/24/2019 12:39 by Dr. Maryjo Cameron MD.    Xr Chest 1 View    Result Date: 7/23/2019  Narrative: EXAM: XR CHEST 1 VW- - 7/23/2019 9:20 AM CDT  HISTORY: dyspnea; R09.02-Hypoxemia; R91.8-Other nonspecific abnormal finding of lung field; R91.8-Other nonspecific abnormal finding of lung field   COMPARISON: 07/22/2019.  TECHNIQUE:  Line images.  Frontal view of the chest.  FINDINGS:  Known left upper lobe mass. Increased opacity at the right lower lobe. Prominence of the mediastinal and cardiac silhouette is likely exaggerated by portable AP technique. No acute bony findings.       Impression: 1. Increased opacity at the right lower lobe, could represent effusion with atelectasis, infection or in the appropriate clinical setting aspiration. 2. Known left upper lobe mass. This report was finalized on 07/23/2019 09:34 by Dr Mile Donnelly MD.    Xr Chest 1 View    Result Date: 7/21/2019  Narrative: HISTORY: Pulmonary edema, lung mass  CXR: Frontal view the chest obtained.  COMPARISON: 07/20/2019  FINDINGS: There is new opacification left hemithorax with associated volume loss suspected. This may relate to mucous plugging in addition to the large 9 cm left upper lobe lung mass in the small left pleural effusion. Mixed interstitial alveolar opacities the right lung are similar suggestive for underlying edema. Small  right pleural effusion. No appreciable pneumothorax. No acute appearing bony pathology.      Impression: 1. New opacification of the left hemithorax with associated volume loss. Mucous plugging in addition to the 9 cm left upper lobe lung mass and small pleural effusion suspected. Similar pulmonary edema of the aerated right lung with a very small right pleural effusion. This report was finalized on 07/21/2019 08:27 by Dr. Deborah Chávez MD.    Xr Chest 1 View    Result Date: 7/20/2019  Narrative: HISTORY: Shortness of breath  CXR: A frontal view the chest obtained.  COMPARISON: 07/17/2019  FINDINGS: There is a stable 9 cm left upper lobe lung mass. There are increasing diffuse bilateral mixed interstitial alveolar pulmonary opacities with small pleural effusions, left greater than right. Cardiac silhouette obscured by the pulmonary findings. There is no appreciable pneumothorax. There is no acute bony pathology.      Impression: 1. Increasing diffuse bilateral pulmonary opacities favorable for edema with small pleural effusions. Stable left upper lobe lung mass. This report was finalized on 07/20/2019 08:55 by Dr. Deborah Chávez MD.    Xr Chest 1 View    Result Date: 7/17/2019  Narrative: XR CHEST 1 VW- 7/17/2019 12:50 PM CDT  HISTORY: cough, soa   COMPARISON: CT scan dated 06/13/2019.  FINDINGS: Dense left upper lobe consolidation reflecting known left upper lobe mass lesion. This measures up to 9.8 cm in greatest diameter. Lung fields are otherwise clear. No pleural effusion or pneumothorax. Borderline cardiomegaly. The pulmonary vasculature are unremarkable.  The osseous structures and surrounding soft tissues demonstrate no acute abnormality.      Impression: 1. Known left upper lobe mass lesion, measuring up to 9.8 cm. Lung fields are otherwise clear.  This report was finalized on 07/17/2019 13:15 by Dr Bruce Rodriguez, .    Xr Abdomen Kub    Result Date: 8/1/2019  Narrative: XR ABDOMEN KUB- 8/1/2019 2:30 PM  CDT  HISTORY: abdominal pain & diarrhea   COMPARISON: None  FINDINGS: There is a nonspecific bowel gas pattern. Mesh is present on the anterior abdominal wall.. No soft tissue mass or pathologic calcification  is visualized.  No acute skeletal abnormality is identified.      Impression: 1. Non specific bowel gas pattern..   This report was finalized on 08/01/2019 15:02 by Dr. Robinson Bangura MD.    Xr Chest Pa & Lateral    Result Date: 7/22/2019  Narrative: Exam:   XR CHEST PA AND LATERAL-   Date:  07/22/2019  History:  Male, age  68 years;abnormal chest xray; R09.02-Hypoxemia; R91.8-Other nonspecific abnormal finding of lung field; R91.8-Other nonspecific abnormal finding of lung field  COMPARISON:  Chest x-ray dated 07/21/2019  Findings :  The heart and mediastinum are normal in size. Interval significant improvement in appearance of the left hemithorax with reexpansion of the lung. The known left upper lobe mass is redemonstrated. Trace bilateral pleural effusions, left greater than right.  The bones show no acute pathology.       Impression: Impression:  1.  Interval reexpansion of the left. 2.  Known left upper lobe mass.  This report was finalized on 07/22/2019 07:41 by Dr. Maryjo Cameron MD.      Physical Examination:        Physical Exam   Constitutional: Vital signs are normal. He appears well-developed and well-nourished. He is cooperative.   HENT:   Head: Normocephalic and atraumatic.   Nose: Nose normal.   Mouth/Throat: Oropharynx is clear and moist.   Eyes: Conjunctivae, EOM and lids are normal. Pupils are equal, round, and reactive to light.   Neck: Trachea normal and normal range of motion. Neck supple.   Cardiovascular: Normal rate, regular rhythm and normal heart sounds.   Pulmonary/Chest: Effort normal. Tachypnea noted. He has decreased breath sounds. He has no wheezes.   Abdominal: Soft. Normal appearance and bowel sounds are normal.   Musculoskeletal: Normal range of motion.   Generalized  weakness   Lymphadenopathy:     He has no cervical adenopathy.     He has no axillary adenopathy.        Left: No supraclavicular adenopathy present.   Neurological: He is alert. He has normal strength. No cranial nerve deficit or sensory deficit.   Skin: Skin is warm, dry and intact. No petechiae and no rash noted. No cyanosis or erythema. Nails show no clubbing.   Psychiatric: He has a normal mood and affect. His speech is normal and behavior is normal. Judgment and thought content normal. Cognition and memory are normal.   Nursing note and vitals reviewed.    Assessment:             Adenocarcinoma, lung (CMS/HCC)    Past Medical History:   Diagnosis Date   • Adenocarcinoma, lung (CMS/HCC)    • Cerumen impaction    • Cervical disc disease    • Colon cancer (CMS/HCC)    • Diabetes (CMS/HCC)    • Eustachian tube dysfunction    • GERD (gastroesophageal reflux disease)    • Hx of colonic polyps    • Hypertension    • PUD (peptic ulcer disease)    • Sensorineural hearing loss         Plan:        1. Acute respiratory failure with hypoxia and hypercapnia  2. Mediastinal adenopathy  3. Left Upper Lobe Lung Mass  4. Diabetes Mellitus type 2 with hyperglycemia  5. Hx Colon Cancer stage 1  6. Pulmonary edema  7. Pleural effusion, bilateral  8. Anemia  9. GERD  10. Headache     Continue current treatment. Monitor counts. Increase activity. Wean oxygen as able. Radiation therapy per radiation oncology. Labs in am. Encourage increased participation with therapy and ADLs. Glycemic control.       Electronically signed by MING Esteban on 8/4/2019 at 10:30 AM

## 2019-08-04 NOTE — PROGRESS NOTES
PULMONARY AND CRITICAL CARE PROGRESS NOTE - [facility]  Patient: Romel Rosario    1950   [id number]  08/04/19   1:23 PM  Referring Provider: Kermit Zamudio MD  Chief Complaint: Hypoxemia  Interval history: The patient has a bad headache today.  He remains on 15 L with a sat of 94%.  His wife is at bedside.  He has no new or worsening pulmonary complaints.  Review of Systems: Review of Systems   Constitutional: Positive for fatigue. Negative for chills and fever.   Respiratory: Positive for shortness of breath. Negative for cough.    Cardiovascular: Negative for chest pain.   Gastrointestinal: Negative for diarrhea, nausea and vomiting.   Neurological: Positive for headaches.    Physical Exam:  Vital signs: Blood pressure 123/54, pulse 80, respirations 18, temperature 97.6, saturation 94% on 15 L High Flow     Physical Exam   Constitutional: He is oriented to person, place, and time. He appears well-developed and well-nourished. He has a sickly appearance. Nasal cannula in place.   HENT:   Head: Normocephalic and atraumatic.   Eyes: Conjunctivae and EOM are normal. Pupils are equal, round, and reactive to light. No scleral icterus.   Neck: Normal range of motion. Neck supple.   Cardiovascular: Normal rate, regular rhythm and normal heart sounds. Exam reveals no friction rub.   No murmur heard.  Pulmonary/Chest: Effort normal. No respiratory distress. He has decreased breath sounds. He has no wheezes. He has no rales.   Abdominal: Soft. Bowel sounds are normal. He exhibits no distension. There is no tenderness.   Musculoskeletal: Normal range of motion. He exhibits edema (mild).   Neurological: He is oriented to person, place, and time.   Skin: Skin is warm and dry.   Psychiatric: He has a normal mood and affect.   Nursing note and vitals reviewed.    Results from last 7 days   Lab Units 08/01/19  1201 08/01/19  0626 07/29/19  0519   WBC 10*3/mm3 9.73 8.97 10.06   HEMOGLOBIN  g/dL 12.0* 12.7* 11.3*   PLATELETS 10*3/mm3 317 341 279     Results from last 7 days   Lab Units 08/01/19  1201 08/01/19  0626 07/29/19  0519   SODIUM mmol/L 137 137 138   POTASSIUM mmol/L 3.4* 3.6 3.8   BUN mg/dL 25* 24* 30*   CREATININE mg/dL 1.21 0.97 1.04         Recent films:  No radiology results for the last day  Films reviewed personally by me.  My interpretation: None today  Pulmonary Assessment:  1. Acute resp failure with hypoxia   2. lung cancer; deferred to others  3. Right lower lobe infiltrate and pulm edema, improving  4. mediastinal adenopathy; due to lung cancer  Recommend:   · Follow-up chest x-ray Monday morning  · Continue high flow nasal cannula and wean as tolerated.  · Continue BiPAP at HS and naps   · Monitoring off antibiotics  · Diuresis per attending  · Continue pulmonary regimen with Symbicort, Delsym, Mucinex and duo nebs  · IS/OPEP   · PT/OT  · Continue prednisone  Electronically signed by MING Saleem on 8/4/2019 at 1:23 PM     Physician substantive portion:  Patient says he is about the same.  He still has diminished breath sounds on exam and he still is requiring 15 L/min of oxygen.  Has had a headache which they are working on.  Continue all current support.    I have seen and examined patient personally, performing a face-to-face diagnostic evaluation with plan of care reviewed and developed with APRN and nursing staff. I have addended and/or modified the above history of present illness, physical examination, and assessment and plan to reflect my findings and impressions. Essential elements of the care plan were discussed with APRN above.  Agree with findings and assessment/plan as documented above.    Electronically signed by Jere Brumfield MD, on 8/4/2019, 1:26 PM

## 2019-08-05 NOTE — PROGRESS NOTES
PULMONARY AND CRITICAL CARE PROGRESS NOTE - [facility]  Patient: Romel Rosario    1950   Acct# [id number]  08/05/19   10:05 AM  Referring Provider: Kermit Zamudio MD  Chief Complaint: Hypoxemia  Interval history: The patient is sleeping in bed on 15 L high flow nasal cannula.  O2 sat 89 to 90%.  No overnight events. No other aggravating or alleviating factors.   Review of Systems: Review of Systems   Constitutional: Positive for fatigue. Negative for chills and fever.   Respiratory: Positive for shortness of breath. Negative for cough.    Cardiovascular: Negative for chest pain.   Gastrointestinal: Negative for diarrhea, nausea and vomiting.    Physical Exam:  Vital signs: Blood pressure 122/62, pulse 72, respirations 18, temperature 97.7, saturation 89-90% on 15L High Flow     Physical Exam   Constitutional: He is oriented to person, place, and time. He appears well-developed and well-nourished. He has a sickly appearance. Nasal cannula in place.   HENT:   Head: Normocephalic and atraumatic.   Eyes: Conjunctivae and EOM are normal. Pupils are equal, round, and reactive to light. No scleral icterus.   Neck: Normal range of motion. Neck supple.   Cardiovascular: Normal rate, regular rhythm and normal heart sounds. Exam reveals no friction rub.   No murmur heard.  Pulmonary/Chest: Effort normal. No respiratory distress. He has decreased breath sounds. He has no wheezes. He has no rales.   Abdominal: Soft. Bowel sounds are normal. He exhibits no distension. There is no tenderness.   Musculoskeletal: Normal range of motion. He exhibits edema (mild).   Neurological: He is oriented to person, place, and time.   Skin: Skin is warm and dry.   Psychiatric: He has a normal mood and affect.   Nursing note and vitals reviewed.    Results from last 7 days   Lab Units 08/05/19  0424 08/01/19  1201 08/01/19  0626   WBC 10*3/mm3 9.58 9.73 8.97   HEMOGLOBIN g/dL 10.7* 12.0* 12.7*   PLATELETS  10*3/mm3 370 317 341     Results from last 7 days   Lab Units 08/05/19  0424 08/01/19  1201 08/01/19  0626   SODIUM mmol/L 138 137 137   POTASSIUM mmol/L 3.8 3.4* 3.6   BUN mg/dL 18 25* 24*   CREATININE mg/dL 0.81 1.21 0.97         Recent films:  Xr Chest 1 View    Result Date: 8/5/2019  Impression:  1.  Known left upper lobe mass. 2.  No residual trace right pleural effusion present. 3.  Mild prominence of interstitium, overall unchanged for lung volume  This report was finalized on 08/05/2019 07:55 by Dr. Maryjo Cameron MD.    Films reviewed personally by me.  My interpretation: Left upper lobe mass.  No new opacities.  Pulmonary Assessment:  1. Acute resp failure with hypoxia   2. lung cancer; deferred to others  3. Right lower lobe infiltrate and pulm edema, improving  4. mediastinal adenopathy; due to lung cancer  Recommend:   · Continue high flow nasal cannula and wean as tolerated.  · Continue BiPAP at HS and naps   · Monitoring off antibiotics  · Diuresis per attending  · Continue pulmonary regimen with Symbicort, Delsym, Mucinex and duo nebs  · IS/OPEP   · PT/OT  · Continue prednisone  Electronically signed by MING Angela on 8/5/2019 at 10:05 AM     Physician substantive portion:  Patient is without new complaints.  He does not really feel better however.  Chest x-ray looks the same.  Lungs have diminished breath sounds and a few crackles.  Remains on 15 L/min high flow oxygen.  We will continue that.  No other reversible factors.    I have seen and examined patient personally, performing a face-to-face diagnostic evaluation with plan of care reviewed and developed with APRN and nursing staff. I have addended and/or modified the above history of present illness, physical examination, and assessment and plan to reflect my findings and impressions. Essential elements of the care plan were discussed with APRN above.  Agree with findings and assessment/plan as documented above.    Electronically  signed by Jere Brumfield MD, on 8/5/2019, 5:40 PM

## 2019-08-05 NOTE — PROGRESS NOTES
[facility]  MIGDALIA Chan M.D.  MING Anders APRN      Internal Medicine Progress Note    8/5/2019   11:35 AM    Name:  Romel Rosario  MRN:    [id number]     Acct:     [id number]   Room:  [address] [place] [address] Day: 0     Admit Date: 7/25/2019  6:42 PM  PCP: Provider, No Known    Subjective:     C/C: Shortness of air.  New diagnosis of lung carcinoma    Interval History: Status:  Improved. Sitting up in chair. Family at bedside. Progressing with therapy. Continues to alternate between high flow NC and Bipap.  States he gets headache in am, this am stated to staff that his head was not hurting he just wanted a nerve pill, informed it wasn't due yet and decided he wanted the pain medication instead.  Discussed with patient that medication for his head pain is different than nerve medications.  Will continue to monitor for further headaches. Labs stable. -208. CXR stable.     Review of Systems   Constitution: Positive for weakness and malaise/fatigue. Negative for chills, decreased appetite and fever.   HENT: Negative for congestion, hoarse voice, nosebleeds and sore throat.    Eyes: Negative for blurred vision, discharge, pain and visual disturbance.   Cardiovascular: Positive for dyspnea on exertion. Negative for chest pain, irregular heartbeat, leg swelling, orthopnea and palpitations.   Respiratory: Positive for cough, shortness of breath, sleep disturbances due to breathing and wheezing. Negative for sputum production.    Hematologic/Lymphatic: Negative for bleeding problem. Does not bruise/bleed easily.   Skin: Negative for dry skin, flushing, itching, poor wound healing, rash and suspicious lesions.   Musculoskeletal: Positive for muscle weakness. Negative for arthritis, back pain, falls, joint pain, joint swelling and myalgias.   Gastrointestinal: Positive for nausea. Negative for bloating, abdominal pain, change in bowel habit, diarrhea, flatus,  heartburn and melena.   Genitourinary: Negative for frequency, hesitancy, incomplete emptying, pelvic pain and urgency.   Neurological: Negative for difficulty with concentration, disturbances in coordination, dizziness, headaches, numbness, paresthesias and tremors.   Psychiatric/Behavioral: Negative for altered mental status, hallucinations and memory loss. The patient is not nervous/anxious.        Medications:     Allergies:   Allergies   Allergen Reactions   • Lactose Intolerance (Gi) Diarrhea       Current Meds:   Current Facility-Administered Medications:   •  acetaminophen (TYLENOL) tablet 650 mg, 650 mg, Oral, Q4H PRN, Kermit Zamudio MD  •  albuterol (PROVENTIL) nebulizer solution 0.083% 2.5 mg/3mL, 2.5 mg, Nebulization, Q4H PRN, Kermit Zamudio MD  •  ALPRAZolam (XANAX) tablet 1 mg, 1 mg, Oral, Q6H PRN, Kermit Zamudio MD  •  atorvastatin (LIPITOR) tablet 40 mg, 40 mg, Oral, Daily, Kermit Zamudio MD  •  benzonatate (TESSALON) capsule 200 mg, 200 mg, Oral, TID PRN, Kermit Zamudio MD  •  budesonide-formoterol (SYMBICORT) 160-4.5 MCG/ACT inhaler 2 puff, 2 puff, Inhalation, BID - RT, Kremit Zamudio MD  •  butalbital-acetaminophen-caffeine (FIORICET, ESGIC) -40 MG per tablet 1 tablet, 1 tablet, Oral, Q4H PRN, Kermit Zamudio MD  •  cetirizine (zyrTEC) tablet 10 mg, 10 mg, Oral, Nightly, Camilla Fofana, APRN  •  dextromethorphan polistirex ER (DELSYM) 30 MG/5ML oral suspension 60 mg, 60 mg, Oral, Q12H, Kermit Zamudio MD  •  dextrose (D50W) 25 g/ 50mL Intravenous Solution 25 g, 25 g, Intravenous, Q15 Min PRN, Kermit Zamudio MD  •  dextrose (GLUTOSE) oral gel 15 g, 15 g, Oral, Q15 Min PRN, Kermit Zamudio MD  •  fluticasone (FLONASE) 50 MCG/ACT nasal spray 2 spray, 2 spray, Each Nare, Daily, Kermit Zamudio MD  •  furosemide (LASIX) injection 20 mg, 20 mg, Intravenous, Q12H, Camilla Fofana APRN  •  glucagon (human  recombinant) (GLUCAGEN DIAGNOSTIC) injection 1 mg, 1 mg, Subcutaneous, PRN, Kermit Zamudio MD  •  guaiFENesin (MUCINEX) 12 hr tablet 1,200 mg, 1,200 mg, Oral, BID, Kermit Zamudio MD  •  hydrOXYzine (ATARAX) tablet 10 mg, 10 mg, Oral, Q4H PRN, Kermit Zamudio MD  •  insulin detemir (LEVEMIR) injection 10 Units, 10 Units, Subcutaneous, Nightly, Zuleika Lucia, MING  •  insulin lispro (humaLOG) injection 2-9 Units, 2-9 Units, Subcutaneous, 4x Daily With Meals & Nightly, Shanice Nair, APRN  •  ipratropium-albuterol (DUO-NEB) nebulizer solution 3 mL, 3 mL, Nebulization, Q12H - RT, Jere Brumfield MD  •  linagliptin (TRADJENTA) tablet 5 mg, 5 mg, Oral, Daily, Kermit Zamudio MD  •  losartan (COZAAR) tablet 50 mg, 50 mg, Oral, Daily, Camilla Fofana, APRN  •  meclizine (ANTIVERT) tablet 25 mg, 25 mg, Oral, TID PRN, Camilla Fofana, APRN  •  ondansetron (ZOFRAN) injection 4 mg, 4 mg, Intravenous, Q6H PRN, Kermit Zamudio MD  •  ondansetron (ZOFRAN) tablet 4 mg, 4 mg, Oral, Q6H PRN, Kermit Zamudio MD  •  pantoprazole (PROTONIX) EC tablet 40 mg, 40 mg, Oral, QAM, Kermit Zamudio MD  •  potassium chloride (MICRO-K) CR capsule 20 mEq, 20 mEq, Oral, Daily, Kermit Zamudio MD  •  predniSONE (DELTASONE) tablet 20 mg, 20 mg, Oral, Daily With Breakfast, Jere Brumfield MD  •  saccharomyces boulardii (FLORASTOR) capsule 250 mg, 250 mg, Oral, BID, Camilla Fofana, APRN  •  sodium chloride 0.9 % flush 3 mL, 3 mL, Intravenous, Q12H, Kermit Zamudio MD  •  sodium chloride 0.9 % flush 3-10 mL, 3-10 mL, Intravenous, PRN, Kermit Zamudio MD    Data:     Code Status:    There are no questions and answers to display.       Family History   Problem Relation Age of Onset   • Heart disease Mother    • Diabetes Mother    • Alzheimer's disease Father    • Colon cancer Neg Hx    • Colon polyps Neg Hx        Social History     Socioeconomic History    • Marital status:      Spouse name: Not on file   • Number of children: Not on file   • Years of education: Not on file   • Highest education level: Not on file   Tobacco Use   • Smoking status: Former Smoker   • Smokeless tobacco: Never Used   Substance and Sexual Activity   • Alcohol use: No   • Drug use: No   • Sexual activity: Defer       Vitals:    T 97.7 P 71 R 20 /62 Sp02 95% (NC)    I/O (24Hr):  No intake or output data in the 24 hours ending 08/05/19 1135    Labs and imaging:      Lab Results (last 24 hours)     Procedure Component Value Units Date/Time    Basic Metabolic Panel [[id number]]  (Abnormal) Collected:  08/05/19 0424    Specimen:  Blood Updated:  08/05/19 0626     Glucose 138 mg/dL      BUN 18 mg/dL      Creatinine 0.81 mg/dL      Sodium 138 mmol/L      Potassium 3.8 mmol/L      Chloride 97 mmol/L      CO2 36.0 mmol/L      Calcium 10.1 mg/dL      eGFR Non African Amer 94 mL/min/1.73      BUN/Creatinine Ratio 22.2     Anion Gap 5.0 mmol/L     Narrative:       GFR Normal >60  Chronic Kidney Disease <60  Kidney Failure <15    BNP [[id number]]  (Normal) Collected:  08/05/19 0424    Specimen:  Blood Updated:  08/05/19 0537     proBNP 369.0 pg/mL     CBC & Differential [[id number]] Collected:  08/05/19 0424    Specimen:  Blood Updated:  08/05/19 0512    Narrative:       The following orders were created for panel order CBC & Differential.  Procedure                               Abnormality         Status                     ---------                               -----------         ------                     CBC Auto Differential[[id number]]        Abnormal            Final result                 Please view results for these tests on the individual orders.    CBC Auto Differential [[id number]]  (Abnormal) Collected:  08/05/19 0424    Specimen:  Blood Updated:  08/05/19 0512     WBC 9.58 10*3/mm3      RBC 3.89 10*6/mm3      Hemoglobin 10.7 g/dL      Hematocrit 34.3 %      MCV 88.2 fL       MCH 27.5 pg      MCHC 31.2 g/dL      RDW 14.3 %      RDW-SD 45.5 fl      MPV 9.9 fL      Platelets 370 10*3/mm3      Neutrophil % 73.2 %      Lymphocyte % 12.6 %      Monocyte % 11.6 %      Eosinophil % 0.8 %      Basophil % 0.4 %      Immature Grans % 1.4 %      Neutrophils, Absolute 7.01 10*3/mm3      Lymphocytes, Absolute 1.21 10*3/mm3      Monocytes, Absolute 1.11 10*3/mm3      Eosinophils, Absolute 0.08 10*3/mm3      Basophils, Absolute 0.04 10*3/mm3      Immature Grans, Absolute 0.13 10*3/mm3      nRBC 0.0 /100 WBC     POC Glucose Once [[id number]]  (Abnormal) Collected:  08/04/19 1738    Specimen:  Blood Updated:  08/04/19 1806     Glucose 208 mg/dL      Comment: : BPGENOVEVA Annalisa EllenMeter ID: [id number]       POC Glucose Once [[id number]]  (Abnormal) Collected:  08/04/19 1152    Specimen:  Blood Updated:  08/04/19 1225     Glucose 145 mg/dL      Comment: : TTRADHATacos Otto TammyMeter ID: [id number]               Xr Chest 1 View    Result Date: 8/5/2019  Narrative: Exam:   XR CHEST 1 VW-   Date:  08/05/2019  History:  Male, age  68 years;f/u resp failure  COMPARISON:  None.  Findings :  The heart and mediastinum are normal in size. No new focal consolidation, pleural effusion or pneumothorax. There is suspected residual trace right pleural effusion. Mild prominence of the right interstitium persists. The left upper lobe opacity reflecting known metastases redemonstrated.  The bones show no acute pathology.       Impression: Impression:  1.  Known left upper lobe mass. 2.  No residual trace right pleural effusion present. 3.  Mild prominence of interstitium, overall unchanged for lung volume  This report was finalized on 08/05/2019 07:55 by Dr. Maryjo Cameron MD.    Xr Chest 1 View    Result Date: 8/1/2019  Narrative: EXAMINATION: XR CHEST 1 VW-  8/1/2019 8:29 AM CDT  HISTORY: worsening shortness of air, cough  1 view chest x-ray compared to 07/24/2019.  The large round left upper lobe mass  currently measures 89 x 78 mm compared with 88 x 60 mm.  Mild right basilar atelectasis with trace pleural fluid.  Underlying chronic parenchymal change.  Stable heart size.  No pneumothorax or heart failure.  Summary: 1. Stable or slightly enlarged left upper lobe mass. 2. No pneumonia or pneumothorax is seen. This report was finalized on 08/01/2019 08:36 by Dr. Tyler Khan MD.    Xr Chest 1 View    Result Date: 7/24/2019  Narrative: Exam:   XR CHEST 1 VW-   Date:  07/24/2019  History:  Male, age  68 years;sob, ? Effusion right; R09.02-Hypoxemia; R91.8-Other nonspecific abnormal finding of lung field; R91.8-Other nonspecific abnormal finding of lung field  COMPARISON:  Chest x-ray dated 07/23/2019  Findings :  The heart and mediastinum are unchanged in size. Known left upper lobe mass.  Small left pleural effusion and associated opacity. Improved aeration at the right lung base.       Impression: Impression:  1.  Improved aeration of the right lung base. 2.  Known left upper lobe mass.  This report was finalized on 07/24/2019 12:39 by Dr. Maryjo Cameron MD.    Xr Chest 1 View    Result Date: 7/23/2019  Narrative: EXAM: XR CHEST 1 VW- - 7/23/2019 9:20 AM CDT  HISTORY: dyspnea; R09.02-Hypoxemia; R91.8-Other nonspecific abnormal finding of lung field; R91.8-Other nonspecific abnormal finding of lung field   COMPARISON: 07/22/2019.  TECHNIQUE:  Line images.  Frontal view of the chest.  FINDINGS:  Known left upper lobe mass. Increased opacity at the right lower lobe. Prominence of the mediastinal and cardiac silhouette is likely exaggerated by portable AP technique. No acute bony findings.       Impression: 1. Increased opacity at the right lower lobe, could represent effusion with atelectasis, infection or in the appropriate clinical setting aspiration. 2. Known left upper lobe mass. This report was finalized on 07/23/2019 09:34 by Dr Mile Donnelly MD.    Xr Chest 1 View    Result Date: 7/21/2019  Narrative:  HISTORY: Pulmonary edema, lung mass  CXR: Frontal view the chest obtained.  COMPARISON: 07/20/2019  FINDINGS: There is new opacification left hemithorax with associated volume loss suspected. This may relate to mucous plugging in addition to the large 9 cm left upper lobe lung mass in the small left pleural effusion. Mixed interstitial alveolar opacities the right lung are similar suggestive for underlying edema. Small right pleural effusion. No appreciable pneumothorax. No acute appearing bony pathology.      Impression: 1. New opacification of the left hemithorax with associated volume loss. Mucous plugging in addition to the 9 cm left upper lobe lung mass and small pleural effusion suspected. Similar pulmonary edema of the aerated right lung with a very small right pleural effusion. This report was finalized on 07/21/2019 08:27 by Dr. Deborah Chávez MD.    Xr Chest 1 View    Result Date: 7/20/2019  Narrative: HISTORY: Shortness of breath  CXR: A frontal view the chest obtained.  COMPARISON: 07/17/2019  FINDINGS: There is a stable 9 cm left upper lobe lung mass. There are increasing diffuse bilateral mixed interstitial alveolar pulmonary opacities with small pleural effusions, left greater than right. Cardiac silhouette obscured by the pulmonary findings. There is no appreciable pneumothorax. There is no acute bony pathology.      Impression: 1. Increasing diffuse bilateral pulmonary opacities favorable for edema with small pleural effusions. Stable left upper lobe lung mass. This report was finalized on 07/20/2019 08:55 by Dr. Deborah Chávez MD.    Xr Chest 1 View    Result Date: 7/17/2019  Narrative: XR CHEST 1 VW- 7/17/2019 12:50 PM CDT  HISTORY: cough, soa   COMPARISON: CT scan dated 06/13/2019.  FINDINGS: Dense left upper lobe consolidation reflecting known left upper lobe mass lesion. This measures up to 9.8 cm in greatest diameter. Lung fields are otherwise clear. No pleural effusion or pneumothorax.  Borderline cardiomegaly. The pulmonary vasculature are unremarkable.  The osseous structures and surrounding soft tissues demonstrate no acute abnormality.      Impression: 1. Known left upper lobe mass lesion, measuring up to 9.8 cm. Lung fields are otherwise clear.  This report was finalized on 07/17/2019 13:15 by Dr Bruce Rodriguez, .    Xr Abdomen Kub    Result Date: 8/1/2019  Narrative: XR ABDOMEN KUB- 8/1/2019 2:30 PM CDT  HISTORY: abdominal pain & diarrhea   COMPARISON: None  FINDINGS: There is a nonspecific bowel gas pattern. Mesh is present on the anterior abdominal wall.. No soft tissue mass or pathologic calcification  is visualized.  No acute skeletal abnormality is identified.      Impression: 1. Non specific bowel gas pattern..   This report was finalized on 08/01/2019 15:02 by Dr. Robinson Bangura MD.    Xr Chest Pa & Lateral    Result Date: 7/22/2019  Narrative: Exam:   XR CHEST PA AND LATERAL-   Date:  07/22/2019  History:  Male, age  68 years;abnormal chest xray; R09.02-Hypoxemia; R91.8-Other nonspecific abnormal finding of lung field; R91.8-Other nonspecific abnormal finding of lung field  COMPARISON:  Chest x-ray dated 07/21/2019  Findings :  The heart and mediastinum are normal in size. Interval significant improvement in appearance of the left hemithorax with reexpansion of the lung. The known left upper lobe mass is redemonstrated. Trace bilateral pleural effusions, left greater than right.  The bones show no acute pathology.       Impression: Impression:  1.  Interval reexpansion of the left. 2.  Known left upper lobe mass.  This report was finalized on 07/22/2019 07:41 by Dr. Maryjo Cameron MD.      Physical Examination:        Physical Exam   Constitutional: Vital signs are normal. He appears well-developed and well-nourished. He is cooperative.   HENT:   Head: Normocephalic and atraumatic.   Nose: Nose normal.   Mouth/Throat: Oropharynx is clear and moist.   Eyes: Conjunctivae, EOM and lids  are normal. Pupils are equal, round, and reactive to light.   Neck: Trachea normal and normal range of motion. Neck supple.   Cardiovascular: Normal rate, regular rhythm and normal heart sounds.   Pulmonary/Chest: Effort normal. Tachypnea noted. He has decreased breath sounds. He has no wheezes.   Abdominal: Soft. Normal appearance and bowel sounds are normal.   Musculoskeletal: Normal range of motion.   Generalized weakness   Lymphadenopathy:     He has no cervical adenopathy.     He has no axillary adenopathy.        Left: No supraclavicular adenopathy present.   Neurological: He is alert. He has normal strength. No cranial nerve deficit or sensory deficit.   Skin: Skin is warm, dry and intact. No petechiae and no rash noted. No cyanosis or erythema. Nails show no clubbing.   Psychiatric: He has a normal mood and affect. His speech is normal and behavior is normal. Judgment and thought content normal. Cognition and memory are normal.   Nursing note and vitals reviewed.    Assessment:             Adenocarcinoma, lung (CMS/HCC)    Past Medical History:   Diagnosis Date   • Adenocarcinoma, lung (CMS/HCC)    • Cerumen impaction    • Cervical disc disease    • Colon cancer (CMS/HCC)    • Diabetes (CMS/HCC)    • Eustachian tube dysfunction    • GERD (gastroesophageal reflux disease)    • Hx of colonic polyps    • Hypertension    • PUD (peptic ulcer disease)    • Sensorineural hearing loss         Plan:        1. Acute respiratory failure with hypoxia and hypercapnia  2. Mediastinal adenopathy  3. Left Upper Lobe Lung Mass  4. Diabetes Mellitus type 2 with hyperglycemia  5. Hx Colon Cancer stage 1  6. Pulmonary edema  7. Pleural effusion, bilateral  8. Anemia  9. GERD  10. Headache     Continue current treatment. Monitor counts. Increase activity. Wean oxygen as able. Radiation therapy per radiation oncology. Labs Thursday. Encourage increased participation with therapy and ADLs. Glycemic control.       Electronically  signed by MING Esteban on 8/5/2019 at 11:35 AM   I have discussed the care of Romel Rosario, including pertinent history and exam findings, with the nurse practitioner.    I have seen and examined the patient and the key elements of all parts of the encounter have been performed by me.  I agree with the assessment, plan and orders as documented by MING Esteban, after I modified the exam findings and the plan of treatments and the final version is my approved version of the assessment.        Electronically signed by Kermit Zamudio MD on 8/5/2019 at 8:08 PM

## 2019-08-05 NOTE — PROGRESS NOTES
"  MEDICAL ONCOLOGY PROGRESS NOTE    Patient name: Romel Rosario  Patient : 1950  VISIT # [id number]  MR #[id number]   Room 590    SUBJECTIVE: Continues XRT # 11  of 13 due today.  \"Coughing spell\" last night, now recovered and on High flow. Chest xray, pending radiologist review, appears to be stable compared to 2019.     INTERVAL HISTORY:  The patient is a 68 years old male who was recently referred to me for further workup of a lung mass.  He has also being referred to thoracic surgery and Dr. Jere Brumfield for further workup and possible bronchoscopy with biopsy  The patient was seen today by Dr. Eddie Bell and had worsening shortness breath, and therefore was sent to the emergency  He is being admitted with acute COPD exacerbation.     Cancer history  Mr Romel Rosario was first seen by me on 2019 referred by his primary care provider for further evaluation of a lung mass and mediastinal adenopathy. The patient had complains of shortness of breath at exertion and therefore was recommended further imaging.  · 2019-CT chest with contrast showed a 6.3 x 5.6 x 7 cm left upper lobe lung mass, centrally necrotic, with compressive atelectasis. Questionable satellite nodule within the lingula measured 7 mm. Noncalcified 11 mm left lower lobe nodule concerning for pulmonary metastases. 5 mm subpleural right lower lobe nodule of uncertain significance. A small hiatal hernia. No adrenal nodules. Mediastinal adenopathy measuring up to 2 cm. Subcarina lymph nodes measure up to 1.3 cm.   · 2019-he was first seen by Dr. Fishman.   · 7/3/2019-bone scan showed no convincing evidence of osseous metastasis.   · 7/3/2019- CT abdomen, pelvis showed no evidence of intra-abdominal metastatic disease. Noncalcified nodule in the right middle lobe     REVIEW OF SYSTEMS  CONSTITUTIONAL: no fever, no night sweats, significant shortness of air at rest. Activity change  HEENT: no blurring of vision, no double " vision, no tinnitus, no ulceration, no dental caries, and no dysplasia, no epistaxis;  LUNGS: no hemoptysis; positive for shortness of breath, cough, hypoxia, lung mass s/p bronch/EBUS. Wearing O2,  CARDIOVASCULAR: no palpitation, no chest pain; shortness of breath   GI: no abdominal pain, no vomiting, no constipation;  MARNI: no dysuria, no hematuria, no frequency or urgency, no nephrolithiasis;  MUSCULOSKELETAL: no joint pain, no swelling, no stiffness; generalized weakness  ENDOCRINE: positive for DM  HEMATOLOGY: no bleeding, no history of clotting disorder; positive for mild anemia  DERMATOLOGY: no skin rash, no eczema, no pruritus;  PSYCHIATRY: no suicidal ideation, no homicidal ideation;  NEUROLOGY: no seizures, no numbness or tingling of hands, no numbness or tingling of feet, no paresis;    OBJECTIVE:    There were no vitals filed for this visit.  No intake or output data in the 24 hours ending 08/05/19 0651    PHYSICAL EXAM:   CONSTITUTIONAL:  Nasal O2  EYES: Non icteric  ENT: Mucus membranes moist, no oral pharyngeal lesions, external inspection of ears and nose are normal  NECK: Supple, no masses.  No palpable thyroid mass  CHEST/LUNGS: diminished breath sounds throughout, no wheezes  CARDIOVASCULAR: RRR, no murmurs.  No lower extremity edema.  mild tachycardia with exertion  ABDOMEN: soft non-tender, active bowel sounds, no HSM; protuberant   EXTREMITIES: warm, full ROM in all 4 extremities, no focal weakness.  SKIN: warm, dry with no rashes or lesions  LYMPH: No cervical, clavicular, axillary, or inguinal lymphadenopathy  NEUROLOGIC: sleepy, follows commands, non focal   PSYCH: mood and affect appropriate.      CBC  Results from last 7 days   Lab Units 08/05/19  0424 08/01/19  1201 08/01/19  0626   WBC 10*3/mm3 9.58 9.73 8.97   HEMOGLOBIN g/dL 10.7* 12.0* 12.7*   HEMATOCRIT % 34.3* 38.8* 41.4   PLATELETS 10*3/mm3 370 317 341       Lab Results   Component Value Date     08/05/2019    K 3.8 08/05/2019     CL 97 (L) 08/05/2019    CO2 36.0 (H) 08/05/2019    BUN 18 08/05/2019    CREATININE 0.81 08/05/2019    GLUCOSE 138 (H) 08/05/2019    CALCIUM 10.1 08/05/2019    BILITOT 0.6 08/01/2019    ALKPHOS 110 08/01/2019    AST 24 08/01/2019    ALT 29 08/01/2019    AGRATIO 1.1 08/01/2019    GLOB 3.1 08/01/2019       No results found for: INR, PROTIME    Cultures:    Lab Results   Component Value Date    BLOODCX No growth at 5 days 07/17/2019     No components found for: URINCX     XR CHEST 1 VW- 7/17/2019 12:50 PM CDT   COMPARISON: CT scan dated 06/13/2019.     FINDINGS:   Dense left upper lobe consolidation reflecting known left upper lobe  mass lesion. This measures up to 9.8 cm in greatest diameter. Lung  fields are otherwise clear. No pleural effusion or pneumothorax.  Borderline cardiomegaly. The pulmonary vasculature are unremarkable.      The osseous structures and surrounding soft tissues demonstrate no acute  abnormality.     IMPRESSION:  1. Known left upper lobe mass lesion, measuring up to 9.8 cm. Lung  fields are otherwise clear.     This report was finalized on 07/17/2019 13:15 by Dr Bruce Rodriguez, .      ASSESSMENT/PLAN:    Lung mass, s/p bronchoscopy with EBUS 7/19/19 by Dr. FARHAN Brumfield    - Status post bronchoscopy/EBUS 7/19/2019 by Dr. Jere Brumfield.  - Findings: no endobronchial lesions. multiple nodes in station 4R, measuring up to 2 cm, subcarinal nodes x2, measuring up to 2 cm. TBNA was collected from station 4 and station 7. Mild, intraprocedural controlled hemorrhage  -  FNA from bronch from 7/19/2019 revealed adenocarcinoma, anticipate chemotherapy/immunotherapy as outpatient when overall performance status has improved.  - KOH prep negative for yeast, respiratory culture negative  - PDL1 requested on 7/24/2019. Unfortunately not enough tissue to obtain study.  - Receiving Palliative radiation initiated 7/19/19 with # 11 of 13 due today.    - Chest x-ray on 7/22/2019 revealed interval expansion of the left  lung.  Trace bilateral pleural effusions left greater than right.  - Chest x-ray on 7/23/2019 revealed increased opacity in the RLL could represent effusion, atelectasis or infection or aspiration.  Known RADU mass  - Dr. Bell documented CT stimulation on 7/24/2019 revealed no change in tumor size. Progression of bilateral pleural effusions compared to 1 week ago was noted.  - Chest xray this AM, 8/5/2019 pending radiologist review, appears to be stable compared to 8/1/2019.    COPD exacerbation  -  Continue with O2 supplementation, bronchodilators and steroids  -  CO2 >40  -  Followed by pulmonology    Hypomagnesemia  -  Mg 1.8 on 7/18/2019  -  per IM    Anemia - stable (CBC 8/5/2019)  · WBC 9.58  · Hgb 10.7  · ,000    Diabetes type 2, presently uncontrolled   -  per IM    Hypertension   -  per IM       Generalized weakness  - PT     Discussed current plan of care with Romel.  Continue Palliative XRT - # 11 of 13 due today.  Continue with pulmonary support.  Anticipate systemic chemotherapy/immunotherapy as outpatient once overall performance status has improved.  Encouraged increase activity as tolerated.      Shanice Nair, MING    08/05/19  6:51 AM      Claudio Nevarez MD  08/05/19   9:13 AM

## 2019-08-06 NOTE — PROGRESS NOTES
PULMONARY AND CRITICAL CARE PROGRESS NOTE - [facility]  Patient: Romel Rosario    1950   Acct# [id number]  08/06/19   11:56 AM  Referring Provider: Kermit Zamudio MD  Chief Complaint: Hypoxemia  Interval history: He is improved from a respiratory standpoint and his FiO2 has been reduced down to 5 L/min on the high flow system.  His main complaint now is copious diarrhea in his colon through the night associated with some abdominal cramping without fevers or vomiting.  He still feels ill and fatigued and feels like he cannot do any sort of physical therapy today.  Review of Systems: Review of Systems   Constitutional: Positive for fatigue. Negative for chills and fever.   Respiratory: Positive for shortness of breath. Negative for cough and choking.    Cardiovascular: Negative for chest pain.   Gastrointestinal: Positive for blood in stool and diarrhea. Negative for constipation, nausea and vomiting.    Physical Exam:  Saturation 92% on 5 L/min pulse 78   Physical Exam   Constitutional: He appears well-developed.  Non-toxic appearance. He has a sickly appearance. He appears ill. No distress. Nasal cannula in place.   Eyes: EOM are normal. No scleral icterus.   Neck: No JVD present. No tracheal deviation present.   Cardiovascular: Normal rate and regular rhythm.   Pulmonary/Chest: No respiratory distress. He has rales.   Abdominal: Soft. There is no tenderness. There is no guarding.   Musculoskeletal: He exhibits no edema.   Skin: Skin is warm and dry. He is not diaphoretic.   Psychiatric: He has a normal mood and affect. His behavior is normal.     Results from last 7 days   Lab Units 08/05/19  0424 08/01/19  1201 08/01/19  0626   WBC 10*3/mm3 9.58 9.73 8.97   HEMOGLOBIN g/dL 10.7* 12.0* 12.7*   PLATELETS 10*3/mm3 370 317 341     Results from last 7 days   Lab Units 08/05/19  0424 08/01/19  1201 08/01/19  0626   SODIUM mmol/L 138 137 137   POTASSIUM mmol/L 3.8 3.4* 3.6   BUN mg/dL 18  25* 24*   CREATININE mg/dL 0.81 1.21 0.97         Recent films:  Xr Chest 1 View    Result Date: 8/5/2019  Impression:  1.  Known left upper lobe mass. 2.  No residual trace right pleural effusion present. 3.  Mild prominence of interstitium, overall unchanged for lung volume  This report was finalized on 08/05/2019 07:55 by Dr. Maryjo Cameron MD.      Pulmonary Assessment:  1. Acute resp failure with hypoxia  improving  2. lung cancer; deferred to others and stable  3. Right lower lobe infiltrate and pulm edema, improving clinically better  4. mediastinal adenopathy; due to lung cancer  5. New onset diarrhea rule out C. difficile  Recommend:   · Titrate oxygen  · Continue BiPAP at HS and naps   · Continue pulmonary regimen with Symbicort, Delsym, Mucinex and duo nebs  · IS/OPEP   · PT/OT  · Continue prednisone at current dose  · Stool C. difficile studies  Electronically signed by Jere Brumfield MD on 8/6/2019 at 11:56 AM

## 2019-08-06 NOTE — PROGRESS NOTES
[facility]  MIGDALIA Chan M.D.  MING Anders APRN      Internal Medicine Progress Note    8/6/2019   2:02 PM    Name:  Romel Rosario  MRN:    [id number]     Acct:     [id number]   Room:  [address] [place] [address] Day: 0     Admit Date: 7/25/2019  6:42 PM  PCP: Provider, No Known    Subjective:     C/C: Shortness of air.  New diagnosis of lung carcinoma    Interval History: Status:  Improved. Resting in bed. No family present at this time.  Tolerating NC on 6L. Has had some diarrhea since last night, staff attempting to get stool studies.  Placed on Contact precautions prophylactic.     Review of Systems   Constitution: Positive for weakness and malaise/fatigue. Negative for chills, decreased appetite and fever.   HENT: Negative for congestion, hoarse voice, nosebleeds and sore throat.    Eyes: Negative for blurred vision, discharge, pain and visual disturbance.   Cardiovascular: Positive for dyspnea on exertion. Negative for chest pain, irregular heartbeat, leg swelling, orthopnea and palpitations.   Respiratory: Positive for cough, shortness of breath, sleep disturbances due to breathing and wheezing. Negative for sputum production.    Hematologic/Lymphatic: Negative for bleeding problem. Does not bruise/bleed easily.   Skin: Negative for dry skin, flushing, itching, poor wound healing, rash and suspicious lesions.   Musculoskeletal: Positive for muscle weakness. Negative for arthritis, back pain, falls, joint pain, joint swelling and myalgias.   Gastrointestinal: Positive for diarrhea and nausea. Negative for bloating, abdominal pain, change in bowel habit, flatus, heartburn and melena.   Genitourinary: Negative for frequency, hesitancy, incomplete emptying, pelvic pain and urgency.   Neurological: Negative for difficulty with concentration, disturbances in coordination, dizziness, headaches, numbness, paresthesias and tremors.   Psychiatric/Behavioral: Negative for  altered mental status, hallucinations and memory loss. The patient is not nervous/anxious.        Medications:     Allergies:   Allergies   Allergen Reactions   • Lactose Intolerance (Gi) Diarrhea       Current Meds:   Current Facility-Administered Medications:   •  acetaminophen (TYLENOL) tablet 650 mg, 650 mg, Oral, Q4H PRN, Kermit Zamudio MD  •  albuterol (PROVENTIL) nebulizer solution 0.083% 2.5 mg/3mL, 2.5 mg, Nebulization, Q4H PRN, Kermit Zamudio MD  •  ALPRAZolam (XANAX) tablet 1 mg, 1 mg, Oral, Q6H PRN, Kermit Zamudio MD  •  atorvastatin (LIPITOR) tablet 40 mg, 40 mg, Oral, Daily, Kermit Zamudio MD  •  benzonatate (TESSALON) capsule 200 mg, 200 mg, Oral, TID PRN, Kermit Zamudio MD  •  budesonide-formoterol (SYMBICORT) 160-4.5 MCG/ACT inhaler 2 puff, 2 puff, Inhalation, BID - RT, Kermit Zamudio MD  •  butalbital-acetaminophen-caffeine (FIORICET, ESGIC) -40 MG per tablet 1 tablet, 1 tablet, Oral, Q4H PRN, Kermit Zamudio MD  •  cetirizine (zyrTEC) tablet 10 mg, 10 mg, Oral, Nightly, Camilla oFfana, APRN  •  dextromethorphan polistirex ER (DELSYM) 30 MG/5ML oral suspension 60 mg, 60 mg, Oral, Q12H, Kermit Zamudio MD  •  dextrose (D50W) 25 g/ 50mL Intravenous Solution 25 g, 25 g, Intravenous, Q15 Min PRN, Kermit Zamudio MD  •  dextrose (GLUTOSE) oral gel 15 g, 15 g, Oral, Q15 Min PRN, Kermit Zamudio MD  •  fluticasone (FLONASE) 50 MCG/ACT nasal spray 2 spray, 2 spray, Each Nare, Daily, Kermit Zamudio MD  •  furosemide (LASIX) tablet 20 mg, 20 mg, Oral, BID, Kermit Zamudio MD  •  glucagon (human recombinant) (GLUCAGEN DIAGNOSTIC) injection 1 mg, 1 mg, Subcutaneous, PRN, Kermit Zamudio MD  •  guaiFENesin (MUCINEX) 12 hr tablet 1,200 mg, 1,200 mg, Oral, BID, Kermit Zamudio MD  •  hydrOXYzine (ATARAX) tablet 10 mg, 10 mg, Oral, Q4H PRN, Kermit Zamudio MD  •  insulin detemir (LEVEMIR)  injection 10 Units, 10 Units, Subcutaneous, Nightly, Zuleika Lucia APRN  •  insulin lispro (humaLOG) injection 2-9 Units, 2-9 Units, Subcutaneous, 4x Daily With Meals & Nightly, Shanice Nair, MING  •  ipratropium-albuterol (DUO-NEB) nebulizer solution 3 mL, 3 mL, Nebulization, Q12H - RT, Jere Brumfield MD  •  linagliptin (TRADJENTA) tablet 5 mg, 5 mg, Oral, Daily, Kermit Zamudio MD  •  losartan (COZAAR) tablet 50 mg, 50 mg, Oral, Daily, Camilla Fofana APRN  •  meclizine (ANTIVERT) tablet 25 mg, 25 mg, Oral, TID PRN, Camilla Fofana APRN  •  ondansetron (ZOFRAN) injection 4 mg, 4 mg, Intravenous, Q6H PRN, Kermit Zamudio MD  •  ondansetron (ZOFRAN) tablet 4 mg, 4 mg, Oral, Q6H PRN, Kermit Zamudio MD  •  pantoprazole (PROTONIX) EC tablet 40 mg, 40 mg, Oral, QAM, Kermit Zamudio MD  •  potassium chloride (MICRO-K) CR capsule 20 mEq, 20 mEq, Oral, Daily, Kermit Zamudio MD  •  predniSONE (DELTASONE) tablet 20 mg, 20 mg, Oral, Daily With Breakfast, Jere Brumfield MD  •  saccharomyces boulardii (FLORASTOR) capsule 250 mg, 250 mg, Oral, BID, Camilla Fofana APRN  •  sodium chloride 0.9 % flush 3 mL, 3 mL, Intravenous, Q12H, Kermit Zamudio MD  •  sodium chloride 0.9 % flush 3-10 mL, 3-10 mL, Intravenous, PRN, Kermit Zamudio MD    Data:     Code Status:    There are no questions and answers to display.       Family History   Problem Relation Age of Onset   • Heart disease Mother    • Diabetes Mother    • Alzheimer's disease Father    • Colon cancer Neg Hx    • Colon polyps Neg Hx        Social History     Socioeconomic History   • Marital status:      Spouse name: Not on file   • Number of children: Not on file   • Years of education: Not on file   • Highest education level: Not on file   Tobacco Use   • Smoking status: Former Smoker   • Smokeless tobacco: Never Used   Substance and Sexual Activity   • Alcohol use: No   • Drug use:  No   • Sexual activity: Defer       Vitals:    T 97.1 P 75 R 20 /47 Sp02 92% (NC)    I/O (24Hr):  No intake or output data in the 24 hours ending 08/06/19 1402    Labs and imaging:      Lab Results (last 24 hours)     Procedure Component Value Units Date/Time    POC Glucose Once [[id number]]  (Abnormal) Collected:  08/06/19 1107    Specimen:  Blood Updated:  08/06/19 1139     Glucose 137 mg/dL      Comment: : [email or web address] Marleni PaulaMeter ID: [id number]       POC Glucose Once [[id number]]  (Abnormal) Collected:  08/05/19 1710    Specimen:  Blood Updated:  08/05/19 1741     Glucose 281 mg/dL      Comment: : JPKINGSLEY Lobo ID: [id number]               Xr Chest 1 View    Result Date: 8/5/2019  Narrative: Exam:   XR CHEST 1 VW-   Date:  08/05/2019  History:  Male, age  68 years;f/u resp failure  COMPARISON:  None.  Findings :  The heart and mediastinum are normal in size. No new focal consolidation, pleural effusion or pneumothorax. There is suspected residual trace right pleural effusion. Mild prominence of the right interstitium persists. The left upper lobe opacity reflecting known metastases redemonstrated.  The bones show no acute pathology.       Impression: Impression:  1.  Known left upper lobe mass. 2.  No residual trace right pleural effusion present. 3.  Mild prominence of interstitium, overall unchanged for lung volume  This report was finalized on 08/05/2019 07:55 by Dr. Maryjo Cameron MD.    Xr Chest 1 View    Result Date: 8/1/2019  Narrative: EXAMINATION: XR CHEST 1 VW-  8/1/2019 8:29 AM CDT  HISTORY: worsening shortness of air, cough  1 view chest x-ray compared to 07/24/2019.  The large round left upper lobe mass currently measures 89 x 78 mm compared with 88 x 60 mm.  Mild right basilar atelectasis with trace pleural fluid.  Underlying chronic parenchymal change.  Stable heart size.  No pneumothorax or heart failure.  Summary: 1. Stable or slightly enlarged left upper lobe  mass. 2. No pneumonia or pneumothorax is seen. This report was finalized on 08/01/2019 08:36 by Dr. Tyler Khan MD.    Xr Chest 1 View    Result Date: 7/24/2019  Narrative: Exam:   XR CHEST 1 VW-   Date:  07/24/2019  History:  Male, age  68 years;sob, ? Effusion right; R09.02-Hypoxemia; R91.8-Other nonspecific abnormal finding of lung field; R91.8-Other nonspecific abnormal finding of lung field  COMPARISON:  Chest x-ray dated 07/23/2019  Findings :  The heart and mediastinum are unchanged in size. Known left upper lobe mass.  Small left pleural effusion and associated opacity. Improved aeration at the right lung base.       Impression: Impression:  1.  Improved aeration of the right lung base. 2.  Known left upper lobe mass.  This report was finalized on 07/24/2019 12:39 by Dr. Maryjo Cameron MD.    Xr Chest 1 View    Result Date: 7/23/2019  Narrative: EXAM: XR CHEST 1 VW- - 7/23/2019 9:20 AM CDT  HISTORY: dyspnea; R09.02-Hypoxemia; R91.8-Other nonspecific abnormal finding of lung field; R91.8-Other nonspecific abnormal finding of lung field   COMPARISON: 07/22/2019.  TECHNIQUE:  Line images.  Frontal view of the chest.  FINDINGS:  Known left upper lobe mass. Increased opacity at the right lower lobe. Prominence of the mediastinal and cardiac silhouette is likely exaggerated by portable AP technique. No acute bony findings.       Impression: 1. Increased opacity at the right lower lobe, could represent effusion with atelectasis, infection or in the appropriate clinical setting aspiration. 2. Known left upper lobe mass. This report was finalized on 07/23/2019 09:34 by Dr Mile Donnelly MD.    Xr Chest 1 View    Result Date: 7/21/2019  Narrative: HISTORY: Pulmonary edema, lung mass  CXR: Frontal view the chest obtained.  COMPARISON: 07/20/2019  FINDINGS: There is new opacification left hemithorax with associated volume loss suspected. This may relate to mucous plugging in addition to the large 9 cm left upper  lobe lung mass in the small left pleural effusion. Mixed interstitial alveolar opacities the right lung are similar suggestive for underlying edema. Small right pleural effusion. No appreciable pneumothorax. No acute appearing bony pathology.      Impression: 1. New opacification of the left hemithorax with associated volume loss. Mucous plugging in addition to the 9 cm left upper lobe lung mass and small pleural effusion suspected. Similar pulmonary edema of the aerated right lung with a very small right pleural effusion. This report was finalized on 07/21/2019 08:27 by Dr. Deborah Chávez MD.    Xr Chest 1 View    Result Date: 7/20/2019  Narrative: HISTORY: Shortness of breath  CXR: A frontal view the chest obtained.  COMPARISON: 07/17/2019  FINDINGS: There is a stable 9 cm left upper lobe lung mass. There are increasing diffuse bilateral mixed interstitial alveolar pulmonary opacities with small pleural effusions, left greater than right. Cardiac silhouette obscured by the pulmonary findings. There is no appreciable pneumothorax. There is no acute bony pathology.      Impression: 1. Increasing diffuse bilateral pulmonary opacities favorable for edema with small pleural effusions. Stable left upper lobe lung mass. This report was finalized on 07/20/2019 08:55 by Dr. Deborah Chávez MD.    Xr Chest 1 View    Result Date: 7/17/2019  Narrative: XR CHEST 1 VW- 7/17/2019 12:50 PM CDT  HISTORY: cough, soa   COMPARISON: CT scan dated 06/13/2019.  FINDINGS: Dense left upper lobe consolidation reflecting known left upper lobe mass lesion. This measures up to 9.8 cm in greatest diameter. Lung fields are otherwise clear. No pleural effusion or pneumothorax. Borderline cardiomegaly. The pulmonary vasculature are unremarkable.  The osseous structures and surrounding soft tissues demonstrate no acute abnormality.      Impression: 1. Known left upper lobe mass lesion, measuring up to 9.8 cm. Lung fields are otherwise clear.   This report was finalized on 07/17/2019 13:15 by Dr Bruce Rodriguez, .    Xr Abdomen Kub    Result Date: 8/1/2019  Narrative: XR ABDOMEN KUB- 8/1/2019 2:30 PM CDT  HISTORY: abdominal pain & diarrhea   COMPARISON: None  FINDINGS: There is a nonspecific bowel gas pattern. Mesh is present on the anterior abdominal wall.. No soft tissue mass or pathologic calcification  is visualized.  No acute skeletal abnormality is identified.      Impression: 1. Non specific bowel gas pattern..   This report was finalized on 08/01/2019 15:02 by Dr. Robinson Bangura MD.    Xr Chest Pa & Lateral    Result Date: 7/22/2019  Narrative: Exam:   XR CHEST PA AND LATERAL-   Date:  07/22/2019  History:  Male, age  68 years;abnormal chest xray; R09.02-Hypoxemia; R91.8-Other nonspecific abnormal finding of lung field; R91.8-Other nonspecific abnormal finding of lung field  COMPARISON:  Chest x-ray dated 07/21/2019  Findings :  The heart and mediastinum are normal in size. Interval significant improvement in appearance of the left hemithorax with reexpansion of the lung. The known left upper lobe mass is redemonstrated. Trace bilateral pleural effusions, left greater than right.  The bones show no acute pathology.       Impression: Impression:  1.  Interval reexpansion of the left. 2.  Known left upper lobe mass.  This report was finalized on 07/22/2019 07:41 by Dr. Maryjo Cameron MD.      Physical Examination:        Physical Exam   Constitutional: Vital signs are normal. He appears well-developed and well-nourished. He is cooperative.   HENT:   Head: Normocephalic and atraumatic.   Nose: Nose normal.   Mouth/Throat: Oropharynx is clear and moist.   Eyes: Conjunctivae, EOM and lids are normal. Pupils are equal, round, and reactive to light.   Neck: Trachea normal and normal range of motion. Neck supple.   Cardiovascular: Normal rate, regular rhythm and normal heart sounds.   Pulmonary/Chest: Effort normal. Tachypnea noted. He has decreased breath  sounds. He has no wheezes.   Abdominal: Soft. Normal appearance and bowel sounds are normal.   Musculoskeletal: Normal range of motion.   Generalized weakness   Lymphadenopathy:     He has no cervical adenopathy.     He has no axillary adenopathy.        Left: No supraclavicular adenopathy present.   Neurological: He is alert. He has normal strength. No cranial nerve deficit or sensory deficit.   Skin: Skin is warm, dry and intact. No petechiae and no rash noted. No cyanosis or erythema. Nails show no clubbing.   Psychiatric: He has a normal mood and affect. His speech is normal and behavior is normal. Judgment and thought content normal. Cognition and memory are normal.   Nursing note and vitals reviewed.    Assessment:             Adenocarcinoma, lung (CMS/HCC)    Past Medical History:   Diagnosis Date   • Adenocarcinoma, lung (CMS/HCC)    • Cerumen impaction    • Cervical disc disease    • Colon cancer (CMS/HCC)    • Diabetes (CMS/HCC)    • Eustachian tube dysfunction    • GERD (gastroesophageal reflux disease)    • Hx of colonic polyps    • Hypertension    • PUD (peptic ulcer disease)    • Sensorineural hearing loss         Plan:        1. Acute respiratory failure with hypoxia and hypercapnia  2. Mediastinal adenopathy  3. Left Upper Lobe Lung Mass  4. Diabetes Mellitus type 2 with hyperglycemia  5. Hx Colon Cancer stage 1  6. Pulmonary edema  7. Pleural effusion, bilateral  8. Anemia  9. GERD  10. Headache     Continue current treatment. Monitor counts. Increase activity. Wean oxygen as able. Radiation therapy per radiation oncology. Labs Thursday. Encourage increased participation with therapy and ADLs. Glycemic control.       Electronically signed by MING Esteban on 8/6/2019 at 2:02 PM     I have discussed the care of Romel Rosario, including pertinent history and exam findings, with the nurse practitioner.    I have seen and examined the patient and the key elements of all parts of the encounter  have been performed by me.  I agree with the assessment, plan and orders as documented by MING Esteban, after I modified the exam findings and the plan of treatments and the final version is my approved version of the assessment.        Electronically signed by Kermit Zamudio MD on 8/6/2019 at 5:33 PM

## 2019-08-07 NOTE — PROGRESS NOTES
PULMONARY AND CRITICAL CARE PROGRESS NOTE - [facility] [place]  Patient: Romel Rosario    1950   Acct# [id number]  08/07/19   8:38 AM  Referring Provider: Kermit Zamudio MD  Chief Complaint: Hypoxemia  Interval history: He is improved from a respiratory standpoint and his FiO2 has been reduced down to 5 L/min on the high flow system.  Diarrhea is improving.  Stool studies negative.  Primary complaint this morning is a persistent headache that is unimproved despite multiple medications.  No other new complaints.  No family at the bedside.    Review of Systems: Review of Systems   Constitutional: Positive for fatigue. Negative for chills and fever.   Respiratory: Positive for shortness of breath. Negative for cough and choking.    Cardiovascular: Negative for chest pain.   Gastrointestinal: Positive for blood in stool and diarrhea. Negative for constipation, nausea and vomiting.   Neurological: Positive for headaches.    Physical Exam:  Blood pressure 122/62, pulse 77, respirations 20, temperature 97.7, saturation 95 on 6 L   Physical Exam   Constitutional: He appears well-developed.  Non-toxic appearance. He has a sickly appearance. He appears ill. No distress. Nasal cannula in place.   Eyes: EOM are normal. No scleral icterus.   Neck: No JVD present. No tracheal deviation present.   Cardiovascular: Normal rate and regular rhythm.   Pulmonary/Chest: No respiratory distress. He has rales.   Abdominal: Soft. There is no tenderness. There is no guarding.   Musculoskeletal: He exhibits no edema.   Skin: Skin is warm and dry. He is not diaphoretic.   Psychiatric: He has a normal mood and affect. His behavior is normal.     Results from last 7 days   Lab Units 08/05/19  0424 08/01/19  1201 08/01/19  0626   WBC 10*3/mm3 9.58 9.73 8.97   HEMOGLOBIN g/dL 10.7* 12.0* 12.7*   PLATELETS 10*3/mm3 370 317 341     Results from last 7 days   Lab Units 08/05/19  0424 08/01/19  1201 08/01/19  0626   SODIUM  mmol/L 138 137 137   POTASSIUM mmol/L 3.8 3.4* 3.6   BUN mg/dL 18 25* 24*   CREATININE mg/dL 0.81 1.21 0.97         Recent films:  No radiology results for the last day    Pulmonary Assessment:  1. Acute resp failure with hypoxia  improving  2. lung cancer; deferred to others and stable  3. Right lower lobe infiltrate and pulm edema, improving clinically better  4. mediastinal adenopathy; due to lung cancer  5. Persistent, unimproved headache  6. New onset diarrhea workup neg for C. difficile  Recommend:   · Titrate oxygen for saturation between 90 and 94%  · Continue BiPAP at HS and naps   · Continue pulmonary regimen with Symbicort, Delsym, Mucinex and duo nebs  · IS/OPEP   · PT/OT  · Continue prednisone at current dose  · Stool C. difficile studies negative  · CT head without, rule out metastatic disease   Electronically signed by MING Canada on 8/7/2019 at 8:38 AM     Physician substantive portion:  His main concern today is a bad headache.  The GI problems from yesterday are a little improved.  He did have 4 bowel movements this morning however.  C. difficile is negative.  Continue monitoring that.  Exam shows diminished breath sounds and a few crackles.  He is on nasal oxygen and respiratory status is markedly improved.  Head CT today for the headache    I have seen and examined patient personally, performing a face-to-face diagnostic evaluation with plan of care reviewed and developed with APRN and nursing staff. I have addended and/or modified the above history of present illness, physical examination, and assessment and plan to reflect my findings and impressions. Essential elements of the care plan were discussed with APRN above.  Agree with findings and assessment/plan as documented above.    Electronically signed by Jere Brumfield MD, on 8/7/2019, 8:55 AM

## 2019-08-07 NOTE — PROGRESS NOTES
MEDICAL ONCOLOGY PROGRESS NOTE    Patient name: Romel Rosario  Patient : 1950  VISIT # [id number]  MR #[id number]   Room 590    SUBJECTIVE: His biggest complaint today is headache, and continued diarrhea.  Reports that he had XRT on Monday which should have been #11 of 13.  He reports he had no radiation since then and he was told he was finished.    INTERVAL HISTORY:  The patient is a 68 years old male who was recently referred to me for further workup of a lung mass.  He has also being referred to thoracic surgery and Dr. Jere Brumfield for further workup and possible bronchoscopy with biopsy  The patient was seen today by Dr. Eddie Bell and had worsening shortness breath, and therefore was sent to the emergency  He is being admitted with acute COPD exacerbation.     Cancer history  Mr Romel Rosario was first seen by me on 2019 referred by his primary care provider for further evaluation of a lung mass and mediastinal adenopathy. The patient had complains of shortness of breath at exertion and therefore was recommended further imaging.  · 2019-CT chest with contrast showed a 6.3 x 5.6 x 7 cm left upper lobe lung mass, centrally necrotic, with compressive atelectasis. Questionable satellite nodule within the lingula measured 7 mm. Noncalcified 11 mm left lower lobe nodule concerning for pulmonary metastases. 5 mm subpleural right lower lobe nodule of uncertain significance. A small hiatal hernia. No adrenal nodules. Mediastinal adenopathy measuring up to 2 cm. Subcarina lymph nodes measure up to 1.3 cm.   · 2019-he was first seen by Dr. Fishman.   · 7/3/2019-bone scan showed no convincing evidence of osseous metastasis.   · 7/3/2019- CT abdomen, pelvis showed no evidence of intra-abdominal metastatic disease. Noncalcified nodule in the right middle lobe    Status post bronchoscopy/EBUS 2019 by Dr. Jere Brumfield.  - Findings: no endobronchial lesions. multiple nodes in station 4R,  measuring up to 2 cm, subcarinal nodes x2, measuring up to 2 cm. TBNA was collected from station 4 and station 7. Mild, intraprocedural controlled hemorrhage  -  FNA from bronch from 7/19/2019 revealed adenocarcinoma, anticipate chemotherapy/immunotherapy as outpatient when overall performance status has improved.    - PDL1 requested on 7/24/2019. Unfortunately not enough tissue to obtain study.  - Dr. Bell documented CT stimulation on 7/24/2019 revealed no change in tumor size. Progression of bilateral pleural effusions compared to 1 week ago was noted.    Palliative radiation given 7/19 -8/7/2019 while inpatient at [facility]-completed 13 fractions while on LTAC        REVIEW OF SYSTEMS  CONSTITUTIONAL: no fever, no night sweats; positive for fatigue, possibly a little better  HEENT: no blurring of vision, no double vision, no tinnitus, no ulceration, no dental caries, and no dysplasia, no epistaxis;  LUNGS: no hemoptysis; positive for shortness of breath, cough, hypoxia, lung mass s/p bronch/EBUS. Wearing O2,  CARDIOVASCULAR: no palpitation, no chest pain; shortness of breath   GI: no abdominal pain, no vomiting, no constipation; positive for persistent diarrhea  MARNI: no dysuria, no hematuria, no frequency or urgency, no nephrolithiasis;  MUSCULOSKELETAL: no joint pain, no swelling, no stiffness; generalized weakness  ENDOCRINE: positive for DM  HEMATOLOGY: no bleeding, no history of clotting disorder; positive for mild anemia  DERMATOLOGY: no skin rash, no eczema, no pruritus;  PSYCHIATRY: no suicidal ideation, no homicidal ideation;  NEUROLOGY: no seizures, no numbness or tingling of hands, no numbness or tingling of feet, no paresis; positive for headache    OBJECTIVE:    There were no vitals filed for this visit.  No intake or output data in the 24 hours ending 08/07/19 0633    PHYSICAL EXAM:   CONSTITUTIONAL:  Nasal O2  EYES: Non icteric  ENT: Mucus membranes moist, no oral pharyngeal lesions, external inspection  of ears and nose are normal  NECK: Supple, no masses.  No palpable thyroid mass  CHEST/LUNGS: diminished breath sounds throughout, no wheezes  CARDIOVASCULAR: RRR, no murmurs.  No lower extremity edema.  mild tachycardia with exertion  ABDOMEN: soft non-tender, active bowel sounds, no HSM; protuberant   EXTREMITIES: warm, full ROM in all 4 extremities, no focal weakness.  SKIN: warm, dry with no rashes or lesions  LYMPH: No cervical, clavicular, axillary, or inguinal lymphadenopathy  NEUROLOGIC: sleepy, follows commands, non focal   PSYCH: mood and affect appropriate.      CBC  Results from last 7 days   Lab Units 08/05/19  0424 08/01/19  1201 08/01/19  0626   WBC 10*3/mm3 9.58 9.73 8.97   HEMOGLOBIN g/dL 10.7* 12.0* 12.7*   HEMATOCRIT % 34.3* 38.8* 41.4   PLATELETS 10*3/mm3 370 317 341       Lab Results   Component Value Date     08/05/2019    K 3.8 08/05/2019    CL 97 (L) 08/05/2019    CO2 36.0 (H) 08/05/2019    BUN 18 08/05/2019    CREATININE 0.81 08/05/2019    GLUCOSE 138 (H) 08/05/2019    CALCIUM 10.1 08/05/2019    BILITOT 0.6 08/01/2019    ALKPHOS 110 08/01/2019    AST 24 08/01/2019    ALT 29 08/01/2019    AGRATIO 1.1 08/01/2019    GLOB 3.1 08/01/2019       No results found for: INR, PROTIME    Cultures:    Lab Results   Component Value Date    BLOODCX No growth at 5 days 07/17/2019     No components found for: URINCX     XR CHEST 1 VW- 7/17/2019 12:50 PM CDT   COMPARISON: CT scan dated 06/13/2019.     FINDINGS:   Dense left upper lobe consolidation reflecting known left upper lobe  mass lesion. This measures up to 9.8 cm in greatest diameter. Lung  fields are otherwise clear. No pleural effusion or pneumothorax.  Borderline cardiomegaly. The pulmonary vasculature are unremarkable.      The osseous structures and surrounding soft tissues demonstrate no acute  abnormality.     IMPRESSION:  1. Known left upper lobe mass lesion, measuring up to 9.8 cm. Lung  fields are otherwise clear.     This report was  finalized on 07/17/2019 13:15 by Dr Bruce Rodriguez, .      ASSESSMENT/PLAN:    Stage IV carcinoma of the lung with pulmonary metastases    - Status post bronchoscopy/EBUS 7/19/2019 by Dr. Jere Brumfield.  - Findings: no endobronchial lesions. multiple nodes in station 4R, measuring up to 2 cm, subcarinal nodes x2, measuring up to 2 cm. TBNA was collected from station 4 and station 7. Mild, intraprocedural controlled hemorrhage  -  FNA from bronch from 7/19/2019 revealed adenocarcinoma, anticipate chemotherapy/immunotherapy as outpatient when overall performance status has improved.  - KOH prep negative for yeast, respiratory culture negative  - PDL1 requested on 7/24/2019. Unfortunately not enough tissue to obtain study.    - Receiving Palliative radiation initiated 7/19/19 -today should have been #13 the final planned palliative treatment however he reports that Monday was the last time he was given radiation and told it was his last one.  I will need to check with radiation today.    - Chest x-ray on 7/22/2019 revealed interval expansion of the left lung.  Trace bilateral pleural effusions left greater than right.  - Chest x-ray on 7/23/2019 revealed increased opacity in the RLL could represent effusion, atelectasis or infection or aspiration.  Known RADU mass  - Dr. Bell documented CT stimulation on 7/24/2019 revealed no change in tumor size. Progression of bilateral pleural effusions compared to 1 week ago was noted.  - pCXR 8/5/2019 revealed normal left upper lobe mass, no residual trace right pleural effusion, mild prominence of interstitium, overall unchanged for lung volume.    COPD exacerbation  -  Continue with O2 supplementation, bronchodilators and steroids  -  Followed by pulmonology    Diarrhea  - GI panel was negative including C. Difficile  -Imodium added    Anemia - stable (CBC 8/5/2019)  · WBC 9.58  · Hgb 10.7  · ,000    Diabetes type 2, presently uncontrolled   -  per IM    Hypertension   -   per IM       Generalized weakness  - PT     Discussed current plan of care with Romel.  Palliative XRT -checking with XRT to see if radiation has been finished  Continue with pulmonary support.  Diarrhea-C. difficile was negative-Imodium added  Headache- Fioricet to be given.  Anticipate systemic chemotherapy/immunotherapy as outpatient once overall performance status has improved.  Encouraged increase activity as tolerated.      DARIUS Pena    08/07/19  6:33 AM      Claudio Nevarez MD  08/07/19   8:52 AM

## 2019-08-07 NOTE — PROGRESS NOTES
[facility]  MIGDALIA Chan M.D.  MING Anders APRN      Internal Medicine Progress Note    8/7/2019   10:45 AM    Name:  Romel Rosario  MRN:    [id number]     Acct:     [id number]   Room:  [address] [place] [address] Day: 0     Admit Date: 7/25/2019  6:42 PM  PCP: Provider, No Known    Subjective:     C/C: Shortness of air.  New diagnosis of lung carcinoma    Interval History: Status:  Unchanged.  Sitting on side of bed. Family present at this time.  Tolerating NC on 6L. Stool studies negative.  Continues with headaches, worse in am.  CT head ordered per Pulmonary.  Re-ordered pain medication.     Review of Systems   Constitution: Positive for weakness and malaise/fatigue. Negative for chills, decreased appetite and fever.   HENT: Negative for congestion, hoarse voice, nosebleeds and sore throat.    Eyes: Negative for blurred vision, discharge, pain and visual disturbance.   Cardiovascular: Positive for dyspnea on exertion. Negative for chest pain, irregular heartbeat, leg swelling, orthopnea and palpitations.   Respiratory: Positive for cough, shortness of breath, sleep disturbances due to breathing and wheezing. Negative for sputum production.    Hematologic/Lymphatic: Negative for bleeding problem. Does not bruise/bleed easily.   Skin: Negative for dry skin, flushing, itching, poor wound healing, rash and suspicious lesions.   Musculoskeletal: Positive for muscle weakness. Negative for arthritis, back pain, falls, joint pain, joint swelling and myalgias.   Gastrointestinal: Positive for diarrhea and nausea. Negative for bloating, abdominal pain, change in bowel habit, flatus, heartburn and melena.   Genitourinary: Negative for frequency, hesitancy, incomplete emptying, pelvic pain and urgency.   Neurological: Negative for difficulty with concentration, disturbances in coordination, dizziness, headaches, numbness, paresthesias and tremors.   Psychiatric/Behavioral:  Negative for altered mental status, hallucinations and memory loss. The patient is not nervous/anxious.        Medications:     Allergies:   Allergies   Allergen Reactions   • Lactose Intolerance (Gi) Diarrhea       Current Meds:   Current Facility-Administered Medications:   •  acetaminophen (TYLENOL) tablet 650 mg, 650 mg, Oral, Q4H PRN, Kermit Zamudio MD  •  albuterol (PROVENTIL) nebulizer solution 0.083% 2.5 mg/3mL, 2.5 mg, Nebulization, Q4H PRN, Kermit Zamudio MD  •  ALPRAZolam (XANAX) tablet 1 mg, 1 mg, Oral, Q6H PRN, Kermit Zamudio MD  •  atorvastatin (LIPITOR) tablet 40 mg, 40 mg, Oral, Daily, Kermit Zamudio MD  •  benzonatate (TESSALON) capsule 200 mg, 200 mg, Oral, TID PRN, Kermit Zamudio MD  •  budesonide-formoterol (SYMBICORT) 160-4.5 MCG/ACT inhaler 2 puff, 2 puff, Inhalation, BID - RT, Kermit Zamudio MD  •  butalbital-acetaminophen-caffeine (FIORICET, ESGIC) -40 MG per tablet 1 tablet, 1 tablet, Oral, Q4H PRN, Kermit Zamudio MD  •  cetirizine (zyrTEC) tablet 10 mg, 10 mg, Oral, Nightly, Camilla Fofana, APRN  •  dextromethorphan polistirex ER (DELSYM) 30 MG/5ML oral suspension 60 mg, 60 mg, Oral, Q12H, Kermit Zamudio MD  •  dextrose (D50W) 25 g/ 50mL Intravenous Solution 25 g, 25 g, Intravenous, Q15 Min PRN, Kermit Zamudio MD  •  dextrose (GLUTOSE) oral gel 15 g, 15 g, Oral, Q15 Min PRN, Kermit Zamudio MD  •  fluticasone (FLONASE) 50 MCG/ACT nasal spray 2 spray, 2 spray, Each Nare, Daily, Kermit Zamudio MD  •  furosemide (LASIX) tablet 20 mg, 20 mg, Oral, BID, Kermit Zamudio MD  •  glucagon (human recombinant) (GLUCAGEN DIAGNOSTIC) injection 1 mg, 1 mg, Subcutaneous, PRN, Kermit Zamudio MD  •  guaiFENesin (MUCINEX) 12 hr tablet 1,200 mg, 1,200 mg, Oral, BID, Kermit Zamudio MD  •  hydrOXYzine (ATARAX) tablet 10 mg, 10 mg, Oral, Q4H PRN, Kermit Zamudio MD  •  insulin detemir  (LEVEMIR) injection 10 Units, 10 Units, Subcutaneous, Nightly, Zuleika Lucia, APRN  •  insulin lispro (humaLOG) injection 2-9 Units, 2-9 Units, Subcutaneous, 4x Daily With Meals & Nightly, Shanice Nair, APRN  •  ipratropium-albuterol (DUO-NEB) nebulizer solution 3 mL, 3 mL, Nebulization, Q12H - RT, Jere Brumfield MD  •  linagliptin (TRADJENTA) tablet 5 mg, 5 mg, Oral, Daily, Kermit Zamudio MD  •  loperamide (IMODIUM) capsule 2 mg, 2 mg, Oral, Q6H PRN, Kermit Zamudio MD  •  losartan (COZAAR) tablet 50 mg, 50 mg, Oral, Daily, Camilla Fofana, APRN  •  meclizine (ANTIVERT) tablet 25 mg, 25 mg, Oral, TID PRN, Camilla Fofana, APRN  •  ondansetron (ZOFRAN) injection 4 mg, 4 mg, Intravenous, Q6H PRN, Kermit Zamudio MD  •  ondansetron (ZOFRAN) tablet 4 mg, 4 mg, Oral, Q6H PRN, Kermit Zamudio MD  •  pantoprazole (PROTONIX) EC tablet 40 mg, 40 mg, Oral, QAM, Kermit Zamudio MD  •  potassium chloride (MICRO-K) CR capsule 20 mEq, 20 mEq, Oral, Daily, Kermit Zamudio MD  •  predniSONE (DELTASONE) tablet 20 mg, 20 mg, Oral, Daily With Breakfast, Jere Brumfield MD  •  saccharomyces boulardii (FLORASTOR) capsule 250 mg, 250 mg, Oral, BID, Camilla Fofana, APRN  •  sodium chloride 0.9 % flush 3 mL, 3 mL, Intravenous, Q12H, Kermit Zamudio MD  •  sodium chloride 0.9 % flush 3-10 mL, 3-10 mL, Intravenous, PRN, Kermit Zamudio MD    Data:     Code Status:    There are no questions and answers to display.       Family History   Problem Relation Age of Onset   • Heart disease Mother    • Diabetes Mother    • Alzheimer's disease Father    • Colon cancer Neg Hx    • Colon polyps Neg Hx        Social History     Socioeconomic History   • Marital status:      Spouse name: Not on file   • Number of children: Not on file   • Years of education: Not on file   • Highest education level: Not on file   Tobacco Use   • Smoking status: Former Smoker   •  Smokeless tobacco: Never Used   Substance and Sexual Activity   • Alcohol use: No   • Drug use: No   • Sexual activity: Defer       Vitals:    T 98.4 P 100 R 24 /44 Sp02 98% (NC)    I/O (24Hr):  No intake or output data in the 24 hours ending 08/07/19 1045    Labs and imaging:      Lab Results (last 24 hours)     Procedure Component Value Units Date/Time    POC Glucose Once [[id number]]  (Normal) Collected:  08/07/19 0802    Specimen:  Blood Updated:  08/07/19 0815     Glucose 112 mg/dL      Comment: : [facility] CindyMeter ID: [id number]       Gastrointestinal Panel, PCR - Stool, Per Rectum [[id number]]  (Normal) Collected:  08/06/19 1231    Specimen:  Stool from Per Rectum Updated:  08/06/19 1836     Campylobacter Not Detected     Clostridium difficile (toxin A/B) N/A     Comment:   N/A. Specimen collected >48h after order.        Plesiomonas shigelloides Not Detected     Salmonella Not Detected     Vibrio Not Detected     Vibrio cholerae Not Detected     Yersinia enterocolitica Not Detected     Enteroaggregative E. coli (EAEC) Not Detected     Enteropathogenic E. coli (EPEC) Not Detected     Enterotoxigenic E. coli (ETEC) lt/st Not Detected     Shiga-like toxin-producing E. coli (STEC) stx1/stx2 Not Detected     E. coli O157 Not Detected     Shigella/Enteroinvasive E. coli (EIEC) Not Detected     Cryptosporidium Not Detected     Cyclospora cayetanensis Not Detected     Entamoeba histolytica Not Detected     Giardia lamblia Not Detected     Adenovirus F40/41 Not Detected     Astrovirus Not Detected     Norovirus GI/GII Not Detected     Rotavirus A Not Detected     Sapovirus (I, II, IV or V) Not Detected    Narrative:       If Aeromonas, Staphylococcus aureus or Bacillus cereus are suspected, please order [id number]: Stool Culture, Aeromonas, S aureus, B Cereus.    POC Glucose Once [[id number]]  (Abnormal) Collected:  08/06/19 1655    Specimen:  Blood Updated:  08/06/19 1739     Glucose 156  mg/dL      Comment: : RAFIQ Yepez PaulaMeter ID: [id number]       POC Glucose Once [[id number]]  (Abnormal) Collected:  08/06/19 1107    Specimen:  Blood Updated:  08/06/19 1139     Glucose 137 mg/dL      Comment: : RAFIQ SorianoaMeter ID: [id number]               Xr Chest 1 View    Result Date: 8/5/2019  Narrative: Exam:   XR CHEST 1 VW-   Date:  08/05/2019  History:  Male, age  68 years;f/u resp failure  COMPARISON:  None.  Findings :  The heart and mediastinum are normal in size. No new focal consolidation, pleural effusion or pneumothorax. There is suspected residual trace right pleural effusion. Mild prominence of the right interstitium persists. The left upper lobe opacity reflecting known metastases redemonstrated.  The bones show no acute pathology.       Impression: Impression:  1.  Known left upper lobe mass. 2.  No residual trace right pleural effusion present. 3.  Mild prominence of interstitium, overall unchanged for lung volume  This report was finalized on 08/05/2019 07:55 by Dr. Maryjo Cameron MD.    Xr Chest 1 View    Result Date: 8/1/2019  Narrative: EXAMINATION: XR CHEST 1 VW-  8/1/2019 8:29 AM CDT  HISTORY: worsening shortness of air, cough  1 view chest x-ray compared to 07/24/2019.  The large round left upper lobe mass currently measures 89 x 78 mm compared with 88 x 60 mm.  Mild right basilar atelectasis with trace pleural fluid.  Underlying chronic parenchymal change.  Stable heart size.  No pneumothorax or heart failure.  Summary: 1. Stable or slightly enlarged left upper lobe mass. 2. No pneumonia or pneumothorax is seen. This report was finalized on 08/01/2019 08:36 by Dr. Tyler Khan MD.    Xr Chest 1 View    Result Date: 7/24/2019  Narrative: Exam:   XR CHEST 1 VW-   Date:  07/24/2019  History:  Male, age  68 years;sob, ? Effusion right; R09.02-Hypoxemia; R91.8-Other nonspecific abnormal finding of lung field; R91.8-Other nonspecific abnormal finding of lung  field  COMPARISON:  Chest x-ray dated 07/23/2019  Findings :  The heart and mediastinum are unchanged in size. Known left upper lobe mass.  Small left pleural effusion and associated opacity. Improved aeration at the right lung base.       Impression: Impression:  1.  Improved aeration of the right lung base. 2.  Known left upper lobe mass.  This report was finalized on 07/24/2019 12:39 by Dr. Maryjo Cameron MD.    Xr Chest 1 View    Result Date: 7/23/2019  Narrative: EXAM: XR CHEST 1 VW- - 7/23/2019 9:20 AM CDT  HISTORY: dyspnea; R09.02-Hypoxemia; R91.8-Other nonspecific abnormal finding of lung field; R91.8-Other nonspecific abnormal finding of lung field   COMPARISON: 07/22/2019.  TECHNIQUE:  Line images.  Frontal view of the chest.  FINDINGS:  Known left upper lobe mass. Increased opacity at the right lower lobe. Prominence of the mediastinal and cardiac silhouette is likely exaggerated by portable AP technique. No acute bony findings.       Impression: 1. Increased opacity at the right lower lobe, could represent effusion with atelectasis, infection or in the appropriate clinical setting aspiration. 2. Known left upper lobe mass. This report was finalized on 07/23/2019 09:34 by Dr Mile Donnelly MD.    Xr Chest 1 View    Result Date: 7/21/2019  Narrative: HISTORY: Pulmonary edema, lung mass  CXR: Frontal view the chest obtained.  COMPARISON: 07/20/2019  FINDINGS: There is new opacification left hemithorax with associated volume loss suspected. This may relate to mucous plugging in addition to the large 9 cm left upper lobe lung mass in the small left pleural effusion. Mixed interstitial alveolar opacities the right lung are similar suggestive for underlying edema. Small right pleural effusion. No appreciable pneumothorax. No acute appearing bony pathology.      Impression: 1. New opacification of the left hemithorax with associated volume loss. Mucous plugging in addition to the 9 cm left upper lobe lung mass  and small pleural effusion suspected. Similar pulmonary edema of the aerated right lung with a very small right pleural effusion. This report was finalized on 07/21/2019 08:27 by Dr. Deborah Chávez MD.    Xr Chest 1 View    Result Date: 7/20/2019  Narrative: HISTORY: Shortness of breath  CXR: A frontal view the chest obtained.  COMPARISON: 07/17/2019  FINDINGS: There is a stable 9 cm left upper lobe lung mass. There are increasing diffuse bilateral mixed interstitial alveolar pulmonary opacities with small pleural effusions, left greater than right. Cardiac silhouette obscured by the pulmonary findings. There is no appreciable pneumothorax. There is no acute bony pathology.      Impression: 1. Increasing diffuse bilateral pulmonary opacities favorable for edema with small pleural effusions. Stable left upper lobe lung mass. This report was finalized on 07/20/2019 08:55 by Dr. Deborah Chávez MD.    Xr Chest 1 View    Result Date: 7/17/2019  Narrative: XR CHEST 1 VW- 7/17/2019 12:50 PM CDT  HISTORY: cough, soa   COMPARISON: CT scan dated 06/13/2019.  FINDINGS: Dense left upper lobe consolidation reflecting known left upper lobe mass lesion. This measures up to 9.8 cm in greatest diameter. Lung fields are otherwise clear. No pleural effusion or pneumothorax. Borderline cardiomegaly. The pulmonary vasculature are unremarkable.  The osseous structures and surrounding soft tissues demonstrate no acute abnormality.      Impression: 1. Known left upper lobe mass lesion, measuring up to 9.8 cm. Lung fields are otherwise clear.  This report was finalized on 07/17/2019 13:15 by Dr Bruce Rodriguez, .    Xr Abdomen Kub    Result Date: 8/1/2019  Narrative: XR ABDOMEN KUB- 8/1/2019 2:30 PM CDT  HISTORY: abdominal pain & diarrhea   COMPARISON: None  FINDINGS: There is a nonspecific bowel gas pattern. Mesh is present on the anterior abdominal wall.. No soft tissue mass or pathologic calcification  is visualized.  No acute skeletal  abnormality is identified.      Impression: 1. Non specific bowel gas pattern..   This report was finalized on 08/01/2019 15:02 by Dr. Robinson Bangura MD.    Xr Chest Pa & Lateral    Result Date: 7/22/2019  Narrative: Exam:   XR CHEST PA AND LATERAL-   Date:  07/22/2019  History:  Male, age  68 years;abnormal chest xray; R09.02-Hypoxemia; R91.8-Other nonspecific abnormal finding of lung field; R91.8-Other nonspecific abnormal finding of lung field  COMPARISON:  Chest x-ray dated 07/21/2019  Findings :  The heart and mediastinum are normal in size. Interval significant improvement in appearance of the left hemithorax with reexpansion of the lung. The known left upper lobe mass is redemonstrated. Trace bilateral pleural effusions, left greater than right.  The bones show no acute pathology.       Impression: Impression:  1.  Interval reexpansion of the left. 2.  Known left upper lobe mass.  This report was finalized on 07/22/2019 07:41 by Dr. Maryjo Cameron MD.      Physical Examination:        Physical Exam   Constitutional: Vital signs are normal. He appears well-developed and well-nourished. He is cooperative.   HENT:   Head: Normocephalic and atraumatic.   Nose: Nose normal.   Mouth/Throat: Oropharynx is clear and moist.   Eyes: Conjunctivae, EOM and lids are normal. Pupils are equal, round, and reactive to light.   Neck: Trachea normal and normal range of motion. Neck supple.   Cardiovascular: Normal rate, regular rhythm and normal heart sounds.   Pulmonary/Chest: Effort normal. Tachypnea noted. He has decreased breath sounds. He has no wheezes.   Abdominal: Soft. Normal appearance and bowel sounds are normal.   Musculoskeletal: Normal range of motion.   Generalized weakness   Lymphadenopathy:     He has no cervical adenopathy.     He has no axillary adenopathy.        Left: No supraclavicular adenopathy present.   Neurological: He is alert. He has normal strength. No cranial nerve deficit or sensory deficit.    Skin: Skin is warm, dry and intact. No petechiae and no rash noted. No cyanosis or erythema. Nails show no clubbing.   Psychiatric: He has a normal mood and affect. His speech is normal and behavior is normal. Judgment and thought content normal. Cognition and memory are normal.   Nursing note and vitals reviewed.    Assessment:             Adenocarcinoma, lung (CMS/HCC)    Past Medical History:   Diagnosis Date   • Adenocarcinoma, lung (CMS/HCC)    • Cerumen impaction    • Cervical disc disease    • Colon cancer (CMS/HCC)    • Diabetes (CMS/HCC)    • Eustachian tube dysfunction    • GERD (gastroesophageal reflux disease)    • Hx of colonic polyps    • Hypertension    • PUD (peptic ulcer disease)    • Sensorineural hearing loss         Plan:        1. Acute respiratory failure with hypoxia and hypercapnia  2. Mediastinal adenopathy  3. Left Upper Lobe Lung Mass  4. Diabetes Mellitus type 2 with hyperglycemia  5. Hx Colon Cancer stage 1  6. Pulmonary edema  7. Pleural effusion, bilateral  8. Anemia  9. GERD  10. Headache     Continue current treatment. Monitor counts. Increase activity. Wean oxygen as able. Radiation therapy per radiation oncology. Labs Thursday. Encourage increased participation with therapy and ADLs. Glycemic control. Pain control.       Electronically signed by MING Esteban on 8/7/2019 at 10:45 AM     I have discussed the care of Romel Rosario, including pertinent history and exam findings, with the nurse practitioner.    I have seen and examined the patient and the key elements of all parts of the encounter have been performed by me.  I agree with the assessment, plan and orders as documented by MING Etseban, after I modified the exam findings and the plan of treatments and the final version is my approved version of the assessment.        Electronically signed by Kermit Zamudio MD on 8/7/2019 at 5:25 PM

## 2019-08-08 NOTE — PROGRESS NOTES
[facility]  MIGDALIA Chan M.D.  MING Anders APRN      Internal Medicine Progress Note    8/8/2019   9:03 AM    Name:  Romel Rosario  MRN:    [id number]     Acct:     [id number]   Room:  [address] [place] [address] Day: 0     Admit Date: 7/25/2019  6:42 PM  PCP: Provider, No Known    Subjective:     C/C: Shortness of air.  New diagnosis of lung carcinoma    Interval History: Status:  Improved.  Resting in bed.  No family present at this time.  Tolerating NC on 6L. Headache improved with Percocet.  CT head negative for any mass or acute process.  C/o reflux and nausea, improved after vomit x 1. -290. Chemistry and hgb stable.     Review of Systems   Constitution: Positive for weakness and malaise/fatigue. Negative for chills, decreased appetite and fever.   HENT: Negative for congestion, hoarse voice, nosebleeds and sore throat.    Eyes: Negative for blurred vision, discharge, pain and visual disturbance.   Cardiovascular: Positive for dyspnea on exertion. Negative for chest pain, irregular heartbeat, leg swelling, orthopnea and palpitations.   Respiratory: Positive for shortness of breath. Negative for cough, sputum production and wheezing.    Hematologic/Lymphatic: Negative for bleeding problem. Does not bruise/bleed easily.   Skin: Negative for dry skin, flushing, itching, poor wound healing, rash and suspicious lesions.   Musculoskeletal: Positive for muscle weakness. Negative for arthritis, back pain, falls, joint pain, joint swelling and myalgias.   Gastrointestinal: Positive for diarrhea and nausea. Negative for bloating, abdominal pain, change in bowel habit, flatus, heartburn and melena.   Genitourinary: Negative for frequency, hesitancy, incomplete emptying, pelvic pain and urgency.   Neurological: Negative for difficulty with concentration, disturbances in coordination, dizziness, headaches, numbness, paresthesias and tremors.   Psychiatric/Behavioral:  Negative for altered mental status, hallucinations and memory loss. The patient is not nervous/anxious.        Medications:     Allergies:   Allergies   Allergen Reactions   • Lactose Intolerance (Gi) Diarrhea       Current Meds:   Current Facility-Administered Medications:   •  acetaminophen (TYLENOL) tablet 650 mg, 650 mg, Oral, Q4H PRN, Kermit Zamudio MD  •  albuterol (PROVENTIL) nebulizer solution 0.083% 2.5 mg/3mL, 2.5 mg, Nebulization, Q4H PRN, Kermit Zamudio MD  •  ALPRAZolam (XANAX) tablet 1 mg, 1 mg, Oral, Q6H PRN, Kermit Zamudio MD  •  aluminum-magnesium hydroxide-simethicone (MAALOX MAX) 400-400-40 MG/5ML suspension 15 mL, 15 mL, Oral, Q6H PRN, Kermit Zamudio MD  •  atorvastatin (LIPITOR) tablet 40 mg, 40 mg, Oral, Daily, Kermit Zamudio MD  •  benzonatate (TESSALON) capsule 200 mg, 200 mg, Oral, TID PRN, Kermit Zamudio MD  •  budesonide-formoterol (SYMBICORT) 160-4.5 MCG/ACT inhaler 2 puff, 2 puff, Inhalation, BID - RT, Kermit Zamudio MD  •  butalbital-acetaminophen-caffeine (FIORICET, ESGIC) -40 MG per tablet 1 tablet, 1 tablet, Oral, Q4H PRN, Kermit Zamudio MD  •  cetirizine (zyrTEC) tablet 10 mg, 10 mg, Oral, Nightly, Camilla Fofana, MING  •  dextromethorphan polistirex ER (DELSYM) 30 MG/5ML oral suspension 60 mg, 60 mg, Oral, Q12H, Kermit Zamudio MD  •  dextrose (D50W) 25 g/ 50mL Intravenous Solution 25 g, 25 g, Intravenous, Q15 Min PRN, Kermit Zamudio MD  •  dextrose (GLUTOSE) oral gel 15 g, 15 g, Oral, Q15 Min PRN, Kermit Zamudio MD  •  fluticasone (FLONASE) 50 MCG/ACT nasal spray 2 spray, 2 spray, Each Nare, Daily, Kermit Zamudio MD  •  furosemide (LASIX) tablet 20 mg, 20 mg, Oral, BID, Kermit Zamudio MD  •  glucagon (human recombinant) (GLUCAGEN DIAGNOSTIC) injection 1 mg, 1 mg, Subcutaneous, PRN, Kermit Zamudio MD  •  guaiFENesin (MUCINEX) 12 hr tablet 1,200 mg, 1,200 mg,  Oral, BID, Kermit Zamudio MD  •  hydrOXYzine (ATARAX) tablet 10 mg, 10 mg, Oral, Q4H PRN, Kermit Zamudio MD  •  insulin detemir (LEVEMIR) injection 10 Units, 10 Units, Subcutaneous, Nightly, Zuleika Lucia, APRN  •  insulin lispro (humaLOG) injection 2-9 Units, 2-9 Units, Subcutaneous, 4x Daily With Meals & Nightly, Sahnice Nair, APRN  •  ipratropium-albuterol (DUO-NEB) nebulizer solution 3 mL, 3 mL, Nebulization, Q12H - RT, Jere Brumfield MD  •  linagliptin (TRADJENTA) tablet 5 mg, 5 mg, Oral, Daily, Kermit Zamudio MD  •  loperamide (IMODIUM) capsule 2 mg, 2 mg, Oral, Q6H PRN, Kermit Zamudio MD  •  losartan (COZAAR) tablet 50 mg, 50 mg, Oral, Daily, Camilla Fofana, APRN  •  meclizine (ANTIVERT) tablet 25 mg, 25 mg, Oral, TID PRN, Camilla Fofana, APRN  •  ondansetron (ZOFRAN) injection 4 mg, 4 mg, Intravenous, Q6H PRN, Kermit Zamudio MD  •  ondansetron (ZOFRAN) tablet 4 mg, 4 mg, Oral, Q6H PRN, Kermit Zamudio MD  •  oxyCODONE-acetaminophen (PERCOCET) 7.5-325 MG per tablet 1 tablet, 1 tablet, Oral, Q4H PRN, Kermit Zamudio MD  •  pantoprazole (PROTONIX) EC tablet 40 mg, 40 mg, Oral, QAM, Kermit Zamudio MD  •  potassium chloride (MICRO-K) CR capsule 20 mEq, 20 mEq, Oral, Daily, Kermit Zamudio MD  •  predniSONE (DELTASONE) tablet 20 mg, 20 mg, Oral, Daily With Breakfast, Jere Brumfield MD  •  saccharomyces boulardii (FLORASTOR) capsule 250 mg, 250 mg, Oral, BID, Camilla Fofana, APRN  •  sodium chloride 0.9 % flush 3 mL, 3 mL, Intravenous, Q12H, Kermit Zamudio MD  •  sodium chloride 0.9 % flush 3-10 mL, 3-10 mL, Intravenous, PRN, Kermit Zamudio MD    Data:     Code Status:    There are no questions and answers to display.       Family History   Problem Relation Age of Onset   • Heart disease Mother    • Diabetes Mother    • Alzheimer's disease Father    • Colon cancer Neg Hx    • Colon polyps Neg Hx         Social History     Socioeconomic History   • Marital status:      Spouse name: Not on file   • Number of children: Not on file   • Years of education: Not on file   • Highest education level: Not on file   Tobacco Use   • Smoking status: Former Smoker   • Smokeless tobacco: Never Used   Substance and Sexual Activity   • Alcohol use: No   • Drug use: No   • Sexual activity: Defer       Vitals:    T 98.3 P 84 R 22 /58 Sp02 93% (NC)    I/O (24Hr):  No intake or output data in the 24 hours ending 08/08/19 0903    Labs and imaging:      Lab Results (last 24 hours)     Procedure Component Value Units Date/Time    POC Glucose Once [[id number]]  (Abnormal) Collected:  08/08/19 0839    Specimen:  Blood Updated:  08/08/19 0857     Glucose 133 mg/dL      Comment: : [facility] CindyMeter ID: [id number]       Basic Metabolic Panel [[id number]]  (Abnormal) Collected:  08/08/19 0456    Specimen:  Blood Updated:  08/08/19 0546     Glucose 108 mg/dL      BUN 17 mg/dL      Creatinine 0.87 mg/dL      Sodium 138 mmol/L      Potassium 4.1 mmol/L      Chloride 97 mmol/L      CO2 35.0 mmol/L      Calcium 10.5 mg/dL      eGFR Non African Amer 87 mL/min/1.73      BUN/Creatinine Ratio 19.5     Anion Gap 6.0 mmol/L     Narrative:       GFR Normal >60  Chronic Kidney Disease <60  Kidney Failure <15    CBC & Differential [[id number]] Collected:  08/08/19 0456    Specimen:  Blood Updated:  08/08/19 0531    Narrative:       The following orders were created for panel order CBC & Differential.  Procedure                               Abnormality         Status                     ---------                               -----------         ------                     CBC Auto Differential[[id number]]        Abnormal            Final result                 Please view results for these tests on the individual orders.    CBC Auto Differential [[id number]]  (Abnormal) Collected:  08/08/19 0456    Specimen:  Blood  Updated:  08/08/19 0531     WBC 11.80 10*3/mm3      RBC 4.26 10*6/mm3      Hemoglobin 11.8 g/dL      Hematocrit 37.9 %      MCV 89.0 fL      MCH 27.7 pg      MCHC 31.1 g/dL      RDW 14.6 %      RDW-SD 46.7 fl      MPV 9.8 fL      Platelets 394 10*3/mm3      Neutrophil % 77.8 %      Lymphocyte % 9.9 %      Monocyte % 9.2 %      Eosinophil % 0.9 %      Basophil % 0.4 %      Immature Grans % 1.8 %      Neutrophils, Absolute 9.18 10*3/mm3      Lymphocytes, Absolute 1.17 10*3/mm3      Monocytes, Absolute 1.08 10*3/mm3      Eosinophils, Absolute 0.11 10*3/mm3      Basophils, Absolute 0.05 10*3/mm3      Immature Grans, Absolute 0.21 10*3/mm3      nRBC 0.0 /100 WBC     POC Glucose Once [[id number]]  (Abnormal) Collected:  08/07/19 2026    Specimen:  Blood Updated:  08/07/19 2040     Glucose 290 mg/dL      Comment: : ROYA Edgar AnthonyMeter ID: [id number]       POC Glucose Once [[id number]]  (Abnormal) Collected:  08/07/19 1625    Specimen:  Blood Updated:  08/07/19 1651     Glucose 168 mg/dL      Comment: : SARAH [place] CindyMeter ID: [id number]       POC Glucose Once [[id number]]  (Abnormal) Collected:  08/07/19 1126    Specimen:  Blood Updated:  08/07/19 1138     Glucose 154 mg/dL      Comment: : SARAH Joseph ([facility]) CindyMeter ID: [id number]               Ct Head Without Contrast    Result Date: 8/7/2019  Narrative: CT HEAD WO CONTRAST- 8/7/2019 2:48 PM CDT  HISTORY: Headache, acute, severe, thunderclap, worst HA of life   DOSE LENGTH PRODUCT: 691 mGy cm. Automated exposure control was also utilized to decrease patient radiation dose.  Technique: Axial CT of the brain without IV contrast. Sagittal and coronal reformations are also provided for review. Soft tissue and bone kernels are available for interpretation.  Comparison: None.  Findings:  There is no evidence of acute large vascular territory infarct. Underlying chronic small vessel ischemic change in the deep white matter.  No intra-axial or extra-axial hemorrhage. No visualized mass lesion or mass effect. The ventricles, cortical sulci and basal cisterns are symmetric and age appropriate.  Posterior fossa structures are unremarkable. The scalp and calvarium are intact. Visualized paranasal sinuses and mastoids are clear.      Impression: Impression:  1. No acute intracranial process. 2. No obvious mass lesion on this noncontrast exam. This report was finalized on 08/07/2019 15:12 by Dr Bruce Rodriguez, .    Xr Chest 1 View    Result Date: 8/5/2019  Narrative: Exam:   XR CHEST 1 VW-   Date:  08/05/2019  History:  Male, age  68 years;f/u resp failure  COMPARISON:  None.  Findings :  The heart and mediastinum are normal in size. No new focal consolidation, pleural effusion or pneumothorax. There is suspected residual trace right pleural effusion. Mild prominence of the right interstitium persists. The left upper lobe opacity reflecting known metastases redemonstrated.  The bones show no acute pathology.       Impression: Impression:  1.  Known left upper lobe mass. 2.  No residual trace right pleural effusion present. 3.  Mild prominence of interstitium, overall unchanged for lung volume  This report was finalized on 08/05/2019 07:55 by Dr. Maryjo Cameron MD.    Xr Chest 1 View    Result Date: 8/1/2019  Narrative: EXAMINATION: XR CHEST 1 VW-  8/1/2019 8:29 AM CDT  HISTORY: worsening shortness of air, cough  1 view chest x-ray compared to 07/24/2019.  The large round left upper lobe mass currently measures 89 x 78 mm compared with 88 x 60 mm.  Mild right basilar atelectasis with trace pleural fluid.  Underlying chronic parenchymal change.  Stable heart size.  No pneumothorax or heart failure.  Summary: 1. Stable or slightly enlarged left upper lobe mass. 2. No pneumonia or pneumothorax is seen. This report was finalized on 08/01/2019 08:36 by Dr. Tyler Khan MD.    Xr Chest 1 View    Result Date: 7/24/2019  Narrative: Exam:   XR CHEST 1  VW-   Date:  07/24/2019  History:  Male, age  68 years;sob, ? Effusion right; R09.02-Hypoxemia; R91.8-Other nonspecific abnormal finding of lung field; R91.8-Other nonspecific abnormal finding of lung field  COMPARISON:  Chest x-ray dated 07/23/2019  Findings :  The heart and mediastinum are unchanged in size. Known left upper lobe mass.  Small left pleural effusion and associated opacity. Improved aeration at the right lung base.       Impression: Impression:  1.  Improved aeration of the right lung base. 2.  Known left upper lobe mass.  This report was finalized on 07/24/2019 12:39 by Dr. Maryjo Cameron MD.    Xr Chest 1 View    Result Date: 7/23/2019  Narrative: EXAM: XR CHEST 1 VW- - 7/23/2019 9:20 AM CDT  HISTORY: dyspnea; R09.02-Hypoxemia; R91.8-Other nonspecific abnormal finding of lung field; R91.8-Other nonspecific abnormal finding of lung field   COMPARISON: 07/22/2019.  TECHNIQUE:  Line images.  Frontal view of the chest.  FINDINGS:  Known left upper lobe mass. Increased opacity at the right lower lobe. Prominence of the mediastinal and cardiac silhouette is likely exaggerated by portable AP technique. No acute bony findings.       Impression: 1. Increased opacity at the right lower lobe, could represent effusion with atelectasis, infection or in the appropriate clinical setting aspiration. 2. Known left upper lobe mass. This report was finalized on 07/23/2019 09:34 by Dr Mile Donnelly MD.    Xr Chest 1 View    Result Date: 7/21/2019  Narrative: HISTORY: Pulmonary edema, lung mass  CXR: Frontal view the chest obtained.  COMPARISON: 07/20/2019  FINDINGS: There is new opacification left hemithorax with associated volume loss suspected. This may relate to mucous plugging in addition to the large 9 cm left upper lobe lung mass in the small left pleural effusion. Mixed interstitial alveolar opacities the right lung are similar suggestive for underlying edema. Small right pleural effusion. No appreciable  pneumothorax. No acute appearing bony pathology.      Impression: 1. New opacification of the left hemithorax with associated volume loss. Mucous plugging in addition to the 9 cm left upper lobe lung mass and small pleural effusion suspected. Similar pulmonary edema of the aerated right lung with a very small right pleural effusion. This report was finalized on 07/21/2019 08:27 by Dr. Deborah Chávez MD.    Xr Chest 1 View    Result Date: 7/20/2019  Narrative: HISTORY: Shortness of breath  CXR: A frontal view the chest obtained.  COMPARISON: 07/17/2019  FINDINGS: There is a stable 9 cm left upper lobe lung mass. There are increasing diffuse bilateral mixed interstitial alveolar pulmonary opacities with small pleural effusions, left greater than right. Cardiac silhouette obscured by the pulmonary findings. There is no appreciable pneumothorax. There is no acute bony pathology.      Impression: 1. Increasing diffuse bilateral pulmonary opacities favorable for edema with small pleural effusions. Stable left upper lobe lung mass. This report was finalized on 07/20/2019 08:55 by Dr. Deborah Chávez MD.    Xr Chest 1 View    Result Date: 7/17/2019  Narrative: XR CHEST 1 VW- 7/17/2019 12:50 PM CDT  HISTORY: cough, soa   COMPARISON: CT scan dated 06/13/2019.  FINDINGS: Dense left upper lobe consolidation reflecting known left upper lobe mass lesion. This measures up to 9.8 cm in greatest diameter. Lung fields are otherwise clear. No pleural effusion or pneumothorax. Borderline cardiomegaly. The pulmonary vasculature are unremarkable.  The osseous structures and surrounding soft tissues demonstrate no acute abnormality.      Impression: 1. Known left upper lobe mass lesion, measuring up to 9.8 cm. Lung fields are otherwise clear.  This report was finalized on 07/17/2019 13:15 by Dr Bruec Rodriguez, .    Xr Abdomen Kub    Result Date: 8/1/2019  Narrative: XR ABDOMEN KUB- 8/1/2019 2:30 PM CDT  HISTORY: abdominal pain & diarrhea    COMPARISON: None  FINDINGS: There is a nonspecific bowel gas pattern. Mesh is present on the anterior abdominal wall.. No soft tissue mass or pathologic calcification  is visualized.  No acute skeletal abnormality is identified.      Impression: 1. Non specific bowel gas pattern..   This report was finalized on 08/01/2019 15:02 by Dr. Robinson Bangura MD.    Xr Chest Pa & Lateral    Result Date: 7/22/2019  Narrative: Exam:   XR CHEST PA AND LATERAL-   Date:  07/22/2019  History:  Male, age  68 years;abnormal chest xray; R09.02-Hypoxemia; R91.8-Other nonspecific abnormal finding of lung field; R91.8-Other nonspecific abnormal finding of lung field  COMPARISON:  Chest x-ray dated 07/21/2019  Findings :  The heart and mediastinum are normal in size. Interval significant improvement in appearance of the left hemithorax with reexpansion of the lung. The known left upper lobe mass is redemonstrated. Trace bilateral pleural effusions, left greater than right.  The bones show no acute pathology.       Impression: Impression:  1.  Interval reexpansion of the left. 2.  Known left upper lobe mass.  This report was finalized on 07/22/2019 07:41 by Dr. Maryjo Cameron MD.      Physical Examination:        Physical Exam   Constitutional: Vital signs are normal. He appears well-developed and well-nourished. He is cooperative.   HENT:   Head: Normocephalic and atraumatic.   Nose: Nose normal.   Mouth/Throat: Oropharynx is clear and moist.   Eyes: Conjunctivae, EOM and lids are normal. Pupils are equal, round, and reactive to light.   Neck: Trachea normal and normal range of motion. Neck supple.   Cardiovascular: Normal rate, regular rhythm and normal heart sounds.   Pulmonary/Chest: Effort normal. Tachypnea noted. He has decreased breath sounds. He has no wheezes.   Abdominal: Soft. Normal appearance and bowel sounds are normal.   Musculoskeletal: Normal range of motion.   Generalized weakness   Lymphadenopathy:     He has no  cervical adenopathy.     He has no axillary adenopathy.        Left: No supraclavicular adenopathy present.   Neurological: He is alert. He has normal strength. No cranial nerve deficit or sensory deficit.   Skin: Skin is warm, dry and intact. No petechiae and no rash noted. No cyanosis or erythema. Nails show no clubbing.   Psychiatric: He has a normal mood and affect. His speech is normal and behavior is normal. Judgment and thought content normal. Cognition and memory are normal.   Nursing note and vitals reviewed.    Assessment:             Adenocarcinoma, lung (CMS/HCC)    Past Medical History:   Diagnosis Date   • Adenocarcinoma, lung (CMS/HCC)    • Cerumen impaction    • Cervical disc disease    • Colon cancer (CMS/HCC)    • Diabetes (CMS/HCC)    • Eustachian tube dysfunction    • GERD (gastroesophageal reflux disease)    • Hx of colonic polyps    • Hypertension    • PUD (peptic ulcer disease)    • Sensorineural hearing loss         Plan:        1. Acute respiratory failure with hypoxia and hypercapnia  2. Mediastinal adenopathy  3. Left Upper Lobe Lung Mass  4. Diabetes Mellitus type 2 with hyperglycemia  5. Hx Colon Cancer stage 1  6. Pulmonary edema  7. Pleural effusion, bilateral  8. Anemia  9. GERD  10. Headache     Continue current treatment. Monitor counts. Increase activity. Wean oxygen as able. Radiation therapy per radiation oncology. Labs Monday. Encourage increased participation with therapy and ADLs. Glycemic control. Pain control. Carafate 1gm before meals and bedtime.     Electronically signed by MING Esteban on 8/8/2019 at 9:03 AM     I have discussed the care of Romel Rosario, including pertinent history and exam findings, with the nurse practitioner.    I have seen and examined the patient and the key elements of all parts of the encounter have been performed by me.  I agree with the assessment, plan and orders as documented by MING Esteban, after I modified the exam  findings and the plan of treatments and the final version is my approved version of the assessment.        Electronically signed by Kermit Zamudio MD on 8/8/2019 at 5:13 PM

## 2019-08-08 NOTE — PROGRESS NOTES
MEDICAL ONCOLOGY PROGRESS NOTE    Patient name: Romel Rosario  Patient : 1950  VISIT # [id number]  MR #[id number]   Room 590    SUBJECTIVE: His biggest complaint today is headache, and continued diarrhea.  Reports that he had XRT on Monday which should have been #11 of 13.  He reports he had no radiation since then and he was told he was finished.    INTERVAL HISTORY:  The patient is a 68 years old male who was recently referred to me for further workup of a lung mass.  He has also being referred to thoracic surgery and Dr. Jere Brumfield for further workup and possible bronchoscopy with biopsy  The patient was seen today by Dr. Eddie Bell and had worsening shortness breath, and therefore was sent to the emergency  He is being admitted with acute COPD exacerbation.     Cancer history  Mr Romel Rosario was first seen by me on 2019 referred by his primary care provider for further evaluation of a lung mass and mediastinal adenopathy. The patient had complains of shortness of breath at exertion and therefore was recommended further imaging.  · 2019-CT chest with contrast showed a 6.3 x 5.6 x 7 cm left upper lobe lung mass, centrally necrotic, with compressive atelectasis. Questionable satellite nodule within the lingula measured 7 mm. Noncalcified 11 mm left lower lobe nodule concerning for pulmonary metastases. 5 mm subpleural right lower lobe nodule of uncertain significance. A small hiatal hernia. No adrenal nodules. Mediastinal adenopathy measuring up to 2 cm. Subcarina lymph nodes measure up to 1.3 cm.   · 2019-he was first seen by Dr. Fishman.   · 7/3/2019-bone scan showed no convincing evidence of osseous metastasis.   · 7/3/2019- CT abdomen, pelvis showed no evidence of intra-abdominal metastatic disease. Noncalcified nodule in the right middle lobe    Status post bronchoscopy/EBUS 2019 by Dr. Jere Brumfield.  - Findings: no endobronchial lesions. multiple nodes in station 4R,  measuring up to 2 cm, subcarinal nodes x2, measuring up to 2 cm. TBNA was collected from station 4 and station 7. Mild, intraprocedural controlled hemorrhage  -  FNA from bronch from 7/19/2019 revealed adenocarcinoma, anticipate chemotherapy/immunotherapy as outpatient when overall performance status has improved.    - PDL1 requested on 7/24/2019. Unfortunately not enough tissue to obtain study.  - Dr. Bell documented CT stimulation on 7/24/2019 revealed no change in tumor size. Progression of bilateral pleural effusions compared to 1 week ago was noted.    Palliative XRT began on 7/19/2019.  He received a 13 treatment fractions that were completed on 8/5/2019 for a total of 3250 cGy.          REVIEW OF SYSTEMS  CONSTITUTIONAL: no fever, no night sweats; positive for fatigue, possibly a little better  HEENT: no blurring of vision, no double vision, no tinnitus, no ulceration, no dental caries, and no dysplasia, no epistaxis;  LUNGS: no hemoptysis; positive for shortness of breath, cough, hypoxia, lung mass s/p bronch/EBUS. Wearing O2,  CARDIOVASCULAR: no palpitation, no chest pain; shortness of breath   GI: no abdominal pain, no vomiting, no constipation; positive for persistent diarrhea  MARNI: no dysuria, no hematuria, no frequency or urgency, no nephrolithiasis;  MUSCULOSKELETAL: no joint pain, no swelling, no stiffness; generalized weakness  ENDOCRINE: positive for DM  HEMATOLOGY: no bleeding, no history of clotting disorder; positive for mild anemia  DERMATOLOGY: no skin rash, no eczema, no pruritus;  PSYCHIATRY: no suicidal ideation, no homicidal ideation;  NEUROLOGY: no seizures, no numbness or tingling of hands, no numbness or tingling of feet, no paresis; positive for headache    OBJECTIVE:    There were no vitals filed for this visit.  No intake or output data in the 24 hours ending 08/08/19 0840    PHYSICAL EXAM:   CONSTITUTIONAL:  Nasal O2  EYES: Non icteric  ENT: Mucus membranes moist, no oral  pharyngeal lesions, external inspection of ears and nose are normal  NECK: Supple, no masses.  No palpable thyroid mass  CHEST/LUNGS: diminished breath sounds throughout, no wheezes  CARDIOVASCULAR: RRR, no murmurs.  No lower extremity edema.  mild tachycardia with exertion  ABDOMEN: soft non-tender, active bowel sounds, no HSM; protuberant   EXTREMITIES: warm, full ROM in all 4 extremities, no focal weakness.  SKIN: warm, dry with no rashes or lesions  LYMPH: No cervical, clavicular, axillary, or inguinal lymphadenopathy  NEUROLOGIC: sleepy, follows commands, non focal   PSYCH: mood and affect appropriate.      CBC  Results from last 7 days   Lab Units 08/08/19  0456 08/05/19  0424 08/01/19  1201   WBC 10*3/mm3 11.80* 9.58 9.73   HEMOGLOBIN g/dL 11.8* 10.7* 12.0*   HEMATOCRIT % 37.9 34.3* 38.8*   PLATELETS 10*3/mm3 394 370 317       Lab Results   Component Value Date     08/08/2019    K 4.1 08/08/2019    CL 97 (L) 08/08/2019    CO2 35.0 (H) 08/08/2019    BUN 17 08/08/2019    CREATININE 0.87 08/08/2019    GLUCOSE 108 (H) 08/08/2019    CALCIUM 10.5 (H) 08/08/2019    BILITOT 0.6 08/01/2019    ALKPHOS 110 08/01/2019    AST 24 08/01/2019    ALT 29 08/01/2019    AGRATIO 1.1 08/01/2019    GLOB 3.1 08/01/2019       No results found for: INR, PROTIME    Cultures:    Lab Results   Component Value Date    BLOODCX No growth at 5 days 07/17/2019     No components found for: URINCX     XR CHEST 1 VW- 7/17/2019 12:50 PM CDT   COMPARISON: CT scan dated 06/13/2019.     FINDINGS:   Dense left upper lobe consolidation reflecting known left upper lobe  mass lesion. This measures up to 9.8 cm in greatest diameter. Lung  fields are otherwise clear. No pleural effusion or pneumothorax.  Borderline cardiomegaly. The pulmonary vasculature are unremarkable.      The osseous structures and surrounding soft tissues demonstrate no acute  abnormality.     IMPRESSION:  1. Known left upper lobe mass lesion, measuring up to 9.8 cm.  Lung  fields are otherwise clear.     This report was finalized on 07/17/2019 13:15 by Dr Bruce Rodriguez, .      ASSESSMENT/PLAN:    Stage IV carcinoma of the lung with pulmonary metastases    - Status post bronchoscopy/EBUS 7/19/2019 by Dr. Jere Brumfield.  - Findings: no endobronchial lesions. multiple nodes in station 4R, measuring up to 2 cm, subcarinal nodes x2, measuring up to 2 cm. TBNA was collected from station 4 and station 7. Mild, intraprocedural controlled hemorrhage  -  FNA from bronch from 7/19/2019 revealed adenocarcinoma, anticipate chemotherapy/immunotherapy as outpatient when overall performance status has improved.  - KOH prep negative for yeast, respiratory culture negative  - PDL1 requested on 7/24/2019. Unfortunately not enough tissue to obtain study.    - Receiving Palliative radiation initiated 7/19/19 -today should have been #13 the final planned palliative treatment however he reports that Monday was the last time he was given radiation and told it was his last one.  I will need to check with radiation today.    - Chest x-ray on 7/22/2019 revealed interval expansion of the left lung.  Trace bilateral pleural effusions left greater than right.  - Chest x-ray on 7/23/2019 revealed increased opacity in the RLL could represent effusion, atelectasis or infection or aspiration.  Known RADU mass  - Dr. Bell documented CT stimulation on 7/24/2019 revealed no change in tumor size. Progression of bilateral pleural effusions compared to 1 week ago was noted.  - pCXR 8/5/2019 revealed normal left upper lobe mass, no residual trace right pleural effusion, mild prominence of interstitium, overall unchanged for lung volume.    COPD exacerbation  -  Continue with O2 supplementation, bronchodilators and steroids  -  Followed by pulmonology    Diarrhea  - GI panel was negative including C. Difficile  -Imodium added    Anemia - stable (CBC 8/5/2019)  · WBC 9.58  · Hgb 10.7  · ,000    Diabetes type 2,  presently uncontrolled   -  per IM    Hypertension   -  per IM       Generalized weakness  - PT     Discussed current plan of care with Romel.      Palliative XRT began on 7/19/2019.  He received a 13 treatment fractions that were completed on 8/5/2019 for a total of 3250 cGy.    Continue with pulmonary support.  Diarrhea-C. difficile was negative-Imodium added  Headache- Fioricet to be given.  Anticipate systemic chemotherapy/immunotherapy as outpatient once overall performance status has improved.  Encouraged increase activity as tolerated.      Claudio Nevarez MD    08/08/19  8:40 AM

## 2019-08-08 NOTE — PROGRESS NOTES
PULMONARY AND CRITICAL CARE PROGRESS NOTE - [facility]  Patient: Romel Rosario    1950   Acct# [id number]  08/08/19   9:37 AM  Referring Provider: Kermit Zamudio MD  Chief Complaint: Hypoxemia   Interval history: He is improved from a respiratory standpoint and his FiO2 has been reduced down to 5 L/min on the high flow system.  Diarrhea is improving.  Stool studies negative.  Headache improved.  Vomit x1.  No nausea at this time.  No other new complaints.  No family at the bedside.    Review of Systems: Review of Systems   Constitutional: Positive for fatigue. Negative for chills and fever.   Respiratory: Positive for shortness of breath. Negative for cough and choking.    Cardiovascular: Negative for chest pain.   Gastrointestinal: Positive for blood in stool and diarrhea. Negative for constipation, nausea and vomiting.   Neurological: Positive for headaches.    Physical Exam:  Blood pressure 118/64, pulse 81, respirations 22, temperature 98.3, saturation 94 on 5 L    Physical Exam   Constitutional: He appears well-developed.  Non-toxic appearance. He has a sickly appearance. He appears ill. No distress. Nasal cannula in place.   Eyes: EOM are normal. No scleral icterus.   Neck: No JVD present. No tracheal deviation present.   Cardiovascular: Normal rate and regular rhythm.   Pulmonary/Chest: No respiratory distress. He has rales.   Abdominal: Soft. There is no tenderness. There is no guarding.   Musculoskeletal: He exhibits no edema.   Skin: Skin is warm and dry. He is not diaphoretic.   Psychiatric: He has a normal mood and affect. His behavior is normal.     Results from last 7 days   Lab Units 08/08/19  0456 08/05/19  0424 08/01/19  1201   WBC 10*3/mm3 11.80* 9.58 9.73   HEMOGLOBIN g/dL 11.8* 10.7* 12.0*   PLATELETS 10*3/mm3 394 370 317     Results from last 7 days   Lab Units 08/08/19  0456 08/05/19  0424 08/01/19  1201   SODIUM mmol/L 138 138 137   POTASSIUM mmol/L 4.1 3.8  3.4*   BUN mg/dL 17 18 25*   CREATININE mg/dL 0.87 0.81 1.21         Recent films:  Ct Head Without Contrast    Result Date: 8/7/2019  Impression:  1. No acute intracranial process. 2. No obvious mass lesion on this noncontrast exam. This report was finalized on 08/07/2019 15:12 by Dr Bruce Rodriguez, .  Pulmonary Assessment:  1. Acute resp failure with hypoxia  improving  2. lung cancer; deferred to others and stable  3. Right lower lobe infiltrate and pulm edema, improving clinically better  4. mediastinal adenopathy; due to lung cancer  5. Persistent, unimproved headache  6. New onset diarrhea workup neg for C. difficile  Recommend:   · Titrate oxygen for saturation between 90 and 94%  · Continue BiPAP at HS and naps   · Continue pulmonary regimen with Symbicort, Delsym, Mucinex and duo nebs  · IS/OPEP   · PT/OT  · Continue prednisone at current dose  · Stool C. difficile studies negative  · CT head negative    Electronically signed by MING Canada on 8/8/2019 at 9:37 AM     Physician substantive portion:  Headache is improved diarrhea is improved.  He said he had one episode of vomiting and now feels much better.  Less short of breath.  Exam shows him to be ill-appearing with diminished breath sounds.  He appears to be doing much better.  Plan to continue oxygen, wean as tolerated.  When other problems are resolved, particularly deconditioning and weakness then okay for home.  Will decrease prednisone to 10 mg/day.  He can continue that for the next week and then discontinue.    I have seen and examined patient personally, performing a face-to-face diagnostic evaluation with plan of care reviewed and developed with APRN and nursing staff. I have addended and/or modified the above history of present illness, physical examination, and assessment and plan to reflect my findings and impressions. Essential elements of the care plan were discussed with APRN above.  Agree with findings and assessment/plan as  documented above.    Electronically signed by Jere Brumfield MD, on 8/8/2019, 10:03 AM

## 2019-08-09 NOTE — PROGRESS NOTES
MEDICAL ONCOLOGY PROGRESS NOTE    Patient name: Romel Rosario  Patient : 1950  VISIT # [id number]  MR #[id number]   Room 590    SUBJECTIVE: He continues with a headache.  Diarrhea is much better.  He is on 4 L/min of oxygen at this time.  He is anticipating going home today.      INTERVAL HISTORY:  The patient is a 68 years old male who was recently referred to me for further workup of a lung mass.  He has also being referred to thoracic surgery and Dr. Jere Brumfield for further workup and possible bronchoscopy with biopsy  The patient was seen today by Dr. Eddie Bell and had worsening shortness breath, and therefore was sent to the emergency  He is being admitted with acute COPD exacerbation.     Cancer history  Mr Romel Rosario was first seen by me on 2019 referred by his primary care provider for further evaluation of a lung mass and mediastinal adenopathy. The patient had complains of shortness of breath at exertion and therefore was recommended further imaging.  · 2019-CT chest with contrast showed a 6.3 x 5.6 x 7 cm left upper lobe lung mass, centrally necrotic, with compressive atelectasis. Questionable satellite nodule within the lingula measured 7 mm. Noncalcified 11 mm left lower lobe nodule concerning for pulmonary metastases. 5 mm subpleural right lower lobe nodule of uncertain significance. A small hiatal hernia. No adrenal nodules. Mediastinal adenopathy measuring up to 2 cm. Subcarina lymph nodes measure up to 1.3 cm.   · 2019-he was first seen by Dr. Fishman.   · 7/3/2019-bone scan showed no convincing evidence of osseous metastasis.   · 7/3/2019- CT abdomen, pelvis showed no evidence of intra-abdominal metastatic disease. Noncalcified nodule in the right middle lobe    Status post bronchoscopy/EBUS 2019 by Dr. Jere Brumfield.  - Findings: no endobronchial lesions. multiple nodes in station 4R, measuring up to 2 cm, subcarinal nodes x2, measuring up to 2 cm. TBNA was  collected from station 4 and station 7. Mild, intraprocedural controlled hemorrhage  -  FNA from bronch from 7/19/2019 revealed adenocarcinoma, anticipate chemotherapy/immunotherapy as outpatient when overall performance status has improved.    - PDL1 requested on 7/24/2019. Unfortunately not enough tissue to obtain study.  - Dr. Bell documented CT stimulation on 7/24/2019 revealed no change in tumor size. Progression of bilateral pleural effusions compared to 1 week ago was noted.    TREATMENT SUMMARY  1. Palliative XRT began on 7/19/2019.  He received a 13 treatment fractions that were completed on 8/5/2019 for a total of 3250 cGy.          REVIEW OF SYSTEMS  CONSTITUTIONAL: no fever, no night sweats; positive for fatigue, possibly a little better  HEENT: no blurring of vision, no double vision, no tinnitus, no ulceration, no dental caries, and no dysplasia, no epistaxis;  LUNGS: no hemoptysis; positive for shortness of breath, cough, hypoxia, lung mass s/p bronch/EBUS. Wearing O2,  CARDIOVASCULAR: no palpitation, no chest pain; shortness of breath   GI: no abdominal pain, no vomiting, no constipation; diarrhea has resolved  MARNI: no dysuria, no hematuria, no frequency or urgency, no nephrolithiasis;  MUSCULOSKELETAL: no joint pain, no swelling, no stiffness; generalized weakness  ENDOCRINE: positive for DM  HEMATOLOGY: no bleeding, no history of clotting disorder; positive for mild anemia  DERMATOLOGY: no skin rash, no eczema, no pruritus;  PSYCHIATRY: no suicidal ideation, no homicidal ideation;  NEUROLOGY: no seizures, no numbness or tingling of hands, no numbness or tingling of feet, no paresis; positive for headache    OBJECTIVE:    There were no vitals filed for this visit.  No intake or output data in the 24 hours ending 08/09/19 0718    PHYSICAL EXAM:   CONSTITUTIONAL:  Nasal O2  EYES: Non icteric  ENT: Mucus membranes moist, no oral pharyngeal lesions, external inspection of ears and nose are  normal  NECK: Supple, no masses.  No palpable thyroid mass  CHEST/LUNGS: diminished breath sounds throughout, no wheezes  CARDIOVASCULAR: RRR, no murmurs.  No lower extremity edema.  mild tachycardia with exertion  ABDOMEN: soft non-tender, active bowel sounds, no HSM; protuberant   EXTREMITIES: warm, full ROM in all 4 extremities, no focal weakness.  SKIN: warm, dry with no rashes or lesions  LYMPH: No cervical, clavicular, axillary, or inguinal lymphadenopathy  NEUROLOGIC: sleepy, follows commands, non focal   PSYCH: mood and affect appropriate.      CBC  Results from last 7 days   Lab Units 08/08/19  0456 08/05/19  0424   WBC 10*3/mm3 11.80* 9.58   HEMOGLOBIN g/dL 11.8* 10.7*   HEMATOCRIT % 37.9 34.3*   PLATELETS 10*3/mm3 394 370       Lab Results   Component Value Date     08/08/2019    K 4.1 08/08/2019    CL 97 (L) 08/08/2019    CO2 35.0 (H) 08/08/2019    BUN 17 08/08/2019    CREATININE 0.87 08/08/2019    GLUCOSE 108 (H) 08/08/2019    CALCIUM 10.5 (H) 08/08/2019    BILITOT 0.6 08/01/2019    ALKPHOS 110 08/01/2019    AST 24 08/01/2019    ALT 29 08/01/2019    AGRATIO 1.1 08/01/2019    GLOB 3.1 08/01/2019       No results found for: INR, PROTIME    Cultures:    Lab Results   Component Value Date    BLOODCX No growth at 5 days 07/17/2019     No components found for: URINCX     XR CHEST 1 VW- 7/17/2019 12:50 PM CDT   COMPARISON: CT scan dated 06/13/2019.     FINDINGS:   Dense left upper lobe consolidation reflecting known left upper lobe  mass lesion. This measures up to 9.8 cm in greatest diameter. Lung  fields are otherwise clear. No pleural effusion or pneumothorax.  Borderline cardiomegaly. The pulmonary vasculature are unremarkable.      The osseous structures and surrounding soft tissues demonstrate no acute  abnormality.     IMPRESSION:  1. Known left upper lobe mass lesion, measuring up to 9.8 cm. Lung  fields are otherwise clear.     This report was finalized on 07/17/2019 13:15 by Dr Bruce Rodriguez,  .      ASSESSMENT/PLAN:    Stage IV carcinoma of the lung with pulmonary metastases    - Status post bronchoscopy/EBUS 7/19/2019 by Dr. Jere Brumfield.  - Findings: no endobronchial lesions. multiple nodes in station 4R, measuring up to 2 cm, subcarinal nodes x2, measuring up to 2 cm. TBNA was collected from station 4 and station 7. Mild, intraprocedural controlled hemorrhage  -  FNA from bronch from 7/19/2019 revealed adenocarcinoma, anticipate chemotherapy/immunotherapy as outpatient when overall performance status has improved.  - KOH prep negative for yeast, respiratory culture negative  - PDL1 requested on 7/24/2019. Unfortunately not enough tissue to obtain study.    - Dr. Bell documented CT stimulation on 7/24/2019 revealed no change in tumor size. Progression of bilateral pleural effusions compared to 1 week ago was noted.    - pCXR 8/5/2019 revealed normal left upper lobe mass, no residual trace right pleural effusion, mild prominence of interstitium, overall unchanged for lung volume.    - Completed Palliative radiation 8/5/2019    COPD exacerbation  -  Continue with O2 supplementation, bronchodilators and steroids  -  Followed by pulmonology    Diarrhea  - GI panel was negative including C. Difficile  - Imodium added and diarrhea resolved    Anemia - stable (CBC 8/8/2019)  · WBC 11.8  · Hgb 11.8  · ,000    Diabetes type 2, presently uncontrolled   -  per IM    Hypertension   -  per IM       Generalized weakness  - PT     Again discussed current plan of care with Romel.      Palliative XRT began on 7/19/2019.  He received a 13 treatment fractions that were completed on 8/5/2019 for a total of 3250 cGy.    Persistent Headache    CT head without 8/7/2019 was negative.    MRI brain with and without gadolinium before he is discharged to r/o metastatic disease    Continue with pulmonary support.  Headache- MRI brain  Anticipate systemic chemotherapy/immunotherapy as outpatient once overall performance  status has improved.  Continuing to encourage increase activity as tolerated.    Possibly home later today  DARIUS Pena    08/09/19  7:18 AM    I personally saw and examined this patient, performing a face-to-face diagnostic evaluation with plan of care reviewed and developed with  Sukhdev Escobar PA-C and nursing staff.   I have addended and/or modified the above history of present illness, physical examination, and assessment and plan to reflect my findings and impressions.   Essential elements of the care plan were discussed with  Sukhdev Escobar PA-C .   Agree with findings and assessment/plan as documented above.   Questions were encouraged, asked and answered to their understanding and satisfaction.    Claudio Nevarez MD  8/9/2019 7:54 AM

## 2019-08-09 NOTE — PROGRESS NOTES
PULMONARY AND CRITICAL CARE PROGRESS NOTE - [facility]  Patient: Romel Rosario    1950   Acct# [id number]  08/09/19   7:13 AM  Referring Provider: Kermit Zamudio MD  Chief Complaint: Hypoxemia   Interval history: He is improved from a respiratory standpoint. He reports he is going home today. He is somewhat mad this morning as there has been some confusion over repeating imaging of the brain that he states he had done on Wednesday. Staff is looking into this for him.   Review of Systems: Review of Systems   Constitutional: Positive for fatigue (improved).   Respiratory: Positive for shortness of breath (improved ). Negative for cough and choking.    Cardiovascular: Negative for chest pain.   Gastrointestinal: Negative for blood in stool, constipation, diarrhea, nausea and vomiting.   Neurological: Negative for headaches.    Physical Exam:  Blood pressure 118/64, pulse 81, respirations 22, temperature 98.3, saturation 94 on 5 L    Physical Exam   Constitutional: He appears well-developed and well-nourished.  Non-toxic appearance. He does not have a sickly appearance. He does not appear ill. No distress. Nasal cannula in place.   Eyes: EOM are normal. No scleral icterus.   Neck: No JVD present. No tracheal deviation present.   Cardiovascular: Normal rate and regular rhythm.   Pulmonary/Chest: No respiratory distress.   Few crackles    Abdominal: Soft. There is no tenderness. There is no guarding.   Musculoskeletal: He exhibits no edema.   Skin: Skin is warm and dry. He is not diaphoretic.   Psychiatric: He has a normal mood and affect. His behavior is normal.     Results from last 7 days   Lab Units 08/08/19  0456 08/05/19  0424   WBC 10*3/mm3 11.80* 9.58   HEMOGLOBIN g/dL 11.8* 10.7*   PLATELETS 10*3/mm3 394 370     Results from last 7 days   Lab Units 08/08/19  0456 08/05/19  0424   SODIUM mmol/L 138 138   POTASSIUM mmol/L 4.1 3.8   BUN mg/dL 17 18   CREATININE mg/dL 0.87 0.81          Recent films:  Ct Head Without Contrast    Result Date: 8/7/2019  Impression:  1. No acute intracranial process. 2. No obvious mass lesion on this noncontrast exam. This report was finalized on 08/07/2019 15:12 by Dr Bruce Rodriguez, .  Pulmonary Assessment:  1. Acute resp failure with hypoxia  improved  2. lung cancer; deferred to others and stable  3. Right lower lobe infiltrate and pulm edema, improved  4. mediastinal adenopathy; due to lung cancer  5. Headache-resolved   6. Diarrhea -resolved   Recommend:   · Ok to discharge  · He is set up for his outpatient oxygen.   · He is asked to follow up with pulmonology in 2 months   Electronically signed by MING Romano on 8/9/2019 at 7:13 AM      Physician substantive portion:  Patient has got home oxygen set up.  He is anxious to go home.  Chest shows diminished breath sounds.  He is awake and alert.  Plan okay to home.  We will see him back after he is been started on his antineoplastic therapy.    I have seen and examined patient personally, performing a face-to-face diagnostic evaluation with plan of care reviewed and developed with APRN and nursing staff. I have addended and/or modified the above history of present illness, physical examination, and assessment and plan to reflect my findings and impressions. Essential elements of the care plan were discussed with APRN above.  Agree with findings and assessment/plan as documented above.    Electronically signed by Jere Brumfield MD, on 8/9/2019, 11:47 AM

## 2019-08-09 NOTE — DISCHARGE SUMMARY
[facility]  Nathan Zamudio M.D.  TERRENCE Zamudio M.D.  MING Anders APRN    Internal Medicine Discharge Summary    Patient ID: Romel Rosario  MRN: [id number]     Acct:  [id number]       Patient's PCP: Provider, No Known    Admit Date: 7/25/2019     Discharge Date:   8/9/2019    Admitting Physician: Kermit Zamudio MD    Discharge Physician: MING Esteban     Active Discharge Diagnoses:  1. Acute respiratory failure with hypoxia and hypercapnia  2. Mediastinal adenopathy  3. Left Upper Lobe Lung Mass  4. Diabetes Mellitus type 2 with hyperglycemia  5. Hx Colon Cancer stage 1  6. Pulmonary edema  7. Pleural effusion, bilateral  8. Anemia  9. GERD  10. Headache    Primary Problem  <principal problem not specified>      Hospital Problems    Adenocarcinoma, lung (CMS/HCC)     Past Medical History:   Diagnosis Date   • Adenocarcinoma, lung (CMS/HCC)    • Cerumen impaction    • Cervical disc disease    • Colon cancer (CMS/HCC)    • Diabetes (CMS/HCC)    • Eustachian tube dysfunction    • GERD (gastroesophageal reflux disease)    • Hx of colonic polyps    • Hypertension    • PUD (peptic ulcer disease)    • Sensorineural hearing loss        The patient was seen and examined on the day of discharge and this discharge summary is in conjunction with any daily progress note from day of discharge.    Code Status:    There are no questions and answers to display.       Hospital Course:   Romel Rosario is a  77 y.o. Male who presented with shortness of breath.  Patient was seen in [facility] ER 7/17/19 after being short of breath and decreased saturation on room air while in radiation oncology trying to establish care and plan for radiation therapy for Left upper lobe cancer (7cm Mass 6/13/19) CXR revealed mass 9.8cm.  During admission to [facility] patient underwent Bronchoscopy with endobronchial ultrasound and transbronchial biopsy 7/19.  Histopathology is positive for  malignant cells favoring adenocarcinoma.  Palliative radiation treatment initiated on 7/19/19, plan for 13 days of radiation treatment.  Completed with little difficulty.  Completed diuretic therapy for bilateral pleural effusions.  Continues to have episodes of shortness of breath with increased activity but has been weaned down to NC 4-6L, great improvement from Bipap and High Flow 10L.    Patient has had several c/o headaches while in Continued care, CT head completed 8/7/19 no acute intracranial process, no obvious mass lesion on non contrasted exam.  Patient will have MRI of brain outpatient later this date.     Resting in bed with wife at bedside.  Eager to go home.  Home health to evaluate and assist.  Oxygen set up at home.        Physical Exam   Constitutional: Vital signs are normal. He appears well-developed and well-nourished. He is cooperative.   HENT:   Head: Normocephalic and atraumatic.   Nose: Nose normal.   Mouth/Throat: Oropharynx is clear and moist.   Eyes: Conjunctivae, EOM and lids are normal. Pupils are equal, round, and reactive to light.   Neck: Trachea normal and normal range of motion. Neck supple.   Cardiovascular: Normal rate, regular rhythm and normal heart sounds.   Pulmonary/Chest: Effort normal. Tachypnea noted. He has decreased breath sounds. He has no wheezes.   Abdominal: Soft. Normal appearance and bowel sounds are normal.   Musculoskeletal: Normal range of motion.   Generalized weakness   Lymphadenopathy:     He has no cervical adenopathy.     He has no axillary adenopathy.        Left: No supraclavicular adenopathy present.   Neurological: He is alert. He has normal strength. No cranial nerve deficit or sensory deficit.   Skin: Skin is warm, dry and intact. No petechiae and no rash noted. No cyanosis or erythema. Nails show no clubbing.   Psychiatric: He has a normal mood and affect. His speech is normal and behavior is normal. Judgment and thought content normal. Cognition and  memory are normal.   Nursing note and vitals reviewed.        Consults:     Pulmonary  Oncology  Radiation    Disposition: home    Discharged Condition: Stable    Follow Up:   Dr. Fishman 8/26/19 at 11:15am  Dr. Brumfield 8/23/19 at 10:45am  PCP 8/16/19 at 09:15am      Diet: Diet Regular; Cardiac, Consistent Carbohydrate    Discharge Medications:   See computer generated list.       Time Spent on discharge is  35 minutes in patient examination, evaluation, patient/family counseling as well as medication reconciliation, prescriptions for required medications, discharge plan and follow up.     Electronically signed by MING Esteban on 8/9/2019 at 11:07 AM

## 2019-08-25 NOTE — TELEPHONE ENCOUNTER
"Referred to call Dr. Fishman today, that is his oncologist.     Reason for Disposition  • [1] SEVERE diarrhea (e.g., 7 or more times / day more than normal) AND [2] receiving chemotherapy or radiation therapy (within past 4 weeks)    Additional Information  • Negative: Shock suspected (e.g., cold/pale/clammy skin, too weak to stand, low BP, rapid pulse)  • Negative: Difficult to awaken or acting confused (e.g., disoriented, slurred speech)  • Negative: Sounds like a life-threatening emergency to the triager  • Negative: Vomiting also present and worse than the diarrhea  • Negative: Fecal impaction suspected (with leakage of watery stool around impaction)  • Negative: [1] SEVERE abdominal pain (e.g., excruciating) AND [2] present > 1 hour  • Negative: [1] SEVERE abdominal pain AND [2] age > 60  • Negative: [1] Blood in the stool AND [2] moderate or large amount of blood  • Negative: Black or tarry bowel movements  (Exception: chronic-unchanged  black-grey bowel movements AND is taking iron pills or peptobismol)  • Negative: [1] Neutropenia known or suspected (e.g., recent cancer chemotherapy) AND [2] new onset of diarrhea  • Negative: [1] Drinking very little AND [2] dehydration suspected (e.g., no urine > 12 hours, very dry mouth, very lightheaded)  • Negative: Patient sounds very sick or weak to the triager    Answer Assessment - Initial Assessment Questions  1. DIARRHEA SEVERITY: \"How bad is the diarrhea?\" \"How many extra stools have you had in the past 24 hours than normal?\"     - MILD (Scale 1-3): Few loose or mushy BMs; increase of 1-3 stools over normal daily number of stools; mild increase in ostomy output.    - MODERATE (Scale 4-7): Increase of 4-6 stools daily over normal; moderate increase in ostomy output.    - SEVERE (Scale 8-10 or Worst Possible): Increase of 7 or more stools daily over normal; moderate increase in ostomy output; incontinence.      Since last night, has had 10 to 12 times  2. ONSET: " "\"When did the diarrhea begin?\"       6 days  3. BM CONSISTENCY: \"How loose or watery is the diarrhea?\"       Loose  4. VOMITING: \"Are you also vomiting?\" If so, ask: \"How many times in the past 24 hours?\"       No  5. ABDOMINAL PAIN: \"Are you having any abdominal pain?\" If yes: \"What does it feel like?\" (e.g., crampy, dull, intermittent, constant)       Nausea  6. ABDOMINAL PAIN SEVERITY: If present, ask: \"How bad is the pain?\"  (e.g., Scale 1-10; mild, moderate, or severe)     - MILD (1-3): doesn't interfere with normal activities, abdomen soft and not tender to touch      - MODERATE (4-7): interferes with normal activities or awakens from sleep, tender to touch      - SEVERE (8-10): excruciating pain, doubled over, unable to do any normal activities        Only before a BM  7. ORAL INTAKE: If vomiting, \"Have you been able to drink liquids?\" \"How much fluids have you had in the past 24 hours?\"      He is drinking good amounts  8. HYDRATION: \"Any signs of dehydration?\" (e.g., dry mouth [not just dry lips], too weak to stand, dizziness, new weight loss) \"When did you last urinate?\"      He is urinating good amounts  9. EXPOSURE: \"Have you traveled to a foreign country recently?\" \"Have you been exposed to anyone with diarrhea?\" \"Could you have eaten any food that was spoiled?\"      No  10. CANCER: \"What type of cancer do you have?\"        Lung  11. CANCER - TREATMENT: \"What cancer treatments have you received?\" \"When did you last receive them?\" (e.g., recent surgery, radiation, or chemotherapy). If triager has access to the patient's medical record, triager should review treatments and administration dates.        Radiation treatment began in July and has 13 treatments  12. CANCER - NEUTROPENIA RISK: \"Have you received chemotherapy recently? If Yes, \"When was it and what was your last CBC and ANC (absolute neutrophil count)?\" \"Were you told that your white cell count was low?\" If triager has access to the patient's " "medical record, triager should review most recent labs. An ANC less than 1,000 - 1,500 means that the neutrophils are low and the immune system is weak.        Just radiation, no recent labs  13. OTHER SYMPTOMS: \"Do you have any other symptoms?\" (e.g., fever, blood in stool)        Afebrile, no blood    Protocols used: CANCER - DIARRHEA-ADULT-AH      "

## 2019-08-29 ENCOUNTER — HOSPITAL ENCOUNTER (OUTPATIENT)
Dept: INFUSION THERAPY | Age: 69
Discharge: HOME | End: 2019-08-29
Payer: MEDICARE

## 2019-08-29 ENCOUNTER — OFFICE VISIT (OUTPATIENT)
Dept: HEMATOLOGY | Age: 69
End: 2019-08-29
Payer: MEDICARE

## 2019-08-29 VITALS
WEIGHT: 192.1 LBS | SYSTOLIC BLOOD PRESSURE: 100 MMHG | BODY MASS INDEX: 28.45 KG/M2 | OXYGEN SATURATION: 88 % | DIASTOLIC BLOOD PRESSURE: 64 MMHG | HEART RATE: 61 BPM | HEIGHT: 69 IN

## 2019-08-29 DIAGNOSIS — C34.12 MALIGNANT NEOPLASM OF UPPER LOBE BRONCHUS, LEFT (HCC): Primary | ICD-10-CM

## 2019-08-29 DIAGNOSIS — C34.12 MALIGNANT NEOPLASM OF UPPER LOBE BRONCHUS, LEFT (HCC): ICD-10-CM

## 2019-08-29 DIAGNOSIS — R63.8 ALTERATION IN APPETITE: ICD-10-CM

## 2019-08-29 DIAGNOSIS — Z71.89 CARE PLAN DISCUSSED WITH PATIENT: ICD-10-CM

## 2019-08-29 PROCEDURE — 99214 OFFICE O/P EST MOD 30 MIN: CPT | Performed by: INTERNAL MEDICINE

## 2019-08-29 PROCEDURE — 80053 COMPREHEN METABOLIC PANEL: CPT

## 2019-08-29 PROCEDURE — 36415 COLL VENOUS BLD VENIPUNCTURE: CPT

## 2019-08-29 PROCEDURE — 85025 COMPLETE CBC W/AUTO DIFF WBC: CPT

## 2019-08-29 RX ORDER — ALBUTEROL SULFATE 2.5 MG/3ML
2.5 SOLUTION RESPIRATORY (INHALATION) EVERY 4 HOURS PRN
COMMUNITY

## 2019-08-29 RX ORDER — SUCRALFATE 1 G/1
1 TABLET ORAL 4 TIMES DAILY
COMMUNITY

## 2019-08-29 RX ORDER — FUROSEMIDE 20 MG/1
20 TABLET ORAL 2 TIMES DAILY
COMMUNITY

## 2019-08-29 RX ORDER — POTASSIUM CHLORIDE 750 MG/1
20 CAPSULE, EXTENDED RELEASE ORAL DAILY
COMMUNITY

## 2019-08-29 RX ORDER — ATORVASTATIN CALCIUM 40 MG/1
40 TABLET, FILM COATED ORAL DAILY
COMMUNITY

## 2019-08-29 RX ORDER — ESOMEPRAZOLE MAGNESIUM 40 MG/1
40 CAPSULE, DELAYED RELEASE ORAL
COMMUNITY

## 2019-08-29 RX ORDER — LOSARTAN POTASSIUM 50 MG/1
50 TABLET ORAL DAILY
COMMUNITY
End: 2019-09-05

## 2019-08-29 RX ORDER — ALPRAZOLAM 1 MG/1
1 TABLET ORAL 2 TIMES DAILY
COMMUNITY
End: 2020-01-06

## 2019-08-29 RX ORDER — BUDESONIDE AND FORMOTEROL FUMARATE DIHYDRATE 160; 4.5 UG/1; UG/1
2 AEROSOL RESPIRATORY (INHALATION) 2 TIMES DAILY
COMMUNITY

## 2019-08-29 RX ORDER — OXYCODONE AND ACETAMINOPHEN 7.5; 325 MG/1; MG/1
1 TABLET ORAL EVERY 4 HOURS PRN
COMMUNITY

## 2019-08-29 RX ORDER — IPRATROPIUM BROMIDE AND ALBUTEROL SULFATE 2.5; .5 MG/3ML; MG/3ML
1 SOLUTION RESPIRATORY (INHALATION) EVERY 4 HOURS
COMMUNITY

## 2019-08-29 RX ORDER — MEGESTROL ACETATE 40 MG/ML
800 SUSPENSION ORAL DAILY
Qty: 600 ML | Refills: 0 | Status: SHIPPED | OUTPATIENT
Start: 2019-08-29 | End: 2019-12-20

## 2019-08-29 NOTE — PROGRESS NOTES
I received a call from Dr. Fishman regarding this patient today.  He reports staging evaluation is been complete and he is stage III disease.    Therefore Dr. Fishman recommends proceeding with definitive chemoradiation.  He anticipates a carboplatinum and Taxol regimen.    We will contact Mr. Rosario asked him return for repeat simulation and treatment planning and coordinate with Dr. Fishman to start concomitant chemoradiation.    We will notify the office of Dr. Fishman and we are ready to proceed with radiation.

## 2019-09-03 PROBLEM — C34.90 NON-SMALL CELL LUNG CANCER (HCC): Status: ACTIVE | Noted: 2019-09-03

## 2019-09-03 NOTE — NURSING NOTE
Pt c/o being nervous, anxious to get surgery over with.  Family updated on status and 1mg of versed repeated.

## 2019-09-03 NOTE — PROGRESS NOTES
[facility], [facility] Anamika Molina BSN, PA-C  ____________________________________________________________               [facility]  Department of Radiation Oncology  [address] [place] [id number]  Office:  [phone]  Fax: [phone]    DATE:  09/04/2019    PATIENT:   Romel Rosario 1950                                   MEDICAL RECORD #:  [id number]                                                       REASON FOR CONSULTATION:  Romel Rosario is a very pleasant 69 y.o. male that has been referred to our clinic by Andrea Fishman MD to discuss completion of a radiation definitive course to the lung along with concurrent chemotherapy to be initiated tomorrow for Stage IIIC Adenocarcinoma of the lung.  Mr. Rosario was seen in consult on 07/17/2019 where he was sent to the ER and admitted due to the need to start radiation therapy ASAP to prevent further compromise to the left lung. Bronchus station 4R, fine-needle aspiration biopsy completed on 07/19/2019 revealed positive malignant cells, favor adenocarcinoma. He completed palliative radiation therapy to the left lung on 08/05/2019. He was seen per  on 08/29/2019 who recommended concurrent chemoradiation with carboplatin AUC 2/Taxol 45 mg/m² followed by immunotherapy x 1 year. He has returned to clinic for further radiation therapy to the left lung. Upon exam patient reports fatigue and SOB. Uses O2 at 3L. Denies activity change, appetite change, unexpected weight change, nausea/vomiting, chest tightness, choking, cough, wheezing, light-headedness, weakness, and headaches. He follows .            HISTORY OF PRESENT ILLNESS  Presented to primary care physician with complaints of SOB. Chest CT was ordered revealing lung mass and mediastinal adenopathy. He was referred to  for further evaluation.     06/13/2019 - CT Chest:  • 7 cm left upper lobe lung mass  highly suspicious for malignancy.  • Enlarged mediastinal lymph nodes, some of which appear centrally necrotic suggest mediastinal metastasis.   • There is a questionable satellite nodule within the lingula measuring 7 mm.   • A noncalcified 11 mm left lower lobe nodule is concerning for pulmonary metastasis.   • 5 mm subpleural right lower lobe nodule could be incidental granuloma as there are granulomatous calcifications of the right hemithorax.  • Small hiatal hernia.  • No adrenal nodules.    07/03/2019 - CT Abdomen/Pelvis:  • No evidence of intra-abdominal metastatic disease.  • Segments of narrowing in the distal descending colon, proximal sigmoid colon likely related to peristalsis. Correlation with the most recent colonoscopy is suggested.  • Previous ventral hernia repair with mesh placement, there is a recurrent ventral hernia inferior to the mesh and has a maximum diameter 9.8 cm and contains fat and nonobstructed small bowel loops. This hernia has a wide fascial defect of up to 5.6 cm.  • Bilateral fat-containing inguinal hernias are noted.  • Small probably benign 4.3 mm noncalcified nodule in the right middle Lobe.    07/03/2019 - Bone Scan:  • No convincing evidence of osseous metastases.    07/08/2019 - Appointment with :  • Likely lung cancer - patient will need a biopsy - appointment with pulmonary clinic on 07/19/2018 and also thoracic surgery   • Awaiting bronchoscopy with biopsy to further determine treatment plan.   • Referral to  for consideration of RT to the thoracic mass.             07/17/2019 - Consult with :  • We planned to proceed with CT simulation to initiate treatment planning today although with the short walk to the simulation room, Mr. Rosario became cyanotic in the face, was very short of breath.   • We were able to complete the simulation scan to prepare for radiation but Mr. Rosario was struggling.  Upon arrival to this appointment today, his O2  sat was 81%, oxygen per nasal cannula placed at 2 liters, his sat robert to 91-93%.   • I do not feel comfortable sending him home as this mass is growing and now has bilateral pleural effusions noted on the CT simulation scan compromising his oxygen saturation. Patient states he becomes dizzy easily.    • I spoke to he and his wife and I plan to start radiation ASAP due to how symptomatic he is and the CT findings of a 6.3 x 5.6 x 7.0 cm left upper lobe lung mass with compressive atelectasis suspected at the left lung apex and constriction of bronchus of before compared to his simulation scan today, he warrants immediate attention to prevent further compromise.   • I will transfer him to the ER so that he can be admitted to [facility], hopefully a biopsy will be easier facilitated and we can start radiation in the next 1-2 days.  • We called ER and report was given to Gerson CHAWLA and to Gerry ELLIS after we took the patient over there by wheelchair.     07/19/2019 - Diagnostic with endobronchial ultrasonography with TBNA mediastinal node was performed per :  Bronchus station 4R, fine-needle aspiration biopsy:  • Positive for malignant cells, favor adenocarcinoma  Bronchusl station 7, fine-needle aspiration biopsy:  • Rare atypical cells, favor adenocarcinoma  Bronchial washing:  • Negative for malignant cells  • Reactive endobronchial cells, pulmonary macrophages, inflammation, and blood    07/19/2019 - 08/05/2019 - Palliative radiation therapy course:  • Received 3250 cGy in 13 treatment fractions    08/09/2019 - MRI Brain with and without contrast:  • No acute intracranial findings. No evidence of intracranial metastatic disease.  • Age-related global cerebral volume loss and chronic microvascular ischemic white matter change.  • Bilateral mastoid effusions.    08/29/2019 - Appointment with :  • Stage III non-small cell lung cancer. Will obtain guardant 360 liquid biopsy. Molecular studies could not be  performed on FNA specimen.  • Recommend a repeat CT chest/abdomen/pelvis   • Mediport placement   • Recommended concurrent chemoradiation with carboplatin AUC 2/Taxol 45 mg/m².   • Anticipated immunotherapy with durvalumab after concurrent chemoradiation x 1 year    08/29/2019 - Documentation per :  • I received a call from Dr. Fishman regarding this patient today.  He reports staging evaluation is been complete and he is Stage III disease.  • Therefore Dr. Fishman recommends proceeding with definitive chemoradiation.  He anticipates a Carboplatinum and Taxol regimen.  • We will contact Mr. Rosario asked him return for repeat simulation and treatment planning and coordinate with Dr. Fishman to start concomitant chemoradiation.  • We will notify the office of Dr. Fishman and we are ready to proceed with radiation.    09/04/2019 - CT Chest with contrast:    09/04/2019 - CT Abdomen/Pelvis with contrast:            History obtained from  PATIENT, FAMILY and CHART    PAST MEDICAL HISTORY   Past Medical History:   Diagnosis Date   • Adenocarcinoma, lung (CMS/HCC)    • Cerumen impaction    • Cervical disc disease    • Colon cancer (CMS/HCC)    • Diabetes (CMS/HCC)    • Elevated cholesterol    • Eustachian tube dysfunction    • GERD (gastroesophageal reflux disease)    • Hx of colonic polyps    • Hypertension    • PUD (peptic ulcer disease)    • Sensorineural hearing loss       PAST SURGICAL HISTORY   Past Surgical History:   Procedure Laterality Date   • BRONCHOSCOPY Left 7/19/2019    Procedure: BRONCHOSCOPY WITH ENDOBRONCHIAL ULTRASOUND AND TRANSBRONCHIAL BIOPSY at 1: 30;  Surgeon: Jere Brumfield MD;  Location: [facility] OR;  Service: Pulmonary   • COLON SURGERY      Due to colon cancer   • COLONOSCOPY N/A 6/26/2017    Procedure: COLONOSCOPY WITH ANESTHESIA;  Surgeon: Barry Banks MD;  Location: [facility] ENDOSCOPY;  Service:    • COLONOSCOPY W/ POLYPECTOMY  08/05/2013    Tubular adenoma transverse x 2,  Diverticulosis repeat exam in 3 years   • ENDOSCOPY  2013    HH, Billroth anastomisis in the gastric antrum   • KNEE SURGERY     • STOMACH SURGERY      Due to ulcer   • VENOUS ACCESS DEVICE (PORT) INSERTION N/A 9/3/2019    Procedure: SINGLE LUMEN PORT PLACEMENT;  Surgeon: Jelena Huntley MD;  Location: [facility] OR;  Service: General      FAMILY HISTORY  family history includes Alzheimer's disease in his father; Diabetes in his mother; Heart disease in his mother.     SOCIAL HISTORY   Social History     Tobacco Use   • Smoking status: Former Smoker     Years: 45.00     Last attempt to quit: 2007     Years since quittin.0   • Smokeless tobacco: Never Used   Substance Use Topics   • Alcohol use: No   • Drug use: No      ALLERGIES  Lactose intolerance (gi)     MEDICATIONS   Current Outpatient Medications   Medication Sig Dispense Refill   • acetaminophen (TYLENOL) 325 MG tablet Take 2 tablets by mouth Every 4 (Four) Hours As Needed for Mild Pain .     • albuterol sulfate  (90 Base) MCG/ACT inhaler Inhale 2 puffs Every 4 (Four) Hours As Needed for Wheezing.     • ALPRAZolam (XANAX) 1 MG tablet Take 1 tablet by mouth Every 6 (Six) Hours As Needed for Anxiety (Agitation).     • atorvastatin (LIPITOR) 40 MG tablet Take 40 mg by mouth Daily.     • budesonide-formoterol (SYMBICORT) 160-4.5 MCG/ACT inhaler Inhale 2 puffs 2 (Two) Times a Day.  12   • Ergocalciferol (VITAMIN D2) 2000 units tablet Take 1 tablet by mouth Daily.     • esomeprazole (nexIUM) 40 MG capsule Take 40 mg by mouth Every Morning Before Breakfast.     • furosemide (LASIX) 20 MG tablet Take 20 mg by mouth Daily.     • HYDROcodone-acetaminophen (NORCO) 7.5-325 MG per tablet Take 1 tablet by mouth Every 4 (Four) Hours As Needed for Moderate Pain  (Pain). 15 tablet 0   • hydrOXYzine (ATARAX) 10 MG tablet Take 10 mg by mouth 4 (Four) Times a Day As Needed for Anxiety.     • JANUVIA 50 MG tablet Take 50 mg by mouth Daily.     • losartan  "(COZAAR) 100 MG tablet Take 1 tablet by mouth Daily.       No current facility-administered medications for this visit.       The following portions of the patient's history were reviewed and updated as appropriate: allergies, current medications, past family history, past medical history, past social history, past surgical history and problem list.    REVIEW OF SYSTEMS  Review of Systems   Constitutional: Positive for fatigue. Negative for activity change, appetite change, chills, diaphoresis, fever and unexpected weight change.   HENT: Negative.         Voice is really weak   Eyes: Negative.    Respiratory: Positive for shortness of breath. Negative for apnea, cough, choking, chest tightness, wheezing and stridor.         Oxygen 3 lpm  Inhalers     Cardiovascular: Negative.    Gastrointestinal: Negative.         Had nausea and diarrhea right after XRT   Endocrine: Negative.    Genitourinary: Negative.    Musculoskeletal: Negative.    Skin: Negative.    Allergic/Immunologic: Negative.    Neurological: Negative.    Hematological: Negative.    Psychiatric/Behavioral: Negative.      PHYSICAL EXAM  VITAL SIGNS:   Vitals:    09/04/19 1346   BP: 108/60   Weight: 88.9 kg (196 lb)   Height: 171 cm (67.32\")   PainSc: 0-No pain      General:  Alert and oriented, no acute distress, well developed, Vitals reviewed. On continuous oxygen by nasal cannula  Head/Neck:  Normocephalic, without obvious abnormality, atraumatic.  The trachea is midline.   Nose/Sinuses:  Nares normal. Septum midline. Mucosa normal. No drainage or sinus tenderness.  Mouth/Throat:  Mucosa moist, no lesions; pharynx without erythema, edema or exudate.  Neck:  supple, no mass, non-tender  Eyes: No gross abnormalities,  no scleral jaundice or erythema.    Ears: Ears appear intact with no abnormalities noted, hearing grossly normal  Chest:  Respiratory efforts are normal and unlabored, chest is clear to auscultation. There are no wheezes, rhonchi, " rales.  Cardiovascular: Regular rate and rhythm without murmurs, rubs, or gallops.   Abdomen:  Soft, non-tender, normal bowel sounds; no noted organomegaly or masses. no CVA tenderness   Extremities:  RICHARD well, warm to touch, no evidence of cyanosis or edema.  Lymphatics:  No evidence of adenopathy in the cervical or supraclavicular areas.  Skin: No suspicious lesions or rashes of concern, skin color, texture, turgor are normal  Neurologic:  Alert and oriented, gait normal, strength and sensation grossly normal  Psych: Mood and affect are appropriate for circumstance. Eye contact is appropriate.     Performance Status: ECOG (1) Restricted in physically strenuous activity, ambulatory and able to do work of light nature    Clinical Quality Measures  Romel Rosario reports a pain score of 0.  Given his pain assessment as noted, treatment options were discussed and the following options were decided upon as a follow-up plan to address the patient's pain: NO PAIN/NO PLAN  -Advanced Care Planning Care plan discussed, no care plan provided  -Body Mass Index Screening and Follow-Up Plan Patient's Body mass index is 30.41 kg/m². BMI is above normal parameters. Recommendations include: educational material.  -Tobacco Use: Screening and Cessation Intervention Social History    Tobacco Use      Smoking status: Former Smoker        Years: 45.00        Quit date: 2007        Years since quittin.0      Smokeless tobacco: Never Used    ASSESSMENT AND PLAN  1. Mass of upper lobe of left lung    2. Hx of colon cancer, stage I    3. Former smoker    4. Encounter for consultation      RECOMMENDATIONS:  Romel Rosario is a very pleasant 69 y.o. male that has been referred to our clinic by Andrea Fishman MD to discuss completion of a radiation definitive course to the lung along with concurrent chemotherapy to be initiated tomorrow.   Mr. Rosario was seen in consult on 2019 where he was sent to the ER and  admitted due to the need to start radiation therapy ASAP to prevent further compromise to the left lung. Bronchus station 4R, fine-needle aspiration biopsy completed on 07/19/2019 revealed positive malignant cells, favor adenocarcinoma. He completed palliative radiation therapy, 3250 cGy to the left lung on 08/05/2019. He was seen per  on 08/29/2019 who recommended concurrent chemoradiation now that Staging is complete with Carboplatin AUC 2/Taxol 45 mg/m² followed by immunotherapy x 1 year. He has returned to clinic for further radiation therapy to the left lung.     The indications and rationale of a definitive course of chemoradiation therapy according to the NCCN Guidelines to the lung has been discussed with the patient and his family today. I have extensively reviewed the risks, benefits and alternatives of therapy with them. The risks of radiation therapy includes but is not limited to radiation induced pulmonary fibrosis, progression of disease in spite of therapy with either local or systemic failure.     I have seen, examined and reviewed this patient's medication list, appropriate labs and imaging studies.  I reviewed relevant medical records and other physicians notes.  I discussed the goals and plans of care with the patient and family and answered all questions.     Definitive radiation course to the lung is 6000 to 6600 cGy, he has completed 3250 cGy to the left lung. After careful consideration of the available clinical diagnostic data and extensive examination and evaluation of the patient, I am recommending Mr. Rosario complete the definitive course, final dose to be determined with planning.     We will proceed with CT simulation to initiate treatment planning,  The patient and his family verbalizes understanding of this discussion, voices no further questions and wishes to proceed with recommended therapy.     Thank you for allowing me to assist in this patients care.     Todays  appointment time was spent in counseling and coordination of care as follows: diagnosis, intent of treatment discussing radiation therapy specifics: logistics, possible and probable side effects and after effects, staging of cancer, standard of care in for this stage of this cancer and treatment options  Eddie Bell III, MD  09/04/2019

## 2019-09-03 NOTE — DISCHARGE INSTRUCTIONS
YOUR NEXT PAIN MEDICATION IS DUE AT______________        Moderate Conscious Sedation, Adult, Care After  Refer to this sheet in the next few weeks. These instructions provide you with information on caring for yourself after your procedure. Your health care provider may also give you more specific instructions. Your treatment has been planned according to current medical practices, but problems sometimes occur. Call your health care provider if you have any problems or questions after your procedure.  WHAT TO EXPECT AFTER THE PROCEDURE    After your procedure:  · You may feel sleepy, clumsy, and have poor balance for several hours.  · Vomiting may occur if you eat too soon after the procedure.  HOME CARE INSTRUCTIONS  · Do not participate in any activities where you could become injured for at least 24 hours. Do not:  ¨ Drive.  ¨ Swim.  ¨ Ride a bicycle.  ¨ Operate heavy machinery.  ¨ Cook.  ¨ Use power tools.  ¨ Climb ladders.  ¨ Work from a high place.  · Do not make important decisions or sign legal documents until you are improved.  · If you vomit, drink water, juice, or soup when you can drink without vomiting. Make sure you have little or no nausea before eating solid foods.  · Only take over-the-counter or prescription medicines for pain, discomfort, or fever as directed by your health care provider.  · Make sure you and your family fully understand everything about the medicines given to you, including what side effects may occur.  · You should not drink alcohol, take sleeping pills, or take medicines that cause drowsiness for at least 24 hours.  · If you smoke, do not smoke without supervision.  · If you are feeling better, you may resume normal activities 24 hours after you were sedated.  · Keep all appointments with your health care provider.  SEEK MEDICAL CARE IF:  · Your skin is pale or bluish in color.  · You continue to feel nauseous or vomit.  · Your pain is getting worse and is not helped by  medicine.  · You have bleeding or swelling.  · You are still sleepy or feeling clumsy after 24 hours.  SEEK IMMEDIATE MEDICAL CARE IF:  · You develop a rash.  · You have difficulty breathing.  · You develop any type of allergic problem.  · You have a fever.  MAKE SURE YOU:  · Understand these instructions.  · Will watch your condition.  · Will get help right away if you are not doing well or get worse.     This information is not intended to replace advice given to you by your health care provider. Make sure you discuss any questions you have with your health care provider.     Document Released: 10/08/2014 Document Revised: 01/08/2016 Document Reviewed: 10/08/2014  [employer] Interactive Patient Education ©2016 [employer]         CALL YOUR PHYSICIAN IF YOU EXPERIENCE  INCREASED PAIN NOT HELPED BY YOUR PAIN MEDICATION.        Fall Prevention in the Home      Falls can cause injuries. They can happen to people of all ages. There are many things you can do to make your home safe and to help prevent falls.    WHAT CAN I DO ON THE OUTSIDE OF MY HOME?  · Regularly fix the edges of walkways and driveways and fix any cracks.  · Remove anything that might make you trip as you walk through a door, such as a raised step or threshold.  · Trim any bushes or trees on the path to your home.  · Use bright outdoor lighting.  · Clear any walking paths of anything that might make someone trip, such as rocks or tools.  · Regularly check to see if handrails are loose or broken. Make sure that both sides of any steps have handrails.  · Any raised decks and porches should have guardrails on the edges.  · Have any leaves, snow, or ice cleared regularly.  · Use sand or salt on walking paths during winter.  · Clean up any spills in your garage right away. This includes oil or grease spills.  WHAT CAN I DO IN THE BATHROOM?    · Use night lights.  · Install grab bars by the toilet and in the tub and shower. Do not use towel bars as grab  bars.  · Use non-skid mats or decals in the tub or shower.  · If you need to sit down in the shower, use a plastic, non-slip stool.  · Keep the floor dry. Clean up any water that spills on the floor as soon as it happens.  · Remove soap buildup in the tub or shower regularly.  · Attach bath mats securely with double-sided non-slip rug tape.  · Do not have throw rugs and other things on the floor that can make you trip.  WHAT CAN I DO IN THE BEDROOM?  · Use night lights.  · Make sure that you have a light by your bed that is easy to reach.  · Do not use any sheets or blankets that are too big for your bed. They should not hang down onto the floor.  · Have a firm chair that has side arms. You can use this for support while you get dressed.  · Do not have throw rugs and other things on the floor that can make you trip.  WHAT CAN I DO IN THE KITCHEN?  · Clean up any spills right away.  · Avoid walking on wet floors.  · Keep items that you use a lot in easy-to-reach places.  · If you need to reach something above you, use a strong step stool that has a grab bar.  · Keep electrical cords out of the way.  · Do not use floor polish or wax that makes floors slippery. If you must use wax, use non-skid floor wax.  · Do not have throw rugs and other things on the floor that can make you trip.  WHAT CAN I DO WITH MY STAIRS?  · Do not leave any items on the stairs.  · Make sure that there are handrails on both sides of the stairs and use them. Fix handrails that are broken or loose. Make sure that handrails are as long as the stairways.  · Check any carpeting to make sure that it is firmly attached to the stairs. Fix any carpet that is loose or worn.  · Avoid having throw rugs at the top or bottom of the stairs. If you do have throw rugs, attach them to the floor with carpet tape.  · Make sure that you have a light switch at the top of the stairs and the bottom of the stairs. If you do not have them, ask someone to add them for  you.  WHAT ELSE CAN I DO TO HELP PREVENT FALLS?  · Wear shoes that:  ¨ Do not have high heels.  ¨ Have rubber bottoms.  ¨ Are comfortable and fit you well.  ¨ Are closed at the toe. Do not wear sandals.  · If you use a stepladder:  ¨ Make sure that it is fully opened. Do not climb a closed stepladder.  ¨ Make sure that both sides of the stepladder are locked into place.  ¨ Ask someone to hold it for you, if possible.  · Clearly yolande and make sure that you can see:  ¨ Any grab bars or handrails.  ¨ First and last steps.  ¨ Where the edge of each step is.  · Use tools that help you move around (mobility aids) if they are needed. These include:  ¨ Canes.  ¨ Walkers.  ¨ Scooters.  ¨ Crutches.  · Turn on the lights when you go into a dark area. Replace any light bulbs as soon as they burn out.  · Set up your furniture so you have a clear path. Avoid moving your furniture around.  · If any of your floors are uneven, fix them.  · If there are any pets around you, be aware of where they are.  · Review your medicines with your doctor. Some medicines can make you feel dizzy. This can increase your chance of falling.  Ask your doctor what other things that you can do to help prevent falls.     This information is not intended to replace advice given to you by your health care provider. Make sure you discuss any questions you have with your health care provider.     Document Released: 10/14/2010 Document Revised: 05/03/2016 Document Reviewed: 01/22/2016  [employer] Interactive Patient Education ©2016 [employer]     PATIENT/FAMILY/RESPONSIBLE PARTY VERBALIZES UNDERSTANDING OF ABOVE EDUCATION.  COPY OF PAIN SCALE GIVEN AND REVIEWED WITH VERBALIZED UNDERSTANDING.

## 2019-09-03 NOTE — ANESTHESIA PREPROCEDURE EVALUATION
Anesthesia Evaluation     Patient summary reviewed   no history of anesthetic complications:  NPO Solid Status: > 8 hours  NPO Liquid Status: > 4 hours           Airway   Mallampati: I  TM distance: >3 FB  Neck ROM: full  Dental    (+) edentulous    Pulmonary    (+) a smoker Former, lung cancer, shortness of breath,   (-) asthma, sleep apnea    ROS comment: 3L continuous home 02  Cardiovascular   Exercise tolerance: good (4-7 METS)    ECG reviewed    (+) hypertension, hyperlipidemia,       Neuro/Psych  (-) TIA, CVA  GI/Hepatic/Renal/Endo    (+)  GERD,  diabetes mellitus,   (-) liver disease, no renal disease    Musculoskeletal     Abdominal    Substance History      OB/GYN          Other      history of cancer                    Anesthesia Plan    ASA 3     MAC     intravenous induction   Anesthetic plan, all risks, benefits, and alternatives have been provided, discussed and informed consent has been obtained with: patient.

## 2019-09-03 NOTE — ANESTHESIA POSTPROCEDURE EVALUATION
Patient: Romel Rosario    Procedure Summary     Date:  09/03/19 Room / Location:   [facility] /  [facility]    Anesthesia Start:  1502 Anesthesia Stop:  1545    Procedure:  SINGLE LUMEN PORT PLACEMENT (N/A Chin to Nipples) Diagnosis:  (LUNG CANCER)    Surgeon:  Jelena Huntley MD Provider:  Yordy Medel CRNA    Anesthesia Type:  MAC ASA Status:  3          Anesthesia Type: MAC  Last vitals  BP   123/61 (09/03/19 1244)   Temp   98 °F (36.7 °C) (09/03/19 1244)   Pulse   60 (09/03/19 1337)   Resp   18 (09/03/19 1337)     SpO2   99 % (09/03/19 1337)     Post Anesthesia Care and Evaluation    Patient location during evaluation: PHASE II  Patient participation: complete - patient participated  Level of consciousness: awake  Pain score: 0  Pain management: adequate  Airway patency: patent  Anesthetic complications: No anesthetic complications  PONV Status: none  Cardiovascular status: acceptable  Respiratory status: face mask and acceptable  Hydration status: acceptable

## 2019-09-03 NOTE — OP NOTE
INSERTION VENOUS ACCESS DEVICE  Procedure Note    Romel Rosario  9/3/2019    Pre-op Diagnosis:   LUNG CANCER    Post-op Diagnosis:     same    Procedure/CPT® Codes:      Procedure(s):  SINGLE LUMEN PORT PLACEMENT    Surgeon(s):  Jelena Huntley MD    Anesthesia: Monitored Anesthesia Care    Staff:   Circulator: Isabel Rolle RN  Scrub Person: Nicol German  Assistant: Nancy Reinoso    Estimated Blood Loss: minimal    Specimens:                None      Drains:      Findings: Unable to access left site for several percutaneous attempts.    Complications: none          Date: 9/3/2019  Time: 3:48 PM  The patient was brought to the operating room and placed in the supine position. After IV sedation and infusion of IV antibiotics, the patient was prepped and draped in the usual sterile fashion. Lidocaine 1% was used for local anesthesia.  I attempted the left side first.  Multiple percutaneous attempts were made but we were unable to cannulate the vein.  I therefore went to the right side.  The vein was accessed, the wire passed. Fluoroscopy revealed it to be in good position. The pocket was incised, developed. The catheter was tunneled, cut to size, secured to the port, and the port sutured in place with 0 Prolene. Sheath passed, catheter was fed. Fluoroscopy revealed it to be in good position. Good flush and flow obtained. The wound was closed with 3-0 and 4-0 Vicryl sutures. Dressing was placed. The patient was awakened and transferred to the recovery room in stable condition, tolerated the procedure well. At the end of the procedure, all counts were correct.       Jelena Huntley MD

## 2019-09-04 VITALS
BODY MASS INDEX: 30.96 KG/M2 | HEIGHT: 69 IN | DIASTOLIC BLOOD PRESSURE: 72 MMHG | SYSTOLIC BLOOD PRESSURE: 130 MMHG | WEIGHT: 209 LBS | HEART RATE: 69 BPM | OXYGEN SATURATION: 92 %

## 2019-09-04 DIAGNOSIS — C34.12 MALIGNANT NEOPLASM OF UPPER LOBE, LEFT BRONCHUS OR LUNG (HCC): ICD-10-CM

## 2019-09-04 NOTE — PATIENT INSTRUCTIONS
External Beam Radiation Therapy  External beam radiation therapy is a type of radiation treatment. This type of radiation therapy can deliver radiation to a fairly large area. This is the most common type of radiation therapy for cancer. This therapy may be done to:  · Treat cancer by:  ? Destroying cancer cells. Radiation delivered during the treatment damages cancer cells. It may also damage normal cells, but normal cells have the DNA to repair themselves while cancer cells do not.  ? Helping with symptoms of your cancer.  ? Stopping the growth of any remaining cancer cells after surgery.  ? Preventing cancer cells from growing in areas that do not have cancer (prophylactic radiation therapy).  · Treat or shrink a tumor.  · Reduce pain (palliative therapy).  The amount of radiation you will receive and the length of therapy depend on your medical condition. You should not feel the radiation being delivered or any pain during your therapy.  Let your health care provider know about:  · Any allergies you have.  · All medicines you are taking, including vitamins, herbs, eye drops, creams, and over-the-counter medicines.  · Any problems you or family members have had with anesthetic medicines.  · Any blood disorders you have.  · Any surgeries you have had.  · Any medical conditions you have.  · Whether you are pregnant or may be pregnant.  What are the risks?  Generally, this is a safe procedure. However, radiation therapy can place a person at a higher risk for a second cancer later in life.  Most people experience side effects from the therapy. Side effects depend on the amount of radiation and the part of the body that was exposed to radiation. The most common side effects include:  · Skin changes.  · Hair loss.  · Fatigue.  · Nausea and vomiting.  What happens before the procedure?  · There will be a planning session (simulation). During the session:  ? Your health care provider will plan exactly where the  radiation will be delivered (treatment field).  ? You will be positioned for your therapy. The goal is to have a position that can be reproduced for each therapy session.  ? Temporary marks may be drawn on your body. Permanent marks may also be drawn on your body in order for you to be positioned the same way for each therapy session.  ? A tool that holds a body part in place (immobilization device) may be used to keep the area of treatment in the correct position.  · Follow instructions from your health care provider about eating or drinking restrictions.  · Ask your health care provider about changing or stopping your regular medicines. This is especially important if you are taking diabetes medicines or blood thinners.  What happens during the procedure?    · You will either lie on a table or sit in a chair in the position determined for your therapy.  · You may have a heavy shield placed on you to protect tissues and organs that are not being treated.  · The radiation machine (linear accelerator) will move around you to deliver the radiation in exact doses from different angles. The machine will not touch you.  The procedure may vary among health care providers and hospitals.  What happens after the procedure?  · You may return to your normal routine including diet, activities, and medicines as told by your health care provider.  · You may want to have someone take you home from the hospital or clinic.  Summary  · External beam radiation therapy is a type of radiation treatment for cancer.  · The amount of radiation you will receive and the length of therapy depend on your medical condition.  · Most people experience side effects from the therapy. Side effects depend on the amount of radiation and the part of the body that was exposed to radiation.  This information is not intended to replace advice given to you by your health care provider. Make sure you discuss any questions you have with your health care  provider.  Document Released: 05/06/2010 Document Revised: 12/18/2017 Document Reviewed: 12/18/2017  [employer] Interactive Patient Education © 2019 [employer]

## 2019-09-04 NOTE — PROGRESS NOTES
Activity    Alcohol use: Not on file    Drug use: Not on file    Sexual activity: Not on file   Lifestyle    Physical activity:     Days per week: Not on file     Minutes per session: Not on file    Stress: Not on file   Relationships    Social connections:     Talks on phone: Not on file     Gets together: Not on file     Attends Taoist service: Not on file     Active member of club or organization: Not on file     Attends meetings of clubs or organizations: Not on file     Relationship status: Not on file    Intimate partner violence:     Fear of current or ex partner: Not on file     Emotionally abused: Not on file     Physically abused: Not on file     Forced sexual activity: Not on file   Other Topics Concern    Not on file   Social History Narrative    Not on file        Family history:   Family History   Problem Relation Age of Onset    Heart Attack Mother     Heart Disease Mother     Alzheimer's Disease Father     Heart Attack Sister     Heart Attack Brother         Current Outpatient Medications   Medication Sig Dispense Refill    ondansetron (ZOFRAN) 8 MG tablet Take 1 tablet by mouth every 8 hours as needed for Nausea or Vomiting 90 tablet 3    folic acid (FOLVITE) 1 MG tablet Take 1 tablet by mouth daily 30 tablet 5    dexamethasone (DECADRON) 4 MG tablet Take 1 tab BID x 3 days starting the day before chemotherapy every 3 weeks 6 tablet 5    sucralfate (CARAFATE) 1 GM tablet Take 1 g by mouth 4 times daily      potassium chloride (MICRO-K) 10 MEQ extended release capsule Take 20 mEq by mouth daily      esomeprazole (NEXIUM) 40 MG delayed release capsule Take 40 mg by mouth every morning (before breakfast)      oxyCODONE-acetaminophen (PERCOCET) 7.5-325 MG per tablet Take 1 tablet by mouth every 4 hours as needed for Pain.       SITagliptin (JANUVIA) 50 MG tablet Take 50 mg by mouth daily      ipratropium-albuterol (DUONEB) 0.5-2.5 (3) MG/3ML SOLN nebulizer solution Inhale 1 vial

## 2019-09-05 ENCOUNTER — HOSPITAL ENCOUNTER (OUTPATIENT)
Dept: INFUSION THERAPY | Age: 69
Discharge: HOME | End: 2019-09-05
Payer: MEDICARE

## 2019-09-05 ENCOUNTER — OFFICE VISIT (OUTPATIENT)
Dept: HEMATOLOGY | Age: 69
End: 2019-09-05
Payer: MEDICARE

## 2019-09-05 VITALS
HEART RATE: 59 BPM | SYSTOLIC BLOOD PRESSURE: 142 MMHG | BODY MASS INDEX: 29 KG/M2 | WEIGHT: 195.8 LBS | OXYGEN SATURATION: 100 % | DIASTOLIC BLOOD PRESSURE: 64 MMHG | HEIGHT: 69 IN | TEMPERATURE: 98.9 F

## 2019-09-05 DIAGNOSIS — C34.12 MALIGNANT NEOPLASM OF UPPER LOBE, LEFT BRONCHUS OR LUNG (HCC): Primary | ICD-10-CM

## 2019-09-05 DIAGNOSIS — Z51.11 CHEMOTHERAPY MANAGEMENT, ENCOUNTER FOR: ICD-10-CM

## 2019-09-05 DIAGNOSIS — C34.12 MALIGNANT NEOPLASM OF UPPER LOBE, LEFT BRONCHUS OR LUNG (HCC): ICD-10-CM

## 2019-09-05 DIAGNOSIS — Z71.89 CARE PLAN DISCUSSED WITH PATIENT: ICD-10-CM

## 2019-09-05 DIAGNOSIS — C34.90 NON-SMALL CELL LUNG CANCER, UNSPECIFIED LATERALITY (HCC): Primary | ICD-10-CM

## 2019-09-05 DIAGNOSIS — T45.1X5A ADVERSE EFFECT OF CHEMOTHERAPY, INITIAL ENCOUNTER: Primary | ICD-10-CM

## 2019-09-05 PROCEDURE — 2500000003 HC RX 250 WO HCPCS: Performed by: INTERNAL MEDICINE

## 2019-09-05 PROCEDURE — 2580000003 HC RX 258: Performed by: INTERNAL MEDICINE

## 2019-09-05 PROCEDURE — 96375 TX/PRO/DX INJ NEW DRUG ADDON: CPT

## 2019-09-05 PROCEDURE — 85025 COMPLETE CBC W/AUTO DIFF WBC: CPT

## 2019-09-05 PROCEDURE — 6360000002 HC RX W HCPCS: Performed by: INTERNAL MEDICINE

## 2019-09-05 PROCEDURE — 96413 CHEMO IV INFUSION 1 HR: CPT

## 2019-09-05 PROCEDURE — 99214 OFFICE O/P EST MOD 30 MIN: CPT | Performed by: INTERNAL MEDICINE

## 2019-09-05 PROCEDURE — 96417 CHEMO IV INFUS EACH ADDL SEQ: CPT

## 2019-09-05 RX ORDER — DIPHENHYDRAMINE HYDROCHLORIDE 50 MG/ML
50 INJECTION INTRAMUSCULAR; INTRAVENOUS PRN
Status: DISCONTINUED | OUTPATIENT
Start: 2019-09-05 | End: 2019-09-07 | Stop reason: HOSPADM

## 2019-09-05 RX ORDER — SODIUM CHLORIDE 9 MG/ML
20 INJECTION, SOLUTION INTRAVENOUS ONCE
Status: DISCONTINUED | OUTPATIENT
Start: 2019-09-05 | End: 2019-09-07 | Stop reason: HOSPADM

## 2019-09-05 RX ORDER — FOLIC ACID 1 MG/1
1 TABLET ORAL DAILY
Qty: 30 TABLET | Refills: 5 | Status: SHIPPED | OUTPATIENT
Start: 2019-09-05 | End: 2019-12-20

## 2019-09-05 RX ORDER — EPINEPHRINE 1 MG/ML
0.3 INJECTION, SOLUTION, CONCENTRATE INTRAVENOUS PRN
Status: DISCONTINUED | OUTPATIENT
Start: 2019-09-05 | End: 2019-09-07 | Stop reason: HOSPADM

## 2019-09-05 RX ORDER — DEXAMETHASONE SODIUM PHOSPHATE 10 MG/ML
10 INJECTION, SOLUTION INTRAMUSCULAR; INTRAVENOUS ONCE
Status: DISCONTINUED | OUTPATIENT
Start: 2019-09-05 | End: 2019-09-07 | Stop reason: HOSPADM

## 2019-09-05 RX ORDER — SODIUM CHLORIDE 0.9 % (FLUSH) 0.9 %
5 SYRINGE (ML) INJECTION PRN
Status: DISCONTINUED | OUTPATIENT
Start: 2019-09-05 | End: 2019-09-06 | Stop reason: HOSPADM

## 2019-09-05 RX ORDER — METHYLPREDNISOLONE SODIUM SUCCINATE 125 MG/2ML
125 INJECTION, POWDER, LYOPHILIZED, FOR SOLUTION INTRAMUSCULAR; INTRAVENOUS PRN
Status: DISCONTINUED | OUTPATIENT
Start: 2019-09-05 | End: 2019-09-07 | Stop reason: HOSPADM

## 2019-09-05 RX ORDER — ONDANSETRON HYDROCHLORIDE 8 MG/1
8 TABLET, FILM COATED ORAL EVERY 8 HOURS PRN
Qty: 90 TABLET | Refills: 3 | Status: SHIPPED | OUTPATIENT
Start: 2019-09-05 | End: 2019-10-05

## 2019-09-05 RX ORDER — DIPHENHYDRAMINE HYDROCHLORIDE 50 MG/ML
25 INJECTION INTRAMUSCULAR; INTRAVENOUS ONCE
Status: DISCONTINUED | OUTPATIENT
Start: 2019-09-05 | End: 2019-09-07 | Stop reason: HOSPADM

## 2019-09-05 RX ORDER — DEXAMETHASONE 4 MG/1
TABLET ORAL
Qty: 6 TABLET | Refills: 5 | Status: SHIPPED | OUTPATIENT
Start: 2019-09-05 | End: 2020-01-24 | Stop reason: SDUPTHER

## 2019-09-05 RX ORDER — SODIUM CHLORIDE 0.9 % (FLUSH) 0.9 %
10 SYRINGE (ML) INJECTION PRN
Status: DISCONTINUED | OUTPATIENT
Start: 2019-09-05 | End: 2019-09-06 | Stop reason: HOSPADM

## 2019-09-05 RX ORDER — PALONOSETRON 0.05 MG/ML
0.25 INJECTION, SOLUTION INTRAVENOUS ONCE
Status: DISCONTINUED | OUTPATIENT
Start: 2019-09-05 | End: 2019-09-07 | Stop reason: HOSPADM

## 2019-09-08 NOTE — PROGRESS NOTES
Dr. Fishman called to advise me recent CT scan of the thorax reveals progression of disease and multiple bilateral pulmonary nodules.  Therefore, our plan is to proceed with definitive chemoradiation to the left upper lobe mass will be deferred and the patient will be treated with systemic chemotherapy only.  We will reserve any additional radiotherapy for palliative purposes in the future as needed.

## 2019-09-09 DIAGNOSIS — C80.1 ANXIETY ASSOCIATED WITH CANCER DIAGNOSIS (HCC): Primary | ICD-10-CM

## 2019-09-09 DIAGNOSIS — F41.1 ANXIETY ASSOCIATED WITH CANCER DIAGNOSIS (HCC): Primary | ICD-10-CM

## 2019-09-09 RX ORDER — LORAZEPAM 0.5 MG/1
0.5 TABLET ORAL EVERY 8 HOURS PRN
Qty: 60 TABLET | Refills: 0 | OUTPATIENT
Start: 2019-09-09 | End: 2019-10-09

## 2019-09-12 ENCOUNTER — HOSPITAL ENCOUNTER (OUTPATIENT)
Dept: INFUSION THERAPY | Age: 69
Discharge: HOME | End: 2019-09-12
Payer: MEDICARE

## 2019-09-12 DIAGNOSIS — C34.90 NON-SMALL CELL LUNG CANCER, UNSPECIFIED LATERALITY (HCC): ICD-10-CM

## 2019-09-12 DIAGNOSIS — C34.90 NON-SMALL CELL LUNG CANCER, UNSPECIFIED LATERALITY (HCC): Primary | ICD-10-CM

## 2019-09-12 DIAGNOSIS — R53.83 OTHER FATIGUE: ICD-10-CM

## 2019-09-12 DIAGNOSIS — C34.12 MALIGNANT NEOPLASM OF UPPER LOBE BRONCHUS, LEFT (HCC): ICD-10-CM

## 2019-09-12 PROCEDURE — 96375 TX/PRO/DX INJ NEW DRUG ADDON: CPT

## 2019-09-12 PROCEDURE — 96372 THER/PROPH/DIAG INJ SC/IM: CPT

## 2019-09-12 PROCEDURE — 36415 COLL VENOUS BLD VENIPUNCTURE: CPT

## 2019-09-12 PROCEDURE — 2580000003 HC RX 258: Performed by: INTERNAL MEDICINE

## 2019-09-12 PROCEDURE — 6360000002 HC RX W HCPCS: Performed by: INTERNAL MEDICINE

## 2019-09-12 PROCEDURE — 84443 ASSAY THYROID STIM HORMONE: CPT

## 2019-09-12 PROCEDURE — 96417 CHEMO IV INFUS EACH ADDL SEQ: CPT

## 2019-09-12 PROCEDURE — 85025 COMPLETE CBC W/AUTO DIFF WBC: CPT

## 2019-09-12 PROCEDURE — 80053 COMPREHEN METABOLIC PANEL: CPT

## 2019-09-12 PROCEDURE — 96413 CHEMO IV INFUSION 1 HR: CPT

## 2019-09-12 RX ORDER — LIDOCAINE AND PRILOCAINE 25; 25 MG/G; MG/G
CREAM TOPICAL
Qty: 1 TUBE | Refills: 2 | Status: SHIPPED | OUTPATIENT
Start: 2019-09-12

## 2019-09-12 RX ORDER — CYANOCOBALAMIN 1000 UG/ML
1000 INJECTION INTRAMUSCULAR; SUBCUTANEOUS ONCE
Status: DISCONTINUED | OUTPATIENT
Start: 2019-09-12 | End: 2019-09-14 | Stop reason: HOSPADM

## 2019-09-12 RX ORDER — HEPARIN SODIUM (PORCINE) LOCK FLUSH IV SOLN 100 UNIT/ML 100 UNIT/ML
500 SOLUTION INTRAVENOUS ONCE
Status: DISCONTINUED | OUTPATIENT
Start: 2019-09-12 | End: 2019-09-14 | Stop reason: HOSPADM

## 2019-10-03 ENCOUNTER — HOSPITAL ENCOUNTER (OUTPATIENT)
Dept: INFUSION THERAPY | Age: 69
Discharge: HOME | End: 2019-10-03
Payer: MEDICARE

## 2019-10-03 DIAGNOSIS — C34.32 MALIGNANT NEOPLASM OF LOWER LOBE, LEFT BRONCHUS OR LUNG (HCC): ICD-10-CM

## 2019-10-03 DIAGNOSIS — C34.90 NON-SMALL CELL LUNG CANCER, UNSPECIFIED LATERALITY (HCC): Primary | ICD-10-CM

## 2019-10-03 PROCEDURE — 96413 CHEMO IV INFUSION 1 HR: CPT

## 2019-10-03 PROCEDURE — 2580000003 HC RX 258: Performed by: INTERNAL MEDICINE

## 2019-10-03 PROCEDURE — 96367 TX/PROPH/DG ADDL SEQ IV INF: CPT

## 2019-10-03 PROCEDURE — 85025 COMPLETE CBC W/AUTO DIFF WBC: CPT

## 2019-10-03 PROCEDURE — 6360000002 HC RX W HCPCS: Performed by: INTERNAL MEDICINE

## 2019-10-03 PROCEDURE — 96372 THER/PROPH/DIAG INJ SC/IM: CPT

## 2019-10-03 PROCEDURE — 96417 CHEMO IV INFUS EACH ADDL SEQ: CPT

## 2019-10-03 PROCEDURE — 96375 TX/PRO/DX INJ NEW DRUG ADDON: CPT

## 2019-10-03 RX ORDER — SODIUM CHLORIDE 9 MG/ML
20 INJECTION, SOLUTION INTRAVENOUS ONCE
Status: DISCONTINUED | OUTPATIENT
Start: 2019-10-03 | End: 2019-10-03

## 2019-10-03 RX ORDER — SODIUM CHLORIDE 0.9 % (FLUSH) 0.9 %
10 SYRINGE (ML) INJECTION PRN
Status: DISCONTINUED | OUTPATIENT
Start: 2019-10-03 | End: 2019-10-04 | Stop reason: HOSPADM

## 2019-10-03 RX ORDER — PALONOSETRON 0.05 MG/ML
0.25 INJECTION, SOLUTION INTRAVENOUS ONCE
Status: DISCONTINUED | OUTPATIENT
Start: 2019-10-03 | End: 2019-10-05 | Stop reason: HOSPADM

## 2019-10-03 RX ORDER — DEXAMETHASONE SODIUM PHOSPHATE 10 MG/ML
10 INJECTION, SOLUTION INTRAMUSCULAR; INTRAVENOUS ONCE
Status: DISCONTINUED | OUTPATIENT
Start: 2019-10-03 | End: 2019-10-05 | Stop reason: HOSPADM

## 2019-10-03 RX ORDER — DIPHENHYDRAMINE HYDROCHLORIDE 50 MG/ML
50 INJECTION INTRAMUSCULAR; INTRAVENOUS PRN
Status: DISCONTINUED | OUTPATIENT
Start: 2019-10-03 | End: 2019-10-05 | Stop reason: HOSPADM

## 2019-10-03 RX ORDER — METHYLPREDNISOLONE SODIUM SUCCINATE 125 MG/2ML
125 INJECTION, POWDER, LYOPHILIZED, FOR SOLUTION INTRAMUSCULAR; INTRAVENOUS PRN
Status: DISCONTINUED | OUTPATIENT
Start: 2019-10-03 | End: 2019-10-05 | Stop reason: HOSPADM

## 2019-10-03 RX ORDER — HEPARIN SODIUM (PORCINE) LOCK FLUSH IV SOLN 100 UNIT/ML 100 UNIT/ML
500 SOLUTION INTRAVENOUS PRN
Status: DISCONTINUED | OUTPATIENT
Start: 2019-10-03 | End: 2019-10-04 | Stop reason: HOSPADM

## 2019-10-03 RX ORDER — EPINEPHRINE 1 MG/ML
0.3 INJECTION, SOLUTION, CONCENTRATE INTRAVENOUS PRN
Status: DISCONTINUED | OUTPATIENT
Start: 2019-10-03 | End: 2019-10-05 | Stop reason: HOSPADM

## 2019-10-03 RX ORDER — SODIUM CHLORIDE 0.9 % (FLUSH) 0.9 %
5 SYRINGE (ML) INJECTION PRN
Status: DISCONTINUED | OUTPATIENT
Start: 2019-10-03 | End: 2019-10-04 | Stop reason: HOSPADM

## 2019-10-07 ENCOUNTER — TELEPHONE (OUTPATIENT)
Dept: HEMATOLOGY | Age: 69
End: 2019-10-07

## 2019-10-23 RX ORDER — CYANOCOBALAMIN 1000 UG/ML
1000 INJECTION INTRAMUSCULAR; SUBCUTANEOUS ONCE
Status: CANCELLED | OUTPATIENT
Start: 2019-10-24

## 2019-10-23 RX ORDER — EPINEPHRINE 1 MG/ML
0.3 INJECTION, SOLUTION, CONCENTRATE INTRAVENOUS PRN
Status: CANCELLED | OUTPATIENT
Start: 2019-10-24

## 2019-10-23 RX ORDER — PALONOSETRON 0.05 MG/ML
0.25 INJECTION, SOLUTION INTRAVENOUS ONCE
Status: CANCELLED | OUTPATIENT
Start: 2019-10-24

## 2019-10-23 RX ORDER — SODIUM CHLORIDE 0.9 % (FLUSH) 0.9 %
10 SYRINGE (ML) INJECTION PRN
Status: CANCELLED | OUTPATIENT
Start: 2019-10-24

## 2019-10-23 RX ORDER — SODIUM CHLORIDE 9 MG/ML
20 INJECTION, SOLUTION INTRAVENOUS ONCE
Status: CANCELLED | OUTPATIENT
Start: 2019-10-24

## 2019-10-23 RX ORDER — HEPARIN SODIUM (PORCINE) LOCK FLUSH IV SOLN 100 UNIT/ML 100 UNIT/ML
500 SOLUTION INTRAVENOUS PRN
Status: CANCELLED | OUTPATIENT
Start: 2019-10-24

## 2019-10-23 RX ORDER — DIPHENHYDRAMINE HYDROCHLORIDE 50 MG/ML
50 INJECTION INTRAMUSCULAR; INTRAVENOUS PRN
Status: CANCELLED | OUTPATIENT
Start: 2019-10-24

## 2019-10-23 RX ORDER — METHYLPREDNISOLONE SODIUM SUCCINATE 125 MG/2ML
125 INJECTION, POWDER, LYOPHILIZED, FOR SOLUTION INTRAMUSCULAR; INTRAVENOUS PRN
Status: CANCELLED | OUTPATIENT
Start: 2019-10-24

## 2019-10-23 RX ORDER — SODIUM CHLORIDE 0.9 % (FLUSH) 0.9 %
5 SYRINGE (ML) INJECTION PRN
Status: CANCELLED | OUTPATIENT
Start: 2019-10-24

## 2019-10-24 ENCOUNTER — OFFICE VISIT (OUTPATIENT)
Dept: HEMATOLOGY | Age: 69
End: 2019-10-24
Payer: MEDICARE

## 2019-10-24 ENCOUNTER — HOSPITAL ENCOUNTER (OUTPATIENT)
Dept: INFUSION THERAPY | Age: 69
Discharge: HOME | End: 2019-10-24
Payer: MEDICARE

## 2019-10-24 VITALS
HEART RATE: 64 BPM | BODY MASS INDEX: 28.73 KG/M2 | OXYGEN SATURATION: 96 % | DIASTOLIC BLOOD PRESSURE: 74 MMHG | HEIGHT: 69 IN | TEMPERATURE: 97 F | SYSTOLIC BLOOD PRESSURE: 138 MMHG | WEIGHT: 194 LBS

## 2019-10-24 DIAGNOSIS — C34.90 NON-SMALL CELL LUNG CANCER, UNSPECIFIED LATERALITY (HCC): ICD-10-CM

## 2019-10-24 DIAGNOSIS — C34.32 MALIGNANT NEOPLASM OF LOWER LOBE, LEFT BRONCHUS OR LUNG (HCC): ICD-10-CM

## 2019-10-24 DIAGNOSIS — C34.90 NON-SMALL CELL LUNG CANCER, UNSPECIFIED LATERALITY (HCC): Primary | ICD-10-CM

## 2019-10-24 DIAGNOSIS — D64.9 ANEMIA, UNSPECIFIED TYPE: ICD-10-CM

## 2019-10-24 DIAGNOSIS — C34.32 MALIGNANT NEOPLASM OF LOWER LOBE, LEFT BRONCHUS OR LUNG (HCC): Primary | ICD-10-CM

## 2019-10-24 DIAGNOSIS — C34.12 MALIGNANT NEOPLASM OF UPPER LOBE, LEFT BRONCHUS OR LUNG (HCC): ICD-10-CM

## 2019-10-24 DIAGNOSIS — Z51.11 CHEMOTHERAPY MANAGEMENT, ENCOUNTER FOR: ICD-10-CM

## 2019-10-24 DIAGNOSIS — T45.1X5A ADVERSE EFFECT OF CHEMOTHERAPY, INITIAL ENCOUNTER: ICD-10-CM

## 2019-10-24 DIAGNOSIS — R53.83 OTHER FATIGUE: ICD-10-CM

## 2019-10-24 LAB — CREAT SERPL-MCNC: 0.8 MG/DL

## 2019-10-24 PROCEDURE — 80053 COMPREHEN METABOLIC PANEL: CPT

## 2019-10-24 PROCEDURE — 85025 COMPLETE CBC W/AUTO DIFF WBC: CPT

## 2019-10-24 PROCEDURE — 96375 TX/PRO/DX INJ NEW DRUG ADDON: CPT

## 2019-10-24 PROCEDURE — 96372 THER/PROPH/DIAG INJ SC/IM: CPT

## 2019-10-24 PROCEDURE — 96365 THER/PROPH/DIAG IV INF INIT: CPT

## 2019-10-24 PROCEDURE — 1036F TOBACCO NON-USER: CPT | Performed by: NURSE PRACTITIONER

## 2019-10-24 PROCEDURE — 96415 CHEMO IV INFUSION ADDL HR: CPT

## 2019-10-24 PROCEDURE — G8427 DOCREV CUR MEDS BY ELIG CLIN: HCPCS | Performed by: NURSE PRACTITIONER

## 2019-10-24 PROCEDURE — 2580000003 HC RX 258: Performed by: INTERNAL MEDICINE

## 2019-10-24 PROCEDURE — 96413 CHEMO IV INFUSION 1 HR: CPT

## 2019-10-24 PROCEDURE — 83550 IRON BINDING TEST: CPT

## 2019-10-24 PROCEDURE — 83540 ASSAY OF IRON: CPT

## 2019-10-24 PROCEDURE — G8484 FLU IMMUNIZE NO ADMIN: HCPCS | Performed by: NURSE PRACTITIONER

## 2019-10-24 PROCEDURE — 36415 COLL VENOUS BLD VENIPUNCTURE: CPT

## 2019-10-24 PROCEDURE — 96374 THER/PROPH/DIAG INJ IV PUSH: CPT

## 2019-10-24 PROCEDURE — 6360000002 HC RX W HCPCS: Performed by: INTERNAL MEDICINE

## 2019-10-24 PROCEDURE — 1123F ACP DISCUSS/DSCN MKR DOCD: CPT | Performed by: NURSE PRACTITIONER

## 2019-10-24 PROCEDURE — 3017F COLORECTAL CA SCREEN DOC REV: CPT | Performed by: NURSE PRACTITIONER

## 2019-10-24 PROCEDURE — G8417 CALC BMI ABV UP PARAM F/U: HCPCS | Performed by: NURSE PRACTITIONER

## 2019-10-24 PROCEDURE — 82728 ASSAY OF FERRITIN: CPT

## 2019-10-24 PROCEDURE — 84443 ASSAY THYROID STIM HORMONE: CPT

## 2019-10-24 PROCEDURE — 96417 CHEMO IV INFUS EACH ADDL SEQ: CPT

## 2019-10-24 PROCEDURE — 4040F PNEUMOC VAC/ADMIN/RCVD: CPT | Performed by: NURSE PRACTITIONER

## 2019-10-24 PROCEDURE — 99214 OFFICE O/P EST MOD 30 MIN: CPT | Performed by: NURSE PRACTITIONER

## 2019-10-24 RX ORDER — PALONOSETRON 0.05 MG/ML
0.25 INJECTION, SOLUTION INTRAVENOUS ONCE
Status: COMPLETED | OUTPATIENT
Start: 2019-10-24 | End: 2019-10-24

## 2019-10-24 RX ORDER — SODIUM CHLORIDE 0.9 % (FLUSH) 0.9 %
10 SYRINGE (ML) INJECTION PRN
Status: DISCONTINUED | OUTPATIENT
Start: 2019-10-24 | End: 2019-10-25 | Stop reason: HOSPADM

## 2019-10-24 RX ORDER — DEXAMETHASONE SODIUM PHOSPHATE 10 MG/ML
10 INJECTION, SOLUTION INTRAMUSCULAR; INTRAVENOUS ONCE
Status: COMPLETED | OUTPATIENT
Start: 2019-10-24 | End: 2019-10-24

## 2019-10-24 RX ORDER — CYANOCOBALAMIN 1000 UG/ML
1000 INJECTION INTRAMUSCULAR; SUBCUTANEOUS ONCE
Status: COMPLETED | OUTPATIENT
Start: 2019-10-24 | End: 2019-10-24

## 2019-10-24 RX ORDER — HEPARIN SODIUM (PORCINE) LOCK FLUSH IV SOLN 100 UNIT/ML 100 UNIT/ML
500 SOLUTION INTRAVENOUS PRN
Status: DISCONTINUED | OUTPATIENT
Start: 2019-10-24 | End: 2019-10-25 | Stop reason: HOSPADM

## 2019-10-24 RX ADMIN — FOSAPREPITANT 150 MG: 150 INJECTION, POWDER, LYOPHILIZED, FOR SOLUTION INTRAVENOUS at 09:00

## 2019-10-24 RX ADMIN — CARBOPLATIN 500 MG: 10 INJECTION, SOLUTION INTRAVENOUS at 10:23

## 2019-10-24 RX ADMIN — DEXAMETHASONE SODIUM PHOSPHATE 10 MG: 10 INJECTION, SOLUTION INTRAMUSCULAR; INTRAVENOUS at 08:50

## 2019-10-24 RX ADMIN — SODIUM CHLORIDE 200 MG: 9 INJECTION, SOLUTION INTRAVENOUS at 09:39

## 2019-10-24 RX ADMIN — SODIUM CHLORIDE 1000 MG: 9 INJECTION, SOLUTION INTRAVENOUS at 10:09

## 2019-10-24 RX ADMIN — CYANOCOBALAMIN 1000 MCG: 1000 INJECTION, SOLUTION INTRAMUSCULAR at 11:08

## 2019-10-24 RX ADMIN — PALONOSETRON 0.25 MG: 0.05 INJECTION, SOLUTION INTRAVENOUS at 08:49

## 2019-11-14 ENCOUNTER — OFFICE VISIT (OUTPATIENT)
Dept: HEMATOLOGY | Age: 69
End: 2019-11-14
Payer: MEDICARE

## 2019-11-14 ENCOUNTER — HOSPITAL ENCOUNTER (OUTPATIENT)
Dept: INFUSION THERAPY | Age: 69
Discharge: HOME | End: 2019-11-14
Payer: MEDICARE

## 2019-11-14 VITALS
TEMPERATURE: 96.7 F | RESPIRATION RATE: 20 BRPM | DIASTOLIC BLOOD PRESSURE: 70 MMHG | BODY MASS INDEX: 29.11 KG/M2 | WEIGHT: 197.1 LBS | SYSTOLIC BLOOD PRESSURE: 132 MMHG | HEART RATE: 88 BPM

## 2019-11-14 DIAGNOSIS — C34.90 NON-SMALL CELL LUNG CANCER, UNSPECIFIED LATERALITY (HCC): Primary | ICD-10-CM

## 2019-11-14 DIAGNOSIS — Z51.11 CHEMOTHERAPY MANAGEMENT, ENCOUNTER FOR: ICD-10-CM

## 2019-11-14 DIAGNOSIS — C34.32 MALIGNANT NEOPLASM OF LOWER LOBE, LEFT BRONCHUS OR LUNG (HCC): Primary | ICD-10-CM

## 2019-11-14 DIAGNOSIS — D64.9 ANEMIA, UNSPECIFIED TYPE: ICD-10-CM

## 2019-11-14 DIAGNOSIS — T45.1X5A ADVERSE EFFECT OF CHEMOTHERAPY, INITIAL ENCOUNTER: ICD-10-CM

## 2019-11-14 DIAGNOSIS — R53.83 OTHER FATIGUE: ICD-10-CM

## 2019-11-14 DIAGNOSIS — C34.90 NON-SMALL CELL LUNG CANCER, UNSPECIFIED LATERALITY (HCC): ICD-10-CM

## 2019-11-14 PROCEDURE — 96413 CHEMO IV INFUSION 1 HR: CPT

## 2019-11-14 PROCEDURE — 96374 THER/PROPH/DIAG INJ IV PUSH: CPT

## 2019-11-14 PROCEDURE — 96372 THER/PROPH/DIAG INJ SC/IM: CPT

## 2019-11-14 PROCEDURE — 4040F PNEUMOC VAC/ADMIN/RCVD: CPT | Performed by: NURSE PRACTITIONER

## 2019-11-14 PROCEDURE — 96367 TX/PROPH/DG ADDL SEQ IV INF: CPT

## 2019-11-14 PROCEDURE — G8428 CUR MEDS NOT DOCUMENT: HCPCS | Performed by: NURSE PRACTITIONER

## 2019-11-14 PROCEDURE — 96417 CHEMO IV INFUS EACH ADDL SEQ: CPT

## 2019-11-14 PROCEDURE — 99214 OFFICE O/P EST MOD 30 MIN: CPT | Performed by: NURSE PRACTITIONER

## 2019-11-14 PROCEDURE — 2580000003 HC RX 258: Performed by: INTERNAL MEDICINE

## 2019-11-14 PROCEDURE — G8417 CALC BMI ABV UP PARAM F/U: HCPCS | Performed by: NURSE PRACTITIONER

## 2019-11-14 PROCEDURE — 96375 TX/PRO/DX INJ NEW DRUG ADDON: CPT

## 2019-11-14 PROCEDURE — 6360000002 HC RX W HCPCS: Performed by: INTERNAL MEDICINE

## 2019-11-14 PROCEDURE — 80053 COMPREHEN METABOLIC PANEL: CPT

## 2019-11-14 PROCEDURE — 1036F TOBACCO NON-USER: CPT | Performed by: NURSE PRACTITIONER

## 2019-11-14 PROCEDURE — G8484 FLU IMMUNIZE NO ADMIN: HCPCS | Performed by: NURSE PRACTITIONER

## 2019-11-14 PROCEDURE — 1123F ACP DISCUSS/DSCN MKR DOCD: CPT | Performed by: NURSE PRACTITIONER

## 2019-11-14 PROCEDURE — 85025 COMPLETE CBC W/AUTO DIFF WBC: CPT

## 2019-11-14 PROCEDURE — 3017F COLORECTAL CA SCREEN DOC REV: CPT | Performed by: NURSE PRACTITIONER

## 2019-11-14 RX ORDER — DIPHENHYDRAMINE HYDROCHLORIDE 50 MG/ML
50 INJECTION INTRAMUSCULAR; INTRAVENOUS PRN
Status: CANCELLED | OUTPATIENT
Start: 2019-11-14

## 2019-11-14 RX ORDER — METHYLPREDNISOLONE SODIUM SUCCINATE 125 MG/2ML
125 INJECTION, POWDER, LYOPHILIZED, FOR SOLUTION INTRAMUSCULAR; INTRAVENOUS PRN
Status: CANCELLED | OUTPATIENT
Start: 2019-11-14

## 2019-11-14 RX ORDER — EPINEPHRINE 1 MG/ML
0.3 INJECTION, SOLUTION, CONCENTRATE INTRAVENOUS PRN
Status: CANCELLED | OUTPATIENT
Start: 2019-11-14

## 2019-11-14 RX ORDER — DEXAMETHASONE SODIUM PHOSPHATE 10 MG/ML
10 INJECTION, SOLUTION INTRAMUSCULAR; INTRAVENOUS ONCE
Status: COMPLETED | OUTPATIENT
Start: 2019-11-14 | End: 2019-11-14

## 2019-11-14 RX ORDER — SODIUM CHLORIDE 9 MG/ML
20 INJECTION, SOLUTION INTRAVENOUS ONCE
Status: CANCELLED | OUTPATIENT
Start: 2019-11-14

## 2019-11-14 RX ORDER — PALONOSETRON 0.05 MG/ML
0.25 INJECTION, SOLUTION INTRAVENOUS ONCE
Status: CANCELLED | OUTPATIENT
Start: 2019-11-14

## 2019-11-14 RX ORDER — SODIUM CHLORIDE 0.9 % (FLUSH) 0.9 %
5 SYRINGE (ML) INJECTION PRN
Status: CANCELLED | OUTPATIENT
Start: 2019-11-14

## 2019-11-14 RX ORDER — SODIUM CHLORIDE 0.9 % (FLUSH) 0.9 %
10 SYRINGE (ML) INJECTION PRN
Status: DISCONTINUED | OUTPATIENT
Start: 2019-11-14 | End: 2019-11-15 | Stop reason: HOSPADM

## 2019-11-14 RX ORDER — HEPARIN SODIUM (PORCINE) LOCK FLUSH IV SOLN 100 UNIT/ML 100 UNIT/ML
500 SOLUTION INTRAVENOUS PRN
Status: CANCELLED | OUTPATIENT
Start: 2019-11-14

## 2019-11-14 RX ORDER — HEPARIN SODIUM (PORCINE) LOCK FLUSH IV SOLN 100 UNIT/ML 100 UNIT/ML
500 SOLUTION INTRAVENOUS PRN
Status: DISCONTINUED | OUTPATIENT
Start: 2019-11-14 | End: 2019-11-15 | Stop reason: HOSPADM

## 2019-11-14 RX ORDER — CYANOCOBALAMIN 1000 UG/ML
1000 INJECTION INTRAMUSCULAR; SUBCUTANEOUS ONCE
Status: COMPLETED | OUTPATIENT
Start: 2019-11-14 | End: 2019-11-14

## 2019-11-14 RX ORDER — PALONOSETRON 0.05 MG/ML
0.25 INJECTION, SOLUTION INTRAVENOUS ONCE
Status: COMPLETED | OUTPATIENT
Start: 2019-11-14 | End: 2019-11-14

## 2019-11-14 RX ORDER — SODIUM CHLORIDE 0.9 % (FLUSH) 0.9 %
10 SYRINGE (ML) INJECTION PRN
Status: CANCELLED | OUTPATIENT
Start: 2019-11-14

## 2019-11-14 RX ADMIN — DEXAMETHASONE SODIUM PHOSPHATE 10 MG: 10 INJECTION, SOLUTION INTRAMUSCULAR; INTRAVENOUS at 09:16

## 2019-11-14 RX ADMIN — CARBOPLATIN 500 MG: 10 INJECTION INTRAVENOUS at 10:45

## 2019-11-14 RX ADMIN — Medication 500 UNITS: at 11:24

## 2019-11-14 RX ADMIN — CYANOCOBALAMIN 1000 MCG: 1000 INJECTION, SOLUTION INTRAMUSCULAR; SUBCUTANEOUS at 11:27

## 2019-11-14 RX ADMIN — PALONOSETRON 0.25 MG: 0.05 INJECTION, SOLUTION INTRAVENOUS at 09:16

## 2019-11-14 RX ADMIN — Medication 10 ML: at 11:24

## 2019-11-14 RX ADMIN — SODIUM CHLORIDE 150 MG: 900 INJECTION, SOLUTION INTRAVENOUS at 09:17

## 2019-11-14 RX ADMIN — SODIUM CHLORIDE 200 MG: 9 INJECTION, SOLUTION INTRAVENOUS at 09:43

## 2019-11-14 RX ADMIN — SODIUM CHLORIDE 1000 MG: 9 INJECTION, SOLUTION INTRAVENOUS at 10:13

## 2019-11-18 VITALS
BODY MASS INDEX: 27.99 KG/M2 | SYSTOLIC BLOOD PRESSURE: 142 MMHG | WEIGHT: 189 LBS | DIASTOLIC BLOOD PRESSURE: 82 MMHG | TEMPERATURE: 98.4 F | HEIGHT: 69 IN | RESPIRATION RATE: 18 BRPM | HEART RATE: 82 BPM | OXYGEN SATURATION: 94 %

## 2019-11-18 PROBLEM — D64.9 ANEMIA: Status: ACTIVE | Noted: 2019-11-18

## 2019-11-18 PROBLEM — Z51.11 CHEMOTHERAPY MANAGEMENT, ENCOUNTER FOR: Status: ACTIVE | Noted: 2019-11-18

## 2019-11-18 PROBLEM — T45.1X5A CHEMOTHERAPY ADVERSE REACTION: Status: ACTIVE | Noted: 2019-11-18

## 2019-11-18 PROBLEM — R53.83 OTHER FATIGUE: Status: ACTIVE | Noted: 2019-11-18

## 2019-11-19 ASSESSMENT — ENCOUNTER SYMPTOMS
CONSTIPATION: 0
BLOOD IN STOOL: 0
EYE DISCHARGE: 0
VOMITING: 0
EYE PAIN: 0
SORE THROAT: 0
GASTROINTESTINAL NEGATIVE: 1
ABDOMINAL PAIN: 0
SHORTNESS OF BREATH: 1
EYE REDNESS: 0
DIARRHEA: 0
EYES NEGATIVE: 1
WHEEZING: 0
COUGH: 0
BACK PAIN: 0
NAUSEA: 0

## 2019-12-01 PROBLEM — E83.42 HYPOMAGNESEMIA: Status: ACTIVE | Noted: 2019-01-01

## 2019-12-01 PROBLEM — J90 PLEURAL EFFUSION: Status: ACTIVE | Noted: 2019-01-01

## 2019-12-01 PROBLEM — J96.21 ACUTE ON CHRONIC RESPIRATORY FAILURE WITH HYPOXIA (HCC): Status: ACTIVE | Noted: 2019-01-01

## 2019-12-01 PROBLEM — T45.1X5A ANEMIA DUE TO CHEMOTHERAPY: Status: ACTIVE | Noted: 2019-01-01

## 2019-12-01 PROBLEM — Z79.4 TYPE 2 DIABETES MELLITUS WITH HYPERGLYCEMIA, WITH LONG-TERM CURRENT USE OF INSULIN (HCC): Status: ACTIVE | Noted: 2019-01-01

## 2019-12-01 PROBLEM — I10 ESSENTIAL HYPERTENSION: Status: ACTIVE | Noted: 2019-01-01

## 2019-12-01 PROBLEM — E11.65 TYPE 2 DIABETES MELLITUS WITH HYPERGLYCEMIA, WITH LONG-TERM CURRENT USE OF INSULIN (HCC): Status: ACTIVE | Noted: 2019-01-01

## 2019-12-01 PROBLEM — J98.11 ATELECTASIS: Status: ACTIVE | Noted: 2019-01-01

## 2019-12-01 PROBLEM — D64.81 ANEMIA DUE TO CHEMOTHERAPY: Status: ACTIVE | Noted: 2019-01-01

## 2019-12-01 NOTE — H&P
[facility]  HISTORY AND PHYSICAL    Date of Admission: 11/30/2019  Primary Care Physician: Kourtney Keller APRN    Subjective     Chief Complaint: sob, low o2 sat    History of Present Illness  He is a 69-year old man with known history of non-small cell lung adenocarcinoma stage IV (diagnosed with lung mass left upper lobe in June 2019) status post chest palliative radiation treatment 13 fractions completed in August 2019.  He is last chemotherapy dose reportedly was 2 weeks ago.  From a progress note/office visit note dated September 5, 2019 by Dr. Fishman he was anticipated to be on carboplatin AUC 5/Alimta and Keytruda every 3 weeks.  He is on home oxygen  He presented to the emergency room brought by emergency medical service due to respiratory distress.  I am asked to admit for concern for pneumonia right lung, pleural effusion, anemia, hypoxia.  I am informed by ER staff (Lupe POWERS) that he has been wearing 6 to 8 L of oxygen via nasal cannula due to increasing shortness of breath.  His blood gas showed pH of 7.37, PCO2 47, PO2 of 102 taken on 100% nonrebreather.  She further told me that he is a full code.  Other work-up showed proBNP of 2065, lactic acid of 2.1, white count of 5.4, hemoglobin of 7.6 with platelet of 239  He received Zosyn, vancomycin      cxr sometime September on the left compared to today's cta showed bilateral pleural effusion     States he has progressive sob in the past 3 days  + weight gain  Improve appetite compared previously seen  Feeling food gets hung on the right side of neck (may have been previously evaluated - will review record)  No fever  Feels cold in the house while the wife feels warm (they can't find the thermostat setting)  No reported bleeding     Last chemo 2 Thursdays ago.       Review of Systems   No nausea/vomiting, abdominal pain,   No reported productive cough  No urinary or bowel disturbance  Otherwise  complete ROS reviewed and negative except as mentioned in the HPI.    Past Medical History:   Past Medical History:   Diagnosis Date   • Adenocarcinoma, lung (CMS/HCC)    • Cerumen impaction    • Cervical disc disease    • Colon cancer (CMS/HCC)    • Diabetes (CMS/HCC)    • Elevated cholesterol    • Eustachian tube dysfunction    • GERD (gastroesophageal reflux disease)    • Hx of colonic polyps    • Hypertension    • PUD (peptic ulcer disease)    • Sensorineural hearing loss      Past Surgical History:  Past Surgical History:   Procedure Laterality Date   • BRONCHOSCOPY Left 7/19/2019    Procedure: BRONCHOSCOPY WITH ENDOBRONCHIAL ULTRASOUND AND TRANSBRONCHIAL BIOPSY at 1: 30;  Surgeon: Jere Brumfield MD;  Location: [facility] OR;  Service: Pulmonary   • COLON SURGERY      Due to colon cancer   • COLONOSCOPY N/A 6/26/2017    Procedure: COLONOSCOPY WITH ANESTHESIA;  Surgeon: Barry Banks MD;  Location: [facility] ENDOSCOPY;  Service:    • COLONOSCOPY W/ POLYPECTOMY  08/05/2013    Tubular adenoma transverse x 2, Diverticulosis repeat exam in 3 years   • ENDOSCOPY  08/05/2013    HH, Billroth anastomisis in the gastric antrum   • KNEE SURGERY     • STOMACH SURGERY      Due to ulcer   • VENOUS ACCESS DEVICE (PORT) INSERTION N/A 9/3/2019    Procedure: SINGLE LUMEN PORT PLACEMENT;  Surgeon: Jelena Huntley MD;  Location: [facility] OR;  Service: General     Social History:  reports that he quit smoking about 12 years ago. He quit after 45.00 years of use. He has never used smokeless tobacco. He reports that he does not drink alcohol or use drugs.    Family History: family history includes Alzheimer's disease in his father; Diabetes in his mother; Heart disease in his mother.     Allergies:  Allergies   Allergen Reactions   • Lactose Intolerance (Gi) Diarrhea     Medications:  Prior to Admission medications    Medication Sig Start Date End Date Taking? Authorizing Provider   acetaminophen (TYLENOL) 325 MG tablet Take 2  tablets by mouth Every 4 (Four) Hours As Needed for Mild Pain . 7/25/19  Yes Aram Salas MD   ALPRAZolam (XANAX) 1 MG tablet Take 1 tablet by mouth Every 6 (Six) Hours As Needed for Anxiety (Agitation). 7/25/19  Yes Aram Salas MD   atorvastatin (LIPITOR) 40 MG tablet Take 40 mg by mouth Daily. 4/13/17  Yes Rozina Shaw MD   budesonide-formoterol (SYMBICORT) 160-4.5 MCG/ACT inhaler Inhale 2 puffs 2 (Two) Times a Day. 7/25/19  Yes Aram Salas MD   Ergocalciferol (VITAMIN D2) 2000 units tablet Take 1 tablet by mouth Daily.   Yes Rozina Shaw MD   esomeprazole (nexIUM) 40 MG capsule Take 40 mg by mouth Every Morning Before Breakfast.   Yes Rozina Shaw MD   folic acid (FOLVITE) 400 MCG tablet Take 400 mcg by mouth Daily.   Yes Rozina Shaw MD   hydrOXYzine (ATARAX) 10 MG tablet Take 10 mg by mouth 4 (Four) Times a Day As Needed for Anxiety.   Yes Rozina Shaw MD   JANUVIA 50 MG tablet Take 50 mg by mouth Daily. 4/21/17  Yes Rozina Shaw MD   ondansetron (ZOFRAN) 8 MG tablet Take 8 mg by mouth Every 8 (Eight) Hours As Needed for Nausea or Vomiting.   Yes Rozina Shaw MD   sucralfate (CARAFATE) 1 g tablet Take 1 g by mouth 4 (Four) Times a Day.   Yes Rozina Shaw MD   metoprolol succinate XL (TOPROL-XL) 50 MG 24 hr tablet Take 50 mg by mouth Daily.  11/30/19 Yes Rozina Shaw MD   albuterol sulfate  (90 Base) MCG/ACT inhaler Inhale 2 puffs Every 4 (Four) Hours As Needed for Wheezing.    Rozina Shaw MD   furosemide (LASIX) 20 MG tablet Take 20 mg by mouth Daily.  11/30/19  Rozina Shaw MD   HYDROcodone-acetaminophen (NORCO) 7.5-325 MG per tablet Take 1 tablet by mouth Every 4 (Four) Hours As Needed for Moderate Pain  (Pain). 9/3/19 11/30/19  Jelena Huntley MD   losartan (COZAAR) 100 MG tablet Take 1 tablet by mouth Daily. 7/26/19 11/30/19  Aram Salas MD  "    Objective     Vital Signs: /63   Pulse 69   Temp 98.3 °F (36.8 °C) (Oral)   Resp 19   Ht 175.3 cm (69\")   Wt 86.2 kg (190 lb)   SpO2 98%   BMI 28.06 kg/m²   Physical Exam   Constitutional: He is oriented to person, place, and time. He appears well-developed. No distress.   States he is feeling better now.  He is on high flow oxygen via Vapotherm 30L on 90%   HENT:   Head: Normocephalic and atraumatic.   Nose: Nose normal.   Mouth/Throat: Oropharynx is clear and moist. No oropharyngeal exudate.   Eyes: Conjunctivae and EOM are normal. Pupils are equal, round, and reactive to light. Right eye exhibits no discharge. Left eye exhibits no discharge. No scleral icterus.   Neck: Normal range of motion. Neck supple. No JVD present. No tracheal deviation present. No thyromegaly present.   Cardiovascular: Normal rate, regular rhythm, normal heart sounds and intact distal pulses.   Pulmonary/Chest: Effort normal and breath sounds normal. No stridor. He has no wheezes. He has no rales.   Abdominal: Soft. Bowel sounds are normal. He exhibits no distension. There is no tenderness. There is no rebound and no guarding.   + ventral hernia   Musculoskeletal: Normal range of motion. He exhibits no edema or tenderness.   Neurological: He is alert and oriented to person, place, and time. No cranial nerve deficit. He exhibits normal muscle tone. Coordination normal.   Skin: Skin is warm. Capillary refill takes less than 2 seconds. He is not diaphoretic. No erythema. No pallor.   Psychiatric: He has a normal mood and affect. His behavior is normal. Judgment and thought content normal.   Vitals reviewed.          Results Reviewed:  Lab Results (last 24 hours)     Procedure Component Value Units Date/Time    Lactic Acid, Reflex [[id number]] Collected:  11/30/19 1812    Specimen:  Blood Updated:  11/30/19 1817    Lactic Acid, Reflex Timer (This will reflex a repeat order 3-3:15 hours after ordered.) [[id number]] Collected:  " "11/30/19 1356    Specimen:  Blood Updated:  11/30/19 1745     Extra Tube Hold for add-ons.     Comment: Auto resulted.       Procalcitonin [[id number]]  (Normal) Collected:  11/30/19 1625    Specimen:  Blood Updated:  11/30/19 1713     Procalcitonin 0.16 ng/mL     Narrative:       As a Marker for Sepsis (Non-Neonates):   1. <0.5 ng/mL represents a low risk of severe sepsis and/or septic shock.  1. >2 ng/mL represents a high risk of severe sepsis and/or septic shock.    As a Marker for Lower Respiratory Tract Infections that require antibiotic therapy:  PCT on Admission     Antibiotic Therapy             6-12 Hrs later  > 0.5                Strongly Recommended            >0.25 - <0.5         Recommended  0.1 - 0.25           Discouraged                   Remeasure/reassess PCT  <0.1                 Strongly Discouraged          Remeasure/reassess PCT      As 28 day mortality risk marker: \"Change in Procalcitonin Result\" (> 80 % or <=80 %) if Day 0 (or Day 1) and Day 4 values are available. Refer to http://www.[employer]-pct-calculator.com/   Change in PCT <=80 %   A decrease of PCT levels below or equal to 80 % defines a positive change in PCT test result representing a higher risk for 28-day all-cause mortality of patients diagnosed with severe sepsis or septic shock.  Change in PCT > 80 %   A decrease of PCT levels of more than 80 % defines a negative change in PCT result representing a lower risk for 28-day all-cause mortality of patients diagnosed with severe sepsis or septic shock.                Comprehensive Metabolic Panel [[id number]]  (Abnormal) Collected:  11/30/19 1625    Specimen:  Blood Updated:  11/30/19 1706     Glucose 174 mg/dL      BUN 13 mg/dL      Creatinine 0.74 mg/dL      Sodium 137 mmol/L      Potassium 4.3 mmol/L      Chloride 98 mmol/L      CO2 30.0 mmol/L      Calcium 9.2 mg/dL      Total Protein 6.3 g/dL      Albumin 3.30 g/dL      ALT (SGPT) 12 U/L      AST (SGOT) 12 U/L      Alkaline " Phosphatase 104 U/L      Total Bilirubin 0.3 mg/dL      eGFR Non African Amer 104 mL/min/1.73      Globulin 3.0 gm/dL      A/G Ratio 1.1 g/dL      BUN/Creatinine Ratio 17.6     Anion Gap 9.0 mmol/L     Narrative:       GFR Normal >60  Chronic Kidney Disease <60  Kidney Failure <15    Troponin [[id number]]  (Normal) Collected:  11/30/19 1625    Specimen:  Blood Updated:  11/30/19 1706     Troponin T <0.010 ng/mL     Narrative:       Troponin T Reference Range:  <= 0.03 ng/mL-   Negative for AMI  >0.03 ng/mL-     Abnormal for myocardial necrosis.  Clinicians would have to utilize clinical acumen, EKG, Troponin and serial changes to determine if it is an Acute Myocardial Infarction or myocardial injury due to an underlying chronic condition.     CBC & Differential [[id number]] Collected:  11/30/19 1356    Specimen:  Blood Updated:  11/30/19 1452    Narrative:       The following orders were created for panel order CBC & Differential.  Procedure                               Abnormality         Status                     ---------                               -----------         ------                     CBC Auto Differential[[id number]]        Abnormal            Final result                 Please view results for these tests on the individual orders.    CBC Auto Differential [[id number]]  (Abnormal) Collected:  11/30/19 1356    Specimen:  Blood Updated:  11/30/19 1452     WBC 5.46 10*3/mm3      RBC 2.43 10*6/mm3      Hemoglobin 7.6 g/dL      Hematocrit 23.9 %      MCV 98.4 fL      MCH 31.3 pg      MCHC 31.8 g/dL      RDW 18.7 %      RDW-SD 62.3 fl      MPV 11.0 fL      Platelets 239 10*3/mm3     Manual Differential [[id number]]  (Abnormal) Collected:  11/30/19 1356    Specimen:  Blood Updated:  11/30/19 1452     Neutrophil % 57.1 %      Lymphocyte % 19.4 %      Monocyte % 14.3 %      Eosinophil % 2.0 %      Bands %  1.0 %      Metamyelocyte % 1.0 %      Myelocyte % 2.0 %      Atypical Lymphocyte % 3.1 %      Neutrophils  Absolute 3.18 10*3/mm3      Lymphocytes Absolute 1.06 10*3/mm3      Monocytes Absolute 0.78 10*3/mm3      Eosinophils Absolute 0.11 10*3/mm3      nRBC 1.0 /100 WBC      Anisocytosis Slight/1+     Dacrocytes Slight/1+     Microcytes Slight/1+     Poikilocytes Slight/1+     Polychromasia Mod/2+     WBC Morphology Normal     Platelet Morphology Normal    Blood Culture - Blood, Arm, Left [[id number]] Collected:  11/30/19 1430    Specimen:  Blood from Arm, Left Updated:  11/30/19 1446    Lactic Acid, Plasma [[id number]]  (Abnormal) Collected:  11/30/19 1356    Specimen:  Blood Updated:  11/30/19 1432     Lactate 2.1 mmol/L     BNP [[id number]]  (Abnormal) Collected:  11/30/19 1356    Specimen:  Blood Updated:  11/30/19 1431     proBNP 2,065.0 pg/mL     Narrative:       Among patients with dyspnea, NT-proBNP is highly sensitive for the detection of acute congestive heart failure. In addition NT-proBNP of <300 pg/ml effectively rules out acute congestive heart failure with 99% negative predictive value.    Blood Culture - Blood, Arm, Right [[id number]] Collected:  11/30/19 1356    Specimen:  Blood from Arm, Right Updated:  11/30/19 1413    Blood Gas, Arterial With Co-Ox [[id number]]  (Abnormal) Collected:  11/30/19 1334    Specimen:  Arterial Blood Updated:  11/30/19 1339     Site Right Radial     Irvin's Test Positive     pH, Arterial 7.373 pH units      pCO2, Arterial 47.0 mm Hg      Comment: 83 Value above reference range        pO2, Arterial 102.0 mm Hg      HCO3, Arterial 27.4 mmol/L      Comment: 83 Value above reference range        Base Excess, Arterial 1.8 mmol/L      O2 Saturation, Arterial 96.2 %      Hemoglobin, Blood Gas 8.2 g/dL      Comment: 84 Value below reference range        Hematocrit, Blood Gas 25.2 %      Comment: 84 Value below reference range        Oxyhemoglobin 94.6 %      Methemoglobin 1.20 %      Carboxyhemoglobin 0.5 %      A-a Gradiant 544.5 mmHg      Temperature 37.0 C      Sodium, Arterial 138  mmol/L      Potassium, Arterial 4.0 mmol/L      Barometric Pressure for Blood Gas 741 mmHg      Modality NRB     FIO2 100 %      Ventilator Mode NA     Note --     Collected by [id number]     Comment: Meter: [id number]     :  [id number]        pH, Temp Corrected --     pCO2, Temperature Corrected --     pO2, Temperature Corrected --        Imaging Results (Last 24 Hours)     Procedure Component Value Units Date/Time    CT Angiogram Chest [[id number]] Collected:  11/30/19 1751     Updated:  11/30/19 1758    Narrative:       EXAMINATION: CT ANGIOGRAM CHEST-      11/30/2019 5:36 PM CST     HISTORY: lung cancer. Respiratory distress.; R09.02-Hypoxemia;  C34.90-Malignant neoplasm of unspecified part of unspecified bronchus or  lung; J18.9-Pneumonia, unspecified organism; D64.9-Anemia, unspecified     In order to have a CT radiation dose as low as reasonably achievable  Automated Exposure Control was utilized for adjustment of the mA and/or  KV according to patient size.     DLP in mGycm= 510.     CT angiography protocol.   CT imaging with bolus IV contrast injection.   Under concurrent supervision axial, sagittal, coronal, and  three-dimensional data sets were constructed.     Comparison is made with 09/04/2019.     Heart size is within normal limits.  There is no thoracic aortic aneurysm or dissection.     Symmetric well opacified pulmonary arteries. No pulmonary embolism.     Linear band of infiltrate or atelectasis within the right middle lobe.      Right suprahilar mass measures 45 x 47 mm compared with 44 x 58 mm.     Moderate bilateral pleural effusions.  Compressive atelectasis noted at the right lung base.     Summary:  1. No pulmonary embolism.  2. New moderate bilateral pleural effusions.  3. New linear band of infiltrate or atelectasis within the right middle  lobe.  4. Left suprahilar mass show slight decreased size.                                   This report was finalized on 11/30/2019 17:55 by   Tyler Khan MD.    XR Chest 1 View [[id number]] Collected:  11/30/19 1428     Updated:  11/30/19 1434    Narrative:       EXAMINATION: XR CHEST 1 VW- 11/30/2019 2:28 PM CST     HISTORY: Shortness of breath     COMPARISON: 09/03/2019     FINDINGS:  The heart and mediastinal contours are stable. A right approach  Port-A-Cath is in stable position with tip projecting over the  cavoatrial junction. New groundglass airspace opacities are noted in the  right mid and lower lung fields. There is minimal atelectasis at the  left lung base. Area of nodularity in the left upper lobe is compatible  with the known malignancy. Areas of nodularity on the right are  presumably metastatic.       Impression:       Right airspace opacities are concerning for pneumonia. Areas  of nodularity likely reflect metastatic disease. No left upper lobe mass  is again noted.  This report was finalized on 11/30/2019 14:31 by Dr. Flavio Mike MD.        I have personally reviewed and interpreted the radiology studies and ECG obtained at time of admission.     Assessment / Plan     Assessment:   Active Hospital Problems    Diagnosis   • Hypoxia     · SOB  · Acute respiratory distress  · Acute on chronic hypoxic respiratory failure  · Bilateral pleural effusion  · Linear atelectasis   · Lung cancer s/p palliative radiation therapy; last chemo ~9 days ago.  · DMt2  · htn  · gerd  · ? Dysphagia  · Anemia prob aggravating SOB; ? Chemo induced; /anemia of chronic disease    Plan:    diurese, follow fluid status, electrolyte  Maintain spo2 > 90 %; vapotherm;   Check iron studies  Echo July 2019  · Estimated EF = 55%.  · Left ventricular systolic function is normal.  · No evidence of pulmonary hypertension is present.  will monitor for any suggestive signs of infection.  ssi  Other meds done  I think his issue is mainly his pleural effusion with elevated BNP aggravated by anemia. Infectious is less in the differential.       Code Status: full code   I  discussed the patient's findings and my recommendations with the patient and wife    Estimated length of stay  To be determined  Aram Salas MD   11/30/19   6:22 PM

## 2019-12-01 NOTE — ED PROVIDER NOTES
Subjective   History of Present Illness    Review of Systems    Past Medical History:   Diagnosis Date   • Adenocarcinoma, lung (CMS/HCC)    • Cerumen impaction    • Cervical disc disease    • Colon cancer (CMS/HCC)    • Diabetes (CMS/HCC)    • Elevated cholesterol    • Eustachian tube dysfunction    • GERD (gastroesophageal reflux disease)    • Hx of colonic polyps    • Hypertension    • PUD (peptic ulcer disease)    • Sensorineural hearing loss        Allergies   Allergen Reactions   • Lactose Intolerance (Gi) Diarrhea       Past Surgical History:   Procedure Laterality Date   • BRONCHOSCOPY Left 2019    Procedure: BRONCHOSCOPY WITH ENDOBRONCHIAL ULTRASOUND AND TRANSBRONCHIAL BIOPSY at 1: 30;  Surgeon: Jere Brumfield MD;  Location: [facility] OR;  Service: Pulmonary   • COLON SURGERY      Due to colon cancer   • COLONOSCOPY N/A 2017    Procedure: COLONOSCOPY WITH ANESTHESIA;  Surgeon: Barry Banks MD;  Location: [facility] ENDOSCOPY;  Service:    • COLONOSCOPY W/ POLYPECTOMY  2013    Tubular adenoma transverse x 2, Diverticulosis repeat exam in 3 years   • ENDOSCOPY  2013    HH, Billroth anastomisis in the gastric antrum   • KNEE SURGERY     • STOMACH SURGERY      Due to ulcer   • VENOUS ACCESS DEVICE (PORT) INSERTION N/A 9/3/2019    Procedure: SINGLE LUMEN PORT PLACEMENT;  Surgeon: Jelena Huntley MD;  Location: [facility] OR;  Service: General       Family History   Problem Relation Age of Onset   • Heart disease Mother    • Diabetes Mother    • Alzheimer's disease Father    • Colon cancer Neg Hx    • Colon polyps Neg Hx        Social History     Socioeconomic History   • Marital status:      Spouse name: Not on file   • Number of children: Not on file   • Years of education: Not on file   • Highest education level: Not on file   Tobacco Use   • Smoking status: Former Smoker     Years: 45.00     Last attempt to quit: 2007     Years since quittin.2   • Smokeless tobacco: Never  Used   Substance and Sexual Activity   • Alcohol use: No   • Drug use: No   • Sexual activity: Defer           Objective   Physical Exam    Procedures           ED Course  ED Course as of Dec 01 1738   Sat Nov 30, 2019   1441 Wife reports that Dr. Fishman completed blood work on the patient recently.  His hemoglobin is 8.8 2 weeks ago.  [TK]   1445 I reviewed this case with MING Wilcox.  She will be assuming the patient's care.  [TK]   1520 I assumed pt case from Андрей Cai.  Patient is resting comfortably on the Vapotherm.  He states that he feels much better.  The patient has advised me that he is a full code.  We are waiting on the CT scan to return.  After this the patient be admitted to the intensive care unit for further evaluation.  [LF]   1633 Lab in room to redraw labs at this time.   [LF]   1807 Reviewed lab and CT findings with pt; pt will be admitted at this time in stable cond. Call placed to hospitalist to admit pt.  [LF]   1817 Pt case discussed with Dr. Salas; pt will be admitted to ICU at this time in stable cond; O2 sat anywhere from 89% to 98% on vapotherm. Pt reports still feels short of breath so will go to ICU.  [LF]      ED Course User Index  [LF] Lupe Garcia APRN  [TK] Cristine Jose-АНДРЕЙ Benz        CT Angiogram Chest   Final Result      XR Chest 1 View   Final Result   Right airspace opacities are concerning for pneumonia. Areas   of nodularity likely reflect metastatic disease. No left upper lobe mass   is again noted.   This report was finalized on 11/30/2019 14:31 by Dr. Flavio Mike MD.        Labs Reviewed   BLOOD GAS, ARTERIAL W/CO-OXIMETRY - Abnormal; Notable for the following components:       Result Value    pCO2, Arterial 47.0 (*)     HCO3, Arterial 27.4 (*)     Hemoglobin, Blood Gas 8.2 (*)     Hematocrit, Blood Gas 25.2 (*)     All other components within normal limits   COMPREHENSIVE METABOLIC PANEL - Abnormal; Notable for the following  components:    Glucose 174 (*)     Creatinine 0.74 (*)     CO2 30.0 (*)     Albumin 3.30 (*)     All other components within normal limits    Narrative:     GFR Normal >60  Chronic Kidney Disease <60  Kidney Failure <15   BNP (IN-HOUSE) - Abnormal; Notable for the following components:    proBNP 2,065.0 (*)     All other components within normal limits    Narrative:     Among patients with dyspnea, NT-proBNP is highly sensitive for the detection of acute congestive heart failure. In addition NT-proBNP of <300 pg/ml effectively rules out acute congestive heart failure with 99% negative predictive value.   LACTIC ACID, PLASMA - Abnormal; Notable for the following components:    Lactate 2.1 (*)     All other components within normal limits   CBC WITH AUTO DIFFERENTIAL - Abnormal; Notable for the following components:    RBC 2.43 (*)     Hemoglobin 7.6 (*)     Hematocrit 23.9 (*)     MCV 98.4 (*)     RDW 18.7 (*)     RDW-SD 62.3 (*)     All other components within normal limits   BASIC METABOLIC PANEL - Abnormal; Notable for the following components:    Glucose 379 (*)     Chloride 95 (*)     All other components within normal limits    Narrative:     GFR Normal >60  Chronic Kidney Disease <60  Kidney Failure <15   CBC WITH AUTO DIFFERENTIAL - Abnormal; Notable for the following components:    WBC 2.55 (*)     RBC 2.21 (*)     Hemoglobin 7.0 (*)     Hematocrit 21.5 (*)     MCV 97.3 (*)     RDW 18.6 (*)     RDW-SD 59.9 (*)     All other components within normal limits   HEMOGLOBIN AND HEMATOCRIT, BLOOD - Abnormal; Notable for the following components:    Hemoglobin 6.9 (*)     Hematocrit 20.7 (*)     All other components within normal limits   POCT GLUCOSE FINGERSTICK - Abnormal; Notable for the following components:    Glucose 316 (*)     All other components within normal limits   POCT GLUCOSE FINGERSTICK - Abnormal; Notable for the following components:    Glucose 151 (*)     All other components within normal limits  "  POCT GLUCOSE FINGERSTICK - Abnormal; Notable for the following components:    Glucose 176 (*)     All other components within normal limits   POCT GLUCOSE FINGERSTICK - Abnormal; Notable for the following components:    Glucose 145 (*)     All other components within normal limits   MANUAL DIFFERENTIAL - Abnormal; Notable for the following components:    Lymphocyte % 19.4 (*)     Monocyte % 14.3 (*)     Metamyelocyte % 1.0 (*)     Myelocyte % 2.0 (*)     nRBC 1.0 (*)     All other components within normal limits   MANUAL DIFFERENTIAL - Abnormal; Notable for the following components:    Neutrophil % 78.0 (*)     Lymphocyte % 15.0 (*)     Monocyte % 3.0 (*)     Metamyelocyte % 1.0 (*)     Myelocyte % 3.0 (*)     Lymphocytes Absolute 0.38 (*)     Monocytes Absolute 0.08 (*)     nRBC 5.0 (*)     All other components within normal limits   TROPONIN (IN-HOUSE) - Normal    Narrative:     Troponin T Reference Range:  <= 0.03 ng/mL-   Negative for AMI  >0.03 ng/mL-     Abnormal for myocardial necrosis.  Clinicians would have to utilize clinical acumen, EKG, Troponin and serial changes to determine if it is an Acute Myocardial Infarction or myocardial injury due to an underlying chronic condition.    PROCALCITONIN - Normal    Narrative:     As a Marker for Sepsis (Non-Neonates):   1. <0.5 ng/mL represents a low risk of severe sepsis and/or septic shock.  1. >2 ng/mL represents a high risk of severe sepsis and/or septic shock.    As a Marker for Lower Respiratory Tract Infections that require antibiotic therapy:  PCT on Admission     Antibiotic Therapy             6-12 Hrs later  > 0.5                Strongly Recommended            >0.25 - <0.5         Recommended  0.1 - 0.25           Discouraged                   Remeasure/reassess PCT  <0.1                 Strongly Discouraged          Remeasure/reassess PCT      As 28 day mortality risk marker: \"Change in Procalcitonin Result\" (> 80 % or <=80 %) if Day 0 (or Day 1) and " Day 4 values are available. Refer to http://www.[facility]-pct-calculator.com/   Change in PCT <=80 %   A decrease of PCT levels below or equal to 80 % defines a positive change in PCT test result representing a higher risk for 28-day all-cause mortality of patients diagnosed with severe sepsis or septic shock.  Change in PCT > 80 %   A decrease of PCT levels of more than 80 % defines a negative change in PCT result representing a lower risk for 28-day all-cause mortality of patients diagnosed with severe sepsis or septic shock.               LACTIC ACID, REFLEX - Normal   TROPONIN (IN-HOUSE) - Normal    Narrative:     Troponin T Reference Range:  <= 0.03 ng/mL-   Negative for AMI  >0.03 ng/mL-     Abnormal for myocardial necrosis.  Clinicians would have to utilize clinical acumen, EKG, Troponin and serial changes to determine if it is an Acute Myocardial Infarction or myocardial injury due to an underlying chronic condition.    IRON PROFILE - Normal   BLOOD CULTURE   BLOOD CULTURE   BLOOD GAS, ARTERIAL W/CO-OXIMETRY   LACTIC ACID REFLEX TIMER   MAGNESIUM   POCT GLUCOSE FINGERSTICK   POCT GLUCOSE FINGERSTICK   POCT GLUCOSE FINGERSTICK   POCT GLUCOSE FINGERSTICK   POCT GLUCOSE FINGERSTICK   POCT GLUCOSE FINGERSTICK   POCT GLUCOSE FINGERSTICK   POCT GLUCOSE FINGERSTICK   TYPE AND SCREEN   PREPARE RBC   CBC AND DIFFERENTIAL    Narrative:     The following orders were created for panel order CBC & Differential.  Procedure                               Abnormality         Status                     ---------                               -----------         ------                     CBC Auto Differential[[id number]]        Abnormal            Final result                 Please view results for these tests on the individual orders.   CBC AND DIFFERENTIAL    Narrative:     The following orders were created for panel order CBC & Differential.  Procedure                               Abnormality         Status                      ---------                               -----------         ------                     CBC Auto Differential[[id number]]        Abnormal            Final result                 Please view results for these tests on the individual orders.               MDM  Number of Diagnoses or Management Options  Adenocarcinoma of lung, unspecified laterality (CMS/HCC): new and requires workup  Anemia, unspecified type: new and requires workup  Hypoxia: new and requires workup  Pleural effusion: new and requires workup  Pneumonia of right lung due to infectious organism, unspecified part of lung: new and requires workup     Amount and/or Complexity of Data Reviewed  Clinical lab tests: ordered and reviewed  Tests in the radiology section of CPT®: ordered and reviewed  Decide to obtain previous medical records or to obtain history from someone other than the patient: yes  Discuss the patient with other providers: yes    Patient Progress  Patient progress: stable      Final diagnoses:   Hypoxia   Adenocarcinoma of lung, unspecified laterality (CMS/HCC)   Pneumonia of right lung due to infectious organism, unspecified part of lung   Anemia, unspecified type   Pleural effusion              Lupe Garcia, APRN  12/01/19 1734

## 2019-12-01 NOTE — PLAN OF CARE
Problem: ARDS (Acute Resp Distress Syndrome) (Adult)  Goal: Signs and Symptoms of Listed Potential Problems Will be Absent, Minimized or Managed (ARDS)  Outcome: Ongoing (interventions implemented as appropriate)

## 2019-12-01 NOTE — PLAN OF CARE
Problem: Fall Risk (Adult)  Goal: Identify Related Risk Factors and Signs and Symptoms  Outcome: Outcome(s) achieved Date Met: 12/01/19    Goal: Absence of Fall  Outcome: Ongoing (interventions implemented as appropriate)      Problem: Patient Care Overview  Goal: Plan of Care Review  Outcome: Ongoing (interventions implemented as appropriate)   12/01/19 0500   Coping/Psychosocial   Plan of Care Reviewed With patient;spouse   OTHER   Outcome Summary pt vitals stable, SOB with activity, no complaints of pain, Hgb now 7, MD notified

## 2019-12-02 PROBLEM — E87.6 HYPOKALEMIA: Status: ACTIVE | Noted: 2019-01-01

## 2019-12-02 NOTE — PLAN OF CARE
Problem: Patient Care Overview  Goal: Plan of Care Review  Outcome: Ongoing (interventions implemented as appropriate)   12/02/19 4271   Coping/Psychosocial   Plan of Care Reviewed With patient;spouse   OTHER   Outcome Summary Pt with unintentinal wt loss over the past few months. Pt with decreased appetite possibly related to chemotherapy. Encouraged oral intake with meals, advised alternate selections, and available snacks. Cont to follow.       Problem: Nutrition, Imbalanced: Inadequate Oral Intake (Adult)  Goal: Identify Related Risk Factors and Signs and Symptoms  Outcome: Ongoing (interventions implemented as appropriate)    Goal: Improved Oral Intake  Outcome: Ongoing (interventions implemented as appropriate)    Goal: Prevent Further Weight Loss  Outcome: Ongoing (interventions implemented as appropriate)

## 2019-12-02 NOTE — PROGRESS NOTES
"Discharge Planning Assessment  [facility]     Patient Name: Romel Rosario  MRN: [id number]  Today's Date: 12/2/2019    Admit Date: 11/30/2019    Discharge Needs Assessment     Row Name 12/02/19 1136       Living Environment    Lives With  spouse    Current Living Arrangements  home/apartment/condo    Primary Care Provided by  self;spouse/significant other    Provides Primary Care For  no one    Family Caregiver if Needed  spouse    Quality of Family Relationships  supportive;helpful;involved    Able to Return to Prior Arrangements  yes       Resource/Environmental Concerns    Resource/Environmental Concerns  none       Transition Planning    Patient/Family Anticipates Transition to  home with family    Patient/Family Anticipated Services at Transition  none    Transportation Anticipated  family or friend will provide       Discharge Needs Assessment    Readmission Within the Last 30 Days  no previous admission in last 30 days    Concerns to be Addressed  discharge planning    Equipment Currently Used at Home  oxygen;wheelchair;commode    Current Discharge Risk  chronically ill;non-[facility]    Discharge Coordination/Progress  SW spoke mainly with patient's spouse.  Patient is hard of hearing.  Patient resides at home with his spouse.  Patient has home O2 that is supplied by [employer].  Patient appeared somewhat agitated.  Spouse advised patient was scheduled to follow up with pulmonologist back in July but he did not keep the appointment.  Spouse stated patient had five doctor's appointments in four days and it was \"just too much.\"  SW advised when appointments are not convenient to reschedule to a time that works better for patient rather than just not follow up.  Patient and spouse appear to lack insight into his conditions.  SW will follow and assist with disposition.        Discharge Plan    No documentation.       Destination      No service coordination in this encounter.      Durable Medical Equipment   "    No service coordination in this encounter.      Dialysis/Infusion      No service coordination in this encounter.      Home Medical Care      No service coordination in this encounter.      Therapy      No service coordination in this encounter.      Community Resources      No service coordination in this encounter.          Demographic Summary    No documentation.       Functional Status    No documentation.       Psychosocial    No documentation.       Abuse/Neglect    No documentation.       Legal    No documentation.       Substance Abuse    No documentation.       Patient Forms    No documentation.           ZBIGNIEW Arzola

## 2019-12-02 NOTE — PROGRESS NOTES
[facility]  INPATIENT PROGRESS NOTE    Patient Name: Romel Rosario  Date of Admission: 11/30/2019  Today's Date: 12/02/19  Length of Stay: 2  Primary Care Physician: Kourtney Keller APRN    Subjective   Chief Complaint: Shortness of breath    HPI   69-year-old male with a history of lung cancer on 2 to 3 L of O2 chronically.  Initially presented in with respiratory distress hypoxic respiratory failure and bilateral pleural effusions.  He has been diuresing over the last several days.  Afebrile.  No sputum production.  Urinating frequently and improving.  Down to 55% O2 Vapotherm.  Currently his main complaint is constipation.  Says he has not had a bowel movement since coming in.  Denies abdominal pain but feels some rectal fullness.  No nausea.  Tolerating p.o. without issue.  His breathing has improved but states he has not been out of bed so not sure to what degree.  No chest pain.        Review of Systems   All pertinent negatives and positives are as above. All other systems have been reviewed and are negative unless otherwise stated.     Objective    Temp:  [97.6 °F (36.4 °C)-98.4 °F (36.9 °C)] 97.9 °F (36.6 °C)  Heart Rate:  [] 84  Resp:  [15-21] 17  BP: ()/(52-80) 124/68  Physical Exam  GEN: Awake, alert, interactive, in NAD  HEENT: Atraumatic, PERRLA, EOMI, Anicteric, Trachea midline  Lungs: Diminished breath sounds at bases bilaterally.  No wheezing or rales.  Heart: RRR, +S1/s2, no rub  ABD: soft, mild distention, Nt, +BS, no guarding/rebound  Extremities: atraumatic, no cyanosis, no edema  Skin: no rashes or lesions  Neuro: AAOx3, no focal deficits  Psych: normal mood & affect        Results Review:  I have reviewed the labs, radiology results, and diagnostic studies.    Laboratory Data:   Results from last 7 days   Lab Units 12/02/19  0035 12/01/19  1110 12/01/19  0008 11/30/19  1356   WBC 10*3/mm3 4.91  --  2.55* 5.46   HEMOGLOBIN  g/dL 8.8* 6.9* 7.0* 7.6*   HEMATOCRIT % 27.1* 20.7* 21.5* 23.9*   PLATELETS 10*3/mm3 263  --  239 239        Results from last 7 days   Lab Units 12/02/19  0035 12/01/19  0007 11/30/19  1625   SODIUM mmol/L 141 136 137   POTASSIUM mmol/L 2.9* 3.9 4.3   CHLORIDE mmol/L 94* 95* 98   CO2 mmol/L 33.0* 28.0 30.0*   BUN mg/dL 12 13 13   CREATININE mg/dL 0.83 0.89 0.74*   CALCIUM mg/dL 9.8 9.4 9.2   BILIRUBIN mg/dL  --   --  0.3   ALK PHOS U/L  --   --  104   ALT (SGPT) U/L  --   --  12   AST (SGOT) U/L  --   --  12   GLUCOSE mg/dL 175* 379* 174*       Culture Data:   Blood Culture   Date Value Ref Range Status   11/30/2019 No growth at 24 hours  Preliminary   11/30/2019 No growth at 24 hours  Preliminary       Radiology Data:   Imaging Results (Last 24 Hours)     ** No results found for the last 24 hours. **          I have reviewed the patient's current medications.     Assessment/Plan     Active Hospital Problems    Diagnosis   • **Acute on chronic respiratory failure with hypoxia (CMS/HCC)   • Hypokalemia   • Hypomagnesemia   • Pleural effusion   • Type 2 diabetes mellitus with hyperglycemia, with long-term current use of insulin (CMS/HCC)   • Anemia due to chemotherapy   • Essential hypertension   • Adenocarcinoma, lung (CMS/HCC)       #1 acute on chronic hypoxic respiratory failure -with history of lung cancer and now acutely seems to be in the setting of pleural effusions.  No noted history of heart failure and normal EF/echo in July of this year.  Has been diuresing here and improving.  He got antibiotics x1 on arrival in the ER but none since.  He has been afebrile without a white count.  He is not making any sputum.  We will repeat a chest x-ray today to evaluate degree of improvement.  Continue to diurese for now.  If chest x-ray still with large amount of effusion will potentially plan for thoracentesis.    #2 pleural effusions  -bilateral.  Does not appear to be in heart failure.  Also does not appear to be  parapneumonic as he is afebrile without a white count and improving without antibiotics. Potentially malignant effusions in the setting of his lung cancer.  Has been diuresing and improving clinically.  We will check an x-ray today.  May consider thoracentesis.      #Hypokalemia -2.9.  Replace and recheck.  Hypokalemia protocol for further replacement as needed.    #4 hypomagnesemia -severe at 0.7.  Replace and recheck.  Hypomagnesemia protocol for further replacement as needed.    #5 anemia -chronic and likely acutely down the setting of his chemotherapy treatments.  He has no signs of active bleeding.  His iron studies are normal.  He has been given 1 unit of PRBC with improvement from 6.9 ->8.8.  Continue to monitor.    #6 constipation -no nausea or issues tolerating p.o.  Will order a bisacodyl suppository.  P.o. Colace twice daily.  PRN MiraLAX.    #7 lung cancer -stage IIIc adenocarcinoma of the lung followed in the outpatient setting by Dr. Fishman and receiving chemotherapy as well as recently referred to Dr. Bell of rad/onc for definitive radiation tx. Will get oncology consult and evaluation for ongoing recommendations while here.      Discharge Planning: Ongoing.  Continue here in the ICU today while weaning O2.    Rush Alberto DO   12/02/19   8:38 AM

## 2019-12-02 NOTE — PLAN OF CARE
Problem: Fall Risk (Adult)  Goal: Absence of Fall  Outcome: Ongoing (interventions implemented as appropriate)      Problem: Patient Care Overview  Goal: Plan of Care Review  Outcome: Ongoing (interventions implemented as appropriate)   12/01/19 6335   Coping/Psychosocial   Plan of Care Reviewed With patient;spouse   OTHER   Outcome Summary Pt is tolerating a decrease in FIO2, but his Mg is 0.7, replacement infusion ordered. He still will desat with talking or slight activity.   Plan of Care Review   Progress no change       Problem: ARDS (Acute Resp Distress Syndrome) (Adult)  Goal: Signs and Symptoms of Listed Potential Problems Will be Absent, Minimized or Managed (ARDS)  Outcome: Ongoing (interventions implemented as appropriate)      Problem: Skin Injury Risk (Adult)  Goal: Identify Related Risk Factors and Signs and Symptoms  Outcome: Ongoing (interventions implemented as appropriate)

## 2019-12-02 NOTE — CONSULTS
MEDICAL ONCOLOGY CONSULTATION      Pt Name: Romel Rosario  YOB: 1950  MRN: [id number]  Room:     Date of admission: 11/30/2019  Date of evaluation: 12/2/2019  Referring Physician: Aram Salas Md  [id number] [place] [address]   Reason for Consultation: Metastatic NSCLC, continuity of care    History Obtained From: History obtained from chart review and the patient.    CHIEF COMPLAINT:    Chief Complaint   Patient presents with   • Respiratory Distress     HISTORY OF PRESENT ILLNESS:   Romel Rosario is a 69 y.o.  gentleman who holds a diagnosis of NSCLC.  He initially presented with stage III disease, unfortunately with interval progression of bilateral pulmonary metastases and is currently treated for stage IV disease.   Treatment history includes palliative radiation therapy to the chest due to obstructive symptoms.   Cycle #4 of palliative carboplatin/Alimta and Keytruda was completed 11/14/19.  Plans were for reimaging in 3 weeks (was scheduled 12/2/19) with anticipation for maintenance Alimta and Keytruda.    Romel however, was admitted to [facility] 11/30/2019 for evaluation of shortness of breath with increased oxygen use.      CTA chest on 11/30/2019 was negative for PE.    New, moderate bilateral pleural effusions noted in addition to a new linear band of infiltrate or atelectasis within the RML.  The left suprahilar mass shows slight decrease in size measuring 45 x 47 mm, previously 44 x 58 mm on 9/4/2019.    He had 1 unit of pRBC for hemoglobin of 6.9, now improved at 8.8.  He has been diuresing.  CXR 12/2/19 improved with a decrease in central pulmonary venous congestion and small bilateral pleural effusions.  Symptomatically Romel states shortness of breath is improved.     ONCOLOGIC HISTORY:  Diagnosis  · Non-small cell lung cancer, adenocarcinoma, July 2019  · Stage IV   · PD-L1 unknown  · Guardant 360-KRAS G12V alteration      Treatment summary    · Status chest palliative RT 13 fractions completed August 2019  · 9/5/2019-1 time dose of carboplatin AUC 2/Taxol 45 mg/m².  · 9/12/2019- Initiated on carboplatin AUC 5/Alimta 500 mg/m² and Keytruda every 3 weeks, anticipate maintenance Alimta and Keytruda     Cancer history  Mr Romel Rosario was first seen by me on 6/28/2019 referred by his primary care provider for further evaluation of a lung mass and mediastinal adenopathy. The patient had complains of shortness of breath at exertion and therefore was recommended further imaging.  · 6/13/2019-CT chest with contrast showed a 6.3 x 5.6 x 7 cm left upper lobe lung mass, centrally necrotic, with compressive atelectasis. Questionable satellite nodule within the lingula measured 7 mm. Noncalcified 11 mm left lower lobe nodule concerning for pulmonary metastases. 5 mm subpleural right lower lobe nodule of uncertain significance. A small hiatal hernia. No adrenal nodules. Mediastinal adenopathy measuring up to 2 cm. Subcarina lymph nodes measure up to 1.3 cm.   · 6/24/2019-Initial oncology consultation   · 7/3/2019-bone scan showed no convincing evidence of osseous metastasis.   · 7/3/2019- CT chest, abdomen, pelvis showed no evidence of intra-abdominal metastatic disease. Noncalcified nodule in the right middle lobe  · 7/19/2019- bronchoscopy/FNA biopsy consistent with non-small cell lung cancer favoring adenocarcinoma subtype.PD-L1 could not be performed on scant material from FNA biopsy.  · 8/7/2019- MRI brain unremarkable for metastatic disease  · 8/29/2019- recommend a repeat CT chest abdomen pelvis Mediport placement and concurrent chemoradiation with carboplatin/Taxol to be followed by immunotherapy 1 year.  Stage III non-small cell lung cancer.  Will obtain guardant 360 liquid biopsy.  Molecular studies could not be performed on FNA specimen.  · 9/4/2019-CT chest abdomen pelvis showed interval improvement of metastatic adenopathy and right upper lobe mass  but interval progression of bilateral pulmonary metastasis.  No evidence of intra-abdominal metastatic disease. Specifically, tumor currently measuring 4.8 x 4.4 cm (previously 6.3 x 5.6 cm). Again identified are multiple lymph nodes in the prevascular space adjacent to the aorta. There has been mild  interval decrease in the size of these nodes, largest now measures 1.8 x  2 cm, previously measured 2.4 x 2 cm. Also noted is interval decrease the size of right paratracheal nodes middle mediastinal region, largest node measuring 1.6 cm in greatest dimension previosly measured 2.2 cm. No developing lymphadenopathy observed. The pulmonary nodule in the left lower lobe, anterolaterally, pleural-based measures 1.4 cm greatest transaxial projection previously measured 1.1 cm. In the lingula, near the major fissure there is a 9 mm nodule appreciated, axial image #92, previously measured less than 5 mm. In the left lower lobe there are several developing pulmonary nodules appreciated, the largest appreciated centrally, axial image #110 measuring nearly 14 mm in greatest dimension, previously measured 9 mm. In the left upper lobe, anteriorly and laterally, axial image #57 there is a developing 6 mm pulmonary nodule appreciated.  The right upper lobe, axial image #24 a developing 4 mm nodule is  observed. More inferiorly, axial image #38 pleural-based 8 mm nodule is  now appreciated. In the right middle lobe, inferiorly and laterally, axial image #75 9 mm pulmonary nodule, pleural-based is now identified. There are multiple developing pulmonary nodules in the right lower lobe,  largest appreciated posteriorly and inferiorly, axial image #115  measuring 1 cm in greatest dimension.  · 9/5/2019-he received 1 cycle of carboplatin AUC 2/Taxol 50 mg/m²  · 9/5/2019- recommended carboplatin Alimta and Keytruda due to findings of stage IV disease today on CT scans.  · 9/12/2019- Initiated on carboplatin AUC 5/Alimta 500 mg/m² and  Keytruda every 3 weeks  · 2019- Guardant 360 identified a KRAS G12V alteration     History  Past Medical History:   Diagnosis Date   • Adenocarcinoma, lung (CMS/HCC)    • Cerumen impaction    • Cervical disc disease    • Colon cancer (CMS/HCC)    • Diabetes (CMS/HCC)    • Elevated cholesterol    • Eustachian tube dysfunction    • GERD (gastroesophageal reflux disease)    • Hx of colonic polyps    • Hypertension    • PUD (peptic ulcer disease)    • Sensorineural hearing loss      Past Surgical History:   Procedure Laterality Date   • BRONCHOSCOPY Left 2019    Procedure: BRONCHOSCOPY WITH ENDOBRONCHIAL ULTRASOUND AND TRANSBRONCHIAL BIOPSY at 1: 30;  Surgeon: Jere Brumfield MD;  Location: [facility] OR;  Service: Pulmonary   • COLON SURGERY      Due to colon cancer   • COLONOSCOPY N/A 2017    Procedure: COLONOSCOPY WITH ANESTHESIA;  Surgeon: Barry Banks MD;  Location: [facility] ENDOSCOPY;  Service:    • COLONOSCOPY W/ POLYPECTOMY  2013    Tubular adenoma transverse x 2, Diverticulosis repeat exam in 3 years   • ENDOSCOPY  2013    HH, Billroth anastomisis in the gastric antrum   • KNEE SURGERY     • STOMACH SURGERY      Due to ulcer   • VENOUS ACCESS DEVICE (PORT) INSERTION N/A 9/3/2019    Procedure: SINGLE LUMEN PORT PLACEMENT;  Surgeon: Jelena Huntley MD;  Location: [facility] OR;  Service: General     Family History   Problem Relation Age of Onset   • Heart disease Mother    • Diabetes Mother    • Alzheimer's disease Father    • Colon cancer Neg Hx    • Colon polyps Neg Hx       Social History     Tobacco Use   • Smoking status: Former Smoker     Years: 45.00     Last attempt to quit: 2007     Years since quittin.2   • Smokeless tobacco: Never Used   Substance Use Topics   • Alcohol use: No   • Drug use: No     Medications Prior to Admission   Medication Sig Dispense Refill Last Dose   • acetaminophen (TYLENOL) 325 MG tablet Take 2 tablets by mouth Every 4 (Four) Hours As Needed  for Mild Pain .   Past Month at Unknown time   • ALPRAZolam (XANAX) 1 MG tablet Take 1 tablet by mouth Every 6 (Six) Hours As Needed for Anxiety (Agitation).   Past Month at Unknown time   • atorvastatin (LIPITOR) 40 MG tablet Take 40 mg by mouth Daily.   11/29/2019 at Unknown time   • budesonide-formoterol (SYMBICORT) 160-4.5 MCG/ACT inhaler Inhale 2 puffs 2 (Two) Times a Day.  12 11/29/2019 at Unknown time   • Ergocalciferol (VITAMIN D2) 2000 units tablet Take 1 tablet by mouth Daily.   11/29/2019 at Unknown time   • esomeprazole (nexIUM) 40 MG capsule Take 40 mg by mouth Every Morning Before Breakfast.   11/29/2019 at Unknown time   • folic acid (FOLVITE) 400 MCG tablet Take 400 mcg by mouth Daily.   11/29/2019 at Unknown time   • hydrOXYzine (ATARAX) 10 MG tablet Take 10 mg by mouth 4 (Four) Times a Day As Needed for Anxiety.   Past Week at Unknown time   • JANUVIA 50 MG tablet Take 50 mg by mouth Daily.   11/29/2019 at Unknown time   • ondansetron (ZOFRAN) 8 MG tablet Take 8 mg by mouth Every 8 (Eight) Hours As Needed for Nausea or Vomiting.   Past Month at Unknown time   • sucralfate (CARAFATE) 1 g tablet Take 1 g by mouth 4 (Four) Times a Day.   11/29/2019 at Unknown time   • albuterol sulfate  (90 Base) MCG/ACT inhaler Inhale 2 puffs Every 4 (Four) Hours As Needed for Wheezing.   More than a month at Unknown time      Scheduled Meds:    atorvastatin 40 mg Oral Nightly   budesonide-formoterol 2 puff Inhalation BID - RT   docusate sodium 100 mg Oral BID   enoxaparin 40 mg Subcutaneous Q24H   folic acid 400 mcg Oral Daily   furosemide 40 mg Intravenous Daily   insulin lispro 2-9 Units Subcutaneous 4x Daily With Meals & Nightly   ipratropium-albuterol 3 mL Nebulization 4x Daily - RT   linagliptin 5 mg Oral Daily   pantoprazole 40 mg Oral Q AM   sodium chloride 10 mL Intravenous Q12H   sucralfate 1 g Oral 4x Daily AC & at Bedtime     PRN Meds:  •  acetaminophen  •  ALPRAZolam  •  [START ON 12/3/2019]  bisacodyl  •  dextrose  •  dextrose  •  glucagon (human recombinant)  •  hydrOXYzine  •  magnesium sulfate **OR** magnesium sulfate **OR** magnesium sulfate  •  ondansetron  •  polyethylene glycol  •  potassium chloride **OR** potassium chloride **OR** potassium chloride  •  sodium chloride  •  [COMPLETED] Insert peripheral IV **AND** sodium chloride  •  sodium chloride   Allergies:  Lactose intolerance (gi)      Subjective   REVIEW OF SYSTEMS:   Review of Systems   Constitutional: Positive for activity change and fatigue. Negative for chills.        No night sweats       HENT: Negative for dental problem, hearing loss, mouth sores, nosebleeds, sore throat and trouble swallowing.    Eyes: Negative for visual disturbance.        No blurring of vision, no double vision     Respiratory: Positive for cough and shortness of breath. Negative for wheezing.         Chronic O2 dependency       Cardiovascular: Negative for chest pain, palpitations and leg swelling.   Gastrointestinal: Negative for abdominal pain, constipation, diarrhea, nausea and vomiting.   Endocrine: Negative for cold intolerance, heat intolerance, polydipsia and polyuria.   Genitourinary: Negative for dysuria, frequency, hematuria and urgency.   Musculoskeletal: Positive for arthralgias. Negative for joint swelling and myalgias.   Skin: Negative for pallor and rash.   Allergic/Immunologic: Negative for immunocompromised state.   Neurological: Negative for seizures, syncope and numbness.   Hematological: Negative for adenopathy. Does not bruise/bleed easily.   Psychiatric/Behavioral: Negative for confusion and suicidal ideas.       Objective   PHYSICAL EXAM:  Physical Exam   Constitutional: He is oriented to person, place, and time. No distress.   Appears chronically ill   HENT:   Head: Normocephalic and atraumatic.   Right Ear: External ear normal.   Left Ear: External ear normal.   Mouth/Throat: Oropharynx is clear and moist.   Eyes: EOM are normal. No  "scleral icterus.   Neck: Neck supple. No tracheal deviation present.   Cardiovascular: Normal rate and regular rhythm.   Pulmonary/Chest: No respiratory distress. He has decreased breath sounds in the right lower field and the left lower field. He has no wheezes. He has no rales.   Chronic O2 dependency   Abdominal: Soft. Bowel sounds are normal. He exhibits no distension and no mass.   No hepatosplenomegaly     Genitourinary:   Genitourinary Comments: Exam deferred.     Musculoskeletal: He exhibits no edema or tenderness.   Normal ROM to all 4 extremities     Lymphadenopathy:   No bulky palpable cervical, clavicular, axillary or inguinal adenopathies on the left or right.     Neurological: He is alert and oriented to person, place, and time.   Follows commands. Non-focal.     Skin: Skin is warm and dry. No rash noted. He is not diaphoretic.   Psychiatric: He has a normal mood and affect. His behavior is normal. Thought content normal.   Vitals reviewed.    Vital Signs   BP 94/66   Pulse 83   Temp 97.9 °F (36.6 °C) (Oral)   Resp 16   Ht 172.7 cm (68\")   Wt 83.5 kg (184 lb 1.4 oz)   SpO2 92%   BMI 27.99 kg/m²     Intake/Output Summary (Last 24 hours) at 12/2/2019 1541  Last data filed at 12/2/2019 0500  Gross per 24 hour   Intake 476 ml   Output 2010 ml   Net -1534 ml     Labs:  CBC  Results from last 7 days   Lab Units 12/02/19  0035 12/01/19  1110 12/01/19  0008 11/30/19  1356   WBC 10*3/mm3 4.91  --  2.55* 5.46   HEMOGLOBIN g/dL 8.8* 6.9* 7.0* 7.6*   HEMATOCRIT % 27.1* 20.7* 21.5* 23.9*   PLATELETS 10*3/mm3 263  --  239 239     Lab Results   Component Value Date     12/02/2019    K 4.0 12/02/2019    CL 94 (L) 12/02/2019    CO2 33.0 (H) 12/02/2019    BUN 12 12/02/2019    CREATININE 0.83 12/02/2019    GLUCOSE 175 (H) 12/02/2019    CALCIUM 9.8 12/02/2019    BILITOT 0.3 11/30/2019    ALKPHOS 104 11/30/2019    AST 12 11/30/2019    ALT 12 11/30/2019    AGRATIO 1.1 11/30/2019    GLOB 3.0 11/30/2019     No " results found for: INR, PROTIME    Cultures:  Lab Results   Component Value Date    BLOODCX No growth at 2 days 11/30/2019     No components found for: URINCX    ASSESSMENT/PLAN:    1. stage IV NSCLC (adenocarcinoma)   cycle #4 of carboplatin/Alimta and Keytruda completed 11/14/19.  Plans were for reimaging with anticipation for maintenance Alimta and Keytruda.     2. Acute on chronic resp. failure, bilateral pleural effuions  Diuresing  CXR today improved  Chronic O2 dependency. Currently hi-flow NC, humidified     3.  Other fatigue, secondary to multiple comorbidities to include obesity, oxygen dependent, chemotherapy, lung cancer and anemia.     4. Anemia, secondary to chemotherapy.   Hgb 8.8 (s/p 1 unit of pRBC for hemoglobin of 6.9)    60, TIBC 304, iron saturation 20% on 12/1/2019       Currently shortness of breath improved.   Still requiring hi-flow O2.  Agree with therapeutic and diagnostic thoracentesis for cytology if worsens/no improvement symptomatically.  Discussed with patient and his spouse.  All questions answered.     Brissa Lea, MING  12/02/19  3:41 PM    Claudio Nevarez MD  12/03/19   9:00 AM

## 2019-12-02 NOTE — PLAN OF CARE
Problem: Patient Care Overview  Goal: Plan of Care Review  Outcome: Ongoing (interventions implemented as appropriate)   12/02/19 0605   Coping/Psychosocial   Plan of Care Reviewed With patient   OTHER   Outcome Summary Vital signs stable. Pt tolerating decreased FI02 while awake, however desats when resting. Magnesium and Potassium replacement in progress. Pt A&O X4, follows commands however is weak. Run of A-fib this shift, provider notified. B/M 12/2/19   Plan of Care Review   Progress no change

## 2019-12-03 PROBLEM — I47.1 SVT (SUPRAVENTRICULAR TACHYCARDIA) (HCC): Status: ACTIVE | Noted: 2019-01-01

## 2019-12-03 NOTE — PROGRESS NOTES
HEMATOLOGY AND MEDICAL ONCOLOGY PROGRESS NOTE          Pt Name: Romel Rosario  YOB: 1950  MRN: [id number]    Date of evaluation: 12/3/2019  History Obtained From:  History obtained from chart review and the patient.    CHIEF COMPLAINT:    Chief Complaint   Patient presents with   • Respiratory Distress     Subjective: feeling better this am. Slept well. Breathing better. Did require increased O2 while sleeping. Issues with tachycardia.     HISTORY OF PRESENT ILLNESS:   Romel Rosario is a 69 y.o.  gentleman who holds a diagnosis of NSCLC.  He initially presented with stage III disease, unfortunately with interval progression of bilateral pulmonary metastases and is currently treated for stage IV disease.   Treatment history includes palliative radiation therapy to the chest due to obstructive symptoms.   Cycle #4 of palliative carboplatin/Alimta and Keytruda was completed 11/14/19.  Plans were for reimaging in 3 weeks (was scheduled 12/2/19) with anticipation for maintenance Alimta and Keytruda.     Romel however, was admitted to [facility] 11/30/2019 for evaluation of shortness of breath with increased oxygen use.      CTA chest on 11/30/2019 was negative for PE.    New, moderate bilateral pleural effusions noted in addition to a new linear band of infiltrate or atelectasis within the RML.  The left suprahilar mass shows slight decrease in size measuring 45 x 47 mm, previously 44 x 58 mm on 9/4/2019.     He had 1 unit of pRBC for hemoglobin of 6.9, now improved at 8.8.  He has been diuresing.  CXR 12/2/19 improved with a decrease in central pulmonary venous congestion and small bilateral pleural effusions.  Symptomatically Romel states shortness of breath is improved.     ONCOLOGIC HISTORY:  Diagnosis  · Non-small cell lung cancer, adenocarcinoma, July 2019  · Stage IV   · PD-L1 unknown  · Guardant 360-KRAS G12V alteration      Treatment summary   · Status chest palliative RT 13 fractions  completed August 2019  · 9/5/2019-1 time dose of carboplatin AUC 2/Taxol 45 mg/m².  · 9/12/2019- Initiated on carboplatin AUC 5/Alimta 500 mg/m² and Keytruda every 3 weeks, anticipate maintenance Alimta and Keytruda     Cancer history  Mr Romel Rosario was first seen by me on 6/28/2019 referred by his primary care provider for further evaluation of a lung mass and mediastinal adenopathy. The patient had complains of shortness of breath at exertion and therefore was recommended further imaging.  · 6/13/2019-CT chest with contrast showed a 6.3 x 5.6 x 7 cm left upper lobe lung mass, centrally necrotic, with compressive atelectasis. Questionable satellite nodule within the lingula measured 7 mm. Noncalcified 11 mm left lower lobe nodule concerning for pulmonary metastases. 5 mm subpleural right lower lobe nodule of uncertain significance. A small hiatal hernia. No adrenal nodules. Mediastinal adenopathy measuring up to 2 cm. Subcarina lymph nodes measure up to 1.3 cm.   · 6/24/2019-Initial oncology consultation   · 7/3/2019-bone scan showed no convincing evidence of osseous metastasis.   · 7/3/2019- CT chest, abdomen, pelvis showed no evidence of intra-abdominal metastatic disease. Noncalcified nodule in the right middle lobe  · 7/19/2019- bronchoscopy/FNA biopsy consistent with non-small cell lung cancer favoring adenocarcinoma subtype.PD-L1 could not be performed on scant material from FNA biopsy.  · 8/7/2019- MRI brain unremarkable for metastatic disease  · 8/29/2019- recommend a repeat CT chest abdomen pelvis Mediport placement and concurrent chemoradiation with carboplatin/Taxol to be followed by immunotherapy 1 year.  Stage III non-small cell lung cancer.  Will obtain guardant 360 liquid biopsy.  Molecular studies could not be performed on FNA specimen.  · 9/4/2019-CT chest abdomen pelvis showed interval improvement of metastatic adenopathy and right upper lobe mass but interval progression of bilateral  pulmonary metastasis.  No evidence of intra-abdominal metastatic disease. Specifically, tumor currently measuring 4.8 x 4.4 cm (previously 6.3 x 5.6 cm). Again identified are multiple lymph nodes in the prevascular space adjacent to the aorta. There has been mild  interval decrease in the size of these nodes, largest now measures 1.8 x  2 cm, previously measured 2.4 x 2 cm. Also noted is interval decrease the size of right paratracheal nodes middle mediastinal region, largest node measuring 1.6 cm in greatest dimension previosly measured 2.2 cm. No developing lymphadenopathy observed. The pulmonary nodule in the left lower lobe, anterolaterally, pleural-based measures 1.4 cm greatest transaxial projection previously measured 1.1 cm. In the lingula, near the major fissure there is a 9 mm nodule appreciated, axial image #92, previously measured less than 5 mm. In the left lower lobe there are several developing pulmonary nodules appreciated, the largest appreciated centrally, axial image #110 measuring nearly 14 mm in greatest dimension, previously measured 9 mm. In the left upper lobe, anteriorly and laterally, axial image #57 there is a developing 6 mm pulmonary nodule appreciated.  The right upper lobe, axial image #24 a developing 4 mm nodule is  observed. More inferiorly, axial image #38 pleural-based 8 mm nodule is  now appreciated. In the right middle lobe, inferiorly and laterally, axial image #75 9 mm pulmonary nodule, pleural-based is now identified. There are multiple developing pulmonary nodules in the right lower lobe,  largest appreciated posteriorly and inferiorly, axial image #115  measuring 1 cm in greatest dimension.  · 9/5/2019-he received 1 cycle of carboplatin AUC 2/Taxol 50 mg/m²  · 9/5/2019- recommended carboplatin Alimta and Keytruda due to findings of stage IV disease today on CT scans.  · 9/12/2019- Initiated on carboplatin AUC 5/Alimta 500 mg/m² and Keytruda every 3 weeks  · 9/29/2019-  Guardant 360 identified a KRAS G12V alteration       Subjective   REVIEW OF SYSTEMS:   Review of Systems   Constitutional: Positive for activity change and fatigue. Negative for chills.        No night sweats       HENT: Negative for dental problem, hearing loss, mouth sores, nosebleeds, sore throat and trouble swallowing.    Eyes: Negative for visual disturbance.        No blurring of vision, no double vision     Respiratory: Positive for cough and shortness of breath (improving with diuretics). Negative for wheezing.         No hemoptysis       Cardiovascular: Negative for chest pain, palpitations and leg swelling.   Gastrointestinal: Negative for abdominal pain, constipation, diarrhea, nausea and vomiting.   Endocrine: Negative for cold intolerance, heat intolerance, polydipsia and polyuria.   Genitourinary: Negative for difficulty urinating, dysuria, frequency, hematuria and urgency.   Musculoskeletal: Positive for arthralgias. Negative for joint swelling and myalgias.   Skin: Negative for pallor and rash.   Allergic/Immunologic: Negative for immunocompromised state.   Neurological: Negative for seizures, syncope and numbness.   Hematological: Negative for adenopathy. Does not bruise/bleed easily.   Psychiatric/Behavioral: Negative for confusion and suicidal ideas.       Objective   PHYSICAL EXAM:  Physical Exam   Constitutional: He is oriented to person, place, and time. No distress.   Appears chronically ill   HENT:   Head: Normocephalic and atraumatic.   Right Ear: External ear normal.   Left Ear: External ear normal.   Mouth/Throat: Oropharynx is clear and moist.   Eyes: EOM are normal. No scleral icterus.   Neck: Neck supple. No tracheal deviation present.   Cardiovascular: Regular rhythm. Tachycardia present.   Pulmonary/Chest: Effort normal. No respiratory distress. He has decreased breath sounds in the right lower field and the left lower field. He has no wheezes. He has no rales.   Hi-flow O2 in use    Abdominal: Soft. Bowel sounds are normal. He exhibits no distension and no mass.   No hepatosplenomegaly     Genitourinary:   Genitourinary Comments: Exam deferred.     Musculoskeletal: He exhibits no edema or tenderness.   Normal ROM to all 4 extremities. Generalized weakness.     Lymphadenopathy:   No bulky palpable cervical, clavicular, axillary or inguinal adenopathies on the left or right.     Neurological: He is alert and oriented to person, place, and time.   Follows commands. Non-focal.     Skin: Skin is warm and dry. No rash noted. He is not diaphoretic.   Psychiatric: He has a normal mood and affect. His behavior is normal. Thought content normal.   Vitals reviewed.    Vitals:    12/03/19 0600 12/03/19 0630 12/03/19 0648 12/03/19 0655   BP: 116/54 139/96     Pulse: 87 (!) 177 85 (!) 149   Resp:   18    Temp:       TempSrc:       SpO2: 95% 93% 95%    Weight:       Height:         I/O last 3 completed shifts:  In: 786 [P.O.:510; I.V.:276]  Out: 2335 [Urine:2335]  No intake/output data recorded.    Labs:  CBC  Results from last 7 days   Lab Units 12/03/19  0232 12/02/19  0035 12/01/19  1110 12/01/19  0008   WBC 10*3/mm3 4.75 4.91  --  2.55*   HEMOGLOBIN g/dL 9.0* 8.8* 6.9* 7.0*   HEMATOCRIT % 28.1* 27.1* 20.7* 21.5*   PLATELETS 10*3/mm3 301 263  --  239     Lab Results   Component Value Date     12/03/2019    K 3.1 (L) 12/03/2019    CL 93 (L) 12/03/2019    CO2 33.0 (H) 12/03/2019    BUN 15 12/03/2019    CREATININE 0.84 12/03/2019    GLUCOSE 165 (H) 12/03/2019    CALCIUM 9.9 12/03/2019    BILITOT 0.3 11/30/2019    ALKPHOS 104 11/30/2019    AST 12 11/30/2019    ALT 12 11/30/2019    AGRATIO 1.1 11/30/2019    GLOB 3.0 11/30/2019     No results found for: INR, PROTIME    Cultures:  Lab Results   Component Value Date    BLOODCX No growth at 2 days 11/30/2019     No components found for: URINCX    ASSESSMENT/PLAN:      1. stage IV NSCLC (adenocarcinoma)   cycle #4 of carboplatin/Alimta and Keytruda completed  11/14/19.  Plans were for reimaging (scheduled 12/2/19 as outpatient) with anticipation for maintenance Alimta and Keytruda.     2. Acute on chronic resp. failure, bilateral pleural effuions  Symptomatically improving with Diuresis  CXR 12/2/19 improved  Chronic O2 dependency. Currently hi-flow NC     3.  Other fatigue, secondary to multiple comorbidities to include obesity, oxygen dependent, chemotherapy, lung cancer and anemia.     4. Anemia, secondary to chemotherapy.   Hgb improved, 9.0 (s/p 1 unit of pRBC for hemoglobin of 6.9)    60, TIBC 304, iron saturation 20% on 12/1/2019     5.  Hypokalemia r/t diuretics, hypomagnesemia  K+ 3.1, Mag 1.4  Defer to attending    6.  Diabetes  Managed per attending    7.  Tachycardia  Defer to attending       Currently shortness of breath improved.   Still requiring hi-flow O2.  Agree with therapeutic and diagnostic thoracentesis for cytology if worsens/no improvement symptomatically and if stable from cardiac standpoint  Hgb improved.  Electrolyte replacements per attending.  Discussed with patient and his nurse.      MING Shannon  12/03/19  7:11 AM    Physicians attestation and contribution:    I, Claudio Nevarez, personally and independently performed an evaluation on Romel Rosario  I have reviewed relevant medical information/data to include but not limited to the medication list, relevant appropriate lab work and imaging when applicable.  I reviewed other physician's notes, ancillary services and nurses assessments.  I have reviewed the above documentation completed by Brissa LAI  Please see my additional addended and/or modified contributions to the history of present illness, physical examination and assessment/medical decision-making and plan that reflects my findings and impressions.  I discussed the essential elements of the care plan with Brissa LAI and the patient.  I have encouraged and answered all the questions raised to the  patient's understanding and satisfaction.  I concur with the above stated.    Subjective: Awake alert and conversive.  He states he had a great bowel movement this morning and since has had tachycardia     Objective:  heart rate is 148-149 and regular..  Appears to be atrial flutter.  Lungs are clear  with scattered rales bilaterally, abdomen is benign.    Assessment/plan: Continue monitoring closely.  Agree with therapeutic and diagnostic thoracentesis.  If performed would like cytology to be done to evaluate for possible identification of progression in the pleura.  Following conservatively      Claudio Nevarez MD  12/3/2019 9:01 AM

## 2019-12-03 NOTE — CONSULTS
Could not dictate consultation when I saw the patient as no consult was requested in epic though verbally this was requested.   LOS: 3 days   Patient Care Team:  Kourtney Keller APRN as PCP - General (Nurse Practitioner)  Jose Cruz, MING Restrepo as Nurse Practitioner (Pulmonary Disease)    Chief Complaint:   Acute on chronic respiratory failure with hypoxia (CMS/HCC)    Adenocarcinoma, lung (CMS/HCC)    Hypomagnesemia    Pleural effusion    Type 2 diabetes mellitus with hyperglycemia, with long-term current use of insulin (CMS/HCC)    Anemia due to chemotherapy    Essential hypertension    Hypokalemia    SVT (supraventricular tachycardia) (CMS/HCC)    Paroxysmal atrial flutter.    History of current illness    Exceedingly pleasant 69-year-old  male with known small cell lung carcinoma that is advanced and is status post chemotherapy and on radiation.  Patient presented with marked shortness of breath.  Was noted to be tachypneic  EKG performed subsequently due to tachycardia showed atrial flutter with rapid ventricular response.  When I saw the patient after discussion with primary attending he was stable  Had mild persistent shortness of breath.  Denied any chest pain  No history of any significant cardiac abnormalities  Moderate orthopnea  No presyncope or syncope.  EKG was read as ST elevation myocardial infarction however EKG shows atrial flutter and repeat EKG shows reversal back to sinus rhythm without any alarming changes to suggest acute coronary syndrome.    Patient Complaints:   Chief Complaint   Patient presents with   • Respiratory Distress       Telemetry: no malignant arrhythmia. No significant pauses.    Review of Systems     Constitutional: No chills   Has fatigue   No fever.     HENT: Negative.    Eyes: Negative.      Respiratory: Cough and shortness of breath  Cardiovascular: Negative for chest pain,   No palpitations   No significant  leg swelling.     Gastrointestinal: Negative for  abdominal distention,  No abdominal pain,   No blood in stool,   No constipation,   No diarrhea,   No nausea   No vomiting.     Endocrine: Negative.    Genitourinary: Negative for difficulty urinating, dysuria, flank pain and hematuria.     Musculoskeletal: Negative.    Skin: Negative for rash and wound.   Allergic/Immunologic: Negative.      Neurological: Negative for dizziness, syncope, weakness,   No light-headedness  No  headaches.     Hematological: Does not bruise/bleed easily.     Psychiatric/Behavioral: Negative for agitation or behavioral problems,   No confusion,   the patient is  nervous/anxious.       History:   Past Medical History:   Diagnosis Date   • Adenocarcinoma, lung (CMS/HCC)    • Cerumen impaction    • Cervical disc disease    • Colon cancer (CMS/HCC)    • Diabetes (CMS/HCC)    • Elevated cholesterol    • Eustachian tube dysfunction    • GERD (gastroesophageal reflux disease)    • Hx of colonic polyps    • Hypertension    • PUD (peptic ulcer disease)    • Sensorineural hearing loss      Past Surgical History:   Procedure Laterality Date   • BRONCHOSCOPY Left 7/19/2019    Procedure: BRONCHOSCOPY WITH ENDOBRONCHIAL ULTRASOUND AND TRANSBRONCHIAL BIOPSY at 1: 30;  Surgeon: Jere Brumfield MD;  Location: [facility] OR;  Service: Pulmonary   • COLON SURGERY      Due to colon cancer   • COLONOSCOPY N/A 6/26/2017    Procedure: COLONOSCOPY WITH ANESTHESIA;  Surgeon: Barry Banks MD;  Location: [facility] ENDOSCOPY;  Service:    • COLONOSCOPY W/ POLYPECTOMY  08/05/2013    Tubular adenoma transverse x 2, Diverticulosis repeat exam in 3 years   • ENDOSCOPY  08/05/2013    HH, Billroth anastomisis in the gastric antrum   • KNEE SURGERY     • STOMACH SURGERY      Due to ulcer   • VENOUS ACCESS DEVICE (PORT) INSERTION N/A 9/3/2019    Procedure: SINGLE LUMEN PORT PLACEMENT;  Surgeon: Jelena Huntley MD;  Location: [facility] OR;  Service: General     Social History     Socioeconomic History   • Marital status:       Spouse name: Not on file   • Number of children: Not on file   • Years of education: Not on file   • Highest education level: Not on file   Tobacco Use   • Smoking status: Former Smoker     Years: 45.00     Last attempt to quit: 2007     Years since quittin.2   • Smokeless tobacco: Never Used   Substance and Sexual Activity   • Alcohol use: No   • Drug use: No   • Sexual activity: Defer     Family History   Problem Relation Age of Onset   • Heart disease Mother    • Diabetes Mother    • Alzheimer's disease Father    • Colon cancer Neg Hx    • Colon polyps Neg Hx        Labs:  WBC WBC   Date Value Ref Range Status   2019 4.75 3.40 - 10.80 10*3/mm3 Final   2019 4.91 3.40 - 10.80 10*3/mm3 Final   2019 2.55 (L) 3.40 - 10.80 10*3/mm3 Final      HGB Hemoglobin   Date Value Ref Range Status   2019 9.0 (L) 13.0 - 17.7 g/dL Final   2019 8.8 (L) 13.0 - 17.7 g/dL Final   2019 6.9 (C) 13.0 - 17.7 g/dL Final   2019 7.0 (L) 13.0 - 17.7 g/dL Final      HCT Hematocrit   Date Value Ref Range Status   2019 28.1 (L) 37.5 - 51.0 % Final   2019 27.1 (L) 37.5 - 51.0 % Final   2019 20.7 (C) 37.5 - 51.0 % Final   2019 21.5 (L) 37.5 - 51.0 % Final      Platelets Platelets   Date Value Ref Range Status   2019 301 140 - 450 10*3/mm3 Final   2019 263 140 - 450 10*3/mm3 Final   2019 239 140 - 450 10*3/mm3 Final      MCV MCV   Date Value Ref Range Status   2019 96.9 79.0 - 97.0 fL Final   2019 95.8 79.0 - 97.0 fL Final   2019 97.3 (H) 79.0 - 97.0 fL Final        Results from last 7 days   Lab Units 19  0232 19  0924 19  0035 19  0007 19  1625   SODIUM mmol/L 137  --  141 136 137   POTASSIUM mmol/L 3.1* 4.0 2.9* 3.9 4.3   CHLORIDE mmol/L 93*  --  94* 95* 98   CO2 mmol/L 33.0*  --  33.0* 28.0 30.0*   BUN mg/dL 15  --  12 13 13   CREATININE mg/dL 0.84  --  0.83 0.89 0.74*   CALCIUM mg/dL 9.9  --   9.8 9.4 9.2   BILIRUBIN mg/dL  --   --   --   --  0.3   ALK PHOS U/L  --   --   --   --  104   ALT (SGPT) U/L  --   --   --   --  12   AST (SGOT) U/L  --   --   --   --  12   GLUCOSE mg/dL 165*  --  175* 379* 174*     Lab Results   Component Value Date    TROPONINI <0.012 07/17/2019    TROPONINT 0.012 12/03/2019     PT/INR:    Protime   Date Value Ref Range Status   12/03/2019 13.1 11.9 - 14.6 Seconds Final   /  INR   Date Value Ref Range Status   12/03/2019 0.96 0.91 - 1.09 Final       Imaging Results (Last 72 Hours)     Procedure Component Value Units Date/Time    XR Chest 1 View [[id number]] Collected:  12/02/19 1247     Updated:  12/02/19 1252    Narrative:       Exam:   XR CHEST 1 VW-       Date:  12/02/2019      History:  Male, age  69 years;f/u pleural effusion, hypoxic rf;  R09.02-Hypoxemia; C34.90-Malignant neoplasm of unspecified part of  unspecified bronchus or lung; J18.9-Pneumonia, unspecified organism;  D64.9-Anemia, unspecified; G84-Agrhbgs effusion, not elsewhere  classified     COMPARISON:  Chest x-ray dated 11/30/2019     Findings :  Right-sided chest port is unchanged in position.  Mild to moderate cardiomegaly, unchanged. Central pulmonary venous  congestion and interstitial edema. Decreased. Small bilateral pleural  effusions, also decreased. No measurable pneumothorax.  The bones show  no acute pathology.         Impression:       Impression:     Decreasing findings of fluid overload.     This report was finalized on 12/02/2019 12:49 by Dr. Maryjo Cameron MD.    CT Angiogram Chest [[id number]] Collected:  11/30/19 1751     Updated:  11/30/19 1758    Narrative:       EXAMINATION: CT ANGIOGRAM CHEST-      11/30/2019 5:36 PM CST     HISTORY: lung cancer. Respiratory distress.; R09.02-Hypoxemia;  C34.90-Malignant neoplasm of unspecified part of unspecified bronchus or  lung; J18.9-Pneumonia, unspecified organism; D64.9-Anemia, unspecified     In order to have a CT radiation dose as low as reasonably  achievable  Automated Exposure Control was utilized for adjustment of the mA and/or  KV according to patient size.     DLP in mGycm= 510.     CT angiography protocol.   CT imaging with bolus IV contrast injection.   Under concurrent supervision axial, sagittal, coronal, and  three-dimensional data sets were constructed.     Comparison is made with 09/04/2019.     Heart size is within normal limits.  There is no thoracic aortic aneurysm or dissection.     Symmetric well opacified pulmonary arteries. No pulmonary embolism.     Linear band of infiltrate or atelectasis within the right middle lobe.      Right suprahilar mass measures 45 x 47 mm compared with 44 x 58 mm.     Moderate bilateral pleural effusions.  Compressive atelectasis noted at the right lung base.     Summary:  1. No pulmonary embolism.  2. New moderate bilateral pleural effusions.  3. New linear band of infiltrate or atelectasis within the right middle  lobe.  4. Left suprahilar mass show slight decreased size.                                   This report was finalized on 11/30/2019 17:55 by Dr. Tyler Khan MD.          Objective     Allergies   Allergen Reactions   • Lactose Intolerance (Gi) Diarrhea       Medication Review: Performed  Current Facility-Administered Medications   Medication Dose Route Frequency Provider Last Rate Last Dose   • acetaminophen (TYLENOL) tablet 650 mg  650 mg Oral Q4H PRN Aram Salas MD   650 mg at 12/03/19 1122   • ALPRAZolam (XANAX) tablet 1 mg  1 mg Oral Q6H PRN Aram Salas MD   1 mg at 12/03/19 1516   • atorvastatin (LIPITOR) tablet 40 mg  40 mg Oral Nightly Aram Salas MD   40 mg at 12/02/19 2142   • bisacodyl (DULCOLAX) suppository 10 mg  10 mg Rectal Daily PRN Rush Alberto, DO       • budesonide-formoterol (SYMBICORT) 160-4.5 MCG/ACT inhaler 2 puff  2 puff Inhalation BID - RT Aram Salas MD   2 puff at 12/03/19 0648   • dextrose (D50W) 25 g/ 50mL  Intravenous Solution 25 g  25 g Intravenous Q15 Min PRN Aram Salas MD       • dextrose (GLUTOSE) oral gel 15 g  15 g Oral Q15 Min PRN Aram Salas MD       • dilTIAZem (CARDIZEM) 125 mg in sodium chloride 0.9 % 125 mL (1 mg/mL) infusion  5-15 mg/hr Intravenous Titrated Rush Alberto DO 5 mL/hr at 12/03/19 1505 5 mg/hr at 12/03/19 1505   • docusate sodium (COLACE) capsule 100 mg  100 mg Oral BID Rush Alberto DO   100 mg at 12/03/19 0813   • enoxaparin (LOVENOX) syringe 80 mg  1 mg/kg Subcutaneous Q12H Rush Alberto DO       • folic acid (FOLVITE) tablet 400 mcg  400 mcg Oral Daily Aram Salas MD   400 mcg at 12/03/19 0813   • furosemide (LASIX) injection 40 mg  40 mg Intravenous Daily Aram Salas MD   40 mg at 12/03/19 0813   • glucagon (human recombinant) (GLUCAGEN DIAGNOSTIC) injection 1 mg  1 mg Subcutaneous Q15 Min PRN Aram Salas MD       • hydrOXYzine (ATARAX) tablet 10 mg  10 mg Oral 4x Daily PRN Aram Salas MD       • insulin lispro (humaLOG) injection 2-9 Units  2-9 Units Subcutaneous 4x Daily With Meals & Nightly Aram Salas MD   2 Units at 12/03/19 1122   • ipratropium-albuterol (DUO-NEB) nebulizer solution 3 mL  3 mL Nebulization 4x Daily - RT Aram Salas MD   3 mL at 12/03/19 0648   • linagliptin (TRADJENTA) tablet 5 mg  5 mg Oral Daily Aram Salas MD   5 mg at 12/03/19 0813   • Magnesium Sulfate 2 gram Bolus, followed by 8 gram infusion (total Mg dose 10 grams)- Mg less than or equal to 1mg/dL  2 g Intravenous PRN Rush Alberto DO 0 mL/hr at 12/02/19 0235 2 g at 12/02/19 0236    Or   • Magnesium Sulfate 2 gram / 50mL Infusion (GIVE X 3 BAGS TO EQUAL 6GM TOTAL DOSE) - Mg 1.1 - 1.5 mg/dl  2 g Intravenous PRN Rush Alberto, DO 25 mL/hr at 12/03/19 1123 2 g at 12/03/19 1123    Or   • Magnesium Sulfate 4 gram infusion- Mg 1.6-1.9 mg/dL  4 g Intravenous PRN Remy  "Rush FRAIRE DO       • metoprolol tartrate (LOPRESSOR) tablet 25 mg  25 mg Oral BID Rush Alberto DO   25 mg at 12/03/19 1250   • ondansetron (ZOFRAN) injection 4 mg  4 mg Intravenous Q6H PRN Aram Salas MD       • pantoprazole (PROTONIX) EC tablet 40 mg  40 mg Oral Q AM Aram Salas MD   40 mg at 12/03/19 0653   • polyethylene glycol 3350 powder (packet)  17 g Oral Daily PRN Rush Alberto DO       • potassium chloride (MICRO-K) CR capsule 40 mEq  40 mEq Oral PRN Jameson Corea MD   40 mEq at 12/03/19 0738    Or   • potassium chloride (KLOR-CON) packet 40 mEq  40 mEq Oral PRN Jameson Corea MD        Or   • potassium chloride 10 mEq in 100 mL IVPB  10 mEq Intravenous Q1H PRN Jameson Corea MD       • sodium chloride 0.9 % flush 10 mL  10 mL Intravenous PRN Ander Boyd Jr., MD       • sodium chloride 0.9 % flush 10 mL  10 mL Intravenous PRN Brant Jose PA       • sodium chloride 0.9 % flush 10 mL  10 mL Intravenous Q12H Aram Salas MD   10 mL at 12/03/19 0814   • sodium chloride 0.9 % flush 10 mL  10 mL Intravenous PRN Aram Salas MD       • sucralfate (CARAFATE) tablet 1 g  1 g Oral 4x Daily AC & at Bedtime Aram Salas MD   1 g at 12/03/19 1122       Vital Sign Min/Max for last 24 hours  Temp  Min: 98 °F (36.7 °C)  Max: 98.6 °F (37 °C)   BP  Min: 88/53  Max: 153/69   Pulse  Min: 74  Max: 177   Resp  Min: 16  Max: 21   SpO2  Min: 88 %  Max: 98 %   No Data Recorded   Weight  Min: 81.3 kg (179 lb 3.7 oz)  Max: 81.3 kg (179 lb 3.7 oz)     Flowsheet Rows      First Filed Value   Admission Height  175.3 cm (69\") Documented at 11/30/2019 1332   Admission Weight  86.2 kg (190 lb) Documented at 11/30/2019 1332          Results for orders placed during the hospital encounter of 07/17/19   Adult Transthoracic Echo Complete W/ Cont if Necessary Per Protocol    Narrative · Estimated EF = 55%.  · Left ventricular systolic " function is normal.  · No evidence of pulmonary hypertension is present.          Physical Exam:    General Appearance: Awake, alert, in no acute distress  Eyes: Pupils equal and reactive    Ears: Appear intact with no abnormalities noted  Nose: Nares normal, no drainage  Neck: supple, trachea midline, no carotid bruit and no JVD  Back: no kyphosis present,    Lungs: respirations regular, respirations even and respirations unlabored    Heart: normal S1, S2,  2/6 pansystolic murmur in the left sternal border, tachycardic    Abdomen: normal bowel sounds, no tenderness   Skin: no bleeding, bruising or rash  Extremities: no cyanosis  Psychiatric/Behavioral: Negative for agitation, behavioral problems, confusion, the patient does  appear to be nervous/anxious.          Results Review:   I reviewed the patient's new clinical results.  I reviewed the patient's new imaging results and agree with the interpretation.  I reviewed the patient's other test results and agree with the interpretation  I personally viewed and interpreted the patient's EKG/Telemetry data  Discussed with patient    Reviewed available prior notes, consults, prior visits, laboratory findings, radiology and cardiology relevant reports. Updated chart as applicable. I have reviewed the patient's medical history in detail and updated the computerized patient record as relevant.      Updated patient regarding any new or relevant abnormalities on review of records or any new findings on physical exam. Mentioned to patient about purpose of visit and desirable health short and long term goals and objectives.     Assessment/Plan       Acute on chronic respiratory failure with hypoxia (CMS/HCC)    Adenocarcinoma, lung (CMS/HCC)    Hypomagnesemia    Pleural effusion    Type 2 diabetes mellitus with hyperglycemia, with long-term current use of insulin (CMS/HCC)    Anemia due to chemotherapy    Essential hypertension    Hypokalemia    SVT (supraventricular  tachycardia) (CMS/HCC)      Plan    Patient overall has multiple medical problems.  Agree with intravenous Cardizem for rate control  Not a candidate for long-term anticoagulation  Treatment of other advance comorbid conditions.  Long-term prognosis is guarded.  We will check echocardiogram  Treat anemia as required  Correct electrolyte abnormalities.  SCDs    Ronald Campo MD  12/03/19  3:55 PM    EMR Dragon/Transcription was used to dictate part of this note

## 2019-12-03 NOTE — PROGRESS NOTES
[facility]  INPATIENT PROGRESS NOTE    Patient Name: Romel Rosario  Date of Admission: 11/30/2019  Today's Date: 12/03/19  Length of Stay: 3  Primary Care Physician: Kourtney Keller APRN    Subjective   Chief Complaint: Elevated heart rate    HPI   Just prior to shift change patient's heart rate went up into the 160s suddenly after a bowel movement.  Nursing called nocturnist who asked him to get an EKG.  Reportedly EKG printout reads said STEMI so I was stat called to evaluate the patient, I asked for 324 of aspirin to be given chewed and swallowed while I was on the way up.  On my arrival patient lying comfortably in bed with normal blood pressure in the 120s systolic.  He is not diaphoretic.  He denies any chest pain or dizziness.  Says he actually feels perfectly fine.  EKG reviewed and he had some lateral depressions in V4-V5.  He had some questionable ST changes in 1 and aVL but this looks like repolarization.  On monitor patient appeared to be in SVT.  I asked him to bear down and after several seconds his heart rate began to slow and elected be going in and out of A. fib and sinus rhythm.  This lasted for about a minute and then he went back up to around 150.  Blood pressure remained stable.  Patient main stable.  5 IV Lopressor was given with no change in heart rate.  Blood pressure checked again stable.  10 of IV Cardizem was given with no change in heart rate.  Overall patient still at this time stated no chest pain or dizziness but that he was started to feel little anxious little tired.  Due to this 6 of adenosine was administered and when patient's heart rate was bradying down it appeared to have a flutter like pattern.  Patient converted back into sinus rhythm.  Another EKG was done which was again normal sinus rhythm with no ST elevations or acute ST-T changes.  Patient continued to feel well.  Fortunately this last only about 2 minutes and  he went back into heart rates up in the 140s.  Call was made to Dr. Campo with cardiology.  Case was discussed.  Agreed with initiation of Cardizem drip.  He will be by to see the patient.  When I left the room patient was HD stable, chest pain-free, no dizziness and feeling well.        Review of Systems   All pertinent negatives and positives are as above. All other systems have been reviewed and are negative unless otherwise stated.     Objective    Temp:  [98 °F (36.7 °C)-98.5 °F (36.9 °C)] 98 °F (36.7 °C)  Heart Rate:  [] 102  Resp:  [16-18] 18  BP: ()/(48-96) 126/79  Physical Exam  GEN: Awake, alert, interactive, in NAD  HEENT: Atraumatic, PERRLA, EOMI, Anicteric, Trachea midline  Lungs: Diminished breath sounds at bases bilaterally.  No wheezing or rales.  Heart: tachy, +S1/s2, no rub  ABD: soft, mild distention, Nt, +BS, no guarding/rebound  Extremities: atraumatic, no cyanosis, no edema  Skin: no rashes or lesions  Neuro: AAOx3, no focal deficits  Psych: normal mood & affect        Results Review:  I have reviewed the labs, radiology results, and diagnostic studies.    Laboratory Data:   Results from last 7 days   Lab Units 12/03/19  0232 12/02/19  0035 12/01/19  1110 12/01/19  0008   WBC 10*3/mm3 4.75 4.91  --  2.55*   HEMOGLOBIN g/dL 9.0* 8.8* 6.9* 7.0*   HEMATOCRIT % 28.1* 27.1* 20.7* 21.5*   PLATELETS 10*3/mm3 301 263  --  239        Results from last 7 days   Lab Units 12/03/19  0232 12/02/19  0924 12/02/19  0035 12/01/19  0007 11/30/19  1625   SODIUM mmol/L 137  --  141 136 137   POTASSIUM mmol/L 3.1* 4.0 2.9* 3.9 4.3   CHLORIDE mmol/L 93*  --  94* 95* 98   CO2 mmol/L 33.0*  --  33.0* 28.0 30.0*   BUN mg/dL 15  --  12 13 13   CREATININE mg/dL 0.84  --  0.83 0.89 0.74*   CALCIUM mg/dL 9.9  --  9.8 9.4 9.2   BILIRUBIN mg/dL  --   --   --   --  0.3   ALK PHOS U/L  --   --   --   --  104   ALT (SGPT) U/L  --   --   --   --  12   AST (SGOT) U/L  --   --   --   --  12   GLUCOSE mg/dL 165*  --   175* 379* 174*       Culture Data:   Blood Culture   Date Value Ref Range Status   11/30/2019 No growth at 24 hours  Preliminary   11/30/2019 No growth at 24 hours  Preliminary       Radiology Data:   Imaging Results (Last 24 Hours)     Procedure Component Value Units Date/Time    XR Chest 1 View [[id number]] Collected:  12/02/19 1247     Updated:  12/02/19 1252    Narrative:       Exam:   XR CHEST 1 VW-       Date:  12/02/2019      History:  Male, age  69 years;f/u pleural effusion, hypoxic rf;  R09.02-Hypoxemia; C34.90-Malignant neoplasm of unspecified part of  unspecified bronchus or lung; J18.9-Pneumonia, unspecified organism;  D64.9-Anemia, unspecified; Y75-Nwgvfaj effusion, not elsewhere  classified     COMPARISON:  Chest x-ray dated 11/30/2019     Findings :  Right-sided chest port is unchanged in position.  Mild to moderate cardiomegaly, unchanged. Central pulmonary venous  congestion and interstitial edema. Decreased. Small bilateral pleural  effusions, also decreased. No measurable pneumothorax.  The bones show  no acute pathology.         Impression:       Impression:     Decreasing findings of fluid overload.     This report was finalized on 12/02/2019 12:49 by Dr. Maryjo Cameron MD.          I have reviewed the patient's current medications.     Assessment/Plan     Active Hospital Problems    Diagnosis   • **Acute on chronic respiratory failure with hypoxia (CMS/HCC)   • Hypokalemia   • Hypomagnesemia   • Pleural effusion   • Type 2 diabetes mellitus with hyperglycemia, with long-term current use of insulin (CMS/HCC)   • Anemia due to chemotherapy   • Essential hypertension   • Adenocarcinoma, lung (CMS/HCC)       #1 acute on chronic hypoxic respiratory failure -with history of lung cancer and now acutely seems to be in the setting of pleural effusions.  No noted history of heart failure and normal EF/echo in July of this year.  Now with event this morning we will update 2D echo.  Has been diuresing  here and improving.  Repeat x-ray from 12/12 with improving effusions.  He got antibiotics x1 on arrival in the ER but none since.  He has been afebrile without a white count.  He is not making any sputum.  Continue to wean O2 and try to get off of Vapotherm.    #2 pleural effusions  -bilateral.  Does not appear to be in heart failure.  Also does not appear to be parapneumonic as he is afebrile without a white count and improving without antibiotics. Potentially malignant effusions in the setting of his lung cancer but responding well to diuresis which would be odd. Was contemplating potential thoracentesis but will defer for now this AM given his svt/aflutter. Will update echo. Has been diuresing and improving clinically.       #Hypokalemia -3.1.  Replace and recheck.  Hypokalemia protocol for further replacement as needed. Need to keep k >/= 4.0    #4 hypomagnesemia -1.4 this morning.  Replace and recheck.  Hypomagnesemia protocol for further replacement as needed. Need to keep mag >/= 2.0    #5 anemia -chronic and likely acutely down the setting of his chemotherapy treatments.  He has no signs of active bleeding.  His iron studies are normal.  He has been given 1 unit of PRBC with improvement from 6.9 ->8.8, and now 9.0 this AM.  Continue to monitor.    #6 constipation - 2 bms since start or bowel regimen.     #7 lung cancer -stage IIIc adenocarcinoma of the lung followed in the outpatient setting by Dr. Fishman and receiving chemotherapy as well as recently referred to Dr. Bell of rad/onc for definitive radiation tx. Oncology following. Appreciate input.    #8 Tachycardia - as above, pt tachycardic this AM, appeared to be SVT on arrival, responded to vagal maneuvers but did not respond to initial lopressor or diltiazem pushes. Eventually 6 of adenosine given and when pt was slowing down it looked to be underlying flutter. Pt went briefly into sinus rhythm but then reverted back into rates of 140s.  He remains  stable narrow complex tachycardia.  Starting a Cardizem drip at this time.  If unable to convert patient by the afternoon will look to start full dose blood thinner.  Also as mentioned above initial EKG this morning had question some ST elevation but I feel this was repolarization in the setting of his tachycardia.  Repeat EKG done after adenosine showed sinus rhythm with no ST-T changes.  He had no chest pain or dizziness at all during event.  No nausea and remained HD stable.  He was given aspirin 324 chewed and swallowed.  Will check troponin x2 and update echo.  Cardiology consult made, case discussed Dr. Campo who reviewed ekg's and agreed with care up to this point, will follow-up on evaluation and recs.      Discharge Planning: Ongoing.  Continue here in the ICU today     Approximately 45 minutes of critical care time were spent managing the patient exclusive of billable procedures.     Rush Alberto DO   12/03/19   8:17 AM

## 2019-12-04 RX ORDER — DIPHENHYDRAMINE HYDROCHLORIDE 50 MG/ML
50 INJECTION INTRAMUSCULAR; INTRAVENOUS PRN
Status: CANCELLED | OUTPATIENT
Start: 2020-01-03

## 2019-12-04 RX ORDER — SODIUM CHLORIDE 0.9 % (FLUSH) 0.9 %
5 SYRINGE (ML) INJECTION PRN
Status: CANCELLED | OUTPATIENT
Start: 2020-01-03

## 2019-12-04 RX ORDER — SODIUM CHLORIDE 9 MG/ML
20 INJECTION, SOLUTION INTRAVENOUS ONCE
Status: CANCELLED | OUTPATIENT
Start: 2020-01-03

## 2019-12-04 RX ORDER — HEPARIN SODIUM (PORCINE) LOCK FLUSH IV SOLN 100 UNIT/ML 100 UNIT/ML
300 SOLUTION INTRAVENOUS PRN
Status: CANCELLED | OUTPATIENT
Start: 2020-01-03

## 2019-12-04 RX ORDER — EPINEPHRINE 1 MG/ML
0.3 INJECTION, SOLUTION, CONCENTRATE INTRAVENOUS PRN
Status: CANCELLED | OUTPATIENT
Start: 2020-01-03

## 2019-12-04 RX ORDER — METHYLPREDNISOLONE SODIUM SUCCINATE 125 MG/2ML
125 INJECTION, POWDER, LYOPHILIZED, FOR SOLUTION INTRAMUSCULAR; INTRAVENOUS PRN
Status: CANCELLED | OUTPATIENT
Start: 2020-01-03

## 2019-12-04 RX ORDER — CYANOCOBALAMIN 1000 UG/ML
1000 INJECTION INTRAMUSCULAR; SUBCUTANEOUS ONCE
Status: CANCELLED
Start: 2020-01-03

## 2019-12-04 RX ORDER — CYANOCOBALAMIN 1000 UG/ML
1000 INJECTION INTRAMUSCULAR; SUBCUTANEOUS ONCE
Status: CANCELLED | OUTPATIENT
Start: 2020-01-03

## 2019-12-04 RX ORDER — SODIUM CHLORIDE 0.9 % (FLUSH) 0.9 %
10 SYRINGE (ML) INJECTION PRN
Status: CANCELLED | OUTPATIENT
Start: 2020-01-03

## 2019-12-04 RX ORDER — PALONOSETRON 0.05 MG/ML
0.25 INJECTION, SOLUTION INTRAVENOUS ONCE
Status: CANCELLED | OUTPATIENT
Start: 2020-01-03

## 2019-12-04 NOTE — PROGRESS NOTES
HEMATOLOGY AND MEDICAL ONCOLOGY PROGRESS NOTE          Pt Name: Romel Rosario  YOB: 1950  MRN: [id number]    Date of evaluation: 12/4/2019  History Obtained From:  History obtained from chart review and the patient.    CHIEF COMPLAINT:    Chief Complaint   Patient presents with   • Respiratory Distress     Subjective: a-fib 12/3/19, converted with cardizem. Remains NSR. Feeling better this am. Voiding well. Continues to require hi-Flow O2, 25L/55%    HISTORY OF PRESENT ILLNESS:   Romel Rosario is a 69 y.o.  gentleman who holds a diagnosis of NSCLC.  He initially presented with stage III disease, unfortunately with interval progression of bilateral pulmonary metastases and is currently treated for stage IV disease.   Treatment history includes palliative radiation therapy to the chest due to obstructive symptoms.   Cycle #4 of palliative carboplatin/Alimta and Keytruda was completed 11/14/19.  Plans were for reimaging in 3 weeks (was scheduled 12/2/19) with anticipation for maintenance Alimta and Keytruda.     Romel however, was admitted to [facility] 11/30/2019 for evaluation of shortness of breath with increased oxygen use.      CTA chest on 11/30/2019 was negative for PE.    New, moderate bilateral pleural effusions noted in addition to a new linear band of infiltrate or atelectasis within the RML.  The left suprahilar mass shows slight decrease in size measuring 45 x 47 mm, previously 44 x 58 mm on 9/4/2019.     He had 1 unit of pRBC for hemoglobin of 6.9, now improved at 8.8.  He has been diuresing.  CXR 12/2/19 improved with a decrease in central pulmonary venous congestion and small bilateral pleural effusions.  Symptomatically Romel states shortness of breath is improved.     ONCOLOGIC HISTORY:  Diagnosis  · Non-small cell lung cancer, adenocarcinoma, July 2019  · Stage IV   · PD-L1 unknown  · Guardant 360-KRAS G12V alteration      Treatment summary   · Status chest palliative RT 13  fractions completed August 2019  · 9/5/2019-1 time dose of carboplatin AUC 2/Taxol 45 mg/m².  · 9/12/2019- Initiated on carboplatin AUC 5/Alimta 500 mg/m² and Keytruda every 3 weeks, anticipate maintenance Alimta and Keytruda     Cancer history  Mr Romel Rosario was first seen by me on 6/28/2019 referred by his primary care provider for further evaluation of a lung mass and mediastinal adenopathy. The patient had complains of shortness of breath at exertion and therefore was recommended further imaging.  · 6/13/2019-CT chest with contrast showed a 6.3 x 5.6 x 7 cm left upper lobe lung mass, centrally necrotic, with compressive atelectasis. Questionable satellite nodule within the lingula measured 7 mm. Noncalcified 11 mm left lower lobe nodule concerning for pulmonary metastases. 5 mm subpleural right lower lobe nodule of uncertain significance. A small hiatal hernia. No adrenal nodules. Mediastinal adenopathy measuring up to 2 cm. Subcarina lymph nodes measure up to 1.3 cm.   · 6/24/2019-Initial oncology consultation   · 7/3/2019-bone scan showed no convincing evidence of osseous metastasis.   · 7/3/2019- CT chest, abdomen, pelvis showed no evidence of intra-abdominal metastatic disease. Noncalcified nodule in the right middle lobe  · 7/19/2019- bronchoscopy/FNA biopsy consistent with non-small cell lung cancer favoring adenocarcinoma subtype.PD-L1 could not be performed on scant material from FNA biopsy.  · 8/7/2019- MRI brain unremarkable for metastatic disease  · 8/29/2019- recommend a repeat CT chest abdomen pelvis Mediport placement and concurrent chemoradiation with carboplatin/Taxol to be followed by immunotherapy 1 year.  Stage III non-small cell lung cancer.  Will obtain guardant 360 liquid biopsy.  Molecular studies could not be performed on FNA specimen.  · 9/4/2019-CT chest abdomen pelvis showed interval improvement of metastatic adenopathy and right upper lobe mass but interval progression of  bilateral pulmonary metastasis.  No evidence of intra-abdominal metastatic disease. Specifically, tumor currently measuring 4.8 x 4.4 cm (previously 6.3 x 5.6 cm). Again identified are multiple lymph nodes in the prevascular space adjacent to the aorta. There has been mild  interval decrease in the size of these nodes, largest now measures 1.8 x  2 cm, previously measured 2.4 x 2 cm. Also noted is interval decrease the size of right paratracheal nodes middle mediastinal region, largest node measuring 1.6 cm in greatest dimension previosly measured 2.2 cm. No developing lymphadenopathy observed. The pulmonary nodule in the left lower lobe, anterolaterally, pleural-based measures 1.4 cm greatest transaxial projection previously measured 1.1 cm. In the lingula, near the major fissure there is a 9 mm nodule appreciated, axial image #92, previously measured less than 5 mm. In the left lower lobe there are several developing pulmonary nodules appreciated, the largest appreciated centrally, axial image #110 measuring nearly 14 mm in greatest dimension, previously measured 9 mm. In the left upper lobe, anteriorly and laterally, axial image #57 there is a developing 6 mm pulmonary nodule appreciated.  The right upper lobe, axial image #24 a developing 4 mm nodule is  observed. More inferiorly, axial image #38 pleural-based 8 mm nodule is  now appreciated. In the right middle lobe, inferiorly and laterally, axial image #75 9 mm pulmonary nodule, pleural-based is now identified. There are multiple developing pulmonary nodules in the right lower lobe,  largest appreciated posteriorly and inferiorly, axial image #115  measuring 1 cm in greatest dimension.  · 9/5/2019-he received 1 cycle of carboplatin AUC 2/Taxol 50 mg/m²  · 9/5/2019- recommended carboplatin Alimta and Keytruda due to findings of stage IV disease today on CT scans.  · 9/12/2019- Initiated on carboplatin AUC 5/Alimta 500 mg/m² and Keytruda every 3  weeks  · 9/29/2019- Guardant 360 identified a KRAS G12V alteration       Subjective   REVIEW OF SYSTEMS:   Review of Systems   Constitutional: Positive for activity change and fatigue. Negative for chills.        No night sweats   HENT: Negative for dental problem, hearing loss, mouth sores, nosebleeds, sore throat and trouble swallowing.    Eyes: Negative for visual disturbance.        No blurring of vision, no double vision     Respiratory: Positive for cough and shortness of breath (improving with diuretics). Negative for wheezing.         No hemoptysis       Cardiovascular: Negative for chest pain, palpitations and leg swelling.        A-fib 12/3/19, currently NSR   Gastrointestinal: Negative for abdominal pain, constipation, diarrhea, nausea and vomiting.   Endocrine: Negative for cold intolerance, heat intolerance, polydipsia and polyuria.   Genitourinary: Negative for difficulty urinating, dysuria, frequency, hematuria and urgency.   Musculoskeletal: Positive for arthralgias. Negative for joint swelling and myalgias.        Generalized weakness   Skin: Negative for pallor and rash.   Allergic/Immunologic: Negative for immunocompromised state.   Neurological: Negative for seizures, syncope and numbness.   Hematological: Negative for adenopathy. Does not bruise/bleed easily.   Psychiatric/Behavioral: Negative for confusion and suicidal ideas.     Objective   PHYSICAL EXAM:  Physical Exam   Constitutional: He is oriented to person, place, and time. No distress.   Appears chronically ill   HENT:   Head: Normocephalic and atraumatic.   Right Ear: External ear normal.   Left Ear: External ear normal.   Eyes: EOM are normal. No scleral icterus.   Neck: Neck supple. No tracheal deviation present.   Cardiovascular: Normal rate and regular rhythm.   Pulmonary/Chest: Effort normal. No respiratory distress. He has decreased breath sounds in the right lower field and the left lower field. He has no wheezes. He has no  rales.   Hi-flow O2 in use   Abdominal: Soft. Bowel sounds are normal. He exhibits no distension and no mass.   No hepatosplenomegaly     Genitourinary:   Genitourinary Comments: Exam deferred.     Musculoskeletal: He exhibits no edema or tenderness.   Normal ROM to all 4 extremities. Generalized weakness.     Lymphadenopathy:   No bulky palpable cervical, clavicular, axillary or inguinal adenopathies on the left or right.     Neurological: He is alert and oriented to person, place, and time.   Follows commands. Non-focal.     Skin: Skin is warm and dry. No rash noted. He is not diaphoretic.   Psychiatric: He has a normal mood and affect. His behavior is normal. Thought content normal.   Vitals reviewed.    Vitals:    12/04/19 0500 12/04/19 0600 12/04/19 0618 12/04/19 0626   BP: 120/53 133/68     Pulse: 84 81 76 80   Resp:   18 18   Temp:       TempSrc:       SpO2: 95% 94% 95% 97%   Weight:       Height:           Intake/Output Summary (Last 24 hours) at 12/4/2019 0709  Last data filed at 12/4/2019 0300  Gross per 24 hour   Intake 590.2 ml   Output 1200 ml   Net -609.8 ml     Labs:  CBC  Results from last 7 days   Lab Units 12/04/19  0226 12/03/19  0232 12/02/19  0035   WBC 10*3/mm3 5.01 4.75 4.91   HEMOGLOBIN g/dL 9.3* 9.0* 8.8*   HEMATOCRIT % 29.4* 28.1* 27.1*   PLATELETS 10*3/mm3 334 301 263     Lab Results   Component Value Date     12/04/2019    K 4.2 12/04/2019    CL 95 (L) 12/04/2019    CO2 37.0 (H) 12/04/2019    BUN 16 12/04/2019    CREATININE 0.87 12/04/2019    GLUCOSE 153 (H) 12/04/2019    CALCIUM 10.0 12/04/2019    BILITOT 0.3 11/30/2019    ALKPHOS 104 11/30/2019    AST 12 11/30/2019    ALT 12 11/30/2019    AGRATIO 1.1 11/30/2019    GLOB 3.0 11/30/2019     Lab Results   Component Value Date    INR 0.96 12/03/2019    PROTIME 13.1 12/03/2019       Cultures:  Lab Results   Component Value Date    BLOODCX No growth at 3 days 11/30/2019     No components found for: URINCX    ASSESSMENT/PLAN:      1.  stage IV NSCLC (adenocarcinoma)   cycle #4 of carboplatin/Alimta and Keytruda completed 11/14/19.  Plans were for reimaging (scheduled 12/2/19 as outpatient) with anticipation for maintenance Alimta and Keytruda.     2. Acute on chronic resp. failure, bilateral pleural effuions  Symptomatically improving with Diuresis  CXR 12/2/19 improved, repeat today  Chronic O2 dependency. Currently continues hi-flow NC     3.  Other fatigue, secondary to multiple comorbidities to include obesity, oxygen dependent, chemotherapy, lung cancer and anemia.     4. Anemia, secondary to chemotherapy.   Hgb improved, 9.3, MCV 99.0 (s/p 1 unit of pRBC for hemoglobin of 6.9)    60, TIBC 304, iron saturation 20% on 12/1/2019     5.  Hypokalemia, hypomagnesemia - improved  K+ 4.2, Mag 2.0  Defer to attending    6.  Diabetes  Managed per attending    7.  A-fib/SVT  Converted NSR with Cardizem 12/3/19  Continues NSR  Echo 12/3/19 EF 55%, left ventricular diastolic dysfunction    Agree with therapeutic and diagnostic thoracentesis for cytology if worsens/no improvement symptomatically - Repeat CXR this am.  Good urinary output.  Hgb improved.  Cardiology following.  Discussed with patient and his nurse.      MING Shannon  12/04/19  7:02 AM      Physicians attestation and contribution:    I, Claudio Nevarez, personally and independently performed an evaluation on Romel Rosario  I have reviewed relevant medical information/data to include but not limited to the medication list, relevant appropriate lab work and imaging when applicable.  I reviewed other physician's notes, ancillary services and nurses assessments.  I have reviewed the above documentation completed by Brissa Lea APRN  Please see my additional addended and/or modified contributions to the history of present illness, physical examination and assessment/medical decision-making and plan that reflects my findings and impressions.  I discussed the essential  elements of the care plan with Brissa LAI and the patient.  I have encouraged and answered all the questions raised to the patient's understanding and satisfaction.  I concur with the above stated.    Subjective: Awake and alert in no acute distress wondering when he can go home    Objective: Heart rate regular in the 80s O2 sat 92, good breath sounds anteriorly bilaterally chronic rales,  abdomen benign    Assessment/plan:  Agree with therapeutic and diagnostic thoracentesis for cytology if worsens/no improvement symptomatically - Repeat CXR this am.  Good urinary output.  Hgb improved.  Cardiology following.  Discussed with patient and his nurse.      Claudio Nevarez MD  12/4/2019 7:26 AM

## 2019-12-04 NOTE — PLAN OF CARE
Problem: Patient Care Overview  Goal: Plan of Care Review  Outcome: Ongoing (interventions implemented as appropriate)   12/04/19 6194   Coping/Psychosocial   Plan of Care Reviewed With patient   OTHER   Outcome Summary PT eval completed. He presents alert, oriented and ready to get out of bed with therapy. He needed CGA/min assist x 1 to get to the EOB, stand and to t/f to the bedside chair. O2 sat WNL while on the vapotherm. PT will continue to progress further gait once off the vapotherm. I anticipate he will d.c home with spouse.

## 2019-12-04 NOTE — PROGRESS NOTES
[facility] Medicine Services  INPATIENT PROGRESS NOTE    Patient Name: Romel Rosario  Date of Admission: 11/30/2019  Today's Date: 12/04/19  Length of Stay: 4  Primary Care Physician: Kourtney Keller APRN    Subjective   Chief Complaint: Elevated heart rate    HPI   Patient seen and examined.  Feels well today.  Feels like his breathing is improving.  No chest pain or palpitations.  No nausea.  Positive BMs.  Converted to normal sinus rhythm yesterday around 2:30 PM.        Review of Systems   All pertinent negatives and positives are as above. All other systems have been reviewed and are negative unless otherwise stated.     Objective    Temp:  [97.7 °F (36.5 °C)-98.6 °F (37 °C)] 97.7 °F (36.5 °C)  Heart Rate:  [] 80  Resp:  [17-23] 18  BP: ()/() 133/68  Physical Exam  GEN: Awake, alert, interactive, in NAD  HEENT: Atraumatic, PERRLA, EOMI, Anicteric, Trachea midline  Lungs: Diminished breath sounds at bases bilaterally.  Improved airflow overall.  No wheezing  Heart: RRR, +S1/s2, no rub  ABD: soft, mild distention, Nt, +BS, no guarding/rebound  Extremities: atraumatic, no cyanosis, no edema  Skin: no rashes or lesions  Neuro: AAOx3, no focal deficits  Psych: normal mood & affect        Results Review:  I have reviewed the labs, radiology results, and diagnostic studies.    Laboratory Data:   Results from last 7 days   Lab Units 12/04/19 0226 12/03/19 0232 12/02/19  0035   WBC 10*3/mm3 5.01 4.75 4.91   HEMOGLOBIN g/dL 9.3* 9.0* 8.8*   HEMATOCRIT % 29.4* 28.1* 27.1*   PLATELETS 10*3/mm3 334 301 263        Results from last 7 days   Lab Units 12/04/19 0226 12/03/19  1610 12/03/19  0232  12/02/19  0035  11/30/19  1625   SODIUM mmol/L 138  --  137  --  141   < > 137   POTASSIUM mmol/L 4.2 3.9 3.1*   < > 2.9*   < > 4.3   CHLORIDE mmol/L 95*  --  93*  --  94*   < > 98   CO2 mmol/L 37.0*  --  33.0*  --  33.0*   < > 30.0*   BUN mg/dL 16  --  15  --  12   < >  13   CREATININE mg/dL 0.87  --  0.84  --  0.83   < > 0.74*   CALCIUM mg/dL 10.0  --  9.9  --  9.8   < > 9.2   BILIRUBIN mg/dL  --   --   --   --   --   --  0.3   ALK PHOS U/L  --   --   --   --   --   --  104   ALT (SGPT) U/L  --   --   --   --   --   --  12   AST (SGOT) U/L  --   --   --   --   --   --  12   GLUCOSE mg/dL 153*  --  165*  --  175*   < > 174*    < > = values in this interval not displayed.       Culture Data:   Blood Culture   Date Value Ref Range Status   11/30/2019 No growth at 24 hours  Preliminary   11/30/2019 No growth at 24 hours  Preliminary       Radiology Data:   Imaging Results (Last 24 Hours)     ** No results found for the last 24 hours. **          I have reviewed the patient's current medications.     Assessment/Plan     Active Hospital Problems    Diagnosis   • **Acute on chronic respiratory failure with hypoxia (CMS/HCC)   • SVT (supraventricular tachycardia) (CMS/HCC)   • Hypokalemia   • Hypomagnesemia   • Pleural effusion   • Type 2 diabetes mellitus with hyperglycemia, with long-term current use of insulin (CMS/HCC)   • Anemia due to chemotherapy   • Essential hypertension   • Adenocarcinoma, lung (CMS/HCC)       #1 acute on chronic hypoxic respiratory failure -with history of lung cancer and now acutely seems to be in the setting of pleural effusions.  No noted history of heart failure and normal EF/echo in July of this year.  Repeat echo done 12/3 again shows normal EF and diastolic function.  Has been diuresing here and improving.  Repeat x-ray from 12/12 with improving effusions.  He got antibiotics x1 on arrival in the ER but none since.  He has been afebrile without a white count.  He is not making any sputum.  We will recheck CXR again today.  Question possible thoracentesis tomorrow if any significant residual pleural fluid.  Continue to wean O2 and try to get off of Vapotherm.  Incentive spiral.  Get patient up with PT.    #2 pleural effusions  -bilateral.  Does not appear  to be in heart failure.  Also does not appear to be parapneumonic as he is afebrile without a white count and improving without antibiotics. Potentially malignant effusions in the setting of his lung cancer but responding well to diuresis which would be odd. Was contemplating potential thoracentesis, will f/u cxr. Got lovenox this AM, would have to do tomorrow    #Hypokalemia -Improved s/p replacement..  Hypokalemia protocol in place for further replacement as needed. Need to keep k >/= 4.0    #4 hypomagnesemia - Improved after replacement.  Hypomagnesemia protocol in place for further replacement as needed. Need to keep mag >/= 2.0    #5 anemia -chronic and likely acutely down the setting of his chemotherapy treatments.  He has no signs of active bleeding.  His iron studies are normal.  He has been given 1 unit of PRBC with improvement from 6.9 ->8.8, and now 9.3 this AM.  Continue to monitor.    #6 constipation - +Bms ,continue bowel regimen    #7 lung cancer -stage IIIc adenocarcinoma of the lung followed in the outpatient setting by Dr. Fishman and receiving chemotherapy as well as recently referred to Dr. Bell of rad/onc for definitive radiation tx. Oncology following. Appreciate input.    #8 aflutter - converted back to NSR yesterday around 2:30 pm, has been off cardizem gtt. Continue metprolol bid. Event lasted only about 8 hrs , per cards not AC candidate, will change his lovenox back to dvt prophylaxis. 2d echo normal. Appreciate cards input.       Discharge Planning: Ongoing.  Work with PT today. F/u CXR. Continue to wean 02. Potential to floor later today vs tomorrow pending clinical course.     Rush Alberto, DO   12/04/19   8:05 AM

## 2019-12-04 NOTE — THERAPY EVALUATION
Patient Name: Romel Rosario  : 1950    MRN: [id number]                              Today's Date: 2019       Admit Date: 2019    Visit Dx:     ICD-10-CM ICD-9-CM   1. Hypoxia R09.02 799.02   2. Adenocarcinoma of lung, unspecified laterality (CMS/HCC) C34.90 162.9   3. Pneumonia of right lung due to infectious organism, unspecified part of lung J18.9 483.8   4. Anemia, unspecified type D64.9 285.9   5. Pleural effusion J90 511.9   6. Impaired mobility Z74.09 799.89     Patient Active Problem List   Diagnosis   • Controlled type 2 diabetes mellitus with hyperglycemia, without long-term current use of insulin (CMS/HCC)   • HTN (hypertension), benign   • Hx of colonic polyps   • Hx of colon cancer, stage I   • Sensorineural hearing loss   • Tinnitus of right ear   • Dysfunction of right eustachian tube   • Mass of upper lobe of left lung   • Encounter for consultation   • Former smoker   • Shortness of breath   • Hypoxia   • Respiratory distress, acute   • Acute respiratory failure with hypoxia and hypercapnia (CMS/HCC)   • Mediastinal adenopathy   • Adenocarcinoma, lung (CMS/HCC)   • Hypomagnesemia   • Pleural effusion   • Acute on chronic respiratory failure with hypoxia (CMS/HCC)   • Atelectasis   • Type 2 diabetes mellitus with hyperglycemia, with long-term current use of insulin (CMS/HCC)   • Anemia due to chemotherapy   • Essential hypertension   • Hypokalemia   • SVT (supraventricular tachycardia) (CMS/HCC)     Past Medical History:   Diagnosis Date   • Adenocarcinoma, lung (CMS/HCC)    • Cerumen impaction    • Cervical disc disease    • Colon cancer (CMS/HCC)    • Diabetes (CMS/HCC)    • Elevated cholesterol    • Eustachian tube dysfunction    • GERD (gastroesophageal reflux disease)    • Hx of colonic polyps    • Hypertension    • PUD (peptic ulcer disease)    • Sensorineural hearing loss      Past Surgical History:   Procedure Laterality Date   • BRONCHOSCOPY Left 2019    Procedure:  BRONCHOSCOPY WITH ENDOBRONCHIAL ULTRASOUND AND TRANSBRONCHIAL BIOPSY at 1: 30;  Surgeon: Jere Brumfield MD;  Location: [facility] OR;  Service: Pulmonary   • COLON SURGERY      Due to colon cancer   • COLONOSCOPY N/A 6/26/2017    Procedure: COLONOSCOPY WITH ANESTHESIA;  Surgeon: Barry Banks MD;  Location: [facility] ENDOSCOPY;  Service:    • COLONOSCOPY W/ POLYPECTOMY  08/05/2013    Tubular adenoma transverse x 2, Diverticulosis repeat exam in 3 years   • ENDOSCOPY  08/05/2013    HH, Billroth anastomisis in the gastric antrum   • KNEE SURGERY     • STOMACH SURGERY      Due to ulcer   • VENOUS ACCESS DEVICE (PORT) INSERTION N/A 9/3/2019    Procedure: SINGLE LUMEN PORT PLACEMENT;  Surgeon: Jelena Huntley MD;  Location: [facility] OR;  Service: General     General Information     Row Name 12/04/19 1417          PT Evaluation Time/Intention    Document Type  evaluation CC: SOB, low O2 sat. Dx: Resp distress, hypoxic resp failure, B pleural effusions, anemia, tachycardia. History of non small cell lung adenocarcinoma.   -ARNAV     Mode of Treatment  physical therapy  -ARNAV     Row Name 12/04/19 1417          General Information    Patient Profile Reviewed?  yes  -ARNAV     Prior Level of Function  independent:;all household mobility  -ARNAV     Existing Precautions/Restrictions  fall;oxygen therapy device and L/min vapotherm  -ARNAV     Barriers to Rehab  medically complex  -ARNAV     Row Name 12/04/19 1417          Relationship/Environment    Lives With  spouse  -ARNAV     Row Name 12/04/19 1417          Resource/Environmental Concerns    Current Living Arrangements  home/apartment/condo  -ARNAV     Row Name 12/04/19 1417          Home Main Entrance    Number of Stairs, Main Entrance  five  -ARNAV     Row Name 12/04/19 1417          Cognitive Assessment/Intervention- PT/OT    Orientation Status (Cognition)  oriented x 4  -ARNAV     Row Name 12/04/19 1417          Safety Issues, Functional Mobility    Impairments Affecting Function (Mobility)   balance;endurance/activity tolerance;strength;shortness of breath  -ARNAV       User Key  (r) = Recorded By, (t) = Taken By, (c) = Cosigned By    Initials Name Provider Type    Emile Damon, PT DPT Physical Therapist        Mobility     Row Name 12/04/19 1417          Bed Mobility Assessment/Treatment    Bed Mobility Assessment/Treatment  supine-sit  -ARNAV     Supine-Sit [place] (Bed Mobility)  contact guard;minimum assist (75% patient effort)  -ARNAV     Assistive Device (Bed Mobility)  head of bed elevated  -ARNAV     Row Name 12/04/19 1417          Transfer Assessment/Treatment    Comment (Transfers)  remains on vapotherm, able to t/f to bedside chair  -ARNAV     Row Name 12/04/19 1417          Bed-Chair Transfer    Bed-Chair [place] (Transfers)  contact guard;minimum assist (75% patient effort)  -ARNAV     [facility] Name 12/04/19 1417          Sit-Stand Transfer    Sit-Stand [place] (Transfers)  contact guard;minimum assist (75% patient effort)  -ARNAV       User Key  (r) = Recorded By, (t) = Taken By, (c) = Cosigned By    Initials Name Provider Type    Emile Damon, PT DPT Physical Therapist        Obj/Interventions     Row Name 12/04/19 1417          General ROM    GENERAL ROM COMMENTS  B LE AROM WFL  -ARNAV     [facility] Name 12/04/19 1417          MMT (Manual Muscle Testing)    General MMT Comments  B LE functionally 4/5  -[facility] Name 12/04/19 1417          Static Sitting Balance    Level of [place] (Unsupported Sitting, Static Balance)  supervision  -ARNAV     Sitting Position (Unsupported Sitting, Static Balance)  sitting on edge of bed  -ARNAV     Row Name 12/04/19 1417          Dynamic Sitting Balance    Level of [place], Reaches Outside Midline (Sitting, Dynamic Balance)  supervision  -ARNAV     Sitting Position, Reaches Outside Midline (Sitting, Dynamic Balance)  sitting on edge of bed  -ARNAV     Row Name 12/04/19 1417          Static Standing Balance    Level of [place] (Supported Standing, Static  Balance)  contact guard assist;minimal assist, 75% patient effort  -ARNAV     Row Name 12/04/19 1417          Dynamic Standing Balance    Level of [place], Reaches Outside Midline (Standing, Dynamic Balance)  contact guard assist;minimal assist, 75% patient effort  -ARNAV       User Key  (r) = Recorded By, (t) = Taken By, (c) = Cosigned By    Initials Name Provider Type    Emile Damon, PT DPT Physical Therapist        Goals/Plan     Row Name 12/04/19 1417          Transfer Goal 1 (PT)    Activity/Assistive Device (Transfer Goal 1, PT)  sit-to-stand/stand-to-sit;bed-to-chair/chair-to-bed  -ARNAV     [place] Level/Cues Needed (Transfer Goal 1, PT)  supervision required  -ARNAV     Time Frame (Transfer Goal 1, PT)  long term goal (LTG);10 days  -ARNAV     Progress/Outcome (Transfer Goal 1, PT)  goal ongoing  -ARNAV     Row Name 12/04/19 1417          Gait Training Goal 1 (PT)    Activity/Assistive Device (Gait Training Goal 1, PT)  gait (walking locomotion);decrease fall risk;improve balance and speed;increase endurance/gait distance  -ARNAV     [place] Level (Gait Training Goal 1, PT)  supervision required  -ARNAV     Distance (Gait Goal 1, PT)  200  -ARNAV     Time Frame (Gait Training Goal 1, PT)  long term goal (LTG);10 days  -ARNAV     Progress/Outcome (Gait Training Goal 1, PT)  goal ongoing  -ARNAV     Row Name 12/04/19 1417          Stairs Goal 1 (PT)    Activity/Assistive Device (Stairs Goal 1, PT)  ascending stairs;descending stairs  -ARNAV     [place] Level/Cues Needed (Stairs Goal 1, PT)  supervision required  -ARNAV     Number of Stairs (Stairs Goal 1, PT)  5  -ARNAV     Time Frame (Stairs Goal 1, PT)  long term goal (LTG);10 days  -ARNAV     Progress/Outcome (Stairs Goal 1, PT)  goal ongoing  -ARNAV       User Key  (r) = Recorded By, (t) = Taken By, (c) = Cosigned By    Initials Name Provider Type    Emile Damon, PT DPT Physical Therapist        Clinical Impression     Row Name 12/04/19 1417          Pain  Assessment    Additional Documentation  Pain Scale: Numbers Pre/Post-Treatment (Group)  -ARNAV     Row Name 12/04/19 1417          Pain Scale: Numbers Pre/Post-Treatment    Pain Scale: Numbers, Pretreatment  0/10 - no pain  -ARNAV     Pre/Post Treatment Pain Comment  c/o weakness  -ARNAV     Row Name 12/04/19 1417          Plan of Care Review    Plan of Care Reviewed With  patient  -ARNAV     [facility] Name 12/04/19 1417          Physical Therapy Clinical Impression    Patient/Family Goals Statement (PT Clinical Impression)  go home  -ARNAV     Criteria for Skilled Interventions Met (PT Clinical Impression)  yes;treatment indicated  -ARNAV     Rehab Potential (PT Clinical Summary)  good, to achieve stated therapy goals  -ARNAV     Predicted Duration of Therapy (PT)  until d/c  -ARNAV     Row Name 12/04/19 1417          Vital Signs    Post Systolic BP Rehab  149  -ARNAV     Post Treatment Diastolic BP  92  -ARNAV     Posttreatment Heart Rate (beats/min)  89  -ARNAV     Intra SpO2 (%)  93  -ARNAV     O2 Delivery Intra Treatment  -- vapotherm  -ARNAV     Post SpO2 (%)  95  -ARNAV     O2 Delivery Post Treatment  -- vapotherm  -ARNAV     Pre Patient Position  Supine  -ARNAV     Intra Patient Position  Standing  -ARNAV     Post Patient Position  Sitting  -ARNAV     [facility] Name 12/04/19 1417          Positioning and Restraints    Pre-Treatment Position  in bed  -ARNAV     Post Treatment Position  chair  -ARNAV     In Chair  sitting;call light within reach;encouraged to call for assist;notified nsg;patient within staff view  -ARNAV       User Key  (r) = Recorded By, (t) = Taken By, (c) = Cosigned By    Initials Name Provider Type    Emile Damon, PT DPT Physical Therapist        Outcome Measures     [facility] Name 12/04/19 1417          How much help from another person do you currently need...    Turning from your back to your side while in flat bed without using bedrails?  3  -ARNAV     Moving from lying on back to sitting on the side of a flat bed without bedrails?  3  -ARNAV     Moving to and  from a bed to a chair (including a wheelchair)?  3  -ARNAV     Standing up from a chair using your arms (e.g., wheelchair, bedside chair)?  3  -ARNAV     Climbing 3-5 steps with a railing?  2  -ARNAV     To walk in hospital room?  3  -ARNAV     AM-PAC 6 Clicks Score (PT)  17  -ARNAV     Row Name 12/04/19 1417          Functional Assessment    Outcome Measure Options  AM-PAC 6 Clicks Basic Mobility (PT)  -ARNAV       User Key  (r) = Recorded By, (t) = Taken By, (c) = Cosigned By    Initials Name Provider Type    Emile Damon, PT DPT Physical Therapist        Physical Therapy Education     Title: PT OT SLP Therapies (In Progress)     Topic: Physical Therapy (In Progress)     Point: Mobility training (Done)     Learning Progress Summary           Patient Acceptance, HERBERT BROWNDU by ARNAV at 12/4/2019  2:17 PM    Comment:  Educated pt on progression of PT POC and benefits of activity                               User Key     Initials Effective Dates Name Provider Type Discipline    ARNAV 08/02/16 -  Emile Reyes, PT DPT Physical Therapist PT              PT Recommendation and Plan  Planned Therapy Interventions (PT Eval): bed mobility training, transfer training, gait training, balance training, home exercise program, patient/family education, postural re-education, stair training, strengthening  Outcome Summary/Treatment Plan (PT)  Anticipated Discharge Disposition (PT): home with assist, home with home health  Plan of Care Reviewed With: patient  Outcome Summary: PT eval completed. He presents alert, oriented and ready to get out of bed with therapy. He needed CGA/min assist x 1 to get to the EOB, stand and to t/f to the bedside chair. O2 sat WNL while on the vapotherm. PT will continue to progress further gait once off the vapotherm. I anticipate he will d.c home with spouse.      Time Calculation:   PT Charges     Row Name 12/04/19 1606             Time Calculation    Start Time  1417  -ARNAV      Stop Time  1457  -ARNAV      Time  Calculation (min)  40 min  -ARNAV      PT Received On  12/04/19  -ARNAV      PT Goal Re-Cert Due Date  12/14/19  -ARNAV        User Key  (r) = Recorded By, (t) = Taken By, (c) = Cosigned By    Initials Name Provider Type    Emile Damon, PT DPT Physical Therapist        Therapy Charges for Today     Code Description Service Date Service Provider Modifiers Qty    [id number] HC PT EVAL MOD COMPLEXITY 3 12/4/2019 Emile Reyes, PT DPT GP 1          PT G-Codes  Outcome Measure Options: AM-PAC 6 Clicks Basic Mobility (PT)  AM-PAC 6 Clicks Score (PT): 17    Emile Reyes, PT DPT  12/4/2019

## 2019-12-05 NOTE — PROGRESS NOTES
HEMATOLOGY AND MEDICAL ONCOLOGY PROGRESS NOTE          Pt Name: Romel Rosario  YOB: 1950  MRN: [id number]    Date of evaluation: 12/5/2019  History Obtained From:  History obtained from chart review and the patient.    CHIEF COMPLAINT:    Chief Complaint   Patient presents with   • Respiratory Distress     Subjective: Remains NSR. Reports diarrhea x3 on 12/4/19. Voiding well. hi-Flow O2, 25L/40% - weaning as tolerated. Complains of generalized weakness.    HISTORY OF PRESENT ILLNESS:   Romel Rosario is a 69 y.o.  gentleman who holds a diagnosis of NSCLC.  He initially presented with stage III disease, unfortunately with interval progression of bilateral pulmonary metastases and is currently treated for stage IV disease.   Treatment history includes palliative radiation therapy to the chest due to obstructive symptoms.   Cycle #4 of palliative carboplatin/Alimta and Keytruda was completed 11/14/19.  Plans were for reimaging in 3 weeks (was scheduled 12/2/19) with anticipation for maintenance Alimta and Keytruda.     Romel however, was admitted to [facility] 11/30/2019 for evaluation of shortness of breath with increased oxygen use.      CTA chest on 11/30/2019 was negative for PE.    New, moderate bilateral pleural effusions noted in addition to a new linear band of infiltrate or atelectasis within the RML.  The left suprahilar mass shows slight decrease in size measuring 45 x 47 mm, previously 44 x 58 mm on 9/4/2019.     He had 1 unit of pRBC for hemoglobin of 6.9, now improved at 8.8.  He has been diuresing.  CXR 12/2/19 improved with a decrease in central pulmonary venous congestion and small bilateral pleural effusions.  Symptomatically Romel states shortness of breath is improved.     ONCOLOGIC HISTORY:  Diagnosis  · Non-small cell lung cancer, adenocarcinoma, July 2019  · Stage IV   · PD-L1 unknown  · Guardant 360-KRAS G12V alteration      Treatment summary   · Status chest palliative  RT 13 fractions completed August 2019  · 9/5/2019-1 time dose of carboplatin AUC 2/Taxol 45 mg/m².  · 9/12/2019- Initiated on carboplatin AUC 5/Alimta 500 mg/m² and Keytruda every 3 weeks, anticipate maintenance Alimta and Keytruda     Cancer history  Mr Romel Rosario was first seen by me on 6/28/2019 referred by his primary care provider for further evaluation of a lung mass and mediastinal adenopathy. The patient had complains of shortness of breath at exertion and therefore was recommended further imaging.  · 6/13/2019-CT chest with contrast showed a 6.3 x 5.6 x 7 cm left upper lobe lung mass, centrally necrotic, with compressive atelectasis. Questionable satellite nodule within the lingula measured 7 mm. Noncalcified 11 mm left lower lobe nodule concerning for pulmonary metastases. 5 mm subpleural right lower lobe nodule of uncertain significance. A small hiatal hernia. No adrenal nodules. Mediastinal adenopathy measuring up to 2 cm. Subcarina lymph nodes measure up to 1.3 cm.   · 6/24/2019-Initial oncology consultation   · 7/3/2019-bone scan showed no convincing evidence of osseous metastasis.   · 7/3/2019- CT chest, abdomen, pelvis showed no evidence of intra-abdominal metastatic disease. Noncalcified nodule in the right middle lobe  · 7/19/2019- bronchoscopy/FNA biopsy consistent with non-small cell lung cancer favoring adenocarcinoma subtype.PD-L1 could not be performed on scant material from FNA biopsy.  · 8/7/2019- MRI brain unremarkable for metastatic disease  · 8/29/2019- recommend a repeat CT chest abdomen pelvis Mediport placement and concurrent chemoradiation with carboplatin/Taxol to be followed by immunotherapy 1 year.  Stage III non-small cell lung cancer.  Will obtain guardant 360 liquid biopsy.  Molecular studies could not be performed on FNA specimen.  · 9/4/2019-CT chest abdomen pelvis showed interval improvement of metastatic adenopathy and right upper lobe mass but interval progression of  bilateral pulmonary metastasis.  No evidence of intra-abdominal metastatic disease. Specifically, tumor currently measuring 4.8 x 4.4 cm (previously 6.3 x 5.6 cm). Again identified are multiple lymph nodes in the prevascular space adjacent to the aorta. There has been mild  interval decrease in the size of these nodes, largest now measures 1.8 x  2 cm, previously measured 2.4 x 2 cm. Also noted is interval decrease the size of right paratracheal nodes middle mediastinal region, largest node measuring 1.6 cm in greatest dimension previosly measured 2.2 cm. No developing lymphadenopathy observed. The pulmonary nodule in the left lower lobe, anterolaterally, pleural-based measures 1.4 cm greatest transaxial projection previously measured 1.1 cm. In the lingula, near the major fissure there is a 9 mm nodule appreciated, axial image #92, previously measured less than 5 mm. In the left lower lobe there are several developing pulmonary nodules appreciated, the largest appreciated centrally, axial image #110 measuring nearly 14 mm in greatest dimension, previously measured 9 mm. In the left upper lobe, anteriorly and laterally, axial image #57 there is a developing 6 mm pulmonary nodule appreciated.  The right upper lobe, axial image #24 a developing 4 mm nodule is  observed. More inferiorly, axial image #38 pleural-based 8 mm nodule is  now appreciated. In the right middle lobe, inferiorly and laterally, axial image #75 9 mm pulmonary nodule, pleural-based is now identified. There are multiple developing pulmonary nodules in the right lower lobe,  largest appreciated posteriorly and inferiorly, axial image #115  measuring 1 cm in greatest dimension.  · 9/5/2019-he received 1 cycle of carboplatin AUC 2/Taxol 50 mg/m²  · 9/5/2019- recommended carboplatin Alimta and Keytruda due to findings of stage IV disease today on CT scans.  · 9/12/2019- Initiated on carboplatin AUC 5/Alimta 500 mg/m² and Keytruda every 3  weeks  · 9/29/2019- Guardant 360 identified a KRAS G12V alteration       Subjective   REVIEW OF SYSTEMS:   Review of Systems   Constitutional: Positive for activity change and fatigue. Negative for chills.        No night sweats   HENT: Negative for dental problem, hearing loss, mouth sores, nosebleeds, sore throat and trouble swallowing.    Eyes: Negative for visual disturbance.        No blurring of vision, no double vision     Respiratory: Positive for cough and shortness of breath (improving with diuretics). Negative for wheezing.         No hemoptysis       Cardiovascular: Negative for chest pain, palpitations and leg swelling.        A-fib 12/3/19, currently NSR   Gastrointestinal: Positive for diarrhea. Negative for abdominal pain, constipation, nausea and vomiting.   Endocrine: Negative for cold intolerance, heat intolerance, polydipsia and polyuria.   Genitourinary: Negative for difficulty urinating, dysuria, frequency, hematuria and urgency.   Musculoskeletal: Positive for arthralgias. Negative for joint swelling and myalgias.        Significant, generalized weakness   Skin: Negative for pallor and rash.   Allergic/Immunologic: Negative for immunocompromised state.   Neurological: Negative for seizures, syncope and numbness.   Hematological: Negative for adenopathy. Does not bruise/bleed easily.   Psychiatric/Behavioral: Negative for confusion and suicidal ideas.     Objective   PHYSICAL EXAM:  Physical Exam   Constitutional: He is oriented to person, place, and time. No distress.   Appears chronically ill   HENT:   Head: Normocephalic and atraumatic.   Right Ear: External ear normal.   Left Ear: External ear normal.   Eyes: EOM are normal. No scleral icterus.   Neck: Neck supple. No tracheal deviation present.   Cardiovascular: Normal rate and regular rhythm.   Pulmonary/Chest: Effort normal. No respiratory distress. He has decreased breath sounds in the right lower field and the left lower field. He has  no wheezes. He has no rales.   Hi-flow O2 in use   Abdominal: Soft. Bowel sounds are normal. He exhibits no distension and no mass.   No hepatosplenomegaly     Genitourinary:   Genitourinary Comments: Exam deferred.     Musculoskeletal: He exhibits no edema or tenderness.   Normal ROM to all 4 extremities. Generalized weakness.     Lymphadenopathy:   No bulky palpable cervical, clavicular, axillary or inguinal adenopathies on the left or right.     Neurological: He is alert and oriented to person, place, and time.   Follows commands. Non-focal.     Skin: Skin is warm and dry. No rash noted. He is not diaphoretic.   Psychiatric: He has a normal mood and affect. His behavior is normal. Thought content normal.   Vitals reviewed.    Vitals:    12/05/19 0500 12/05/19 0600 12/05/19 0630 12/05/19 0640   BP: 101/61 109/78     BP Location: Left leg Left leg     Patient Position: Lying Lying     Pulse: 76 98 93 81   Resp:   18    Temp:       TempSrc:       SpO2: 93% 96% 96%    Weight:       Height:           Intake/Output Summary (Last 24 hours) at 12/5/2019 0653  Last data filed at 12/5/2019 0500  Gross per 24 hour   Intake 750 ml   Output 675 ml   Net 75 ml     Labs:  CBC  Results from last 7 days   Lab Units 12/05/19  0216 12/04/19  0226 12/03/19  0232   WBC 10*3/mm3 5.34 5.01 4.75   HEMOGLOBIN g/dL 8.8* 9.3* 9.0*   HEMATOCRIT % 28.3* 29.4* 28.1*   PLATELETS 10*3/mm3 303 334 301     Lab Results   Component Value Date     (L) 12/05/2019    K 4.1 12/05/2019    CL 93 (L) 12/05/2019    CO2 32.0 (H) 12/05/2019    BUN 18 12/05/2019    CREATININE 0.84 12/05/2019    GLUCOSE 134 (H) 12/05/2019    CALCIUM 9.7 12/05/2019    BILITOT 0.3 11/30/2019    ALKPHOS 104 11/30/2019    AST 12 11/30/2019    ALT 12 11/30/2019    AGRATIO 1.1 11/30/2019    GLOB 3.0 11/30/2019     Lab Results   Component Value Date    INR 0.96 12/03/2019    PROTIME 13.1 12/03/2019       Cultures:  Lab Results   Component Value Date    BLOODCX No growth at 4  days 11/30/2019     No components found for: URINCX    ASSESSMENT/PLAN:      1. stage IV NSCLC (adenocarcinoma)   cycle #4 of carboplatin/Alimta and Keytruda completed 11/14/19.  Plans were for reimaging (scheduled 12/2/19 as outpatient) with anticipation for maintenance Alimta and Keytruda.     2. Acute on chronic resp. failure, bilateral pleural effuions  Symptomatically improving with Diuresis - currently receiving Lasix 40 mg IV daily  CXR 12/4/19 improving/decreasing pulmonary edema, possible small left pleural fluid.   Chronic O2 dependency. Currently continues hi-flow NC     3.  Other fatigue, secondary to multiple comorbidities to include obesity, oxygen dependent, chemotherapy, lung cancer and anemia.     4. Anemia, secondary to chemotherapy.   Hgb improved, 8.8, MCV 99.0 (s/p 1 unit of pRBC for hemoglobin of 6.9)    60, TIBC 304, iron saturation 20% on 12/1/2019     5.  Hypokalemia, hypomagnesemia   K+ 4.1, Mag 1.3  Defer to attending    6.  Diabetes  Managed per attending    7.  A-fib/SVT -resolved-converted medically  Converted NSR with Cardizem 12/3/19  Continues NSR  Echo 12/3/19 EF 55%, left ventricular diastolic dysfunction    Agree with therapeutic and diagnostic thoracentesis for cytology if worsens/no improvement symptomatically - luckily CXR improving.  Symptomatically improving with diuresis.  Patient states he sat up in chair yesterday for 30 minutes. He complained more of generalized weakness than shortness of breath.  Physical Therapy.  Magnesium replacement per attending.  Diarrhea x3 12/4/19. Stool for c-diff if persists today.  Continue supportive care.       Brissa Lea, MING  12/05/19  6:53 AM      Physicians attestation and contribution:    I, Claudio Nevarez, personally and independently performed an evaluation on Romel Rosario  I have reviewed relevant medical information/data to include but not limited to the medication list, relevant appropriate lab work and imaging when  applicable.  I reviewed other physician's notes, ancillary services and nurses assessments.  I have reviewed the above documentation completed by Brissa LAI  Please see my additional addended and/or modified contributions to the history of present illness, physical examination and assessment/medical decision-making and plan that reflects my findings and impressions.  I discussed the essential elements of the care plan with Brissa LAI and the patient.  I have encouraged and answered all the questions raised to the patient's understanding and satisfaction.  I concur with the above stated.    Subjective: Awake and alert feels well no acute complaints    Objective: Good breath sounds bilaterally chronic rales diffusely    Assessment/plan: Continuing supportive care    Claudio Nevarez MD  12/5/2019 7:33 AM

## 2019-12-05 NOTE — PLAN OF CARE
Problem: Fall Risk (Adult)  Goal: Absence of Fall  Outcome: Ongoing (interventions implemented as appropriate)      Problem: Patient Care Overview  Goal: Plan of Care Review  Outcome: Ongoing (interventions implemented as appropriate)   12/05/19 1630   Coping/Psychosocial   Plan of Care Reviewed With patient   OTHER   Outcome Summary Patient transferred from the unit. Mag. replaced per protocol. VSS. Safety Maintained. Will continue to monitor.    Plan of Care Review   Progress no change       Problem: ARDS (Acute Resp Distress Syndrome) (Adult)  Goal: Signs and Symptoms of Listed Potential Problems Will be Absent, Minimized or Managed (ARDS)  Outcome: Ongoing (interventions implemented as appropriate)      Problem: Skin Injury Risk (Adult)  Goal: Skin Health and Integrity  Outcome: Ongoing (interventions implemented as appropriate)      Problem: Nutrition, Imbalanced: Inadequate Oral Intake (Adult)  Goal: Improved Oral Intake  Outcome: Ongoing (interventions implemented as appropriate)    Goal: Prevent Further Weight Loss  Outcome: Ongoing (interventions implemented as appropriate)

## 2019-12-05 NOTE — PROGRESS NOTES
LOS: 4 days   Patient Care Team:  Kourtney Keller APRN as PCP - General (Nurse Practitioner)  Jose Cruz, MING Restrepo as Nurse Practitioner (Pulmonary Disease)    Chief Complaint:   Acute on chronic respiratory failure with hypoxia (CMS/HCC)    Adenocarcinoma, lung (CMS/HCC)    Hypomagnesemia    Pleural effusion    Type 2 diabetes mellitus with hyperglycemia, with long-term current use of insulin (CMS/HCC)    Anemia due to chemotherapy    Essential hypertension    Hypokalemia    SVT (supraventricular tachycardia) (CMS/HCC)    Shortness of breath    Subjective      Less shortness of breath  No palpitation  Back in sinus rhythm  Latest test results reviewed  Electrolytes are corrected  No orthopnea paroxysmal nocturnal dyspnea  Tolerating current medical therapy well  Oral intake is fair  Is mostly confined to bed    Patient Complaints:   Chief Complaint   Patient presents with   • Respiratory Distress       Telemetry: no malignant arrhythmia. No significant pauses.  Maintaining sinus rhythm    Review of Systems     Constitutional: No chills   Has fatigue   No fever.     HENT: Negative.    Eyes: Negative.      Respiratory: Negative for cough,   No chest wall soreness,   Shortness of breath,   no wheezing, no stridor.      Cardiovascular: Negative for chest pain,   No palpitations   No significant  leg swelling.     Gastrointestinal: Negative for abdominal distention,  No abdominal pain,   No blood in stool,   No constipation,   No diarrhea,   No nausea   No vomiting.     Endocrine: Negative.    Genitourinary: Negative for difficulty urinating, dysuria, flank pain and hematuria.     Musculoskeletal: Negative.    Skin: Negative for rash and wound.   Allergic/Immunologic: Negative.      Neurological: Negative for dizziness, syncope, weakness,   No light-headedness  No  headaches.     Hematological: Does not bruise/bleed easily.     Psychiatric/Behavioral: Negative for agitation or behavioral problems,   No  confusion,   the patient is  nervous/anxious.       History:   Past Medical History:   Diagnosis Date   • Adenocarcinoma, lung (CMS/HCC)    • Cerumen impaction    • Cervical disc disease    • Colon cancer (CMS/HCC)    • Diabetes (CMS/HCC)    • Elevated cholesterol    • Eustachian tube dysfunction    • GERD (gastroesophageal reflux disease)    • Hx of colonic polyps    • Hypertension    • PUD (peptic ulcer disease)    • Sensorineural hearing loss      Past Surgical History:   Procedure Laterality Date   • BRONCHOSCOPY Left 2019    Procedure: BRONCHOSCOPY WITH ENDOBRONCHIAL ULTRASOUND AND TRANSBRONCHIAL BIOPSY at 1: 30;  Surgeon: Jere Brumfield MD;  Location: [facility] OR;  Service: Pulmonary   • COLON SURGERY      Due to colon cancer   • COLONOSCOPY N/A 2017    Procedure: COLONOSCOPY WITH ANESTHESIA;  Surgeon: Barry Banks MD;  Location: [facility] ENDOSCOPY;  Service:    • COLONOSCOPY W/ POLYPECTOMY  2013    Tubular adenoma transverse x 2, Diverticulosis repeat exam in 3 years   • ENDOSCOPY  2013    HH, Billroth anastomisis in the gastric antrum   • KNEE SURGERY     • STOMACH SURGERY      Due to ulcer   • VENOUS ACCESS DEVICE (PORT) INSERTION N/A 9/3/2019    Procedure: SINGLE LUMEN PORT PLACEMENT;  Surgeon: Jelena Huntley MD;  Location: [facility] OR;  Service: General     Social History     Socioeconomic History   • Marital status:      Spouse name: Not on file   • Number of children: Not on file   • Years of education: Not on file   • Highest education level: Not on file   Tobacco Use   • Smoking status: Former Smoker     Years: 45.00     Last attempt to quit: 2007     Years since quittin.2   • Smokeless tobacco: Never Used   Substance and Sexual Activity   • Alcohol use: No   • Drug use: No   • Sexual activity: Defer     Family History   Problem Relation Age of Onset   • Heart disease Mother    • Diabetes Mother    • Alzheimer's disease Father    • Colon cancer Neg Hx    •  Colon polyps Neg Hx        Labs:  WBC WBC   Date Value Ref Range Status   12/04/2019 5.01 3.40 - 10.80 10*3/mm3 Final   12/03/2019 4.75 3.40 - 10.80 10*3/mm3 Final   12/02/2019 4.91 3.40 - 10.80 10*3/mm3 Final      HGB Hemoglobin   Date Value Ref Range Status   12/04/2019 9.3 (L) 13.0 - 17.7 g/dL Final   12/03/2019 9.0 (L) 13.0 - 17.7 g/dL Final   12/02/2019 8.8 (L) 13.0 - 17.7 g/dL Final      HCT Hematocrit   Date Value Ref Range Status   12/04/2019 29.4 (L) 37.5 - 51.0 % Final   12/03/2019 28.1 (L) 37.5 - 51.0 % Final   12/02/2019 27.1 (L) 37.5 - 51.0 % Final      Platelets Platelets   Date Value Ref Range Status   12/04/2019 334 140 - 450 10*3/mm3 Final   12/03/2019 301 140 - 450 10*3/mm3 Final   12/02/2019 263 140 - 450 10*3/mm3 Final      MCV MCV   Date Value Ref Range Status   12/04/2019 99.0 (H) 79.0 - 97.0 fL Final   12/03/2019 96.9 79.0 - 97.0 fL Final   12/02/2019 95.8 79.0 - 97.0 fL Final        Results from last 7 days   Lab Units 12/04/19  0226 12/03/19  1610 12/03/19  0232  12/02/19  0035  11/30/19  1625   SODIUM mmol/L 138  --  137  --  141   < > 137   POTASSIUM mmol/L 4.2 3.9 3.1*   < > 2.9*   < > 4.3   CHLORIDE mmol/L 95*  --  93*  --  94*   < > 98   CO2 mmol/L 37.0*  --  33.0*  --  33.0*   < > 30.0*   BUN mg/dL 16  --  15  --  12   < > 13   CREATININE mg/dL 0.87  --  0.84  --  0.83   < > 0.74*   CALCIUM mg/dL 10.0  --  9.9  --  9.8   < > 9.2   BILIRUBIN mg/dL  --   --   --   --   --   --  0.3   ALK PHOS U/L  --   --   --   --   --   --  104   ALT (SGPT) U/L  --   --   --   --   --   --  12   AST (SGOT) U/L  --   --   --   --   --   --  12   GLUCOSE mg/dL 153*  --  165*  --  175*   < > 174*    < > = values in this interval not displayed.     Lab Results   Component Value Date    TROPONINI <0.012 07/17/2019    TROPONINT 0.012 12/03/2019     PT/INR:    Protime   Date Value Ref Range Status   12/03/2019 13.1 11.9 - 14.6 Seconds Final   /  INR   Date Value Ref Range Status   12/03/2019 0.96 0.91 - 1.09  Final       Imaging Results (Last 72 Hours)     Procedure Component Value Units Date/Time    XR Chest 1 View [[id number]] Collected:  12/04/19 1354     Updated:  12/04/19 1359    Narrative:       EXAM: XR CHEST 1 VW- - 12/4/2019 1:27 PM CST     HISTORY: follow-up pleural effusions; R09.02-Hypoxemia; C34.90-Malignant  neoplasm of unspecified part of unspecified bronchus or lung;  J18.9-Pneumonia, unspecified organism; D64.9-Anemia, unspecified;  S69-Kkjgpwh effusion, not elsewhere classified       COMPARISON: 12/02/2019.      TECHNIQUE:  1 images.  Frontal view of the chest.     FINDINGS:    Port in the right chest with 2 grossly intact catheter, tip projects at  the upper right atrium. No pneumothorax. Possible small left pleural  fluid. There is a approximately 4 cm left suprahilar mass, better  demonstrated on prior CT 11/30/2019. Decreased perihilar vascular  prominence. Enlarged cardiac silhouette. Upper mediastinum similar  compared to prior. No convincing acute bony findings by radiograph.          Impression:       1. Improving/decreasing pulmonary edema. Possible small left pleural  fluid.  2. Known left suprahilar mass better seen on prior CT.  3. Enlarged cardiac silhouette.  This report was finalized on 12/04/2019 13:56 by Dr Mile Donnelly MD.    XR Chest 1 View [[id number]] Collected:  12/02/19 1247     Updated:  12/02/19 1252    Narrative:       Exam:   XR CHEST 1 VW-       Date:  12/02/2019      History:  Male, age  69 years;f/u pleural effusion, hypoxic rf;  R09.02-Hypoxemia; C34.90-Malignant neoplasm of unspecified part of  unspecified bronchus or lung; J18.9-Pneumonia, unspecified organism;  D64.9-Anemia, unspecified; C79-Bzrpzeg effusion, not elsewhere  classified     COMPARISON:  Chest x-ray dated 11/30/2019     Findings :  Right-sided chest port is unchanged in position.  Mild to moderate cardiomegaly, unchanged. Central pulmonary venous  congestion and interstitial edema. Decreased. Small  bilateral pleural  effusions, also decreased. No measurable pneumothorax.  The bones show  no acute pathology.         Impression:       Impression:     Decreasing findings of fluid overload.     This report was finalized on 12/02/2019 12:49 by Dr. Maryjo Cameron MD.          Objective     Allergies   Allergen Reactions   • Lactose Intolerance (Gi) Diarrhea       Medication Review: Performed  Current Facility-Administered Medications   Medication Dose Route Frequency Provider Last Rate Last Dose   • acetaminophen (TYLENOL) tablet 650 mg  650 mg Oral Q4H PRN Aram Salas MD   650 mg at 12/04/19 0922   • ALPRAZolam (XANAX) tablet 1 mg  1 mg Oral Q6H PRN Aram Salas MD   1 mg at 12/04/19 1220   • atorvastatin (LIPITOR) tablet 40 mg  40 mg Oral Nightly Aram Salas MD   40 mg at 12/03/19 2052   • bisacodyl (DULCOLAX) suppository 10 mg  10 mg Rectal Daily PRN Rush Alberto DO       • budesonide-formoterol (SYMBICORT) 160-4.5 MCG/ACT inhaler 2 puff  2 puff Inhalation BID - RT Aram Salas MD   2 puff at 12/04/19 1905   • dextrose (D50W) 25 g/ 50mL Intravenous Solution 25 g  25 g Intravenous Q15 Min PRN Aram Salas MD       • dextrose (GLUTOSE) oral gel 15 g  15 g Oral Q15 Min PRN Aram Salas MD       • docusate sodium (COLACE) capsule 100 mg  100 mg Oral BID Rush Alberto DO   100 mg at 12/04/19 0807   • [START ON 12/5/2019] enoxaparin (LOVENOX) syringe 40 mg  40 mg Subcutaneous Q24H Rush Alberto DO       • folic acid (FOLVITE) tablet 400 mcg  400 mcg Oral Daily Aram Salas MD   400 mcg at 12/04/19 0807   • furosemide (LASIX) injection 40 mg  40 mg Intravenous Daily Aram Salas MD   40 mg at 12/04/19 1001   • glucagon (human recombinant) (GLUCAGEN DIAGNOSTIC) injection 1 mg  1 mg Subcutaneous Q15 Min PRN Aram Salas MD       • hydrOXYzine (ATARAX) tablet 10 mg  10 mg Oral 4x Daily PRN  Aram Salas MD       • insulin lispro (humaLOG) injection 2-9 Units  2-9 Units Subcutaneous 4x Daily With Meals & Nightly Aram Salas MD   2 Units at 12/04/19 1752   • ipratropium-albuterol (DUO-NEB) nebulizer solution 3 mL  3 mL Nebulization 4x Daily - RT Aram Salas MD   3 mL at 12/04/19 1905   • linagliptin (TRADJENTA) tablet 5 mg  5 mg Oral Daily Aram Salas MD   5 mg at 12/04/19 0807   • Magnesium Sulfate 2 gram Bolus, followed by 8 gram infusion (total Mg dose 10 grams)- Mg less than or equal to 1mg/dL  2 g Intravenous PRN Rush Alberto DO 0 mL/hr at 12/02/19 0235 2 g at 12/02/19 0236    Or   • Magnesium Sulfate 2 gram / 50mL Infusion (GIVE X 3 BAGS TO EQUAL 6GM TOTAL DOSE) - Mg 1.1 - 1.5 mg/dl  2 g Intravenous PRN Rush Alberto DO 25 mL/hr at 12/03/19 1123 2 g at 12/03/19 1123    Or   • Magnesium Sulfate 4 gram infusion- Mg 1.6-1.9 mg/dL  4 g Intravenous PRN Rush Alberto DO       • metoprolol tartrate (LOPRESSOR) tablet 25 mg  25 mg Oral BID Rush Alberto DO   25 mg at 12/04/19 0807   • ondansetron (ZOFRAN) injection 4 mg  4 mg Intravenous Q6H PRN Aram Salas MD       • pantoprazole (PROTONIX) EC tablet 40 mg  40 mg Oral Q AM Aram Salas MD   40 mg at 12/04/19 0628   • polyethylene glycol 3350 powder (packet)  17 g Oral Daily PRN Rush Alberto DO       • potassium chloride (MICRO-K) CR capsule 40 mEq  40 mEq Oral PRN Jameson Corea MD   40 mEq at 12/03/19 0738    Or   • potassium chloride (KLOR-CON) packet 40 mEq  40 mEq Oral PRN Jameson Corea MD        Or   • potassium chloride 10 mEq in 100 mL IVPB  10 mEq Intravenous Q1H PRN Jameson Corea MD       • sodium chloride 0.9 % flush 10 mL  10 mL Intravenous PRN Ander Boyd Jr., MD       • sodium chloride 0.9 % flush 10 mL  10 mL Intravenous PRN Brant Jose PA       • sodium chloride 0.9 % flush 10 mL  10 mL Intravenous Q12H  "Aram Salas MD   10 mL at 12/04/19 0808   • sodium chloride 0.9 % flush 10 mL  10 mL Intravenous PRN Aram Salas MD       • sucralfate (CARAFATE) tablet 1 g  1 g Oral 4x Daily AC & at Bedtime Aram Salas MD   1 g at 12/04/19 1646       Vital Sign Min/Max for last 24 hours  Temp  Min: 97.6 °F (36.4 °C)  Max: 98 °F (36.7 °C)   BP  Min: 84/65  Max: 159/117   Pulse  Min: 64  Max: 99   Resp  Min: 16  Max: 23   SpO2  Min: 90 %  Max: 100 %   No Data Recorded   No Data Recorded     Flowsheet Rows      First Filed Value   Admission Height  175.3 cm (69\") Documented at 11/30/2019 1332   Admission Weight  86.2 kg (190 lb) Documented at 11/30/2019 1332          Results for orders placed during the hospital encounter of 11/30/19   Adult Transthoracic Echo Complete W/ Cont if Necessary Per Protocol    Narrative · Estimated EF = 55%.  · Left ventricular systolic function is normal.  · Left ventricular wall thickness is consistent with mild concentric   hypertrophy.  · Left ventricular diastolic dysfunction.  · No evidence of pulmonary hypertension is present.          Physical Exam:    General Appearance: Awake, alert, in no acute distress  Eyes: Pupils equal and reactive    Ears: Appear intact with no abnormalities noted  Nose: Nares normal, no drainage  Neck: supple, trachea midline, no carotid bruit and no JVD  Back: no kyphosis present,    Lungs: respirations regular, respirations even and respirations unlabored    Heart: normal S1, S2,  2/6 pansystolic murmur in the left sternal border,  no rub and no click    Abdomen: normal bowel sounds, no tenderness   Skin: no bleeding, bruising or rash  Extremities: no cyanosis  Psychiatric/Behavioral: Negative for agitation, behavioral problems, confusion, the patient does  appear to be nervous/anxious.          Results Review:   I reviewed the patient's new clinical results.  I reviewed the patient's new imaging results and agree with the " interpretation.  I reviewed the patient's other test results and agree with the interpretation  I personally viewed and interpreted the patient's EKG/Telemetry data  Discussed with patient    Reviewed available prior notes, consults, prior visits, laboratory findings, radiology and cardiology relevant reports. Updated chart as applicable. I have reviewed the patient's medical history in detail and updated the computerized patient record as relevant.      Updated patient regarding any new or relevant abnormalities on review of records or any new findings on physical exam. Mentioned to patient about purpose of visit and desirable health short and long term goals and objectives.     Assessment/Plan       Acute on chronic respiratory failure with hypoxia (CMS/[facility])    Adenocarcinoma, lung (CMS/[facility])    Hypomagnesemia    Pleural effusion    Type 2 diabetes mellitus with hyperglycemia, with long-term current use of insulin (CMS/[facility])    Anemia due to chemotherapy    Essential hypertension    Hypokalemia    SVT (supraventricular tachycardia) (CMS/[facility])  Paroxysmal atrial flutter        Plan     Patient overall has improved  Continue on current medical therapy  Maintaining sinus rhythm    Results of echocardiogram discussed with patient  Okay to move to telemetry floor  Multiple comorbidities are being addressed  Long-term prognosis is guarded    We will recommend increasing physical activity and mobility  Anticoagulation as tolerated    Continue on statin therapy  Continue beta-blocker therapy  Avoid beta-2 agonist and use Atrovent instead.  Patient overall sick but slowly improving  Moderate complexity decision making      Ronald Campo MD  12/04/19  7:21 PM    EMR Dragon/Transcription was used to dictate part of this note

## 2019-12-05 NOTE — PLAN OF CARE
Problem: Patient Care Overview  Goal: Plan of Care Review  Outcome: Ongoing (interventions implemented as appropriate)   12/05/19 8232   Coping/Psychosocial   Plan of Care Reviewed With patient   OTHER   Outcome Summary Pt was SBA supine to sit and cga-min x1 to stand. Pt walked on 6L with RW cga-sba 100ft. Pt's 02 stayed in 90's. Pt progressing well and would benefit from continued PT.   Plan of Care Review   Progress improving

## 2019-12-05 NOTE — PROGRESS NOTES
LOS: 5 days   Patient Care Team:  Kourtney Keller APRN as PCP - General (Nurse Practitioner)  Jose Cruz, MING Restrepo as Nurse Practitioner (Pulmonary Disease)    Chief Complaint:   Acute on chronic respiratory failure with hypoxia (CMS/HCC)    Adenocarcinoma, lung (CMS/HCC)    Hypomagnesemia    Pleural effusion    Type 2 diabetes mellitus with hyperglycemia, with long-term current use of insulin (CMS/[facility])    Anemia due to chemotherapy    Essential hypertension    Hypokalemia    SVT (supraventricular tachycardia) (CMS/[facility])    Shortness of breath    Subjective    Overall improved  No chest pain  No palpitation  Maintaining sinus rhythm  Latest test results reviewed  Oral intake has improved  Patient getting out of bed and sitting in chair.  No orthopnea paroxysmal nocturnal dyspnea  Nurse taking excellent care of patient available by bedside.  Patient Complaints:   Chief Complaint   Patient presents with   • Respiratory Distress       Telemetry: no malignant arrhythmia. No significant pauses.  Maintaining sinus rhythm    Review of Systems     Constitutional: No chills   Has fatigue   No fever.     HENT: Negative.    Eyes: Negative.      Respiratory: Negative for cough,   No chest wall soreness,   Shortness of breath,   no wheezing, no stridor.      Cardiovascular: Negative for chest pain,   No palpitations   No significant  leg swelling.     Gastrointestinal: Negative for abdominal distention,  No abdominal pain,   No blood in stool,   No constipation,   No diarrhea,   No nausea   No vomiting.     Endocrine: Negative.    Genitourinary: Negative for difficulty urinating, dysuria, flank pain and hematuria.     Musculoskeletal: Negative.    Skin: Negative for rash and wound.   Allergic/Immunologic: Negative.      Neurological: Negative for dizziness, syncope, weakness,   No light-headedness  No  headaches.     Hematological: Does not bruise/bleed easily.     Psychiatric/Behavioral: Negative for agitation or  behavioral problems,   No confusion,   the patient is  nervous/anxious.       History:   Past Medical History:   Diagnosis Date   • Adenocarcinoma, lung (CMS/HCC)    • Cerumen impaction    • Cervical disc disease    • Colon cancer (CMS/HCC)    • Diabetes (CMS/HCC)    • Elevated cholesterol    • Eustachian tube dysfunction    • GERD (gastroesophageal reflux disease)    • Hx of colonic polyps    • Hypertension    • PUD (peptic ulcer disease)    • Sensorineural hearing loss      Past Surgical History:   Procedure Laterality Date   • BRONCHOSCOPY Left 2019    Procedure: BRONCHOSCOPY WITH ENDOBRONCHIAL ULTRASOUND AND TRANSBRONCHIAL BIOPSY at 1: 30;  Surgeon: Jere Brumfield MD;  Location: [facility] OR;  Service: Pulmonary   • COLON SURGERY      Due to colon cancer   • COLONOSCOPY N/A 2017    Procedure: COLONOSCOPY WITH ANESTHESIA;  Surgeon: Barry Banks MD;  Location: [facility] ENDOSCOPY;  Service:    • COLONOSCOPY W/ POLYPECTOMY  2013    Tubular adenoma transverse x 2, Diverticulosis repeat exam in 3 years   • ENDOSCOPY  2013    HH, Billroth anastomisis in the gastric antrum   • KNEE SURGERY     • STOMACH SURGERY      Due to ulcer   • VENOUS ACCESS DEVICE (PORT) INSERTION N/A 9/3/2019    Procedure: SINGLE LUMEN PORT PLACEMENT;  Surgeon: Jelena Huntley MD;  Location: [facility] OR;  Service: General     Social History     Socioeconomic History   • Marital status:      Spouse name: Not on file   • Number of children: Not on file   • Years of education: Not on file   • Highest education level: Not on file   Tobacco Use   • Smoking status: Former Smoker     Years: 45.00     Last attempt to quit: 2007     Years since quittin.2   • Smokeless tobacco: Never Used   Substance and Sexual Activity   • Alcohol use: No   • Drug use: No   • Sexual activity: Defer     Family History   Problem Relation Age of Onset   • Heart disease Mother    • Diabetes Mother    • Alzheimer's disease Father    •  Colon cancer Neg Hx    • Colon polyps Neg Hx        Labs:  WBC WBC   Date Value Ref Range Status   12/05/2019 5.34 3.40 - 10.80 10*3/mm3 Final   12/04/2019 5.01 3.40 - 10.80 10*3/mm3 Final   12/03/2019 4.75 3.40 - 10.80 10*3/mm3 Final      HGB Hemoglobin   Date Value Ref Range Status   12/05/2019 8.8 (L) 13.0 - 17.7 g/dL Final   12/04/2019 9.3 (L) 13.0 - 17.7 g/dL Final   12/03/2019 9.0 (L) 13.0 - 17.7 g/dL Final      HCT Hematocrit   Date Value Ref Range Status   12/05/2019 28.3 (L) 37.5 - 51.0 % Final   12/04/2019 29.4 (L) 37.5 - 51.0 % Final   12/03/2019 28.1 (L) 37.5 - 51.0 % Final      Platelets Platelets   Date Value Ref Range Status   12/05/2019 303 140 - 450 10*3/mm3 Final   12/04/2019 334 140 - 450 10*3/mm3 Final   12/03/2019 301 140 - 450 10*3/mm3 Final      MCV MCV   Date Value Ref Range Status   12/05/2019 99.0 (H) 79.0 - 97.0 fL Final   12/04/2019 99.0 (H) 79.0 - 97.0 fL Final   12/03/2019 96.9 79.0 - 97.0 fL Final        Results from last 7 days   Lab Units 12/05/19  0216 12/04/19  0226 12/03/19  1610 12/03/19  0232  11/30/19  1625   SODIUM mmol/L 135* 138  --  137   < > 137   POTASSIUM mmol/L 4.1 4.2 3.9 3.1*   < > 4.3   CHLORIDE mmol/L 93* 95*  --  93*   < > 98   CO2 mmol/L 32.0* 37.0*  --  33.0*   < > 30.0*   BUN mg/dL 18 16  --  15   < > 13   CREATININE mg/dL 0.84 0.87  --  0.84   < > 0.74*   CALCIUM mg/dL 9.7 10.0  --  9.9   < > 9.2   BILIRUBIN mg/dL  --   --   --   --   --  0.3   ALK PHOS U/L  --   --   --   --   --  104   ALT (SGPT) U/L  --   --   --   --   --  12   AST (SGOT) U/L  --   --   --   --   --  12   GLUCOSE mg/dL 134* 153*  --  165*   < > 174*    < > = values in this interval not displayed.     Lab Results   Component Value Date    TROPONINI <0.012 07/17/2019    TROPONINT 0.012 12/03/2019     PT/INR:    Protime   Date Value Ref Range Status   12/03/2019 13.1 11.9 - 14.6 Seconds Final   /  INR   Date Value Ref Range Status   12/03/2019 0.96 0.91 - 1.09 Final       Imaging Results (Last  72 Hours)     Procedure Component Value Units Date/Time    XR Chest 1 View [[id number]] Collected:  12/04/19 1354     Updated:  12/04/19 1359    Narrative:       EXAM: XR CHEST 1 VW- - 12/4/2019 1:27 PM CST     HISTORY: follow-up pleural effusions; R09.02-Hypoxemia; C34.90-Malignant  neoplasm of unspecified part of unspecified bronchus or lung;  J18.9-Pneumonia, unspecified organism; D64.9-Anemia, unspecified;  R39-[place] effusion, not elsewhere classified       COMPARISON: 12/02/2019.      TECHNIQUE:  1 images.  Frontal view of the chest.     FINDINGS:    Port in the right chest with 2 grossly intact catheter, tip projects at  the upper right atrium. No pneumothorax. Possible small left pleural  fluid. There is a approximately 4 cm left suprahilar mass, better  demonstrated on prior CT 11/30/2019. Decreased perihilar vascular  prominence. Enlarged cardiac silhouette. Upper mediastinum similar  compared to prior. No convincing acute bony findings by radiograph.          Impression:       1. Improving/decreasing pulmonary edema. Possible small left pleural  fluid.  2. Known left suprahilar mass better seen on prior CT.  3. Enlarged cardiac silhouette.  This report was finalized on 12/04/2019 13:56 by Dr Mile Donnelly MD.          Objective     Allergies   Allergen Reactions   • Lactose Intolerance (Gi) Diarrhea       Medication Review: Performed  Current Facility-Administered Medications   Medication Dose Route Frequency Provider Last Rate Last Dose   • acetaminophen (TYLENOL) tablet 650 mg  650 mg Oral Q4H PRN Aram Salas MD   650 mg at 12/04/19 0922   • ALPRAZolam (XANAX) tablet 1 mg  1 mg Oral Q6H PRN Aram Salas MD   1 mg at 12/05/19 1109   • atorvastatin (LIPITOR) tablet 40 mg  40 mg Oral Nightly Aram Salas MD   40 mg at 12/04/19 2039   • bisacodyl (DULCOLAX) suppository 10 mg  10 mg Rectal Daily PRN Rush Alberto, DO       • budesonide-formoterol (SYMBICORT)  160-4.5 MCG/ACT inhaler 2 puff  2 puff Inhalation BID - RT Aram Salas MD   2 puff at 12/05/19 0630   • dextrose (D50W) 25 g/ 50mL Intravenous Solution 25 g  25 g Intravenous Q15 Min PRN Aram Salas MD       • dextrose (GLUTOSE) oral gel 15 g  15 g Oral Q15 Min PRN Aram Salas MD       • enoxaparin (LOVENOX) syringe 40 mg  40 mg Subcutaneous Q24H Rush Alberto, DO   40 mg at 12/05/19 0538   • folic acid (FOLVITE) tablet 400 mcg  400 mcg Oral Daily Aram Salas MD   400 mcg at 12/04/19 0807   • glucagon (human recombinant) (GLUCAGEN DIAGNOSTIC) injection 1 mg  1 mg Subcutaneous Q15 Min PRN Aram Salas MD       • hydrOXYzine (ATARAX) tablet 10 mg  10 mg Oral 4x Daily PRN Aram Salas MD       • insulin lispro (humaLOG) injection 2-9 Units  2-9 Units Subcutaneous 4x Daily With Meals & Nightly Aram Salas MD   2 Units at 12/04/19 1752   • ipratropium-albuterol (DUO-NEB) nebulizer solution 3 mL  3 mL Nebulization 4x Daily - RT Aram Salas MD   3 mL at 12/05/19 1013   • linagliptin (TRADJENTA) tablet 5 mg  5 mg Oral Daily Aram Salas MD   5 mg at 12/05/19 0900   • Magnesium Sulfate 2 gram Bolus, followed by 8 gram infusion (total Mg dose 10 grams)- Mg less than or equal to 1mg/dL  2 g Intravenous PRN Rush Alberto, DO 0 mL/hr at 12/02/19 0235 2 g at 12/02/19 0236    Or   • Magnesium Sulfate 2 gram / 50mL Infusion (GIVE X 3 BAGS TO EQUAL 6GM TOTAL DOSE) - Mg 1.1 - 1.5 mg/dl  2 g Intravenous PRN Rush Alberto DO 25 mL/hr at 12/05/19 1106 2 g at 12/05/19 1106    Or   • Magnesium Sulfate 4 gram infusion- Mg 1.6-1.9 mg/dL  4 g Intravenous PRN Rush Alberto DO       • metoprolol tartrate (LOPRESSOR) tablet 25 mg  25 mg Oral BID Rush Alberto DO   25 mg at 12/05/19 0900   • ondansetron (ZOFRAN) injection 4 mg  4 mg Intravenous Q6H PRN Aram Salas MD       • pantoprazole  "(PROTONIX) EC tablet 40 mg  40 mg Oral Q AM Aram Salas MD   40 mg at 12/05/19 0538   • polyethylene glycol 3350 powder (packet)  17 g Oral Daily PRN Rush Alberto, DO       • potassium chloride (MICRO-K) CR capsule 40 mEq  40 mEq Oral PRN Jameson Corea MD   40 mEq at 12/03/19 0738    Or   • potassium chloride (KLOR-CON) packet 40 mEq  40 mEq Oral PRN Jameson Corea MD        Or   • potassium chloride 10 mEq in 100 mL IVPB  10 mEq Intravenous Q1H PRN Jameson Corea MD       • sodium chloride 0.9 % flush 10 mL  10 mL Intravenous PRN Ander Boyd Jr., MD       • sodium chloride 0.9 % flush 10 mL  10 mL Intravenous PRN Brant Jose PA       • sodium chloride 0.9 % flush 10 mL  10 mL Intravenous Q12H Aram Slaas MD   10 mL at 12/05/19 0901   • sodium chloride 0.9 % flush 10 mL  10 mL Intravenous PRN Aram Salas MD       • sucralfate (CARAFATE) tablet 1 g  1 g Oral 4x Daily AC & at Bedtime Aram Salas MD   1 g at 12/05/19 1140       Vital Sign Min/Max for last 24 hours  Temp  Min: 97.6 °F (36.4 °C)  Max: 98.9 °F (37.2 °C)   BP  Min: 97/64  Max: 177/137   Pulse  Min: 76  Max: 100   Resp  Min: 14  Max: 23   SpO2  Min: 91 %  Max: 100 %   No Data Recorded   Weight  Min: 84.1 kg (185 lb 6.5 oz)  Max: 84.1 kg (185 lb 6.5 oz)     Flowsheet Rows      First Filed Value   Admission Height  175.3 cm (69\") Documented at 11/30/2019 1332   Admission Weight  86.2 kg (190 lb) Documented at 11/30/2019 1332          Results for orders placed during the hospital encounter of 11/30/19   Adult Transthoracic Echo Complete W/ Cont if Necessary Per Protocol    Narrative · Estimated EF = 55%.  · Left ventricular systolic function is normal.  · Left ventricular wall thickness is consistent with mild concentric   hypertrophy.  · Left ventricular diastolic dysfunction.  · No evidence of pulmonary hypertension is present.          Physical Exam:    General " Appearance: Awake, alert, in no acute distress  Eyes: Pupils equal and reactive    Ears: Appear intact with no abnormalities noted  Nose: Nares normal, no drainage  Neck: supple, trachea midline, no carotid bruit and no JVD  Back: no kyphosis present,    Lungs: respirations regular, respirations even and respirations unlabored    Heart: normal S1, S2,  2/6 pansystolic murmur in the left sternal border,  no rub and no click    Abdomen: normal bowel sounds, no tenderness   Skin: no bleeding, bruising or rash  Extremities: no cyanosis  Psychiatric/Behavioral: Negative for agitation, behavioral problems, confusion, the patient does  appear to be nervous/anxious.          Results Review:   I reviewed the patient's new clinical results.  I reviewed the patient's new imaging results and agree with the interpretation.  I reviewed the patient's other test results and agree with the interpretation  I personally viewed and interpreted the patient's EKG/Telemetry data  Discussed with patient    Reviewed available prior notes, consults, prior visits, laboratory findings, radiology and cardiology relevant reports. Updated chart as applicable. I have reviewed the patient's medical history in detail and updated the computerized patient record as relevant.      Updated patient regarding any new or relevant abnormalities on review of records or any new findings on physical exam. Mentioned to patient about purpose of visit and desirable health short and long term goals and objectives.     Assessment/Plan       Acute on chronic respiratory failure with hypoxia (CMS/HCC)    Adenocarcinoma, lung (CMS/HCC)    Hypomagnesemia    Pleural effusion    Type 2 diabetes mellitus with hyperglycemia, with long-term current use of insulin (CMS/HCC)    Anemia due to chemotherapy    Essential hypertension    Hypokalemia    SVT (supraventricular tachycardia) (CMS/HCC)  Paroxysmal atrial flutter        Plan     Patient overall has improved  Continue on  current medical therapy  Maintaining sinus rhythm  Okay to move to telemetry floor  Discussed with nursing  No additional cardiac testing currently required  Increase physical activity  Anticoagulation as tolerated  Will be seen in office in approximately 4 weeks after discharge.  Treatment of comorbid conditions and progress.    We will recommend increasing physical activity and mobility  Anticoagulation as tolerated        Ronald Campo MD  12/05/19  1:49 PM    EMR Dragon/Transcription was used to dictate part of this note

## 2019-12-05 NOTE — PLAN OF CARE
Problem: Patient Care Overview  Goal: Plan of Care Review  Outcome: Ongoing (interventions implemented as appropriate)   12/05/19 0612   Coping/Psychosocial   Plan of Care Reviewed With patient   OTHER   Outcome Summary pt has been stable this evening. no pain, no cardiac issues. vapotherm was weaned to 40%.

## 2019-12-05 NOTE — PLAN OF CARE
Problem: Fall Risk (Adult)  Goal: Absence of Fall  Outcome: Ongoing (interventions implemented as appropriate)   12/04/19 1823   Fall Risk (Adult)   Absence of Fall making progress toward outcome       Problem: Patient Care Overview  Goal: Plan of Care Review  Outcome: Ongoing (interventions implemented as appropriate)   12/04/19 1823   Coping/Psychosocial   Plan of Care Reviewed With patient   OTHER   Outcome Summary Patient up to chair today with minimal assistance. A&O X4 today. NSR throughout the day. Weaning vapotherm patient now on 45% O2.       Problem: Skin Injury Risk (Adult)  Goal: Identify Related Risk Factors and Signs and Symptoms  Outcome: Ongoing (interventions implemented as appropriate)   12/01/19 1838   Skin Injury Risk (Adult)   Related Risk Factors (Skin Injury Risk) critical care admission     Goal: Skin Health and Integrity  Outcome: Ongoing (interventions implemented as appropriate)   12/04/19 1823   Skin Injury Risk (Adult)   Skin Health and Integrity making progress toward outcome       Problem: Nutrition, Imbalanced: Inadequate Oral Intake (Adult)  Goal: Identify Related Risk Factors and Signs and Symptoms  Outcome: Ongoing (interventions implemented as appropriate)   12/04/19 1823   Nutrition, Imbalanced: Inadequate Oral Intake (Adult)   Related Risk Factors (Nutrition Imbalance, Inadequate Oral Intake) chronic illness/infection;satiety early   Signs and Symptoms (Nutrition Imbalance, Inadequate Oral Intake: Signs and Symptoms) weakness/lethargy     Goal: Improved Oral Intake  Outcome: Ongoing (interventions implemented as appropriate)   12/04/19 1823   Nutrition, Imbalanced: Inadequate Oral Intake (Adult)   Improved Oral Intake making progress toward outcome     Goal: Prevent Further Weight Loss  Outcome: Ongoing (interventions implemented as appropriate)   12/04/19 1823   Nutrition, Imbalanced: Inadequate Oral Intake (Adult)   Prevent Further Weight Loss making progress toward outcome

## 2019-12-05 NOTE — THERAPY TREATMENT NOTE
Acute Care - Physical Therapy Treatment Note  [facility]     Patient Name: Romel Rosario  : 1950  MRN: [id number]  Today's Date: 2019             Admit Date: 2019    Visit Dx:    ICD-10-CM ICD-9-CM   1. Hypoxia R09.02 799.02   2. Adenocarcinoma of lung, unspecified laterality (CMS/HCC) C34.90 162.9   3. Pneumonia of right lung due to infectious organism, unspecified part of lung J18.9 483.8   4. Anemia, unspecified type D64.9 285.9   5. Pleural effusion J90 511.9   6. Impaired mobility Z74.09 799.89     Patient Active Problem List   Diagnosis   • Controlled type 2 diabetes mellitus with hyperglycemia, without long-term current use of insulin (CMS/HCC)   • HTN (hypertension), benign   • Hx of colonic polyps   • Hx of colon cancer, stage I   • Sensorineural hearing loss   • Tinnitus of right ear   • Dysfunction of right eustachian tube   • Mass of upper lobe of left lung   • Encounter for consultation   • Former smoker   • Shortness of breath   • Hypoxia   • Respiratory distress, acute   • Acute respiratory failure with hypoxia and hypercapnia (CMS/HCC)   • Mediastinal adenopathy   • Adenocarcinoma, lung (CMS/HCC)   • Hypomagnesemia   • Pleural effusion   • Acute on chronic respiratory failure with hypoxia (CMS/HCC)   • Atelectasis   • Type 2 diabetes mellitus with hyperglycemia, with long-term current use of insulin (CMS/HCC)   • Anemia due to chemotherapy   • Essential hypertension   • Hypokalemia   • SVT (supraventricular tachycardia) (CMS/HCC)       Therapy Treatment    Rehabilitation Treatment Summary     Row Name 19 1532 19 1022          Treatment Time/Intention    Discipline  physical therapy assistant  -AE  --     Document Type  therapy note (daily note)  -AE  --     Subjective Information  complains of;dyspnea  -AE  --     Reason Treatment Not Performed  --  -- RN states pt is resting and to check on at 1:00  -AE     Existing Precautions/Restrictions  fall;oxygen therapy  device and L/min  -AE  --     Recorded by [AE] Kamala Wiggins, PTA 12/05/19 1611 [AE] Kamala Wiggins, PTA 12/05/19 1054     Row Name 12/05/19 1532             Vital Signs    Pre SpO2 (%)  94  -AE      O2 Delivery Pre Treatment  supplemental O2 5L  -AE      O2 Delivery Intra Treatment  supplemental O2 6L  -AE      Post SpO2 (%)  92  -AE      O2 Delivery Post Treatment  supplemental O2 5L  -AE      Recorded by [AE] Kamala Wiggins, PTA 12/05/19 1612      Row Name 12/05/19 1532             Bed Mobility Assessment/Treatment    Supine-Sit [place] (Bed Mobility)  supervision  -AE      Recorded by [AE] Kamala Wiggins, PTA 12/05/19 1611      Row Name 12/05/19 1532             Sit-Stand Transfer    Sit-Stand [place] (Transfers)  contact guard  -AE      Recorded by [AE] Kamala Wiggins, PTA 12/05/19 1611      Row Name 12/05/19 1532             Gait/Stairs Assessment/Training    [place] Level (Gait)  contact guard;stand by assist  -AE      Assistive Device (Gait)  walker, 4-wheeled  -AE      Distance in Feet (Gait)  100  -AE2      Comment (Gait/Stairs)  -- plus assited in bathroom  -AE      Recorded by [AE] Kamala Wiggins, PTA 12/05/19 1611  [AE2] Kamala Wiggins, PTA 12/05/19 1615      Row Name 12/05/19 1532             Static Standing Balance    Level of [place] (Supported Standing, Static Balance)  contact guard assist;standby assist assited in bathroom  -AE      Recorded by [AE] Kamala Wiggins, PTA 12/05/19 1611      Row Name 12/05/19 1532             Positioning and Restraints    Pre-Treatment Position  in bed  -AE      Post Treatment Position  bed  -AE      In Bed  fowlers;call light within reach  -AE      Recorded by [AE] Kamala Wiggins, PTA 12/05/19 1611      Row Name 12/05/19 1532             Pain Scale: Numbers Pre/Post-Treatment    Pain Scale: Numbers, Pretreatment  0/10 - no pain  -AE      Recorded by [AE] Kamala Wiggins, PTA 12/05/19 1611        User Key  (r) = Recorded By, (t) = Taken  By, (c) = Cosigned By    Initials Name Effective Dates Discipline    AE Kamala Wiggins PTA 06/22/15 -  PT                   Physical Therapy Education     Title: PT OT SLP Therapies (In Progress)     Topic: Physical Therapy (In Progress)     Point: Mobility training (Done)     Learning Progress Summary           Patient Acceptance, KEVIN HERBERT,DU by ARNAV at 12/4/2019  2:17 PM    Comment:  Educated pt on progression of PT POC and benefits of activity                               User Key     Initials Effective Dates Name Provider Type Discipline    ARNAV 08/02/16 -  Emile Reyes PT DPT Physical Therapist PT                PT Recommendation and Plan     Plan of Care Reviewed With: patient  Progress: improving  Outcome Summary: Pt was SBA supine to sit and cga-min x1 to stand. Pt walked on 6L     Time Calculation:   PT Charges     Row Name 12/05/19 1615             Time Calculation    Start Time  1532  -AE      Stop Time  1610  -AE      Time Calculation (min)  38 min  -AE      PT Received On  12/05/19  -AE      PT Goal Re-Cert Due Date  12/14/19  -AE         Time Calculation- PT    Total Timed Code Minutes- PT  38 minute(s)  -AE         Timed Charges    78260 - Gait Training Minutes   30  -AE      36009 - PT Therapeutic Activity Minutes  8  -AE        User Key  (r) = Recorded By, (t) = Taken By, (c) = Cosigned By    Initials Name Provider Type    AE Kamala Wiggins PTA Physical Therapy Assistant        Therapy Charges for Today     Code Description Service Date Service Provider Modifiers Qty    30082668420 HC PT THERAPEUTIC ACT EA 15 MIN 12/5/2019 Kamala Wiggins PTA GP 1    02544830614 HC GAIT TRAINING EA 15 MIN 12/5/2019 Kamala Wiggins PTA GP 2          PT G-Codes  Outcome Measure Options: AM-PAC 6 Clicks Basic Mobility (PT)  AM-PAC 6 Clicks Score (PT): 17    Kamala Wiggins PTA  12/5/2019

## 2019-12-06 NOTE — PROGRESS NOTES
Continued Stay Note  [facility] Becerril     Patient Name: Romel Rosario  MRN: [id number]  Today's Date: 12/6/2019    Admit Date: 11/30/2019    Discharge Plan     Row Name 12/06/19 0905       Plan    Plan  Home with possible HH services    Patient/Family in Agreement with Plan  yes    Plan Comments  Pt will return home with wife as before.  Pt might benefit from HH at discharge depending on condition at this time. Will follow.         Discharge Codes    No documentation.             CORRY Colindres

## 2019-12-06 NOTE — PLAN OF CARE
Problem: Patient Care Overview  Goal: Plan of Care Review  Outcome: Ongoing (interventions implemented as appropriate)   12/06/19 0920   Coping/Psychosocial   Plan of Care Reviewed With patient   OTHER   Outcome Summary Pt was S supine to sit and cga x1 to stand. Pt walked on 6L with RW cga-sba 160ft. Pt's 02 stayed in 90's. Pt progressing well and would benefit from continued PT at home   Plan of Care Review   Progress improving

## 2019-12-06 NOTE — DISCHARGE SUMMARY
[facility]  DISCHARGE SUMMARY       Date of Admission: 11/30/2019  Date of Discharge:  12/6/2019  Primary Care Physician: Kourtney Keller APRN    Presenting Problem/History of Present Illness:  Hypoxia [R09.02]     Final Discharge Diagnoses:  Active Hospital Problems    Diagnosis   • **Acute on chronic respiratory failure with hypoxia (CMS/HCC)   • SVT (supraventricular tachycardia) (CMS/HCC)   • Hypokalemia   • Hypomagnesemia   • Pleural effusion   • Type 2 diabetes mellitus with hyperglycemia, with long-term current use of insulin (CMS/HCC)   • Anemia due to chemotherapy   • Essential hypertension   • Adenocarcinoma, lung (CMS/HCC)       Consults:   #1 Dr. Nevarez, hematology/oncology  #2 Dr. Campo, cardiology    Procedures Performed: none    Pertinent Test Results:   CT Angiogram Chest [[id number]] Sukhdev as Reviewed   Order Status: Completed Collected: 11/30/19 1751    Updated: 11/30/19 1758   Narrative:     EXAMINATION: CT ANGIOGRAM CHEST-      11/30/2019 5:36 PM CST     HISTORY: lung cancer. Respiratory distress.; R09.02-Hypoxemia;  C34.90-Malignant neoplasm of unspecified part of unspecified bronchus or  lung; J18.9-Pneumonia, unspecified organism; D64.9-Anemia, unspecified     In order to have a CT radiation dose as low as reasonably achievable  Automated Exposure Control was utilized for adjustment of the mA and/or  KV according to patient size.     DLP in mGycm= 510.     CT angiography protocol.   CT imaging with bolus IV contrast injection.   Under concurrent supervision axial, sagittal, coronal, and  three-dimensional data sets were constructed.     Comparison is made with 09/04/2019.     Heart size is within normal limits.  There is no thoracic aortic aneurysm or dissection.     Symmetric well opacified pulmonary arteries. No pulmonary embolism.     Linear band of infiltrate or atelectasis within the right middle lobe.      Right suprahilar mass  measures 45 x 47 mm compared with 44 x 58 mm.     Moderate bilateral pleural effusions.  Compressive atelectasis noted at the right lung base.     Summary:  1. No pulmonary embolism.  2. New moderate bilateral pleural effusions.  3. New linear band of infiltrate or atelectasis within the right middle  lobe.  4. Left suprahilar mass show slight decreased size.                                   This report was finalized on 11/30/2019 17:55 by Dr. Tyler Khan MD.   XR Chest 1 View [[id number]] Sukhdev as Reviewed   Order Status: Completed Collected: 11/30/19 1428    Updated: 11/30/19 1434   Narrative:     EXAMINATION: XR CHEST 1 VW- 11/30/2019 2:28 PM CST     HISTORY: Shortness of breath     COMPARISON: 09/03/2019     FINDINGS:  The heart and mediastinal contours are stable. A right approach  Port-A-Cath is in stable position with tip projecting over the  cavoatrial junction. New groundglass airspace opacities are noted in the  right mid and lower lung fields. There is minimal atelectasis at the  left lung base. Area of nodularity in the left upper lobe is compatible  with the known malignancy. Areas of nodularity on the right are  presumably metastatic.      Impression:     Right airspace opacities are concerning for pneumonia. Areas  of nodularity likely reflect metastatic disease. No left upper lobe mass  is again noted.  This report was finalized on 11/30/2019 14:31 by Dr. Flavio Mike MD.     Procedure Component Value Units Date/Time   XR Chest 1 View [[id number]] Sukhdev as Reviewed   Order Status: Completed Collected: 12/04/19 1354    Updated: 12/04/19 1359   Narrative:     EXAM: XR CHEST 1 VW- - 12/4/2019 1:27 PM CST     HISTORY: follow-up pleural effusions; R09.02-Hypoxemia; C34.90-Malignant  neoplasm of unspecified part of unspecified bronchus or lung;  J18.9-Pneumonia, unspecified organism; D64.9-Anemia, unspecified;  K74-Noiwxdm effusion, not elsewhere classified       COMPARISON: 12/02/2019.      TECHNIQUE:  1  images.  Frontal view of the chest.     FINDINGS:    Port in the right chest with 2 grossly intact catheter, tip projects at  the upper right atrium. No pneumothorax. Possible small left pleural  fluid. There is a approximately 4 cm left suprahilar mass, better  demonstrated on prior CT 2019. Decreased perihilar vascular  prominence. Enlarged cardiac silhouette. Upper mediastinum similar  compared to prior. No convincing acute bony findings by radiograph.         Impression:     1. Improving/decreasing pulmonary edema. Possible small left pleural  fluid.  2. Known left suprahilar mass better seen on prior CT.  3. Enlarged cardiac silhouette.  This report was finalized on 2019 13:56 by Dr Mile Donnelly MD.     Romel Rosario   Echocardiogram   Order# [id number]   Reading physician: Ronald Campo MD Ordering physician: Rush Alberto DO Study date: 12/3/19   Patient Information     Patient Name  Romel Rosario MRN  [id number] Sex  Male  (Age)  1950 (69 y.o.)   Sedation Narrator Report     Sedation Narrator Report   Interpretation Summary     · Estimated EF = 55%.  · Left ventricular systolic function is normal.  · Left ventricular wall thickness is consistent with mild concentric hypertrophy.  · Left ventricular diastolic dysfunction.  · No evidence of pulmonary hypertension is present.         Chief Complaint on Day of Discharge: f/u SOB    History of Present Illness on Day of Discharge: Patient seen and examined.  Currently on 3-1/2 L O2.  Feels well.  Ambulated the thomas today without much issue.  Feels close to baseline.  Want to go home.  No chest pain, nausea, abdominal pain.  No further a flutter events noted overnight.    Hospital Course:  The patient is a 69 y.o. male with history of long adenocarcinoma stage IIIc, colon cancer, diabetes, GERD, chronic hypoxic respiratory failure on 3 L O2 who presented to [facility] on  with with respiratory distress and acute on  chronic hypoxic respiratory failure.  In the ER he was requiring 6-8 L of O2 and was found on imaging to have a question of pneumonia with bilateral pleural effusions.  He was given a dose of antibiotics and admitted to the ICU for ongoing care on high flow oxygen.  Admitting provider felt patient's presentation and symptoms were due to pleural effusions and volume overload.  No further antibiotics were given and patient was started on diuresis.  He was maintained on high flow O2 with very slow improvement with diuresis.  He did eventually slowly begin to improve however.  X-rays every other day were showing decreasing effusions.  A 2D echo was repeated which did show some diastolic dysfunction but normal EF as similar to prior.  Question was whether or not these were malignant effusions or acute on chronic diastolic heart failure.  Unable to determine at this time although usually malignant effusions do not respond to diuretics.  Again patient did however improve.  Only other setback was in the morning of 12/3 where patient had an event of a flutter to 160s.  He was asymptomatic throughout event without chest pain, palpitations, hypotension, dizziness, or worsening shortness of breath.  He was given IV metoprolol and Cardizem without much improvement.  Eventually started on a Cardizem drip which maxed out at 15.  Started on oral metoprolol.  Cardiology consult was placed.  Around 2:30 in the afternoon patient converted back into sinus rhythm.  He was weaned off the Cardizem drip and maintained sinus rhythm.  He was in flutter for approximately 7 to 8 hours.  He had no further events during hospital stay.  In cardiology consultation Dr. Campo stated he was not a candidate for long-term anticoagulation.  In subsequent days he recommended continue metoprolol which patient has been stable on.  Eventually was weaned off of Vapotherm to nasal cannula.  Transferred out of the ICU.  Diuretics were stopped and he was  "monitored.  He continues to do well and with PT and incentive [place], he is now down to 3-1/2 L O2 satting 95 to 97%.  Again his baseline is 3 L home O2.  He is ambulating without issues or dyspnea.  At this time we will discharge him home with home health services for ongoing therapy with outpatient follow-up to PCP, cardiology, oncology.    Condition on Discharge: Stable/improved.  Ambulating without dyspnea on 3.5 L    Physical Exam on Discharge:  /62 (BP Location: Left arm, Patient Position: Lying)   Pulse 77   Temp 97.8 °F (36.6 °C) (Oral)   Resp 20   Ht 175.3 cm (69\")   Wt 86 kg (189 lb 8 oz)   SpO2 90%   BMI 27.98 kg/m²   Physical Exam  GEN: Awake, alert, interactive, in NAD  HEENT: Atraumatic, PERRLA, EOMI, Anicteric, Trachea midline  Lungs: Diminished breath sounds at bases bilaterally.  Improved airflow overall.  No wheezing or rales.  Heart: RRR, +S1/s2, no rub  ABD: soft, mild distention, Nt, +BS, no guarding/rebound  Extremities: atraumatic, no cyanosis, no edema  Skin: no rashes or lesions  Neuro: AAOx3, no focal deficits  Psych: normal mood & affect    Discharge Disposition:  Home-Health Care [facility]    Discharge Medications:     Discharge Medications      New Medications      Instructions Start Date   metoprolol tartrate 25 MG tablet  Commonly known as:  LOPRESSOR   25 mg, Oral, 2 Times Daily         Continue These Medications      Instructions Start Date   acetaminophen 325 MG tablet  Commonly known as:  TYLENOL   650 mg, Oral, Every 4 Hours PRN      albuterol sulfate  (90 Base) MCG/ACT inhaler  Commonly known as:  PROVENTIL HFA;VENTOLIN HFA;PROAIR HFA   2 puffs, Inhalation, Every 4 Hours PRN      ALPRAZolam 1 MG tablet  Commonly known as:  XANAX   1 mg, Oral, Every 6 Hours PRN      atorvastatin 40 MG tablet  Commonly known as:  LIPITOR   40 mg, Oral, Daily      budesonide-formoterol 160-4.5 MCG/ACT inhaler  Commonly known as:  SYMBICORT   2 puffs, Inhalation, 2 Times Daily - RT    "   esomeprazole 40 MG capsule  Commonly known as:  nexIUM   40 mg, Oral, Every Morning Before Breakfast      folic acid 400 MCG tablet  Commonly known as:  FOLVITE   400 mcg, Oral, Daily      hydrOXYzine 10 MG tablet  Commonly known as:  ATARAX   10 mg, Oral, 4 Times Daily PRN      JANUVIA 50 MG tablet  Generic drug:  SITagliptin   50 mg, Oral, Daily      ondansetron 8 MG tablet  Commonly known as:  ZOFRAN   8 mg, Oral, Every 8 Hours PRN      sucralfate 1 g tablet  Commonly known as:  CARAFATE   1 g, Oral, 4 Times Daily      Vitamin D2 50 MCG (2000 UT) tablet   1 tablet, Oral, Daily             Discharge Diet: Cardiac/carb consistent    Activity at Discharge: As tolerated    Discharge Care Plan/Instructions: Follow-up with PCP, hematology/oncology, cardiology.  If any worsening of your symptoms or chest pain/palpitations seek medical attention.    Follow-up Appointments:   No future appointments.    Test Results Pending at Discharge: None    Rush Alberto DO  12/06/19  9:30 AM    Time: 35 minutes

## 2019-12-06 NOTE — THERAPY DISCHARGE NOTE
Acute Care - Physical Therapy Discharge Summary  [facility]       Patient Name: Romel Rosario  : 1950  MRN: [id number]    Today's Date: 2019                 Admit Date: 2019      PT Recommendation and Plan    Visit Dx:    ICD-10-CM ICD-9-CM   1. Hypoxia R09.02 799.02   2. Adenocarcinoma of lung, unspecified laterality (CMS/[facility]) C34.90 162.9   3. Pneumonia of right lung due to infectious organism, unspecified part of lung J18.9 483.8   4. Anemia, unspecified type D64.9 285.9   5. Pleural effusion J90 511.9   6. Impaired mobility Z74.09 799.89   7. Acute on chronic respiratory failure with hypoxia (CMS/[facility]) J96.21 518.84     799.02           PT Charges     Row Name 19 0921             Time Calculation    Start Time  0846  -AE      Stop Time  0910  -AE      Time Calculation (min)  24 min  -AE      PT Received On  19  -AE      PT Goal Re-Cert Due Date  19  -AE         Time Calculation- PT    Total Timed Code Minutes- PT  24 minute(s)  -AE         Timed Charges    98318 - Gait Training Minutes   15  -AE      20747 - PT Therapeutic Activity Minutes  9  -AE        User Key  (r) = Recorded By, (t) = Taken By, (c) = Cosigned By    Initials Name Provider Type    AE Kamala Wiggins, PTA Physical Therapy Assistant          Rehab Goal Summary     Row Name 19 1446             Transfer Goal 1 (PT)    Activity/Assistive Device (Transfer Goal 1, PT)  sit-to-stand/stand-to-sit;bed-to-chair/chair-to-bed  -      [place] Level/Cues Needed (Transfer Goal 1, PT)  supervision required  -      Time Frame (Transfer Goal 1, PT)  long term goal (LTG);10 days  -      Progress/Outcome (Transfer Goal 1, PT)  goal not met  -         Gait Training Goal 1 (PT)    Activity/Assistive Device (Gait Training Goal 1, PT)  gait (walking locomotion);decrease fall risk;improve balance and speed;increase endurance/gait distance  -      [place] Level (Gait Training Goal 1, PT)  supervision  required  -AH      Distance (Gait Goal 1, PT)  200  -AH      Time Frame (Gait Training Goal 1, PT)  long term goal (LTG);10 days  -AH      Progress/Outcome (Gait Training Goal 1, PT)  goal not met  -AH         Stairs Goal 1 (PT)    Activity/Assistive Device (Stairs Goal 1, PT)  ascending stairs;descending stairs  -      [place] Level/Cues Needed (Stairs Goal 1, PT)  supervision required  -AH      Number of Stairs (Stairs Goal 1, PT)  5  -AH      Time Frame (Stairs Goal 1, PT)  long term goal (LTG);10 days  -AH      Progress/Outcome (Stairs Goal 1, PT)  goal not met  -AH        User Key  (r) = Recorded By, (t) = Taken By, (c) = Cosigned By    Initials Name Provider Type Discipline    Diana Witt PTA Physical Therapy Assistant PT              PT Discharge Summary  Reason for Discharge: Discharge from facility  Outcomes Achieved: Refer to plan of care for updates on goals achieved  Discharge Destination: Home with home health      Diana Reynolds PTA   12/6/2019

## 2019-12-06 NOTE — PLAN OF CARE
Problem: Fall Risk (Adult)  Goal: Absence of Fall  Outcome: Ongoing (interventions implemented as appropriate)      Problem: Patient Care Overview  Goal: Plan of Care Review  Outcome: Ongoing (interventions implemented as appropriate)   12/05/19 1630 12/05/19 2015 12/06/19 0536   Coping/Psychosocial   Plan of Care Reviewed With --  patient --    OTHER   Outcome Summary --  --  pt no c/o pain. pt states appetite improving. vss. pt resting comfortably   Plan of Care Review   Progress no change --  --        Problem: ARDS (Acute Resp Distress Syndrome) (Adult)  Goal: Signs and Symptoms of Listed Potential Problems Will be Absent, Minimized or Managed (ARDS)  Outcome: Ongoing (interventions implemented as appropriate)      Problem: Skin Injury Risk (Adult)  Goal: Identify Related Risk Factors and Signs and Symptoms  Outcome: Ongoing (interventions implemented as appropriate)    Goal: Skin Health and Integrity  Outcome: Ongoing (interventions implemented as appropriate)

## 2019-12-06 NOTE — THERAPY TREATMENT NOTE
Acute Care - Physical Therapy Treatment Note  [facility]     Patient Name: oRmel Rosario  : 1950  MRN: [id number]  Today's Date: 2019             Admit Date: 2019    Visit Dx:    ICD-10-CM ICD-9-CM   1. Hypoxia R09.02 799.02   2. Adenocarcinoma of lung, unspecified laterality (CMS/HCC) C34.90 162.9   3. Pneumonia of right lung due to infectious organism, unspecified part of lung J18.9 483.8   4. Anemia, unspecified type D64.9 285.9   5. Pleural effusion J90 511.9   6. Impaired mobility Z74.09 799.89     Patient Active Problem List   Diagnosis   • Controlled type 2 diabetes mellitus with hyperglycemia, without long-term current use of insulin (CMS/HCC)   • HTN (hypertension), benign   • Hx of colonic polyps   • Hx of colon cancer, stage I   • Sensorineural hearing loss   • Tinnitus of right ear   • Dysfunction of right eustachian tube   • Mass of upper lobe of left lung   • Encounter for consultation   • Former smoker   • Shortness of breath   • Hypoxia   • Respiratory distress, acute   • Acute respiratory failure with hypoxia and hypercapnia (CMS/HCC)   • Mediastinal adenopathy   • Adenocarcinoma, lung (CMS/HCC)   • Hypomagnesemia   • Pleural effusion   • Acute on chronic respiratory failure with hypoxia (CMS/HCC)   • Atelectasis   • Type 2 diabetes mellitus with hyperglycemia, with long-term current use of insulin (CMS/HCC)   • Anemia due to chemotherapy   • Essential hypertension   • Hypokalemia   • SVT (supraventricular tachycardia) (CMS/HCC)       Therapy Treatment    Rehabilitation Treatment Summary     Row Name 19 0846             Treatment Time/Intention    Discipline  physical therapy assistant  -AE      Document Type  therapy note (daily note)  -AE      Subjective Information  complains of;pain  -AE      Existing Precautions/Restrictions  fall;oxygen therapy device and L/min  -AE      Recorded by [AE] Kamala Wiggins, BETHANIE 19 4680      Row Name 19 0827              Vital Signs    Pre SpO2 (%)  95  -AE      O2 Delivery Pre Treatment  supplemental O2 4L  -AE      O2 Delivery Intra Treatment  supplemental O2 4-6L  -AE      Post SpO2 (%)  87  -AE      O2 Delivery Post Treatment  supplemental O2  -AE      Recorded by [AE] Kamala Wiggins, PTA 12/06/19 0920      Row Name 12/06/19 0846             Sit-Stand Transfer    Sit-Stand [place] (Transfers)  contact guard;verbal cues  -AE      Recorded by [AE] Kamala Wiggnis, PTA 12/06/19 0920      Row Name 12/06/19 0846             Gait/Stairs Assessment/Training    [place] Level (Gait)  contact guard;stand by assist  -AE      Assistive Device (Gait)  walker, front-wheeled  -AE      Distance in Feet (Gait)  160  -AE      Recorded by [AE] Kamala Wgigins, PTA 12/06/19 0920      Row Name 12/06/19 0846             Therapeutic Exercise    Lower Extremity (Therapeutic Exercise)  LAQ (long arc quad), bilateral  -AE      Lower Extremity Range of Motion (Therapeutic Exercise)  ankle dorsiflexion/plantar flexion, bilateral  -AE      Exercise Type (Therapeutic Exercise)  AROM (active range of motion)  -AE      Position (Therapeutic Exercise)  seated  -AE      Sets/Reps (Therapeutic Exercise)  20  -AE      Recorded by [AE] Kamala Wiggins, PTA 12/06/19 0920      Row Name 12/06/19 0846             Positioning and Restraints    Pre-Treatment Position  in bed  -AE      Post Treatment Position  chair  -AE      In Chair  sitting;call light within reach  -AE      Recorded by [AE] Kamala Wiggins, PTA 12/06/19 0920      Row Name 12/06/19 0846             Pain Scale: Numbers Pre/Post-Treatment    Pain Scale: Numbers, Pretreatment  4/10  -AE      Pain Location - Side  Bilateral  -AE      Pain Location  back  -AE      Pain Intervention(s)  Repositioned  -AE      Recorded by [AE] Kamala Wiggins, PTA 12/06/19 0920        User Key  (r) = Recorded By, (t) = Taken By, (c) = Cosigned By    Initials Name Effective Dates Discipline    AE Kamala Wiggins, PTA  06/22/15 -  PT                   Physical Therapy Education     Title: PT OT SLP Therapies (In Progress)     Topic: Physical Therapy (In Progress)     Point: Mobility training (Done)     Learning Progress Summary           Patient Acceptance, HERBERT BROWNANNEL by ARNAV at 12/4/2019  2:17 PM    Comment:  Educated pt on progression of PT POC and benefits of activity                               User Key     Initials Effective Dates Name Provider Type Discipline    ARNAV 08/02/16 -  Emile Reyes, PT DPT Physical Therapist PT                PT Recommendation and Plan     Plan of Care Reviewed With: patient  Progress: improving  Outcome Summary: Pt was SBA supine to sit and cga-min x1 to stand. Pt walked on 6L with RW cga-sba 100ft. Pt's 02 stayed in 90's. Pt progressing well and would benefit from continued PT.     Time Calculation:   PT Charges     Row Name 12/06/19 0921             Time Calculation    Start Time  0846  -AE      Stop Time  0910  -AE      Time Calculation (min)  24 min  -AE      PT Received On  12/06/19  -AE      PT Goal Re-Cert Due Date  12/14/19  -AE         Time Calculation- PT    Total Timed Code Minutes- PT  24 minute(s)  -AE         Timed Charges    91682 - Gait Training Minutes   15  -AE      28238 - PT Therapeutic Activity Minutes  9  -AE        User Key  (r) = Recorded By, (t) = Taken By, (c) = Cosigned By    Initials Name Provider Type    AE Kamala Wiggins PTA Physical Therapy Assistant        Therapy Charges for Today     Code Description Service Date Service Provider Modifiers Qty    42043440176 HC PT THERAPEUTIC ACT EA 15 MIN 12/5/2019 Kamala Wiggins PTA GP 1    30047769960 HC GAIT TRAINING EA 15 MIN 12/5/2019 Kamala Wiggins PTA GP 2    49942403590 HC GAIT TRAINING EA 15 MIN 12/6/2019 Kamala Wiggins PTA GP 1    31037991560 HC PT THER PROC EA 15 MIN 12/6/2019 Kamala Wiggins PTA GP 1          PT G-Codes  Outcome Measure Options: AM-PAC 6 Clicks Basic Mobility (PT)  AM-PAC 6 Clicks Score  (PT): 17    Kamala Wiggins, PTA  12/6/2019

## 2019-12-06 NOTE — PROGRESS NOTES
Continued Stay Note  [facility]     Patient Name: Romel Rosario  MRN: [id number]  Today's Date: 12/6/2019    Admit Date: 11/30/2019    Discharge Plan     Row Name 12/06/19 1021       Plan    Plan  [facility]    Provided post acute provider list?  Yes    Post Acute Provider Lists  Home Health    Post Acuite Provider List  Delivered    Delivered To  Patient    Method of Delivery  In person    Patient/Family in Agreement with Plan  yes    Final Discharge Disposition Code  06 - home with home health care    Final Note  Pt is being discharged home today and has HH care ordered. Spoke with pt to get preferred provider and gave CMS compare list. Pt prefers [facility] so called Jaqui there and provided referral and informed of d/c status [phone].    Row Name 12/06/19 0905       Plan    Plan  Home with possible HH services    Patient/Family in Agreement with Plan  yes    Plan Comments  Pt will return home with wife as before.  Pt might benefit from HH at discharge depending on condition at this time. Will follow.         Discharge Codes    No documentation.       Expected Discharge Date and Time     Expected Discharge Date Expected Discharge Time    Dec 6, 2019             CORRY Colindres

## 2019-12-06 NOTE — PROGRESS NOTES
HEMATOLOGY AND MEDICAL ONCOLOGY PROGRESS NOTE          Pt Name: Romel Rosario  YOB: 1950  MRN: [id number]    Date of evaluation: 12/6/2019  History Obtained From:  History obtained from chart review and the patient.    CHIEF COMPLAINT:    Chief Complaint   Patient presents with   • Respiratory Distress     Subjective: was able to move to floor 12/5/19. Shortness of breath continues to improve, off high-flow O2, now using O2 3-1/2 L via NC. Participating with therapy.    HISTORY OF PRESENT ILLNESS:   Romel Rosario is a 69 y.o.  gentleman who holds a diagnosis of NSCLC.  He initially presented with stage III disease, unfortunately with interval progression of bilateral pulmonary metastases and is currently treated for stage IV disease.   Treatment history includes palliative radiation therapy to the chest due to obstructive symptoms.   Cycle #4 of palliative carboplatin/Alimta and Keytruda was completed 11/14/19.  Plans were for reimaging in 3 weeks (was scheduled 12/2/19) with anticipation for maintenance Alimta and Keytruda.     Romel however, was admitted to [facility] 11/30/2019 for evaluation of shortness of breath with increased oxygen use.      CTA chest on 11/30/2019 was negative for PE.    New, moderate bilateral pleural effusions noted in addition to a new linear band of infiltrate or atelectasis within the RML.  The left suprahilar mass shows slight decrease in size measuring 45 x 47 mm, previously 44 x 58 mm on 9/4/2019.     He had 1 unit of pRBC for hemoglobin of 6.9, now improved at 8.8.  He has been diuresing.  CXR 12/2/19 improved with a decrease in central pulmonary venous congestion and small bilateral pleural effusions.  Symptomatically Romel states shortness of breath is improved.     ONCOLOGIC HISTORY:  Diagnosis  · Non-small cell lung cancer, adenocarcinoma, July 2019  · Stage IV   · PD-L1 unknown  · Guardant 360-KRAS G12V alteration      Treatment summary   · Status  chest palliative RT 13 fractions completed August 2019  · 9/5/2019-1 time dose of carboplatin AUC 2/Taxol 45 mg/m².  · 9/12/2019- Initiated on carboplatin AUC 5/Alimta 500 mg/m² and Keytruda every 3 weeks, anticipate maintenance Alimta and Keytruda     Cancer history  Mr Romel Rosario was first seen by me on 6/28/2019 referred by his primary care provider for further evaluation of a lung mass and mediastinal adenopathy. The patient had complains of shortness of breath at exertion and therefore was recommended further imaging.  · 6/13/2019-CT chest with contrast showed a 6.3 x 5.6 x 7 cm left upper lobe lung mass, centrally necrotic, with compressive atelectasis. Questionable satellite nodule within the lingula measured 7 mm. Noncalcified 11 mm left lower lobe nodule concerning for pulmonary metastases. 5 mm subpleural right lower lobe nodule of uncertain significance. A small hiatal hernia. No adrenal nodules. Mediastinal adenopathy measuring up to 2 cm. Subcarina lymph nodes measure up to 1.3 cm.   · 6/24/2019-Initial oncology consultation   · 7/3/2019-bone scan showed no convincing evidence of osseous metastasis.   · 7/3/2019- CT chest, abdomen, pelvis showed no evidence of intra-abdominal metastatic disease. Noncalcified nodule in the right middle lobe  · 7/19/2019- bronchoscopy/FNA biopsy consistent with non-small cell lung cancer favoring adenocarcinoma subtype.PD-L1 could not be performed on scant material from FNA biopsy.  · 8/7/2019- MRI brain unremarkable for metastatic disease  · 8/29/2019- recommend a repeat CT chest abdomen pelvis Mediport placement and concurrent chemoradiation with carboplatin/Taxol to be followed by immunotherapy 1 year.  Stage III non-small cell lung cancer.  Will obtain guardant 360 liquid biopsy.  Molecular studies could not be performed on FNA specimen.  · 9/4/2019-CT chest abdomen pelvis showed interval improvement of metastatic adenopathy and right upper lobe mass but interval  progression of bilateral pulmonary metastasis.  No evidence of intra-abdominal metastatic disease. Specifically, tumor currently measuring 4.8 x 4.4 cm (previously 6.3 x 5.6 cm). Again identified are multiple lymph nodes in the prevascular space adjacent to the aorta. There has been mild  interval decrease in the size of these nodes, largest now measures 1.8 x  2 cm, previously measured 2.4 x 2 cm. Also noted is interval decrease the size of right paratracheal nodes middle mediastinal region, largest node measuring 1.6 cm in greatest dimension previosly measured 2.2 cm. No developing lymphadenopathy observed. The pulmonary nodule in the left lower lobe, anterolaterally, pleural-based measures 1.4 cm greatest transaxial projection previously measured 1.1 cm. In the lingula, near the major fissure there is a 9 mm nodule appreciated, axial image #92, previously measured less than 5 mm. In the left lower lobe there are several developing pulmonary nodules appreciated, the largest appreciated centrally, axial image #110 measuring nearly 14 mm in greatest dimension, previously measured 9 mm. In the left upper lobe, anteriorly and laterally, axial image #57 there is a developing 6 mm pulmonary nodule appreciated.  The right upper lobe, axial image #24 a developing 4 mm nodule is  observed. More inferiorly, axial image #38 pleural-based 8 mm nodule is  now appreciated. In the right middle lobe, inferiorly and laterally, axial image #75 9 mm pulmonary nodule, pleural-based is now identified. There are multiple developing pulmonary nodules in the right lower lobe,  largest appreciated posteriorly and inferiorly, axial image #115  measuring 1 cm in greatest dimension.  · 9/5/2019-he received 1 cycle of carboplatin AUC 2/Taxol 50 mg/m²  · 9/5/2019- recommended carboplatin Alimta and Keytruda due to findings of stage IV disease today on CT scans.  · 9/12/2019- Initiated on carboplatin AUC 5/Alimta 500 mg/m² and Keytruda every 3  weeks  · 9/29/2019- Guardant 360 identified a KRAS G12V alteration       Subjective   REVIEW OF SYSTEMS:   Review of Systems   Constitutional: Positive for activity change and fatigue. Negative for chills.        No night sweats   HENT: Negative for dental problem, hearing loss, mouth sores, nosebleeds, sore throat and trouble swallowing.    Eyes: Negative for visual disturbance.        No blurring of vision, no double vision     Respiratory: Positive for cough and shortness of breath (continues to improve). Negative for wheezing.         No hemoptysis       Cardiovascular: Negative for chest pain, palpitations and leg swelling.        A-fib 12/3/19, currently NSR   Gastrointestinal: Negative for abdominal pain, constipation, diarrhea, nausea and vomiting.   Endocrine: Negative for cold intolerance, heat intolerance, polydipsia and polyuria.   Genitourinary: Negative for difficulty urinating, dysuria, frequency, hematuria and urgency.   Musculoskeletal: Positive for arthralgias. Negative for joint swelling and myalgias.        Significant, generalized weakness   Skin: Negative for pallor and rash.   Allergic/Immunologic: Negative for immunocompromised state.   Neurological: Negative for seizures, syncope and numbness.   Hematological: Negative for adenopathy. Does not bruise/bleed easily.   Psychiatric/Behavioral: Negative for confusion and suicidal ideas.     Objective   PHYSICAL EXAM:  Physical Exam   Constitutional: He is oriented to person, place, and time. No distress.   Appears chronically ill   HENT:   Head: Normocephalic and atraumatic.   Right Ear: External ear normal.   Left Ear: External ear normal.   Eyes: EOM are normal. No scleral icterus.   Neck: Neck supple. No tracheal deviation present.   Cardiovascular: Normal rate and regular rhythm.   Pulmonary/Chest: Effort normal. No respiratory distress. He has decreased breath sounds in the right lower field and the left lower field. He has no wheezes. He  has no rales.   O2 3-1/2 L via NC in use   Abdominal: Soft. Bowel sounds are normal. He exhibits no distension and no mass.   No hepatosplenomegaly     Genitourinary:   Genitourinary Comments: Exam deferred.     Musculoskeletal: He exhibits no edema or tenderness.   Normal ROM to all 4 extremities. Generalized weakness.     Lymphadenopathy:   No bulky palpable cervical, clavicular, axillary or inguinal adenopathies on the left or right.     Neurological: He is alert and oriented to person, place, and time.   Follows commands. Non-focal.     Skin: Skin is warm and dry. No rash noted. He is not diaphoretic.   Psychiatric: He has a normal mood and affect. His behavior is normal. Thought content normal.   Vitals reviewed.    Vitals:    12/05/19 2356 12/06/19 0638 12/06/19 0645 12/06/19 0751   BP: 112/50   121/62   BP Location: Left arm   Left arm   Patient Position: Lying   Lying   Pulse: 80 77 79 77   Resp: 16 17 16 20   Temp: 98.5 °F (36.9 °C)   97.8 °F (36.6 °C)   TempSrc: Oral   Oral   SpO2: 95% 98% 97% 90%   Weight:       Height:           Intake/Output Summary (Last 24 hours) at 12/6/2019 0759  Last data filed at 12/5/2019 1143  Gross per 24 hour   Intake 88.7 ml   Output 800 ml   Net -711.3 ml     Labs:  CBC  Results from last 7 days   Lab Units 12/05/19  0216 12/04/19  0226 12/03/19  0232   WBC 10*3/mm3 5.34 5.01 4.75   HEMOGLOBIN g/dL 8.8* 9.3* 9.0*   HEMATOCRIT % 28.3* 29.4* 28.1*   PLATELETS 10*3/mm3 303 334 301     Lab Results   Component Value Date     (L) 12/06/2019    K 4.5 12/06/2019    CL 93 (L) 12/06/2019    CO2 34.0 (H) 12/06/2019    BUN 19 12/06/2019    CREATININE 0.78 12/06/2019    GLUCOSE 142 (H) 12/06/2019    CALCIUM 10.1 12/06/2019    BILITOT 0.3 11/30/2019    ALKPHOS 104 11/30/2019    AST 12 11/30/2019    ALT 12 11/30/2019    AGRATIO 1.1 11/30/2019    GLOB 3.0 11/30/2019     Lab Results   Component Value Date    INR 0.96 12/03/2019    PROTIME 13.1 12/03/2019       Cultures:  Lab Results    Component Value Date    BLOODCX No growth at 5 days 11/30/2019     No components found for: URINCX    ASSESSMENT/PLAN:      1. stage IV NSCLC (adenocarcinoma)   cycle #4 of carboplatin/Alimta and Keytruda completed 11/14/19.  Plans were for reimaging (scheduled 12/2/19 as outpatient) with anticipation for maintenance Alimta and Keytruda.  CTA chest completed 11/30/19, Right suprahilar mass measured 45 x 47 mm, compared with 44 x 58 mm on 9/4/19.  will keep scheduled outpatient apt with Dr. Fishman     2. Acute on chronic resp. failure, bilateral pleural effuions  Symptomatically improving with Diuresis - currently receiving Lasix 40 mg IV daily  CXR 12/4/19 improving/decreasing pulmonary edema, possible small left pleural fluid.   Chronic O2 dependency. Off high flow. Currently using O2 3-1/2L via NC     3.  Other fatigue, secondary to multiple comorbidities to include obesity, oxygen dependent, chemotherapy, lung cancer and anemia.     4. Anemia, secondary to chemotherapy.   Hgb 8.8, MCV 99.0 on 12/5/19 (s/p 1 unit of pRBC for hemoglobin of 6.9)    60, TIBC 304, iron saturation 20% on 12/1/2019     5.  Hypokalemia, hypomagnesemia   K+ 4.5, Mag 1.7  per attending    6.  Diabetes  Managed per attending    7.  A-fib/SVT -resolved-converted medically  Converted NSR with Cardizem 12/3/19  Continues NSR  Echo 12/3/19 EF 55%, left ventricular diastolic dysfunction    Clinically/symptomatically improved with diuretics..  Generalized weakness, continue Physical Therapy.  Continue supportive care.   Disposition: Plans for home with .  Keep outpatient follow-up with Dr. Fishman.  Will see Romel on 12/9/19 if he is not discharged by that time.   Call if needed.      Brissa Lea, MING  12/06/19  7:59 AM      Physicians attestation and contribution:    I, Claudio Nevarez, personally and independently performed an evaluation on Romel Rosario  I have reviewed relevant medical information/data to include but not  limited to the medication list, relevant appropriate lab work and imaging when applicable.  I reviewed other physician's notes, ancillary services and nurses assessments.  I have reviewed the above documentation completed by Brissa LAI  Please see my additional addended and/or modified contributions to the history of present illness, physical examination and assessment/medical decision-making and plan that reflects my findings and impressions.  I discussed the essential elements of the care plan with Brissa LAI and the patient.  I have encouraged and answered all the questions raised to the patient's understanding and satisfaction.  I concur with the above stated.    Subjective: Symptomatically stable but still with underlying congestion, cough and wheezing    Objective: Wheezing bilaterally bilateral rales still in bed most of the time, very debilitated    Assessment/plan:  Continue supportive care.   Disposition: Plans for home with .  Keep outpatient follow-up with Dr. Fishman.  Will see Romel on 12/9/19 if he is not discharged by that time.   Call if needed.      Claudio Nevarez MD  12/6/2019 8:15 AM

## 2019-12-08 NOTE — OUTREACH NOTE
Prep Survey      Responses   Facility patient discharged from?  [facility]   Is patient eligible?  Yes   Discharge diagnosis  Acute on chronic respiratory failure with hypoxia    Does the patient have one of the following disease processes/diagnoses(primary or secondary)?  CHF   Does the patient have Home health ordered?  Yes   What is the Home health agency?   [facility]   Is there a DME ordered?  No   Prep survey completed?  Yes          Debbie Ballesteros RN

## 2019-12-09 NOTE — OUTREACH NOTE
CHF Week 1 Survey      Responses   Facility patient discharged from?  [facility]   Does the patient have one of the following disease processes/diagnoses(primary or secondary)?  CHF   Is there a successful TCM telephone encounter documented?  No   CHF Week 1 attempt successful?  No   Unsuccessful attempts  Attempt 1          Varsha Akhtar RN

## 2019-12-11 NOTE — OUTREACH NOTE
CHF Week 1 Survey      Responses   Facility patient discharged from?  [facility]   Does the patient have one of the following disease processes/diagnoses(primary or secondary)?  CHF   Is there a successful TCM telephone encounter documented?  No   CHF Week 1 attempt successful?  No   Unsuccessful attempts  Attempt 2          Robert Duque RN

## 2019-12-16 NOTE — OUTREACH NOTE
CHF Week 2 Survey      Responses   Facility patient discharged from?  [facility]   Does the patient have one of the following disease processes/diagnoses(primary or secondary)?  CHF   Week 2 attempt successful?  No   Unsuccessful attempts  Attempt 1          Ana Malik RN

## 2019-12-18 NOTE — OUTREACH NOTE
CHF Week 2 Survey      Responses   Facility patient discharged from?  [facility]   Does the patient have one of the following disease processes/diagnoses(primary or secondary)?  CHF   Week 2 attempt successful?  No   Unsuccessful attempts  Attempt 2          Varsha Akhtar RN

## 2019-12-20 ENCOUNTER — HOSPITAL ENCOUNTER (OUTPATIENT)
Dept: INFUSION THERAPY | Age: 69
Discharge: HOME | End: 2019-12-20
Payer: MEDICARE

## 2019-12-20 ENCOUNTER — OFFICE VISIT (OUTPATIENT)
Dept: HEMATOLOGY | Age: 69
End: 2019-12-20
Payer: MEDICARE

## 2019-12-20 VITALS
OXYGEN SATURATION: 97 % | DIASTOLIC BLOOD PRESSURE: 66 MMHG | HEART RATE: 60 BPM | BODY MASS INDEX: 29.11 KG/M2 | HEIGHT: 69 IN | SYSTOLIC BLOOD PRESSURE: 124 MMHG

## 2019-12-20 DIAGNOSIS — Z99.81 SUPPLEMENTAL OXYGEN DEPENDENT: ICD-10-CM

## 2019-12-20 DIAGNOSIS — C80.1 ANXIETY ASSOCIATED WITH CANCER DIAGNOSIS (HCC): ICD-10-CM

## 2019-12-20 DIAGNOSIS — D64.9 NORMOCYTIC ANEMIA: ICD-10-CM

## 2019-12-20 DIAGNOSIS — C34.32 MALIGNANT NEOPLASM OF LOWER LOBE, LEFT BRONCHUS OR LUNG (HCC): ICD-10-CM

## 2019-12-20 DIAGNOSIS — F41.1 ANXIETY ASSOCIATED WITH CANCER DIAGNOSIS (HCC): ICD-10-CM

## 2019-12-20 DIAGNOSIS — T45.1X5A ADVERSE EFFECT OF CHEMOTHERAPY, INITIAL ENCOUNTER: ICD-10-CM

## 2019-12-20 DIAGNOSIS — J44.9 STAGE 3 SEVERE COPD BY GOLD CLASSIFICATION (HCC): ICD-10-CM

## 2019-12-20 DIAGNOSIS — R53.83 OTHER FATIGUE: ICD-10-CM

## 2019-12-20 DIAGNOSIS — Z51.11 CHEMOTHERAPY MANAGEMENT, ENCOUNTER FOR: Primary | ICD-10-CM

## 2019-12-20 PROCEDURE — G8427 DOCREV CUR MEDS BY ELIG CLIN: HCPCS | Performed by: INTERNAL MEDICINE

## 2019-12-20 PROCEDURE — 3023F SPIROM DOC REV: CPT | Performed by: INTERNAL MEDICINE

## 2019-12-20 PROCEDURE — 3017F COLORECTAL CA SCREEN DOC REV: CPT | Performed by: INTERNAL MEDICINE

## 2019-12-20 PROCEDURE — G8417 CALC BMI ABV UP PARAM F/U: HCPCS | Performed by: INTERNAL MEDICINE

## 2019-12-20 PROCEDURE — G8484 FLU IMMUNIZE NO ADMIN: HCPCS | Performed by: INTERNAL MEDICINE

## 2019-12-20 PROCEDURE — 85025 COMPLETE CBC W/AUTO DIFF WBC: CPT

## 2019-12-20 PROCEDURE — 99214 OFFICE O/P EST MOD 30 MIN: CPT | Performed by: INTERNAL MEDICINE

## 2019-12-20 PROCEDURE — 1123F ACP DISCUSS/DSCN MKR DOCD: CPT | Performed by: INTERNAL MEDICINE

## 2019-12-20 PROCEDURE — G8926 SPIRO NO PERF OR DOC: HCPCS | Performed by: INTERNAL MEDICINE

## 2019-12-20 PROCEDURE — 99211 OFF/OP EST MAY X REQ PHY/QHP: CPT

## 2019-12-20 PROCEDURE — 4040F PNEUMOC VAC/ADMIN/RCVD: CPT | Performed by: INTERNAL MEDICINE

## 2019-12-20 PROCEDURE — 1036F TOBACCO NON-USER: CPT | Performed by: INTERNAL MEDICINE

## 2020-01-01 ENCOUNTER — APPOINTMENT (OUTPATIENT)
Dept: CARDIOLOGY | Facility: HOSPITAL | Age: 70
End: 2020-01-01

## 2020-01-01 ENCOUNTER — APPOINTMENT (OUTPATIENT)
Dept: CT IMAGING | Facility: HOSPITAL | Age: 70
End: 2020-01-01

## 2020-01-01 ENCOUNTER — APPOINTMENT (OUTPATIENT)
Dept: GENERAL RADIOLOGY | Facility: HOSPITAL | Age: 70
End: 2020-01-01

## 2020-01-01 ENCOUNTER — HOSPITAL ENCOUNTER (INPATIENT)
Facility: HOSPITAL | Age: 70
LOS: 4 days | Discharge: HOSPICE | End: 2020-02-11
Attending: INTERNAL MEDICINE | Admitting: FAMILY MEDICINE

## 2020-01-01 VITALS
RESPIRATION RATE: 16 BRPM | WEIGHT: 187 LBS | HEART RATE: 61 BPM | HEIGHT: 67 IN | TEMPERATURE: 98.4 F | SYSTOLIC BLOOD PRESSURE: 134 MMHG | OXYGEN SATURATION: 99 % | BODY MASS INDEX: 29.35 KG/M2 | DIASTOLIC BLOOD PRESSURE: 66 MMHG

## 2020-01-01 DIAGNOSIS — J81.0 ACUTE PULMONARY EDEMA (HCC): Primary | ICD-10-CM

## 2020-01-01 DIAGNOSIS — C34.90 ADENOCARCINOMA OF LUNG, UNSPECIFIED LATERALITY (HCC): ICD-10-CM

## 2020-01-01 DIAGNOSIS — J90 PLEURAL EFFUSION: ICD-10-CM

## 2020-01-01 LAB
ALBUMIN SERPL-MCNC: 3.1 G/DL (ref 3.5–5.2)
ALBUMIN SERPL-MCNC: 3.2 G/DL (ref 3.5–5.2)
ALBUMIN SERPL-MCNC: 3.4 G/DL (ref 3.5–5.2)
ALBUMIN SERPL-MCNC: 3.8 G/DL (ref 3.5–5.2)
ALBUMIN/GLOB SERPL: 0.8 G/DL
ALBUMIN/GLOB SERPL: 0.9 G/DL
ALBUMIN/GLOB SERPL: 1.1 G/DL
ALBUMIN/GLOB SERPL: 1.1 G/DL
ALP SERPL-CCNC: 109 U/L (ref 39–117)
ALP SERPL-CCNC: 93 U/L (ref 39–117)
ALP SERPL-CCNC: 99 U/L (ref 39–117)
ALP SERPL-CCNC: 99 U/L (ref 39–117)
ALT SERPL W P-5'-P-CCNC: 10 U/L (ref 1–41)
ALT SERPL W P-5'-P-CCNC: 11 U/L (ref 1–41)
ALT SERPL W P-5'-P-CCNC: 11 U/L (ref 1–41)
ALT SERPL W P-5'-P-CCNC: 12 U/L (ref 1–41)
ANION GAP SERPL CALCULATED.3IONS-SCNC: 11 MMOL/L (ref 5–15)
ANION GAP SERPL CALCULATED.3IONS-SCNC: 11 MMOL/L (ref 5–15)
ANION GAP SERPL CALCULATED.3IONS-SCNC: 13 MMOL/L (ref 5–15)
ANION GAP SERPL CALCULATED.3IONS-SCNC: 16 MMOL/L (ref 5–15)
ANISOCYTOSIS BLD QL: ABNORMAL
ARTERIAL PATENCY WRIST A: POSITIVE
AST SERPL-CCNC: 11 U/L (ref 1–40)
AST SERPL-CCNC: 17 U/L (ref 1–40)
ATMOSPHERIC PRESS: 741 MMHG
BASE EXCESS BLDA CALC-SCNC: -0.4 MMOL/L (ref 0–2)
BASOPHILS # BLD AUTO: 0.02 10*3/MM3 (ref 0–0.2)
BASOPHILS # BLD AUTO: 0.04 10*3/MM3 (ref 0–0.2)
BASOPHILS # BLD AUTO: 0.07 10*3/MM3 (ref 0–0.2)
BASOPHILS NFR BLD AUTO: 0.2 % (ref 0–1.5)
BASOPHILS NFR BLD AUTO: 0.4 % (ref 0–1.5)
BASOPHILS NFR BLD AUTO: 0.5 % (ref 0–1.5)
BDY SITE: ABNORMAL
BH CV ECHO MEAS - AO MAX PG (FULL): 2.8 MMHG
BH CV ECHO MEAS - AO MAX PG: 11.7 MMHG
BH CV ECHO MEAS - AO MEAN PG (FULL): 3 MMHG
BH CV ECHO MEAS - AO MEAN PG: 6 MMHG
BH CV ECHO MEAS - AO V2 MAX: 171 CM/SEC
BH CV ECHO MEAS - AO V2 MEAN: 113 CM/SEC
BH CV ECHO MEAS - AO V2 VTI: 31.5 CM
BH CV ECHO MEAS - BSA(HAYCOCK): 2 M^2
BH CV ECHO MEAS - BSA: 2 M^2
BH CV ECHO MEAS - BZI_BMI: 28.8 KILOGRAMS/M^2
BH CV ECHO MEAS - BZI_METRIC_HEIGHT: 170.2 CM
BH CV ECHO MEAS - BZI_METRIC_WEIGHT: 83.5 KG
BH CV ECHO MEAS - EDV(CUBED): 116.9 ML
BH CV ECHO MEAS - EDV(MOD-SP4): 139 ML
BH CV ECHO MEAS - EDV(TEICH): 112.3 ML
BH CV ECHO MEAS - EF(CUBED): 82.4 %
BH CV ECHO MEAS - EF(MOD-SP4): 79.1 %
BH CV ECHO MEAS - EF(TEICH): 75 %
BH CV ECHO MEAS - ESV(CUBED): 20.6 ML
BH CV ECHO MEAS - ESV(MOD-SP4): 29 ML
BH CV ECHO MEAS - ESV(TEICH): 28 ML
BH CV ECHO MEAS - FS: 44 %
BH CV ECHO MEAS - IVS/LVPW: 1.2
BH CV ECHO MEAS - IVSD: 1.2 CM
BH CV ECHO MEAS - LV DIASTOLIC VOL/BSA (35-75): 71.2 ML/M^2
BH CV ECHO MEAS - LV MASS(C)D: 168.2 GRAMS
BH CV ECHO MEAS - LV MASS(C)DI: 86.2 GRAMS/M^2
BH CV ECHO MEAS - LV MAX PG: 8.9 MMHG
BH CV ECHO MEAS - LV MEAN PG: 3 MMHG
BH CV ECHO MEAS - LV SYSTOLIC VOL/BSA (12-30): 14.9 ML/M^2
BH CV ECHO MEAS - LV V1 MAX: 149 CM/SEC
BH CV ECHO MEAS - LV V1 MEAN: 84.7 CM/SEC
BH CV ECHO MEAS - LV V1 VTI: 25 CM
BH CV ECHO MEAS - LVIDD: 4.9 CM
BH CV ECHO MEAS - LVIDS: 2.7 CM
BH CV ECHO MEAS - LVLD AP4: 8 CM
BH CV ECHO MEAS - LVLS AP4: 6.3 CM
BH CV ECHO MEAS - LVPWD: 1.2 CM
BH CV ECHO MEAS - RAP SYSTOLE: 5 MMHG
BH CV ECHO MEAS - RVSP: 48.8 MMHG
BH CV ECHO MEAS - SI(CUBED): 49.4 ML/M^2
BH CV ECHO MEAS - SI(MOD-SP4): 56.4 ML/M^2
BH CV ECHO MEAS - SI(TEICH): 43.2 ML/M^2
BH CV ECHO MEAS - SV(CUBED): 96.4 ML
BH CV ECHO MEAS - SV(MOD-SP4): 110 ML
BH CV ECHO MEAS - SV(TEICH): 84.3 ML
BH CV ECHO MEAS - TR MAX VEL: 331 CM/SEC
BILIRUB SERPL-MCNC: 0.2 MG/DL (ref 0.2–1.2)
BILIRUB SERPL-MCNC: 0.3 MG/DL (ref 0.2–1.2)
BODY TEMPERATURE: 37 C
BUN BLD-MCNC: 11 MG/DL (ref 8–23)
BUN BLD-MCNC: 11 MG/DL (ref 8–23)
BUN BLD-MCNC: 15 MG/DL (ref 8–23)
BUN BLD-MCNC: 22 MG/DL (ref 8–23)
BUN/CREAT SERPL: 14.3 (ref 7–25)
BUN/CREAT SERPL: 14.7 (ref 7–25)
BUN/CREAT SERPL: 16.5 (ref 7–25)
BUN/CREAT SERPL: 27.5 (ref 7–25)
CALCIUM SPEC-SCNC: 10.2 MG/DL (ref 8.6–10.5)
CALCIUM SPEC-SCNC: 9.4 MG/DL (ref 8.6–10.5)
CALCIUM SPEC-SCNC: 9.7 MG/DL (ref 8.6–10.5)
CALCIUM SPEC-SCNC: 9.7 MG/DL (ref 8.6–10.5)
CHLORIDE SERPL-SCNC: 101 MMOL/L (ref 98–107)
CHLORIDE SERPL-SCNC: 97 MMOL/L (ref 98–107)
CHLORIDE SERPL-SCNC: 98 MMOL/L (ref 98–107)
CHLORIDE SERPL-SCNC: 98 MMOL/L (ref 98–107)
CHOLEST SERPL-MCNC: 112 MG/DL (ref 0–200)
CLUMPED PLATELETS: PRESENT
CO2 SERPL-SCNC: 22 MMOL/L (ref 22–29)
CO2 SERPL-SCNC: 23 MMOL/L (ref 22–29)
CO2 SERPL-SCNC: 30 MMOL/L (ref 22–29)
CO2 SERPL-SCNC: 32 MMOL/L (ref 22–29)
CREAT BLD-MCNC: 0.75 MG/DL (ref 0.76–1.27)
CREAT BLD-MCNC: 0.77 MG/DL (ref 0.76–1.27)
CREAT BLD-MCNC: 0.8 MG/DL (ref 0.76–1.27)
CREAT BLD-MCNC: 0.91 MG/DL (ref 0.76–1.27)
D DIMER PPP FEU-MCNC: 6.36 MG/L (FEU) (ref 0–0.5)
DEPRECATED RDW RBC AUTO: 47.8 FL (ref 37–54)
DEPRECATED RDW RBC AUTO: 47.9 FL (ref 37–54)
DEPRECATED RDW RBC AUTO: 48.6 FL (ref 37–54)
DEPRECATED RDW RBC AUTO: 51.6 FL (ref 37–54)
EOSINOPHIL # BLD AUTO: 0 10*3/MM3 (ref 0–0.4)
EOSINOPHIL # BLD AUTO: 0 10*3/MM3 (ref 0–0.4)
EOSINOPHIL # BLD AUTO: 0.1 10*3/MM3 (ref 0–0.4)
EOSINOPHIL NFR BLD AUTO: 0 % (ref 0.3–6.2)
EOSINOPHIL NFR BLD AUTO: 0 % (ref 0.3–6.2)
EOSINOPHIL NFR BLD AUTO: 0.6 % (ref 0.3–6.2)
ERYTHROCYTE [DISTWIDTH] IN BLOOD BY AUTOMATED COUNT: 14.9 % (ref 12.3–15.4)
ERYTHROCYTE [DISTWIDTH] IN BLOOD BY AUTOMATED COUNT: 14.9 % (ref 12.3–15.4)
ERYTHROCYTE [DISTWIDTH] IN BLOOD BY AUTOMATED COUNT: 15 % (ref 12.3–15.4)
ERYTHROCYTE [DISTWIDTH] IN BLOOD BY AUTOMATED COUNT: 15.5 % (ref 12.3–15.4)
FERRITIN SERPL-MCNC: 643.4 NG/ML (ref 30–400)
FOLATE SERPL-MCNC: >20 NG/ML (ref 4.78–24.2)
GAS FLOW AIRWAY: 6 LPM
GFR SERPL CREATININE-BSD FRML MDRD: 100 ML/MIN/1.73
GFR SERPL CREATININE-BSD FRML MDRD: 103 ML/MIN/1.73
GFR SERPL CREATININE-BSD FRML MDRD: 82 ML/MIN/1.73
GFR SERPL CREATININE-BSD FRML MDRD: 95 ML/MIN/1.73
GIANT PLATELETS: ABNORMAL
GLOBULIN UR ELPH-MCNC: 3 GM/DL
GLOBULIN UR ELPH-MCNC: 3.4 GM/DL
GLOBULIN UR ELPH-MCNC: 3.5 GM/DL
GLOBULIN UR ELPH-MCNC: 4 GM/DL
GLUCOSE BLD-MCNC: 148 MG/DL (ref 65–99)
GLUCOSE BLD-MCNC: 165 MG/DL (ref 65–99)
GLUCOSE BLD-MCNC: 208 MG/DL (ref 65–99)
GLUCOSE BLD-MCNC: 215 MG/DL (ref 65–99)
GLUCOSE BLDC GLUCOMTR-MCNC: 134 MG/DL (ref 70–130)
GLUCOSE BLDC GLUCOMTR-MCNC: 143 MG/DL (ref 70–130)
GLUCOSE BLDC GLUCOMTR-MCNC: 146 MG/DL (ref 70–130)
GLUCOSE BLDC GLUCOMTR-MCNC: 147 MG/DL (ref 70–130)
GLUCOSE BLDC GLUCOMTR-MCNC: 151 MG/DL (ref 70–130)
GLUCOSE BLDC GLUCOMTR-MCNC: 153 MG/DL (ref 70–130)
GLUCOSE BLDC GLUCOMTR-MCNC: 163 MG/DL (ref 70–130)
GLUCOSE BLDC GLUCOMTR-MCNC: 164 MG/DL (ref 70–130)
GLUCOSE BLDC GLUCOMTR-MCNC: 177 MG/DL (ref 70–130)
GLUCOSE BLDC GLUCOMTR-MCNC: 178 MG/DL (ref 70–130)
GLUCOSE BLDC GLUCOMTR-MCNC: 189 MG/DL (ref 70–130)
GLUCOSE BLDC GLUCOMTR-MCNC: 197 MG/DL (ref 70–130)
GLUCOSE BLDC GLUCOMTR-MCNC: 223 MG/DL (ref 70–130)
GLUCOSE BLDC GLUCOMTR-MCNC: 228 MG/DL (ref 70–130)
GLUCOSE BLDC GLUCOMTR-MCNC: 260 MG/DL (ref 70–130)
GLUCOSE BLDC GLUCOMTR-MCNC: 294 MG/DL (ref 70–130)
GLUCOSE BLDC GLUCOMTR-MCNC: 319 MG/DL (ref 70–130)
GLUCOSE BLDC GLUCOMTR-MCNC: 342 MG/DL (ref 70–130)
HBA1C MFR BLD: 7.3 % (ref 4.8–5.6)
HCO3 BLDA-SCNC: 23.4 MMOL/L (ref 20–26)
HCT VFR BLD AUTO: 27.8 % (ref 37.5–51)
HCT VFR BLD AUTO: 28 % (ref 37.5–51)
HCT VFR BLD AUTO: 29.6 % (ref 37.5–51)
HCT VFR BLD AUTO: 32.9 % (ref 37.5–51)
HDLC SERPL-MCNC: 37 MG/DL (ref 40–60)
HGB BLD-MCNC: 10.2 G/DL (ref 13–17.7)
HGB BLD-MCNC: 8.9 G/DL (ref 13–17.7)
HGB BLD-MCNC: 9.2 G/DL (ref 13–17.7)
HGB BLD-MCNC: 9.7 G/DL (ref 13–17.7)
HOLD SPECIMEN: NORMAL
IMM GRANULOCYTES # BLD AUTO: 0.28 10*3/MM3 (ref 0–0.05)
IMM GRANULOCYTES # BLD AUTO: 0.37 10*3/MM3 (ref 0–0.05)
IMM GRANULOCYTES # BLD AUTO: 0.45 10*3/MM3 (ref 0–0.05)
IMM GRANULOCYTES NFR BLD AUTO: 2.3 % (ref 0–0.5)
IMM GRANULOCYTES NFR BLD AUTO: 2.7 % (ref 0–0.5)
IMM GRANULOCYTES NFR BLD AUTO: 4.6 % (ref 0–0.5)
IRON 24H UR-MRATE: 50 MCG/DL (ref 59–158)
IRON SATN MFR SERPL: 17 % (ref 20–50)
LDLC SERPL CALC-MCNC: 64 MG/DL (ref 0–100)
LDLC/HDLC SERPL: 1.72 {RATIO}
LEFT ATRIUM VOLUME INDEX: 39.9 ML/M2
LEFT ATRIUM VOLUME: 77.8 CM3
LV EF 2D ECHO EST: 55 %
LYMPHOCYTES # BLD AUTO: 0.36 10*3/MM3 (ref 0.7–3.1)
LYMPHOCYTES # BLD AUTO: 0.98 10*3/MM3 (ref 0.7–3.1)
LYMPHOCYTES # BLD AUTO: 1.14 10*3/MM3 (ref 0.7–3.1)
LYMPHOCYTES # BLD MANUAL: 0.52 10*3/MM3 (ref 0.7–3.1)
LYMPHOCYTES NFR BLD AUTO: 4.5 % (ref 19.6–45.3)
LYMPHOCYTES NFR BLD AUTO: 6.9 % (ref 19.6–45.3)
LYMPHOCYTES NFR BLD AUTO: 8.1 % (ref 19.6–45.3)
LYMPHOCYTES NFR BLD MANUAL: 5.9 % (ref 19.6–45.3)
LYMPHOCYTES NFR BLD MANUAL: 7.9 % (ref 5–12)
Lab: ABNORMAL
MAGNESIUM SERPL-MCNC: 0.8 MG/DL (ref 1.6–2.4)
MAGNESIUM SERPL-MCNC: 1.4 MG/DL (ref 1.6–2.4)
MAGNESIUM SERPL-MCNC: 1.7 MG/DL (ref 1.6–2.4)
MAXIMAL PREDICTED HEART RATE: 149 BPM
MCH RBC QN AUTO: 29.4 PG (ref 26.6–33)
MCH RBC QN AUTO: 29.6 PG (ref 26.6–33)
MCH RBC QN AUTO: 29.7 PG (ref 26.6–33)
MCH RBC QN AUTO: 30.2 PG (ref 26.6–33)
MCHC RBC AUTO-ENTMCNC: 31 G/DL (ref 31.5–35.7)
MCHC RBC AUTO-ENTMCNC: 32 G/DL (ref 31.5–35.7)
MCHC RBC AUTO-ENTMCNC: 32.8 G/DL (ref 31.5–35.7)
MCHC RBC AUTO-ENTMCNC: 32.9 G/DL (ref 31.5–35.7)
MCV RBC AUTO: 90.5 FL (ref 79–97)
MCV RBC AUTO: 91.8 FL (ref 79–97)
MCV RBC AUTO: 92.4 FL (ref 79–97)
MCV RBC AUTO: 94.8 FL (ref 79–97)
METAMYELOCYTES NFR BLD MANUAL: 1 % (ref 0–0)
MICROCYTES BLD QL: ABNORMAL
MODALITY: ABNORMAL
MONOCYTES # BLD AUTO: 0.08 10*3/MM3 (ref 0.1–0.9)
MONOCYTES # BLD AUTO: 0.7 10*3/MM3 (ref 0.1–0.9)
MONOCYTES # BLD AUTO: 1.41 10*3/MM3 (ref 0.1–0.9)
MONOCYTES # BLD AUTO: 1.76 10*3/MM3 (ref 0.1–0.9)
MONOCYTES NFR BLD AUTO: 1 % (ref 5–12)
MONOCYTES NFR BLD AUTO: 14.5 % (ref 5–12)
MONOCYTES NFR BLD AUTO: 8.5 % (ref 5–12)
MYELOCYTES NFR BLD MANUAL: 2 % (ref 0–0)
NEUTROPHILS # BLD AUTO: 13.41 10*3/MM3 (ref 1.7–7)
NEUTROPHILS # BLD AUTO: 7.12 10*3/MM3 (ref 1.7–7)
NEUTROPHILS # BLD AUTO: 7.38 10*3/MM3 (ref 1.7–7)
NEUTROPHILS # BLD AUTO: 9.12 10*3/MM3 (ref 1.7–7)
NEUTROPHILS NFR BLD AUTO: 74.9 % (ref 42.7–76)
NEUTROPHILS NFR BLD AUTO: 80.9 % (ref 42.7–76)
NEUTROPHILS NFR BLD AUTO: 89.4 % (ref 42.7–76)
NEUTROPHILS NFR BLD MANUAL: 83.2 % (ref 42.7–76)
NRBC BLD AUTO-RTO: 0.1 /100 WBC (ref 0–0.2)
NRBC BLD AUTO-RTO: 0.2 /100 WBC (ref 0–0.2)
NRBC BLD AUTO-RTO: 0.3 /100 WBC (ref 0–0.2)
NT-PROBNP SERPL-MCNC: 2117 PG/ML (ref 5–900)
PCO2 BLDA: 34.2 MM HG (ref 35–45)
PH BLDA: 7.45 PH UNITS (ref 7.35–7.45)
PHOSPHATE SERPL-MCNC: 3.9 MG/DL (ref 2.5–4.5)
PLATELET # BLD AUTO: 234 10*3/MM3 (ref 140–450)
PLATELET # BLD AUTO: 247 10*3/MM3 (ref 140–450)
PLATELET # BLD AUTO: 269 10*3/MM3 (ref 140–450)
PLATELET # BLD AUTO: 360 10*3/MM3 (ref 140–450)
PMV BLD AUTO: 10.2 FL (ref 6–12)
PMV BLD AUTO: 10.2 FL (ref 6–12)
PMV BLD AUTO: 10.5 FL (ref 6–12)
PMV BLD AUTO: 10.5 FL (ref 6–12)
PO2 BLDA: 74.6 MM HG (ref 83–108)
POLYCHROMASIA BLD QL SMEAR: ABNORMAL
POTASSIUM BLD-SCNC: 3.7 MMOL/L (ref 3.5–5.2)
POTASSIUM BLD-SCNC: 3.9 MMOL/L (ref 3.5–5.2)
POTASSIUM BLD-SCNC: 4.1 MMOL/L (ref 3.5–5.2)
POTASSIUM BLD-SCNC: 4.1 MMOL/L (ref 3.5–5.2)
PROT SERPL-MCNC: 6.4 G/DL (ref 6–8.5)
PROT SERPL-MCNC: 6.7 G/DL (ref 6–8.5)
PROT SERPL-MCNC: 7.1 G/DL (ref 6–8.5)
PROT SERPL-MCNC: 7.2 G/DL (ref 6–8.5)
RBC # BLD AUTO: 3.01 10*6/MM3 (ref 4.14–5.8)
RBC # BLD AUTO: 3.05 10*6/MM3 (ref 4.14–5.8)
RBC # BLD AUTO: 3.27 10*6/MM3 (ref 4.14–5.8)
RBC # BLD AUTO: 3.47 10*6/MM3 (ref 4.14–5.8)
SAO2 % BLDCOA: 93.6 % (ref 94–99)
SODIUM BLD-SCNC: 136 MMOL/L (ref 136–145)
SODIUM BLD-SCNC: 137 MMOL/L (ref 136–145)
SODIUM BLD-SCNC: 138 MMOL/L (ref 136–145)
SODIUM BLD-SCNC: 141 MMOL/L (ref 136–145)
STRESS TARGET HR: 127 BPM
TIBC SERPL-MCNC: 289 MCG/DL (ref 298–536)
TRANSFERRIN SERPL-MCNC: 194 MG/DL (ref 200–360)
TRIGL SERPL-MCNC: 57 MG/DL (ref 0–150)
TROPONIN T SERPL-MCNC: <0.01 NG/ML (ref 0–0.03)
TROPONIN T SERPL-MCNC: <0.01 NG/ML (ref 0–0.03)
TSH SERPL DL<=0.05 MIU/L-ACNC: 0.29 UIU/ML (ref 0.27–4.2)
VENTILATOR MODE: ABNORMAL
VIT B12 BLD-MCNC: 867 PG/ML (ref 211–946)
VLDLC SERPL-MCNC: 11.4 MG/DL
WBC MORPH BLD: NORMAL
WBC NRBC COR # BLD: 12.16 10*3/MM3 (ref 3.4–10.8)
WBC NRBC COR # BLD: 16.58 10*3/MM3 (ref 3.4–10.8)
WBC NRBC COR # BLD: 7.97 10*3/MM3 (ref 3.4–10.8)
WBC NRBC COR # BLD: 8.87 10*3/MM3 (ref 3.4–10.8)
WHOLE BLOOD HOLD SPECIMEN: NORMAL
WHOLE BLOOD HOLD SPECIMEN: NORMAL

## 2020-01-01 PROCEDURE — 82803 BLOOD GASES ANY COMBINATION: CPT

## 2020-01-01 PROCEDURE — 84484 ASSAY OF TROPONIN QUANT: CPT | Performed by: INTERNAL MEDICINE

## 2020-01-01 PROCEDURE — 71275 CT ANGIOGRAPHY CHEST: CPT

## 2020-01-01 PROCEDURE — 93325 DOPPLER ECHO COLOR FLOW MAPG: CPT | Performed by: INTERNAL MEDICINE

## 2020-01-01 PROCEDURE — 93321 DOPPLER ECHO F-UP/LMTD STD: CPT

## 2020-01-01 PROCEDURE — 93005 ELECTROCARDIOGRAM TRACING: CPT | Performed by: INTERNAL MEDICINE

## 2020-01-01 PROCEDURE — 99497 ADVNCD CARE PLAN 30 MIN: CPT | Performed by: CLINICAL NURSE SPECIALIST

## 2020-01-01 PROCEDURE — 82962 GLUCOSE BLOOD TEST: CPT

## 2020-01-01 PROCEDURE — 94640 AIRWAY INHALATION TREATMENT: CPT

## 2020-01-01 PROCEDURE — 94799 UNLISTED PULMONARY SVC/PX: CPT

## 2020-01-01 PROCEDURE — 0 IOPAMIDOL PER 1 ML: Performed by: PHYSICIAN ASSISTANT

## 2020-01-01 PROCEDURE — 80061 LIPID PANEL: CPT | Performed by: FAMILY MEDICINE

## 2020-01-01 PROCEDURE — 63710000001 INSULIN LISPRO (HUMAN) PER 5 UNITS: Performed by: INTERNAL MEDICINE

## 2020-01-01 PROCEDURE — 25010000002 MORPHINE SULFATE (PF) 2 MG/ML SOLUTION: Performed by: FAMILY MEDICINE

## 2020-01-01 PROCEDURE — 25010000002 FUROSEMIDE PER 20 MG: Performed by: FAMILY MEDICINE

## 2020-01-01 PROCEDURE — 99233 SBSQ HOSP IP/OBS HIGH 50: CPT | Performed by: INTERNAL MEDICINE

## 2020-01-01 PROCEDURE — 85379 FIBRIN DEGRADATION QUANT: CPT | Performed by: PHYSICIAN ASSISTANT

## 2020-01-01 PROCEDURE — 83735 ASSAY OF MAGNESIUM: CPT | Performed by: FAMILY MEDICINE

## 2020-01-01 PROCEDURE — 71045 X-RAY EXAM CHEST 1 VIEW: CPT

## 2020-01-01 PROCEDURE — 25010000002 PIPERACILLIN SOD-TAZOBACTAM PER 1 G: Performed by: FAMILY MEDICINE

## 2020-01-01 PROCEDURE — 71046 X-RAY EXAM CHEST 2 VIEWS: CPT

## 2020-01-01 PROCEDURE — 63710000001 INSULIN LISPRO (HUMAN) PER 5 UNITS: Performed by: FAMILY MEDICINE

## 2020-01-01 PROCEDURE — 84466 ASSAY OF TRANSFERRIN: CPT | Performed by: FAMILY MEDICINE

## 2020-01-01 PROCEDURE — 25010000002 MAGNESIUM SULFATE 2 GM/50ML SOLUTION: Performed by: FAMILY MEDICINE

## 2020-01-01 PROCEDURE — 82607 VITAMIN B-12: CPT | Performed by: FAMILY MEDICINE

## 2020-01-01 PROCEDURE — 93308 TTE F-UP OR LMTD: CPT | Performed by: INTERNAL MEDICINE

## 2020-01-01 PROCEDURE — 85007 BL SMEAR W/DIFF WBC COUNT: CPT

## 2020-01-01 PROCEDURE — 80053 COMPREHEN METABOLIC PANEL: CPT | Performed by: FAMILY MEDICINE

## 2020-01-01 PROCEDURE — 93325 DOPPLER ECHO COLOR FLOW MAPG: CPT

## 2020-01-01 PROCEDURE — 84443 ASSAY THYROID STIM HORMONE: CPT | Performed by: NURSE PRACTITIONER

## 2020-01-01 PROCEDURE — 80053 COMPREHEN METABOLIC PANEL: CPT

## 2020-01-01 PROCEDURE — 25010000002 FUROSEMIDE PER 20 MG: Performed by: PHYSICIAN ASSISTANT

## 2020-01-01 PROCEDURE — 99232 SBSQ HOSP IP/OBS MODERATE 35: CPT | Performed by: CLINICAL NURSE SPECIALIST

## 2020-01-01 PROCEDURE — 80053 COMPREHEN METABOLIC PANEL: CPT | Performed by: INTERNAL MEDICINE

## 2020-01-01 PROCEDURE — 99223 1ST HOSP IP/OBS HIGH 75: CPT | Performed by: INTERNAL MEDICINE

## 2020-01-01 PROCEDURE — 99233 SBSQ HOSP IP/OBS HIGH 50: CPT | Performed by: CLINICAL NURSE SPECIALIST

## 2020-01-01 PROCEDURE — 83540 ASSAY OF IRON: CPT | Performed by: FAMILY MEDICINE

## 2020-01-01 PROCEDURE — 99497 ADVNCD CARE PLAN 30 MIN: CPT | Performed by: INTERNAL MEDICINE

## 2020-01-01 PROCEDURE — 85025 COMPLETE CBC W/AUTO DIFF WBC: CPT | Performed by: FAMILY MEDICINE

## 2020-01-01 PROCEDURE — 25010000002 MORPHINE PER 10 MG: Performed by: FAMILY MEDICINE

## 2020-01-01 PROCEDURE — 36600 WITHDRAWAL OF ARTERIAL BLOOD: CPT

## 2020-01-01 PROCEDURE — 84484 ASSAY OF TROPONIN QUANT: CPT

## 2020-01-01 PROCEDURE — 82746 ASSAY OF FOLIC ACID SERUM: CPT | Performed by: FAMILY MEDICINE

## 2020-01-01 PROCEDURE — 83036 HEMOGLOBIN GLYCOSYLATED A1C: CPT | Performed by: INTERNAL MEDICINE

## 2020-01-01 PROCEDURE — 93010 ELECTROCARDIOGRAM REPORT: CPT | Performed by: INTERNAL MEDICINE

## 2020-01-01 PROCEDURE — 99285 EMERGENCY DEPT VISIT HI MDM: CPT

## 2020-01-01 PROCEDURE — 99223 1ST HOSP IP/OBS HIGH 75: CPT | Performed by: CLINICAL NURSE SPECIALIST

## 2020-01-01 PROCEDURE — 93308 TTE F-UP OR LMTD: CPT

## 2020-01-01 PROCEDURE — 93321 DOPPLER ECHO F-UP/LMTD STD: CPT | Performed by: INTERNAL MEDICINE

## 2020-01-01 PROCEDURE — 25010000002 FUROSEMIDE PER 20 MG: Performed by: INTERNAL MEDICINE

## 2020-01-01 PROCEDURE — 85025 COMPLETE CBC W/AUTO DIFF WBC: CPT | Performed by: INTERNAL MEDICINE

## 2020-01-01 PROCEDURE — 83735 ASSAY OF MAGNESIUM: CPT | Performed by: INTERNAL MEDICINE

## 2020-01-01 PROCEDURE — 85025 COMPLETE CBC W/AUTO DIFF WBC: CPT

## 2020-01-01 PROCEDURE — 25010000002 PERFLUTREN 6.52 MG/ML SUSPENSION: Performed by: FAMILY MEDICINE

## 2020-01-01 PROCEDURE — 25010000002 METHYLPREDNISOLONE PER 125 MG: Performed by: PHYSICIAN ASSISTANT

## 2020-01-01 PROCEDURE — 83880 ASSAY OF NATRIURETIC PEPTIDE: CPT

## 2020-01-01 PROCEDURE — 82728 ASSAY OF FERRITIN: CPT | Performed by: FAMILY MEDICINE

## 2020-01-01 PROCEDURE — 99232 SBSQ HOSP IP/OBS MODERATE 35: CPT | Performed by: INTERNAL MEDICINE

## 2020-01-01 PROCEDURE — 25010000002 MORPHINE PER 10 MG: Performed by: INTERNAL MEDICINE

## 2020-01-01 PROCEDURE — 84100 ASSAY OF PHOSPHORUS: CPT | Performed by: INTERNAL MEDICINE

## 2020-01-01 RX ORDER — ESCITALOPRAM OXALATE 10 MG/1
10 TABLET ORAL DAILY
COMMUNITY

## 2020-01-01 RX ORDER — SODIUM CHLORIDE 0.9 % (FLUSH) 0.9 %
10 SYRINGE (ML) INJECTION EVERY 12 HOURS SCHEDULED
Status: DISCONTINUED | OUTPATIENT
Start: 2020-01-01 | End: 2020-01-01 | Stop reason: HOSPADM

## 2020-01-01 RX ORDER — SUCRALFATE 1 G/1
1 TABLET ORAL
Status: DISCONTINUED | OUTPATIENT
Start: 2020-01-01 | End: 2020-01-01 | Stop reason: HOSPADM

## 2020-01-01 RX ORDER — LISINOPRIL AND HYDROCHLOROTHIAZIDE 25; 20 MG/1; MG/1
1 TABLET ORAL DAILY
COMMUNITY

## 2020-01-01 RX ORDER — MORPHINE SULFATE 2 MG/ML
2 INJECTION, SOLUTION INTRAMUSCULAR; INTRAVENOUS
Status: DISCONTINUED | OUTPATIENT
Start: 2020-01-01 | End: 2020-01-01 | Stop reason: HOSPADM

## 2020-01-01 RX ORDER — ALPRAZOLAM 0.5 MG/1
1 TABLET ORAL EVERY 6 HOURS PRN
Status: DISCONTINUED | OUTPATIENT
Start: 2020-01-01 | End: 2020-01-01

## 2020-01-01 RX ORDER — FUROSEMIDE 20 MG/1
20 TABLET ORAL DAILY
Qty: 30 TABLET | Refills: 0 | Status: SHIPPED | OUTPATIENT
Start: 2020-01-01

## 2020-01-01 RX ORDER — ATORVASTATIN CALCIUM 40 MG/1
40 TABLET, FILM COATED ORAL DAILY
Status: DISCONTINUED | OUTPATIENT
Start: 2020-01-01 | End: 2020-01-01 | Stop reason: HOSPADM

## 2020-01-01 RX ORDER — BUSPIRONE HYDROCHLORIDE 7.5 MG/1
7.5 TABLET ORAL 2 TIMES DAILY
COMMUNITY

## 2020-01-01 RX ORDER — FUROSEMIDE 10 MG/ML
40 INJECTION INTRAMUSCULAR; INTRAVENOUS ONCE
Status: COMPLETED | OUTPATIENT
Start: 2020-01-01 | End: 2020-01-01

## 2020-01-01 RX ORDER — FLUTICASONE PROPIONATE 50 MCG
1 SPRAY, SUSPENSION (ML) NASAL DAILY
COMMUNITY

## 2020-01-01 RX ORDER — IPRATROPIUM BROMIDE AND ALBUTEROL SULFATE 2.5; .5 MG/3ML; MG/3ML
3 SOLUTION RESPIRATORY (INHALATION)
COMMUNITY

## 2020-01-01 RX ORDER — IPRATROPIUM BROMIDE AND ALBUTEROL SULFATE 2.5; .5 MG/3ML; MG/3ML
3 SOLUTION RESPIRATORY (INHALATION) ONCE
Status: COMPLETED | OUTPATIENT
Start: 2020-01-01 | End: 2020-01-01

## 2020-01-01 RX ORDER — DEXTROSE MONOHYDRATE 25 G/50ML
25 INJECTION, SOLUTION INTRAVENOUS
Status: DISCONTINUED | OUTPATIENT
Start: 2020-01-01 | End: 2020-01-01 | Stop reason: HOSPADM

## 2020-01-01 RX ORDER — LORAZEPAM 0.5 MG/1
0.5 TABLET ORAL EVERY 4 HOURS PRN
Status: DISCONTINUED | OUTPATIENT
Start: 2020-01-01 | End: 2020-01-01 | Stop reason: HOSPADM

## 2020-01-01 RX ORDER — LANSOPRAZOLE
30 KIT
Status: DISCONTINUED | OUTPATIENT
Start: 2020-01-01 | End: 2020-01-01

## 2020-01-01 RX ORDER — FUROSEMIDE 10 MG/ML
20 INJECTION INTRAMUSCULAR; INTRAVENOUS EVERY 12 HOURS
Status: DISCONTINUED | OUTPATIENT
Start: 2020-01-01 | End: 2020-01-01 | Stop reason: HOSPADM

## 2020-01-01 RX ORDER — GUAIFENESIN AND DEXTROMETHORPHAN HYDROBROMIDE 600; 30 MG/1; MG/1
1 TABLET, EXTENDED RELEASE ORAL 2 TIMES DAILY
Status: DISCONTINUED | OUTPATIENT
Start: 2020-01-01 | End: 2020-01-01 | Stop reason: HOSPADM

## 2020-01-01 RX ORDER — LANOLIN ALCOHOL/MO/W.PET/CERES
400 CREAM (GRAM) TOPICAL DAILY
Status: DISCONTINUED | OUTPATIENT
Start: 2020-01-01 | End: 2020-01-01 | Stop reason: HOSPADM

## 2020-01-01 RX ORDER — IPRATROPIUM BROMIDE AND ALBUTEROL SULFATE 2.5; .5 MG/3ML; MG/3ML
3 SOLUTION RESPIRATORY (INHALATION)
Status: DISCONTINUED | OUTPATIENT
Start: 2020-01-01 | End: 2020-01-01 | Stop reason: HOSPADM

## 2020-01-01 RX ORDER — MAGNESIUM SULFATE HEPTAHYDRATE 40 MG/ML
2 INJECTION, SOLUTION INTRAVENOUS ONCE
Status: COMPLETED | OUTPATIENT
Start: 2020-01-01 | End: 2020-01-01

## 2020-01-01 RX ORDER — ONDANSETRON 4 MG/1
8 TABLET, FILM COATED ORAL EVERY 8 HOURS PRN
Status: DISCONTINUED | OUTPATIENT
Start: 2020-01-01 | End: 2020-01-01 | Stop reason: HOSPADM

## 2020-01-01 RX ORDER — SUCRALFATE 1 G/1
1 TABLET ORAL 4 TIMES DAILY
Status: DISCONTINUED | OUTPATIENT
Start: 2020-01-01 | End: 2020-01-01

## 2020-01-01 RX ORDER — NICOTINE POLACRILEX 4 MG
15 LOZENGE BUCCAL
Status: DISCONTINUED | OUTPATIENT
Start: 2020-01-01 | End: 2020-01-01 | Stop reason: HOSPADM

## 2020-01-01 RX ORDER — FAMOTIDINE 20 MG/1
20 TABLET, FILM COATED ORAL 2 TIMES DAILY
Status: DISCONTINUED | OUTPATIENT
Start: 2020-01-01 | End: 2020-01-01 | Stop reason: HOSPADM

## 2020-01-01 RX ORDER — LORAZEPAM 0.5 MG/1
0.5 TABLET ORAL EVERY 4 HOURS PRN
Qty: 18 TABLET | Refills: 0 | Status: SHIPPED | OUTPATIENT
Start: 2020-01-01

## 2020-01-01 RX ORDER — SODIUM CHLORIDE 0.9 % (FLUSH) 0.9 %
10 SYRINGE (ML) INJECTION AS NEEDED
Status: DISCONTINUED | OUTPATIENT
Start: 2020-01-01 | End: 2020-01-01 | Stop reason: HOSPADM

## 2020-01-01 RX ORDER — METHYLPREDNISOLONE SODIUM SUCCINATE 125 MG/2ML
125 INJECTION, POWDER, LYOPHILIZED, FOR SOLUTION INTRAMUSCULAR; INTRAVENOUS ONCE
Status: COMPLETED | OUTPATIENT
Start: 2020-01-01 | End: 2020-01-01

## 2020-01-01 RX ORDER — FAMOTIDINE 10 MG/ML
20 INJECTION, SOLUTION INTRAVENOUS 2 TIMES DAILY
Status: DISCONTINUED | OUTPATIENT
Start: 2020-01-01 | End: 2020-01-01 | Stop reason: ALTCHOICE

## 2020-01-01 RX ORDER — MORPHINE SULFATE 100 MG/5ML
10 SOLUTION ORAL
Qty: 30 ML | Refills: 0 | Status: SHIPPED | OUTPATIENT
Start: 2020-01-01

## 2020-01-01 RX ORDER — LORAZEPAM 0.5 MG/1
0.5 TABLET ORAL EVERY 8 HOURS PRN
COMMUNITY

## 2020-01-01 RX ORDER — ACETAMINOPHEN 325 MG/1
650 TABLET ORAL EVERY 4 HOURS PRN
Status: DISCONTINUED | OUTPATIENT
Start: 2020-01-01 | End: 2020-01-01 | Stop reason: HOSPADM

## 2020-01-01 RX ORDER — HYDROXYZINE HYDROCHLORIDE 10 MG/1
10 TABLET, FILM COATED ORAL 4 TIMES DAILY PRN
Status: DISCONTINUED | OUTPATIENT
Start: 2020-01-01 | End: 2020-01-01 | Stop reason: HOSPADM

## 2020-01-01 RX ORDER — LOPERAMIDE HYDROCHLORIDE 2 MG/1
2 CAPSULE ORAL 4 TIMES DAILY PRN
Status: DISCONTINUED | OUTPATIENT
Start: 2020-01-01 | End: 2020-01-01 | Stop reason: HOSPADM

## 2020-01-01 RX ORDER — BUDESONIDE AND FORMOTEROL FUMARATE DIHYDRATE 160; 4.5 UG/1; UG/1
2 AEROSOL RESPIRATORY (INHALATION)
Status: DISCONTINUED | OUTPATIENT
Start: 2020-01-01 | End: 2020-01-01 | Stop reason: HOSPADM

## 2020-01-01 RX ORDER — LORAZEPAM 0.5 MG/1
0.5 TABLET ORAL EVERY 6 HOURS PRN
Status: DISCONTINUED | OUTPATIENT
Start: 2020-01-01 | End: 2020-01-01

## 2020-01-01 RX ADMIN — SUCRALFATE 1 G: 1 TABLET ORAL at 17:19

## 2020-01-01 RX ADMIN — SODIUM CHLORIDE, PRESERVATIVE FREE 10 ML: 5 INJECTION INTRAVENOUS at 20:21

## 2020-01-01 RX ADMIN — ATORVASTATIN CALCIUM 40 MG: 40 TABLET, FILM COATED ORAL at 08:08

## 2020-01-01 RX ADMIN — IPRATROPIUM BROMIDE AND ALBUTEROL SULFATE 3 ML: 2.5; .5 SOLUTION RESPIRATORY (INHALATION) at 14:56

## 2020-01-01 RX ADMIN — ALPRAZOLAM 1 MG: 0.5 TABLET ORAL at 20:25

## 2020-01-01 RX ADMIN — LORAZEPAM 0.5 MG: 0.5 TABLET ORAL at 17:15

## 2020-01-01 RX ADMIN — MORPHINE SULFATE 3 MG: 4 INJECTION, SOLUTION INTRAMUSCULAR; INTRAVENOUS at 12:33

## 2020-01-01 RX ADMIN — METOPROLOL TARTRATE 25 MG: 25 TABLET, FILM COATED ORAL at 09:22

## 2020-01-01 RX ADMIN — MORPHINE SULFATE 2 MG: 2 INJECTION, SOLUTION INTRAMUSCULAR; INTRAVENOUS at 12:26

## 2020-01-01 RX ADMIN — SODIUM CHLORIDE, PRESERVATIVE FREE 10 ML: 5 INJECTION INTRAVENOUS at 08:09

## 2020-01-01 RX ADMIN — TAZOBACTAM SODIUM AND PIPERACILLIN SODIUM 3.38 G: 375; 3 INJECTION, SOLUTION INTRAVENOUS at 05:06

## 2020-01-01 RX ADMIN — SUCRALFATE 1 G: 1 TABLET ORAL at 10:31

## 2020-01-01 RX ADMIN — IPRATROPIUM BROMIDE AND ALBUTEROL SULFATE 3 ML: 2.5; .5 SOLUTION RESPIRATORY (INHALATION) at 10:17

## 2020-01-01 RX ADMIN — LINAGLIPTIN 5 MG: 5 TABLET, FILM COATED ORAL at 09:23

## 2020-01-01 RX ADMIN — SUCRALFATE 1 G: 1 TABLET ORAL at 16:55

## 2020-01-01 RX ADMIN — MORPHINE SULFATE 2 MG: 2 INJECTION, SOLUTION INTRAMUSCULAR; INTRAVENOUS at 17:15

## 2020-01-01 RX ADMIN — IPRATROPIUM BROMIDE AND ALBUTEROL SULFATE 3 ML: 2.5; .5 SOLUTION RESPIRATORY (INHALATION) at 10:34

## 2020-01-01 RX ADMIN — LOPERAMIDE HYDROCHLORIDE 2 MG: 2 CAPSULE ORAL at 08:26

## 2020-01-01 RX ADMIN — TAZOBACTAM SODIUM AND PIPERACILLIN SODIUM 3.38 G: 375; 3 INJECTION, SOLUTION INTRAVENOUS at 04:14

## 2020-01-01 RX ADMIN — TAZOBACTAM SODIUM AND PIPERACILLIN SODIUM 3.38 G: 375; 3 INJECTION, SOLUTION INTRAVENOUS at 20:13

## 2020-01-01 RX ADMIN — METOPROLOL TARTRATE 25 MG: 25 TABLET, FILM COATED ORAL at 20:13

## 2020-01-01 RX ADMIN — METOPROLOL TARTRATE 25 MG: 25 TABLET, FILM COATED ORAL at 20:03

## 2020-01-01 RX ADMIN — SUCRALFATE 1 G: 1 TABLET ORAL at 11:53

## 2020-01-01 RX ADMIN — INSULIN LISPRO 5 UNITS: 100 INJECTION, SOLUTION INTRAVENOUS; SUBCUTANEOUS at 11:53

## 2020-01-01 RX ADMIN — TAZOBACTAM SODIUM AND PIPERACILLIN SODIUM 3.38 G: 375; 3 INJECTION, SOLUTION INTRAVENOUS at 20:25

## 2020-01-01 RX ADMIN — LORAZEPAM 0.5 MG: 0.5 TABLET ORAL at 16:16

## 2020-01-01 RX ADMIN — Medication 400 MCG: at 08:50

## 2020-01-01 RX ADMIN — MORPHINE SULFATE 3 MG: 4 INJECTION, SOLUTION INTRAMUSCULAR; INTRAVENOUS at 22:47

## 2020-01-01 RX ADMIN — INSULIN LISPRO 2 UNITS: 100 INJECTION, SOLUTION INTRAVENOUS; SUBCUTANEOUS at 20:13

## 2020-01-01 RX ADMIN — SUCRALFATE 1 G: 1 TABLET ORAL at 17:17

## 2020-01-01 RX ADMIN — BUDESONIDE AND FORMOTEROL FUMARATE DIHYDRATE 2 PUFF: 160; 4.5 AEROSOL RESPIRATORY (INHALATION) at 06:35

## 2020-01-01 RX ADMIN — IPRATROPIUM BROMIDE AND ALBUTEROL SULFATE 3 ML: 2.5; .5 SOLUTION RESPIRATORY (INHALATION) at 14:24

## 2020-01-01 RX ADMIN — SUCRALFATE 1 G: 1 TABLET ORAL at 12:26

## 2020-01-01 RX ADMIN — INSULIN LISPRO 3 UNITS: 100 INJECTION, SOLUTION INTRAVENOUS; SUBCUTANEOUS at 12:35

## 2020-01-01 RX ADMIN — IPRATROPIUM BROMIDE AND ALBUTEROL SULFATE 3 ML: 2.5; .5 SOLUTION RESPIRATORY (INHALATION) at 23:41

## 2020-01-01 RX ADMIN — Medication 400 MCG: at 08:09

## 2020-01-01 RX ADMIN — ALPRAZOLAM 1 MG: 0.5 TABLET ORAL at 10:30

## 2020-01-01 RX ADMIN — SUCRALFATE 1 G: 1 TABLET ORAL at 17:16

## 2020-01-01 RX ADMIN — INSULIN LISPRO 2 UNITS: 100 INJECTION, SOLUTION INTRAVENOUS; SUBCUTANEOUS at 20:25

## 2020-01-01 RX ADMIN — INSULIN LISPRO 2 UNITS: 100 INJECTION, SOLUTION INTRAVENOUS; SUBCUTANEOUS at 08:08

## 2020-01-01 RX ADMIN — INSULIN LISPRO 3 UNITS: 100 INJECTION, SOLUTION INTRAVENOUS; SUBCUTANEOUS at 09:23

## 2020-01-01 RX ADMIN — GUAIFENESIN AND DEXTROMETHORPHAN HYDROBROMIDE 1 TABLET: 600; 30 TABLET, EXTENDED RELEASE ORAL at 08:08

## 2020-01-01 RX ADMIN — BUDESONIDE AND FORMOTEROL FUMARATE DIHYDRATE 2 PUFF: 160; 4.5 AEROSOL RESPIRATORY (INHALATION) at 19:39

## 2020-01-01 RX ADMIN — LINAGLIPTIN 5 MG: 5 TABLET, FILM COATED ORAL at 08:10

## 2020-01-01 RX ADMIN — MORPHINE SULFATE 3 MG: 4 INJECTION, SOLUTION INTRAMUSCULAR; INTRAVENOUS at 15:23

## 2020-01-01 RX ADMIN — SODIUM CHLORIDE, PRESERVATIVE FREE 10 ML: 5 INJECTION INTRAVENOUS at 20:26

## 2020-01-01 RX ADMIN — IPRATROPIUM BROMIDE AND ALBUTEROL SULFATE 3 ML: 2.5; .5 SOLUTION RESPIRATORY (INHALATION) at 06:35

## 2020-01-01 RX ADMIN — FAMOTIDINE 20 MG: 10 INJECTION, SOLUTION INTRAVENOUS at 20:14

## 2020-01-01 RX ADMIN — METHYLPREDNISOLONE SODIUM SUCCINATE 125 MG: 125 INJECTION, POWDER, FOR SOLUTION INTRAMUSCULAR; INTRAVENOUS at 01:37

## 2020-01-01 RX ADMIN — FAMOTIDINE 20 MG: 10 INJECTION, SOLUTION INTRAVENOUS at 13:01

## 2020-01-01 RX ADMIN — BUDESONIDE AND FORMOTEROL FUMARATE DIHYDRATE 2 PUFF: 160; 4.5 AEROSOL RESPIRATORY (INHALATION) at 06:30

## 2020-01-01 RX ADMIN — IPRATROPIUM BROMIDE AND ALBUTEROL SULFATE 3 ML: 2.5; .5 SOLUTION RESPIRATORY (INHALATION) at 04:46

## 2020-01-01 RX ADMIN — INSULIN LISPRO 2 UNITS: 100 INJECTION, SOLUTION INTRAVENOUS; SUBCUTANEOUS at 12:38

## 2020-01-01 RX ADMIN — METOPROLOL TARTRATE 25 MG: 25 TABLET, FILM COATED ORAL at 08:49

## 2020-01-01 RX ADMIN — FUROSEMIDE 20 MG: 10 INJECTION, SOLUTION INTRAVENOUS at 08:19

## 2020-01-01 RX ADMIN — SODIUM CHLORIDE, PRESERVATIVE FREE 10 ML: 5 INJECTION INTRAVENOUS at 20:20

## 2020-01-01 RX ADMIN — MORPHINE SULFATE 3 MG: 4 INJECTION, SOLUTION INTRAMUSCULAR; INTRAVENOUS at 10:43

## 2020-01-01 RX ADMIN — INSULIN LISPRO 2 UNITS: 100 INJECTION, SOLUTION INTRAVENOUS; SUBCUTANEOUS at 11:52

## 2020-01-01 RX ADMIN — MAGNESIUM SULFATE HEPTAHYDRATE 2 G: 40 INJECTION, SOLUTION INTRAVENOUS at 09:29

## 2020-01-01 RX ADMIN — FAMOTIDINE 20 MG: 10 INJECTION, SOLUTION INTRAVENOUS at 20:03

## 2020-01-01 RX ADMIN — LINAGLIPTIN 5 MG: 5 TABLET, FILM COATED ORAL at 08:49

## 2020-01-01 RX ADMIN — SODIUM CHLORIDE, PRESERVATIVE FREE 10 ML: 5 INJECTION INTRAVENOUS at 20:04

## 2020-01-01 RX ADMIN — ALPRAZOLAM 1 MG: 0.5 TABLET ORAL at 20:03

## 2020-01-01 RX ADMIN — INSULIN LISPRO 5 UNITS: 100 INJECTION, SOLUTION INTRAVENOUS; SUBCUTANEOUS at 17:23

## 2020-01-01 RX ADMIN — IPRATROPIUM BROMIDE AND ALBUTEROL SULFATE 3 ML: 2.5; .5 SOLUTION RESPIRATORY (INHALATION) at 01:24

## 2020-01-01 RX ADMIN — IPRATROPIUM BROMIDE AND ALBUTEROL SULFATE 3 ML: 2.5; .5 SOLUTION RESPIRATORY (INHALATION) at 19:38

## 2020-01-01 RX ADMIN — IPRATROPIUM BROMIDE AND ALBUTEROL SULFATE 3 ML: 2.5; .5 SOLUTION RESPIRATORY (INHALATION) at 14:37

## 2020-01-01 RX ADMIN — TAZOBACTAM SODIUM AND PIPERACILLIN SODIUM 3.38 G: 375; 3 INJECTION, SOLUTION INTRAVENOUS at 12:26

## 2020-01-01 RX ADMIN — SODIUM CHLORIDE, PRESERVATIVE FREE 10 ML: 5 INJECTION INTRAVENOUS at 09:24

## 2020-01-01 RX ADMIN — BUDESONIDE AND FORMOTEROL FUMARATE DIHYDRATE 2 PUFF: 160; 4.5 AEROSOL RESPIRATORY (INHALATION) at 08:27

## 2020-01-01 RX ADMIN — ALPRAZOLAM 1 MG: 0.5 TABLET ORAL at 13:26

## 2020-01-01 RX ADMIN — ALPRAZOLAM 1 MG: 0.5 TABLET ORAL at 13:36

## 2020-01-01 RX ADMIN — IPRATROPIUM BROMIDE AND ALBUTEROL SULFATE 3 ML: 2.5; .5 SOLUTION RESPIRATORY (INHALATION) at 20:36

## 2020-01-01 RX ADMIN — LINAGLIPTIN 5 MG: 5 TABLET, FILM COATED ORAL at 08:09

## 2020-01-01 RX ADMIN — SUCRALFATE 1 G: 1 TABLET ORAL at 20:25

## 2020-01-01 RX ADMIN — Medication 400 MCG: at 08:08

## 2020-01-01 RX ADMIN — FUROSEMIDE 40 MG: 10 INJECTION, SOLUTION INTRAMUSCULAR; INTRAVENOUS at 04:20

## 2020-01-01 RX ADMIN — LORAZEPAM 0.5 MG: 0.5 TABLET ORAL at 13:17

## 2020-01-01 RX ADMIN — LORAZEPAM 0.5 MG: 0.5 TABLET ORAL at 05:23

## 2020-01-01 RX ADMIN — ATORVASTATIN CALCIUM 40 MG: 40 TABLET, FILM COATED ORAL at 08:50

## 2020-01-01 RX ADMIN — SUCRALFATE 1 G: 1 TABLET ORAL at 09:22

## 2020-01-01 RX ADMIN — ATORVASTATIN CALCIUM 40 MG: 40 TABLET, FILM COATED ORAL at 08:09

## 2020-01-01 RX ADMIN — ALPRAZOLAM 1 MG: 0.5 TABLET ORAL at 07:50

## 2020-01-01 RX ADMIN — FAMOTIDINE 20 MG: 10 INJECTION, SOLUTION INTRAVENOUS at 08:08

## 2020-01-01 RX ADMIN — METOPROLOL TARTRATE 25 MG: 25 TABLET, FILM COATED ORAL at 20:26

## 2020-01-01 RX ADMIN — MORPHINE SULFATE 3 MG: 4 INJECTION, SOLUTION INTRAMUSCULAR; INTRAVENOUS at 07:51

## 2020-01-01 RX ADMIN — IPRATROPIUM BROMIDE AND ALBUTEROL SULFATE 3 ML: 2.5; .5 SOLUTION RESPIRATORY (INHALATION) at 14:38

## 2020-01-01 RX ADMIN — IPRATROPIUM BROMIDE AND ALBUTEROL SULFATE 3 ML: 2.5; .5 SOLUTION RESPIRATORY (INHALATION) at 10:29

## 2020-01-01 RX ADMIN — MORPHINE SULFATE 3 MG: 4 INJECTION, SOLUTION INTRAMUSCULAR; INTRAVENOUS at 03:57

## 2020-01-01 RX ADMIN — METOPROLOL TARTRATE 25 MG: 25 TABLET, FILM COATED ORAL at 11:53

## 2020-01-01 RX ADMIN — FAMOTIDINE 20 MG: 10 INJECTION, SOLUTION INTRAVENOUS at 08:49

## 2020-01-01 RX ADMIN — GUAIFENESIN AND DEXTROMETHORPHAN HYDROBROMIDE 1 TABLET: 600; 30 TABLET, EXTENDED RELEASE ORAL at 20:14

## 2020-01-01 RX ADMIN — Medication 400 MCG: at 08:10

## 2020-01-01 RX ADMIN — SUCRALFATE 1 G: 1 TABLET ORAL at 11:52

## 2020-01-01 RX ADMIN — SUCRALFATE 1 G: 1 TABLET ORAL at 20:03

## 2020-01-01 RX ADMIN — SUCRALFATE 1 G: 1 TABLET ORAL at 07:51

## 2020-01-01 RX ADMIN — LOPERAMIDE HYDROCHLORIDE 2 MG: 2 CAPSULE ORAL at 07:05

## 2020-01-01 RX ADMIN — METOPROLOL TARTRATE 25 MG: 25 TABLET, FILM COATED ORAL at 08:09

## 2020-01-01 RX ADMIN — BUDESONIDE AND FORMOTEROL FUMARATE DIHYDRATE 2 PUFF: 160; 4.5 AEROSOL RESPIRATORY (INHALATION) at 07:31

## 2020-01-01 RX ADMIN — INSULIN LISPRO 4 UNITS: 100 INJECTION, SOLUTION INTRAVENOUS; SUBCUTANEOUS at 20:32

## 2020-01-01 RX ADMIN — PERFLUTREN 8.48 MG: 6.52 INJECTION, SUSPENSION INTRAVENOUS at 16:18

## 2020-01-01 RX ADMIN — FUROSEMIDE 40 MG: 10 INJECTION, SOLUTION INTRAVENOUS at 11:42

## 2020-01-01 RX ADMIN — FAMOTIDINE 20 MG: 10 INJECTION, SOLUTION INTRAVENOUS at 22:10

## 2020-01-01 RX ADMIN — MORPHINE SULFATE 2 MG: 2 INJECTION, SOLUTION INTRAMUSCULAR; INTRAVENOUS at 05:14

## 2020-01-01 RX ADMIN — MAGNESIUM SULFATE HEPTAHYDRATE 2 G: 40 INJECTION, SOLUTION INTRAVENOUS at 18:26

## 2020-01-01 RX ADMIN — INSULIN LISPRO 2 UNITS: 100 INJECTION, SOLUTION INTRAVENOUS; SUBCUTANEOUS at 17:15

## 2020-01-01 RX ADMIN — SUCRALFATE 1 G: 1 TABLET ORAL at 10:55

## 2020-01-01 RX ADMIN — FAMOTIDINE 20 MG: 20 TABLET, FILM COATED ORAL at 13:55

## 2020-01-01 RX ADMIN — MORPHINE SULFATE 2 MG: 2 INJECTION, SOLUTION INTRAMUSCULAR; INTRAVENOUS at 16:52

## 2020-01-01 RX ADMIN — ATORVASTATIN CALCIUM 40 MG: 40 TABLET, FILM COATED ORAL at 08:10

## 2020-01-01 RX ADMIN — TAZOBACTAM SODIUM AND PIPERACILLIN SODIUM 3.38 G: 375; 3 INJECTION, SOLUTION INTRAVENOUS at 05:45

## 2020-01-01 RX ADMIN — SODIUM CHLORIDE, PRESERVATIVE FREE 10 ML: 5 INJECTION INTRAVENOUS at 08:10

## 2020-01-01 RX ADMIN — ATORVASTATIN CALCIUM 40 MG: 40 TABLET, FILM COATED ORAL at 09:22

## 2020-01-01 RX ADMIN — IOPAMIDOL 89 ML: 755 INJECTION, SOLUTION INTRAVENOUS at 02:57

## 2020-01-01 RX ADMIN — HYDROXYZINE HYDROCHLORIDE 10 MG: 10 TABLET ORAL at 05:35

## 2020-01-01 RX ADMIN — ALPRAZOLAM 1 MG: 0.5 TABLET ORAL at 07:19

## 2020-01-01 RX ADMIN — SUCRALFATE 1 G: 1 TABLET ORAL at 08:09

## 2020-01-01 RX ADMIN — TAZOBACTAM SODIUM AND PIPERACILLIN SODIUM 3.38 G: 375; 3 INJECTION, SOLUTION INTRAVENOUS at 15:57

## 2020-01-01 RX ADMIN — SUCRALFATE 1 G: 1 TABLET ORAL at 20:13

## 2020-01-01 RX ADMIN — ONDANSETRON 8 MG: 4 TABLET, FILM COATED ORAL at 12:32

## 2020-01-01 RX ADMIN — IPRATROPIUM BROMIDE AND ALBUTEROL SULFATE 3 ML: 2.5; .5 SOLUTION RESPIRATORY (INHALATION) at 07:30

## 2020-01-01 RX ADMIN — LORAZEPAM 0.5 MG: 0.5 TABLET ORAL at 11:00

## 2020-01-01 RX ADMIN — SUCRALFATE 1 G: 1 TABLET ORAL at 08:08

## 2020-01-01 RX ADMIN — TAZOBACTAM SODIUM AND PIPERACILLIN SODIUM 3.38 G: 375; 3 INJECTION, SOLUTION INTRAVENOUS at 11:53

## 2020-01-01 RX ADMIN — TAZOBACTAM SODIUM AND PIPERACILLIN SODIUM 3.38 G: 375; 3 INJECTION, SOLUTION INTRAVENOUS at 05:14

## 2020-01-01 RX ADMIN — LORAZEPAM 0.5 MG: 0.5 TABLET ORAL at 03:00

## 2020-01-01 RX ADMIN — FAMOTIDINE 20 MG: 10 INJECTION, SOLUTION INTRAVENOUS at 08:09

## 2020-01-01 RX ADMIN — MORPHINE SULFATE 2 MG: 2 INJECTION, SOLUTION INTRAMUSCULAR; INTRAVENOUS at 11:00

## 2020-01-01 RX ADMIN — SUCRALFATE 1 G: 1 TABLET ORAL at 08:50

## 2020-01-01 RX ADMIN — ALPRAZOLAM 1 MG: 0.5 TABLET ORAL at 04:14

## 2020-01-01 RX ADMIN — LINAGLIPTIN 5 MG: 5 TABLET, FILM COATED ORAL at 08:08

## 2020-01-01 RX ADMIN — SODIUM CHLORIDE, PRESERVATIVE FREE 10 ML: 5 INJECTION INTRAVENOUS at 08:50

## 2020-01-01 RX ADMIN — METOPROLOL TARTRATE 25 MG: 25 TABLET, FILM COATED ORAL at 08:08

## 2020-01-01 RX ADMIN — TAZOBACTAM SODIUM AND PIPERACILLIN SODIUM 3.38 G: 375; 3 INJECTION, SOLUTION INTRAVENOUS at 20:03

## 2020-01-01 RX ADMIN — TAZOBACTAM SODIUM AND PIPERACILLIN SODIUM 3.38 G: 375; 3 INJECTION, SOLUTION INTRAVENOUS at 19:59

## 2020-01-01 RX ADMIN — FUROSEMIDE 20 MG: 10 INJECTION, SOLUTION INTRAVENOUS at 08:08

## 2020-01-01 RX ADMIN — LORAZEPAM 0.5 MG: 0.5 TABLET ORAL at 08:11

## 2020-01-01 RX ADMIN — GUAIFENESIN AND DEXTROMETHORPHAN HYDROBROMIDE 1 TABLET: 600; 30 TABLET, EXTENDED RELEASE ORAL at 08:49

## 2020-01-01 RX ADMIN — HYDROXYZINE HYDROCHLORIDE 10 MG: 10 TABLET ORAL at 12:54

## 2020-01-01 RX ADMIN — SUCRALFATE 1 G: 1 TABLET ORAL at 20:14

## 2020-01-01 RX ADMIN — IPRATROPIUM BROMIDE AND ALBUTEROL SULFATE 3 ML: 2.5; .5 SOLUTION RESPIRATORY (INHALATION) at 10:12

## 2020-01-01 RX ADMIN — BUDESONIDE AND FORMOTEROL FUMARATE DIHYDRATE 2 PUFF: 160; 4.5 AEROSOL RESPIRATORY (INHALATION) at 20:41

## 2020-01-01 RX ADMIN — IPRATROPIUM BROMIDE AND ALBUTEROL SULFATE 3 ML: 2.5; .5 SOLUTION RESPIRATORY (INHALATION) at 10:11

## 2020-01-01 RX ADMIN — TAZOBACTAM SODIUM AND PIPERACILLIN SODIUM 3.38 G: 375; 3 INJECTION, SOLUTION INTRAVENOUS at 12:39

## 2020-01-01 RX ADMIN — MORPHINE SULFATE 2 MG: 2 INJECTION, SOLUTION INTRAMUSCULAR; INTRAVENOUS at 08:08

## 2020-01-01 RX ADMIN — IPRATROPIUM BROMIDE AND ALBUTEROL SULFATE 3 ML: 2.5; .5 SOLUTION RESPIRATORY (INHALATION) at 06:30

## 2020-01-01 RX ADMIN — INSULIN LISPRO 4 UNITS: 100 INJECTION, SOLUTION INTRAVENOUS; SUBCUTANEOUS at 17:19

## 2020-01-01 RX ADMIN — MORPHINE SULFATE 2 MG: 2 INJECTION, SOLUTION INTRAMUSCULAR; INTRAVENOUS at 08:48

## 2020-01-01 RX ADMIN — Medication 400 MCG: at 09:22

## 2020-01-01 RX ADMIN — MORPHINE SULFATE 2 MG: 2 INJECTION, SOLUTION INTRAMUSCULAR; INTRAVENOUS at 02:13

## 2020-01-01 RX ADMIN — FUROSEMIDE 20 MG: 10 INJECTION, SOLUTION INTRAVENOUS at 08:09

## 2020-01-03 ENCOUNTER — HOSPITAL ENCOUNTER (OUTPATIENT)
Dept: INFUSION THERAPY | Age: 70
Discharge: HOME | End: 2020-01-03
Payer: MEDICARE

## 2020-01-03 VITALS
OXYGEN SATURATION: 98 % | DIASTOLIC BLOOD PRESSURE: 78 MMHG | SYSTOLIC BLOOD PRESSURE: 148 MMHG | RESPIRATION RATE: 20 BRPM | TEMPERATURE: 98.1 F | HEART RATE: 67 BPM

## 2020-01-03 DIAGNOSIS — C34.32 MALIGNANT NEOPLASM OF LOWER LOBE, LEFT BRONCHUS OR LUNG (HCC): Primary | ICD-10-CM

## 2020-01-03 DIAGNOSIS — C34.90 NON-SMALL CELL LUNG CANCER, UNSPECIFIED LATERALITY (HCC): ICD-10-CM

## 2020-01-03 PROCEDURE — 36415 COLL VENOUS BLD VENIPUNCTURE: CPT

## 2020-01-03 PROCEDURE — 6360000002 HC RX W HCPCS: Performed by: NURSE PRACTITIONER

## 2020-01-03 PROCEDURE — 96372 THER/PROPH/DIAG INJ SC/IM: CPT

## 2020-01-03 PROCEDURE — 80053 COMPREHEN METABOLIC PANEL: CPT

## 2020-01-03 PROCEDURE — 2580000003 HC RX 258: Performed by: NURSE PRACTITIONER

## 2020-01-03 PROCEDURE — 96367 TX/PROPH/DG ADDL SEQ IV INF: CPT

## 2020-01-03 PROCEDURE — 96375 TX/PRO/DX INJ NEW DRUG ADDON: CPT

## 2020-01-03 PROCEDURE — 84443 ASSAY THYROID STIM HORMONE: CPT

## 2020-01-03 PROCEDURE — 96413 CHEMO IV INFUSION 1 HR: CPT

## 2020-01-03 PROCEDURE — 96417 CHEMO IV INFUS EACH ADDL SEQ: CPT

## 2020-01-03 PROCEDURE — 96374 THER/PROPH/DIAG INJ IV PUSH: CPT

## 2020-01-03 PROCEDURE — 85025 COMPLETE CBC W/AUTO DIFF WBC: CPT

## 2020-01-03 RX ORDER — CYANOCOBALAMIN 1000 UG/ML
1000 INJECTION INTRAMUSCULAR; SUBCUTANEOUS ONCE
Status: COMPLETED | OUTPATIENT
Start: 2020-01-03 | End: 2020-01-03

## 2020-01-03 RX ORDER — HEPARIN SODIUM (PORCINE) LOCK FLUSH IV SOLN 100 UNIT/ML 100 UNIT/ML
300 SOLUTION INTRAVENOUS PRN
Status: DISCONTINUED | OUTPATIENT
Start: 2020-01-03 | End: 2020-01-04 | Stop reason: HOSPADM

## 2020-01-03 RX ORDER — SODIUM CHLORIDE 0.9 % (FLUSH) 0.9 %
10 SYRINGE (ML) INJECTION PRN
Status: DISCONTINUED | OUTPATIENT
Start: 2020-01-03 | End: 2020-01-04 | Stop reason: HOSPADM

## 2020-01-03 RX ORDER — DEXAMETHASONE SODIUM PHOSPHATE 10 MG/ML
10 INJECTION, SOLUTION INTRAMUSCULAR; INTRAVENOUS ONCE
Status: COMPLETED | OUTPATIENT
Start: 2020-01-03 | End: 2020-01-03

## 2020-01-03 RX ORDER — PALONOSETRON 0.05 MG/ML
0.25 INJECTION, SOLUTION INTRAVENOUS ONCE
Status: COMPLETED | OUTPATIENT
Start: 2020-01-03 | End: 2020-01-03

## 2020-01-03 RX ORDER — CYANOCOBALAMIN 1000 UG/ML
1000 INJECTION INTRAMUSCULAR; SUBCUTANEOUS ONCE
Status: DISCONTINUED | OUTPATIENT
Start: 2020-01-03 | End: 2020-01-03

## 2020-01-03 RX ADMIN — SODIUM CHLORIDE 150 MG: 900 INJECTION, SOLUTION INTRAVENOUS at 08:56

## 2020-01-03 RX ADMIN — HEPARIN 300 UNITS: 100 SYRINGE at 10:19

## 2020-01-03 RX ADMIN — PALONOSETRON 0.25 MG: 0.05 INJECTION, SOLUTION INTRAVENOUS at 08:50

## 2020-01-03 RX ADMIN — Medication 10 ML: at 10:19

## 2020-01-03 RX ADMIN — SODIUM CHLORIDE 1000 MG: 9 INJECTION, SOLUTION INTRAVENOUS at 10:04

## 2020-01-03 RX ADMIN — SODIUM CHLORIDE 200 MG: 9 INJECTION, SOLUTION INTRAVENOUS at 09:31

## 2020-01-03 RX ADMIN — DEXAMETHASONE SODIUM PHOSPHATE 10 MG: 10 INJECTION, SOLUTION INTRAMUSCULAR; INTRAVENOUS at 08:50

## 2020-01-03 RX ADMIN — CYANOCOBALAMIN 1000 MCG: 1000 INJECTION, SOLUTION INTRAMUSCULAR; SUBCUTANEOUS at 08:42

## 2020-01-06 RX ORDER — LORAZEPAM 0.5 MG/1
0.5 TABLET ORAL EVERY 8 HOURS PRN
Qty: 60 TABLET | Refills: 0 | OUTPATIENT
Start: 2020-01-06 | End: 2020-02-03

## 2020-01-24 ENCOUNTER — OFFICE VISIT (OUTPATIENT)
Dept: HEMATOLOGY | Age: 70
End: 2020-01-24
Payer: MEDICARE

## 2020-01-24 ENCOUNTER — HOSPITAL ENCOUNTER (OUTPATIENT)
Dept: INFUSION THERAPY | Age: 70
Discharge: HOME | End: 2020-01-24
Payer: MEDICARE

## 2020-01-24 VITALS
TEMPERATURE: 98.3 F | SYSTOLIC BLOOD PRESSURE: 142 MMHG | DIASTOLIC BLOOD PRESSURE: 82 MMHG | OXYGEN SATURATION: 92 % | BODY MASS INDEX: 28.8 KG/M2 | WEIGHT: 195 LBS | HEART RATE: 69 BPM

## 2020-01-24 DIAGNOSIS — C34.32 MALIGNANT NEOPLASM OF LOWER LOBE, LEFT BRONCHUS OR LUNG (HCC): Primary | ICD-10-CM

## 2020-01-24 DIAGNOSIS — C34.90 NON-SMALL CELL LUNG CANCER, UNSPECIFIED LATERALITY (HCC): ICD-10-CM

## 2020-01-24 DIAGNOSIS — D64.9 ANEMIA, UNSPECIFIED TYPE: ICD-10-CM

## 2020-01-24 PROCEDURE — 36415 COLL VENOUS BLD VENIPUNCTURE: CPT

## 2020-01-24 PROCEDURE — 96413 CHEMO IV INFUSION 1 HR: CPT

## 2020-01-24 PROCEDURE — 4040F PNEUMOC VAC/ADMIN/RCVD: CPT | Performed by: NURSE PRACTITIONER

## 2020-01-24 PROCEDURE — G8484 FLU IMMUNIZE NO ADMIN: HCPCS | Performed by: NURSE PRACTITIONER

## 2020-01-24 PROCEDURE — 99214 OFFICE O/P EST MOD 30 MIN: CPT | Performed by: NURSE PRACTITIONER

## 2020-01-24 PROCEDURE — G8417 CALC BMI ABV UP PARAM F/U: HCPCS | Performed by: NURSE PRACTITIONER

## 2020-01-24 PROCEDURE — 85025 COMPLETE CBC W/AUTO DIFF WBC: CPT

## 2020-01-24 PROCEDURE — 1123F ACP DISCUSS/DSCN MKR DOCD: CPT | Performed by: NURSE PRACTITIONER

## 2020-01-24 PROCEDURE — 6360000002 HC RX W HCPCS: Performed by: NURSE PRACTITIONER

## 2020-01-24 PROCEDURE — 1036F TOBACCO NON-USER: CPT | Performed by: NURSE PRACTITIONER

## 2020-01-24 PROCEDURE — 96372 THER/PROPH/DIAG INJ SC/IM: CPT

## 2020-01-24 PROCEDURE — 96417 CHEMO IV INFUS EACH ADDL SEQ: CPT

## 2020-01-24 PROCEDURE — 2580000003 HC RX 258: Performed by: NURSE PRACTITIONER

## 2020-01-24 PROCEDURE — 80053 COMPREHEN METABOLIC PANEL: CPT

## 2020-01-24 PROCEDURE — G8926 SPIRO NO PERF OR DOC: HCPCS | Performed by: NURSE PRACTITIONER

## 2020-01-24 PROCEDURE — 3017F COLORECTAL CA SCREEN DOC REV: CPT | Performed by: NURSE PRACTITIONER

## 2020-01-24 PROCEDURE — 96375 TX/PRO/DX INJ NEW DRUG ADDON: CPT

## 2020-01-24 PROCEDURE — 84443 ASSAY THYROID STIM HORMONE: CPT

## 2020-01-24 PROCEDURE — 3023F SPIROM DOC REV: CPT | Performed by: NURSE PRACTITIONER

## 2020-01-24 PROCEDURE — G8428 CUR MEDS NOT DOCUMENT: HCPCS | Performed by: NURSE PRACTITIONER

## 2020-01-24 RX ORDER — PALONOSETRON 0.05 MG/ML
0.25 INJECTION, SOLUTION INTRAVENOUS ONCE
Status: COMPLETED | OUTPATIENT
Start: 2020-01-24 | End: 2020-01-24

## 2020-01-24 RX ORDER — DEXAMETHASONE SODIUM PHOSPHATE 10 MG/ML
10 INJECTION, SOLUTION INTRAMUSCULAR; INTRAVENOUS ONCE
Status: COMPLETED | OUTPATIENT
Start: 2020-01-24 | End: 2020-01-24

## 2020-01-24 RX ORDER — SODIUM CHLORIDE 0.9 % (FLUSH) 0.9 %
10 SYRINGE (ML) INJECTION PRN
Status: DISCONTINUED | OUTPATIENT
Start: 2020-01-24 | End: 2020-01-25 | Stop reason: HOSPADM

## 2020-01-24 RX ORDER — DEXAMETHASONE 4 MG/1
TABLET ORAL
Qty: 6 TABLET | Refills: 5 | Status: SHIPPED | OUTPATIENT
Start: 2020-01-24

## 2020-01-24 RX ORDER — SODIUM CHLORIDE 0.9 % (FLUSH) 0.9 %
5 SYRINGE (ML) INJECTION PRN
Status: CANCELLED | OUTPATIENT
Start: 2020-01-24

## 2020-01-24 RX ORDER — HEPARIN SODIUM (PORCINE) LOCK FLUSH IV SOLN 100 UNIT/ML 100 UNIT/ML
300 SOLUTION INTRAVENOUS PRN
Status: DISCONTINUED | OUTPATIENT
Start: 2020-01-24 | End: 2020-01-26 | Stop reason: HOSPADM

## 2020-01-24 RX ORDER — METHYLPREDNISOLONE SODIUM SUCCINATE 125 MG/2ML
125 INJECTION, POWDER, LYOPHILIZED, FOR SOLUTION INTRAMUSCULAR; INTRAVENOUS PRN
Status: CANCELLED | OUTPATIENT
Start: 2020-01-24

## 2020-01-24 RX ORDER — SODIUM CHLORIDE 0.9 % (FLUSH) 0.9 %
10 SYRINGE (ML) INJECTION PRN
Status: CANCELLED | OUTPATIENT
Start: 2020-01-24

## 2020-01-24 RX ORDER — SODIUM CHLORIDE 9 MG/ML
20 INJECTION, SOLUTION INTRAVENOUS ONCE
Status: CANCELLED | OUTPATIENT
Start: 2020-01-24

## 2020-01-24 RX ORDER — HEPARIN SODIUM (PORCINE) LOCK FLUSH IV SOLN 100 UNIT/ML 100 UNIT/ML
300 SOLUTION INTRAVENOUS PRN
Status: CANCELLED
Start: 2020-01-24

## 2020-01-24 RX ORDER — CYANOCOBALAMIN 1000 UG/ML
1000 INJECTION INTRAMUSCULAR; SUBCUTANEOUS ONCE
Status: COMPLETED | OUTPATIENT
Start: 2020-01-24 | End: 2020-01-24

## 2020-01-24 RX ORDER — EPINEPHRINE 1 MG/ML
0.3 INJECTION, SOLUTION, CONCENTRATE INTRAVENOUS PRN
Status: CANCELLED | OUTPATIENT
Start: 2020-01-24

## 2020-01-24 RX ORDER — DIPHENHYDRAMINE HYDROCHLORIDE 50 MG/ML
50 INJECTION INTRAMUSCULAR; INTRAVENOUS PRN
Status: CANCELLED | OUTPATIENT
Start: 2020-01-24

## 2020-01-24 RX ORDER — CYANOCOBALAMIN 1000 UG/ML
1000 INJECTION INTRAMUSCULAR; SUBCUTANEOUS ONCE
Status: CANCELLED | OUTPATIENT
Start: 2020-01-24

## 2020-01-24 RX ADMIN — PALONOSETRON 0.25 MG: 0.05 INJECTION, SOLUTION INTRAVENOUS at 08:18

## 2020-01-24 RX ADMIN — SODIUM CHLORIDE, PRESERVATIVE FREE 10 ML: 5 INJECTION INTRAVENOUS at 10:16

## 2020-01-24 RX ADMIN — DEXAMETHASONE SODIUM PHOSPHATE 10 MG: 10 INJECTION, SOLUTION INTRAMUSCULAR; INTRAVENOUS at 08:18

## 2020-01-24 RX ADMIN — SODIUM CHLORIDE 1000 MG: 9 INJECTION, SOLUTION INTRAVENOUS at 10:02

## 2020-01-24 RX ADMIN — SODIUM CHLORIDE 200 MG: 9 INJECTION, SOLUTION INTRAVENOUS at 09:28

## 2020-01-24 RX ADMIN — HEPARIN 300 UNITS: 100 SYRINGE at 10:16

## 2020-01-24 RX ADMIN — CYANOCOBALAMIN 1000 MCG: 1000 INJECTION, SOLUTION INTRAMUSCULAR; SUBCUTANEOUS at 10:16

## 2020-01-24 ASSESSMENT — ENCOUNTER SYMPTOMS
GASTROINTESTINAL NEGATIVE: 1
CONSTIPATION: 0
EYE DISCHARGE: 0
WHEEZING: 0
EYE PAIN: 0
ABDOMINAL PAIN: 0
EYE REDNESS: 0
NAUSEA: 0
VOMITING: 0
BACK PAIN: 0
SORE THROAT: 0
DIARRHEA: 0
EYES NEGATIVE: 1
BLOOD IN STOOL: 0

## 2020-01-24 NOTE — PROGRESS NOTES
adenopathy and right upper lobe mass but interval progression of bilateral pulmonary metastasis. No evidence of intra-abdominal metastatic disease. Specifically, tumor currently measuring 4.8 x 4.4 cm (previously 6.3 x 5.6 cm). Again identified are multiple lymph nodes in the prevascular space adjacent to the aorta. There has been mild  interval decrease in the size of these nodes, largest now measures 1.8 x  2 cm, previously measured 2.4 x 2 cm. Also noted is interval decrease the size of right paratracheal nodes middle mediastinal region, largest node measuring 1.6 cm in greatest dimension previosly measured 2.2 cm. No developing lymphadenopathy observed. The pulmonary nodule in the left lower lobe, anterolaterally, pleural-based measures 1.4 cm greatest transaxial projection previously measured 1.1 cm. In the lingula, near the major fissure there is a 9 mm nodule appreciated, axial image #92, previously measured less than 5 mm. In the left lower lobe there are several developing pulmonary nodules appreciated, the largest appreciated centrally, axial image #110 measuring nearly 14 mm in greatest dimension, previously measured 9 mm. In the left upper lobe, anteriorly and laterally, axial image #57 there is a developing 6 mm pulmonary nodule appreciated. The right upper lobe, axial image #24 a developing 4 mm nodule is  observed. More inferiorly, axial image #38 pleural-based 8 mm nodule is  now appreciated. In the right middle lobe, inferiorly and laterally, axial image #75 9 mm pulmonary nodule, pleural-based is now identified. There are multiple developing pulmonary nodules in the right lower lobe,  largest appreciated posteriorly and inferiorly, axial image #115  measuring 1 cm in greatest dimension. · 9/5/2018-he received 1 cycle of carboplatin AUC 2/Taxol 50 mg/m²  · 9/5/ 2019- recommended carboplatin Alimta and Keytruda due to findings of stage IV disease today on CT scans.   · 9/12/2019- Initiated on carboplatin AUC 5/Alimta 500 mg/m² and Keytruda every 3 weeks  · 9/29/2019- Guardant 360 identified a KRAS G12V alteration   · 11/30/2019- CTA showed new small to moderate bilateral pleural effusions with scattered pulmonary opacities and bandlike atelectasis of the right  middle lobe compared to 09/04/2019. There has been improvement in the primary left suprahilar mass and scattered bilateral pulmonary nodules (limitations in assessment as described above). There is interval improvement in the mediastinal lymphadenopathy when compared to 09/04/2019. · December 2019-hospitalization at [facility] with severe anemia secondary to chemotherapy, COPD exacerbation and diastolic heart failure, A. fib with rapid ventricular response. · 12/20/2019- he was seen after hospitalization. Recommended maintenance with Alimta and Keytruda. Past Medical History:    Past Medical History:   Diagnosis Date    Anxiety     Cerumen impaction     Cervical disc disease     Colon cancer ([address]     Depression     Eustachian tube dysfunction     GERD (gastroesophageal reflux disease)     Headache     Hearing loss     Hypertension     Hypoxemia     Malignant neoplasm of upper lobe, left bronchus or lung (HCC)     PUD (peptic ulcer disease)     Type 2 diabetes mellitus without complication ([address]        Past Surgical History:    Past Surgical History:   Procedure Laterality Date    COLON SURGERY      Two foot of colon removed.  COLONOSCOPY      JOINT REPLACEMENT      UPPER GASTROINTESTINAL ENDOSCOPY         Current Medications:    Current Outpatient Medications   Medication Sig Dispense Refill    dexamethasone (DECADRON) 4 MG tablet Take 1 tab BID x 3 days starting the day before chemotherapy every 3 weeks 6 tablet 5    LORazepam (ATIVAN) 0.5 MG tablet Take 1 tablet by mouth every 8 hours as needed for Anxiety for up to 30 days.  60 tablet 0    lidocaine-prilocaine (EMLA) 2.5-2.5 % cream Apply topically to port area 30min prior to chemotherapy every 3 weeks 1 Tube 2    folic acid (FOLVITE) 1 MG tablet Take 1 tablet by mouth daily 30 tablet 5    sucralfate (CARAFATE) 1 GM tablet Take 1 g by mouth 4 times daily      potassium chloride (MICRO-K) 10 MEQ extended release capsule Take 20 mEq by mouth daily      esomeprazole (NEXIUM) 40 MG delayed release capsule Take 40 mg by mouth every morning (before breakfast)      oxyCODONE-acetaminophen (PERCOCET) 7.5-325 MG per tablet Take 1 tablet by mouth every 4 hours as needed for Pain.  SITagliptin (JANUVIA) 50 MG tablet Take 50 mg by mouth daily      ipratropium-albuterol (DUONEB) 0.5-2.5 (3) MG/3ML SOLN nebulizer solution Inhale 1 vial into the lungs every 4 hours      furosemide (LASIX) 20 MG tablet Take 20 mg by mouth 2 times daily      Dextromethorphan Polistirex (DELSYM PO) Take 60 mg by mouth 2 times daily      budesonide-formoterol (SYMBICORT) 160-4.5 MCG/ACT AERO Inhale 2 puffs into the lungs 2 times daily      atorvastatin (LIPITOR) 40 MG tablet Take 40 mg by mouth daily      albuterol (PROVENTIL) (2.5 MG/3ML) 0.083% nebulizer solution Take 2.5 mg by nebulization every 4 hours as needed for Wheezing      megestrol (MEGACE) 40 MG/ML suspension Take 20 mLs by mouth daily 600 mL 0     No current facility-administered medications for this visit. Allergies:    Allergies   Allergen Reactions    Lactose Intolerance (Gi) Diarrhea       Social History:    Social History     Tobacco Use    Smoking status: Former Smoker     Last attempt to quit:      Years since quittin.0    Smokeless tobacco: Never Used   Substance Use Topics    Alcohol use: Not on file    Drug use: Not on file       Family History:   Family History   Problem Relation Age of Onset    Heart Attack Mother     Heart Disease Mother     Alzheimer's Disease Father     Heart Attack Sister     Heart Attack Brother        Vitals:  Vitals:    20 0800   BP: (!) 142/82   Pulse: 69   Temp: 98.3 °F (36.8 °C)   TempSrc: Temporal   SpO2: 92%   Weight: 195 lb (88.5 kg)        Subjective   REVIEW OF SYSTEMS:   Review of Systems   Constitutional: Positive for activity change and fatigue. Negative for chills, diaphoresis and fever. HENT: Negative. Negative for congestion, ear pain, hearing loss, nosebleeds, sore throat and tinnitus. Eyes: Negative. Negative for pain, discharge and redness. Respiratory: Positive for cough and shortness of breath (Severe shortness of air at rest and with exertion). Negative for wheezing. Cardiovascular: Negative. Negative for chest pain, palpitations and leg swelling. Gastrointestinal: Negative. Negative for abdominal pain, blood in stool, constipation, diarrhea, nausea and vomiting. Endocrine: Negative for polydipsia. Genitourinary: Negative for dysuria, flank pain, frequency, hematuria and urgency. Musculoskeletal: Negative. Negative for back pain, myalgias and neck pain. Skin: Negative. Negative for rash. Neurological: Positive for weakness. Negative for dizziness, tremors, seizures and headaches. Hematological: Does not bruise/bleed easily. Psychiatric/Behavioral: Negative. The patient is not nervous/anxious. Objective   PHYSICAL EXAM:  Physical Exam  Vitals signs reviewed. Constitutional:       General: He is not in acute distress. Appearance: He is well-developed. He is not diaphoretic. HENT:      Head: Normocephalic and atraumatic. Mouth/Throat:      Pharynx: Uvula midline. Tonsils: No tonsillar exudate. Eyes:      General: Lids are normal. No scleral icterus. Right eye: No discharge. Left eye: No discharge. Conjunctiva/sclera: Conjunctivae normal.      Pupils: Pupils are equal, round, and reactive to light. Neck:      Musculoskeletal: Normal range of motion and neck supple. Thyroid: No thyroid mass or thyromegaly. Vascular: No JVD.       Trachea: Trachea normal. No tracheal treatment plan and answering questions.     Electronically signed by STEFANIE Hu on 1/26/2020 at 5:24 PM

## 2020-01-26 PROBLEM — J44.9 STAGE 3 SEVERE COPD BY GOLD CLASSIFICATION (HCC): Status: ACTIVE | Noted: 2020-01-26

## 2020-01-26 PROBLEM — Z99.81 SUPPLEMENTAL OXYGEN DEPENDENT: Status: ACTIVE | Noted: 2020-01-26

## 2020-01-26 ASSESSMENT — ENCOUNTER SYMPTOMS
SHORTNESS OF BREATH: 1
COUGH: 1

## 2020-02-03 RX ORDER — LORAZEPAM 0.5 MG/1
TABLET ORAL
Qty: 60 TABLET | Refills: 0 | OUTPATIENT
Start: 2020-02-03 | End: 2020-03-04

## 2020-02-07 PROBLEM — C18.9 COLON CANCER (HCC): Status: ACTIVE | Noted: 2020-01-01

## 2020-02-07 PROBLEM — J81.0 ACUTE PULMONARY EDEMA (HCC): Status: ACTIVE | Noted: 2020-01-01

## 2020-02-07 PROBLEM — K27.9 PUD (PEPTIC ULCER DISEASE): Status: ACTIVE | Noted: 2020-01-01

## 2020-02-07 NOTE — PROGRESS NOTES
[facility] Medicine Services  INPATIENT PROGRESS NOTE    Length of Stay: 0  Date of Admission: 2/7/2020  Primary Care Physician: Eleazar Turner DO    Subjective   Chief Complaint: SOB    HPI   Patient is breathing better.  Patient denies any chest pain.  Patient is currently on 6 L of oxygen.    Review of Systems   Constitutional: Positive for activity change, appetite change and fatigue. Negative for chills and fever.   HENT: Negative for hearing loss, nosebleeds, tinnitus and trouble swallowing.    Eyes: Negative for visual disturbance.   Respiratory: Positive for cough, shortness of breath and wheezing. Negative for chest tightness.    Cardiovascular: Negative for chest pain, palpitations and leg swelling.   Gastrointestinal: Positive for diarrhea and nausea. Negative for abdominal distention, abdominal pain, blood in stool, constipation and vomiting.   Endocrine: Negative for cold intolerance, heat intolerance, polydipsia, polyphagia and polyuria.   Genitourinary: Negative for decreased urine volume, difficulty urinating, dysuria, flank pain, frequency and hematuria.   Musculoskeletal: Negative for arthralgias, joint swelling and myalgias.   Skin: Negative for rash.   Allergic/Immunologic: Negative for immunocompromised state.   Neurological: Positive for weakness. Negative for dizziness, syncope, light-headedness and headaches.   Hematological: Negative for adenopathy. Does not bruise/bleed easily.   Psychiatric/Behavioral: Negative for confusion and sleep disturbance. The patient is not nervous/anxious.           All pertinent negatives and positives are as above. All other systems have been reviewed and are negative unless otherwise stated.      Objective    Temp:  [97.2 °F (36.2 °C)-98.9 °F (37.2 °C)] 98.9 °F (37.2 °C)  Heart Rate:  [63-77] 67  Resp:  [14-28] 19  BP: (108-145)/(50-77) 109/57    Intake/Output Summary (Last 24 hours) at 2/7/2020 1241  Last data filed  at 2/7/2020 0929  Gross per 24 hour   Intake 120 ml   Output 500 ml   Net -380 ml     Physical Exam   Constitutional: He is oriented to person, place, and time. He appears well-developed.   HENT:   Head: Normocephalic.   Eyes: Pupils are equal, round, and reactive to light. Conjunctivae are normal.   Neck: Neck supple. No JVD present.   Cardiovascular: Normal rate, regular rhythm, normal heart sounds and intact distal pulses. Exam reveals no gallop and no friction rub.   No murmur heard.  Pulmonary/Chest: No respiratory distress. He has wheezes. He has no rales. He exhibits no tenderness.   Rhonchi bilateral.  Diminished breath sound.   Abdominal: Soft. Bowel sounds are normal. He exhibits no distension. There is no tenderness. There is no rebound and no guarding.   Musculoskeletal: Normal range of motion. He exhibits no edema, tenderness or deformity.   Neurological: He is alert and oriented to person, place, and time. He displays normal reflexes. No cranial nerve deficit. He exhibits abnormal muscle tone. Coordination abnormal.   Skin: Skin is warm and dry. Capillary refill takes 2 to 3 seconds. No rash noted.   Psychiatric: He has a normal mood and affect. His behavior is normal. Thought content normal.   Nursing note and vitals reviewed.      Results Review:  Lab Results (last 24 hours)     Procedure Component Value Units Date/Time    POC Glucose Once [[id number]]  (Abnormal) Collected:  02/07/20 1140    Specimen:  Blood Updated:  02/07/20 1152     Glucose 319 mg/dL      Comment: : [id number] Toni Avila ID: [id number]       Extra Tubes [[id number]] Collected:  02/07/20 0921    Specimen:  Blood, Venous Line Updated:  02/07/20 1031    Narrative:       The following orders were created for panel order Extra Tubes.  Procedure                               Abnormality         Status                     ---------                               -----------         ------                     [facility]  (Gel)[[id number]]                                  Final result                 Please view results for these tests on the individual orders.    Green Top (Gel) [[id number]] Collected:  02/07/20 0921    Specimen:  Blood Updated:  02/07/20 1031     Extra Tube Hold for add-ons.     Comment: Auto resulted.       Hemoglobin A1c [[id number]]  (Abnormal) Collected:  02/07/20 0749    Specimen:  Blood Updated:  02/07/20 1011     Hemoglobin A1C 7.30 %     Narrative:       Hemoglobin A1C Ranges:    Increased Risk for Diabetes  5.7% to 6.4%  Diabetes                     >= 6.5%  Diabetic Goal                < 7.0%    CBC Auto Differential [[id number]]  (Abnormal) Collected:  02/07/20 0920    Specimen:  Blood Updated:  02/07/20 0936     WBC 7.97 10*3/mm3      RBC 3.27 10*6/mm3      Hemoglobin 9.7 g/dL      Hematocrit 29.6 %      MCV 90.5 fL      MCH 29.7 pg      MCHC 32.8 g/dL      RDW 14.9 %      RDW-SD 47.9 fl      MPV 10.2 fL      Platelets 247 10*3/mm3      Neutrophil % 89.4 %      Lymphocyte % 4.5 %      Monocyte % 1.0 %      Eosinophil % 0.0 %      Basophil % 0.5 %      Immature Grans % 4.6 %      Neutrophils, Absolute 7.12 10*3/mm3      Lymphocytes, Absolute 0.36 10*3/mm3      Monocytes, Absolute 0.08 10*3/mm3      Eosinophils, Absolute 0.00 10*3/mm3      Basophils, Absolute 0.04 10*3/mm3      Immature Grans, Absolute 0.37 10*3/mm3      nRBC 0.3 /100 WBC     Magnesium [[id number]]  (Abnormal) Collected:  02/07/20 0749    Specimen:  Blood Updated:  02/07/20 0841     Magnesium 0.8 mg/dL     POC Glucose Once [[id number]]  (Abnormal) Collected:  02/07/20 0808    Specimen:  Blood Updated:  02/07/20 0837     Glucose 228 mg/dL      Comment: : [id number] Sonja Meredith Avery ID: [id number]       Comprehensive Metabolic Panel [[id number]]  (Abnormal) Collected:  02/07/20 0749    Specimen:  Blood Updated:  02/07/20 0833     Glucose 208 mg/dL      BUN 11 mg/dL      Creatinine 0.77 mg/dL      Sodium 136 mmol/L      Potassium 4.1  mmol/L      Chloride 98 mmol/L      CO2 22.0 mmol/L      Calcium 9.7 mg/dL      Total Protein 7.1 g/dL      Albumin 3.10 g/dL      ALT (SGPT) 12 U/L      AST (SGOT) 11 U/L      Comment: Specimen hemolyzed.  Results may be affected.        Alkaline Phosphatase 99 U/L      Total Bilirubin 0.3 mg/dL      eGFR Non African Amer 100 mL/min/1.73      Globulin 4.0 gm/dL      A/G Ratio 0.8 g/dL      BUN/Creatinine Ratio 14.3     Anion Gap 16.0 mmol/L     Narrative:       GFR Normal >60  Chronic Kidney Disease <60  Kidney Failure <15      Troponin [[id number]]  (Normal) Collected:  02/07/20 0749    Specimen:  Blood Updated:  02/07/20 0828     Troponin T <0.010 ng/mL     Narrative:       Troponin T Reference Range:  <= 0.03 ng/mL-   Negative for AMI  >0.03 ng/mL-     Abnormal for myocardial necrosis.  Clinicians would have to utilize clinical acumen, EKG, Troponin and serial changes to determine if it is an Acute Myocardial Infarction or myocardial injury due to an underlying chronic condition.       Results may be falsely decreased if patient taking Biotin.      Phosphorus [[id number]]  (Normal) Collected:  02/07/20 0749    Specimen:  Blood Updated:  02/07/20 0828     Phosphorus 3.9 mg/dL     [place] Draw [[id number]] Collected:  02/07/20 0048    Specimen:  Blood Updated:  02/07/20 0201    Narrative:       The following orders were created for panel order [place] Draw.  Procedure                               Abnormality         Status                     ---------                               -----------         ------                     Light Blue Top[[id number]]                                   Final result               Green Top (Gel)[[id number]]                                  Final result               Lavender Top[[id number]]                                     Final result               Red Top[[id number]]                                          Final result               [place] Blood Culture Christoph...[[id number]]                       Final result               Gray Top - Ice[[id number]]                                   Final result                 Please view results for these tests on the individual orders.    Light Blue Top [[id number]] Collected:  02/07/20 0048    Specimen:  Blood Updated:  02/07/20 0201     Extra Tube hold for add-on     Comment: Auto resulted       Green Top (Gel) [[id number]] Collected:  02/07/20 0048    Specimen:  Blood Updated:  02/07/20 0201     Extra Tube Hold for add-ons.     Comment: Auto resulted.       L[place]ender Top [[id number]] Collected:  02/07/20 0048    Specimen:  Blood Updated:  02/07/20 0201     Extra Tube hold for add-on     Comment: Auto resulted       Red Top [[id number]] Collected:  02/07/20 0048    Specimen:  Blood Updated:  02/07/20 0201     Extra Tube Hold for add-ons.     Comment: Auto resulted.       [place] Blood Culture Bottle Set [[id number]] Collected:  02/07/20 0048    Specimen:  Blood from Wrist, Right Updated:  02/07/20 0201     Extra Tube Hold for add-ons.     Comment: Auto resulted.       D-dimer, Quantitative [[id number]]  (Abnormal) Collected:  02/07/20 0048    Specimen:  Blood Updated:  02/07/20 0142     D-Dimer, Quantitative 6.36 mg/L (FEU)     Narrative:       Reference Range is 0-0.50 mg/L FEU. However, results <0.50 mg/L FEU tends to rule out DVT or PE. Results >0.50 mg/L FEU are not useful in predicting absence or presence of DVT or PE.      CBC & Differential [[id number]] Collected:  02/07/20 0048    Specimen:  Blood Updated:  02/07/20 0136    Narrative:       The following orders were created for panel order CBC & Differential.  Procedure                               Abnormality         Status                     ---------                               -----------         ------                     CBC Auto Differential[[id number]]        Abnormal            Final result                 Please view results for these tests on the individual orders.    CBC Auto Differential  [[id number]]  (Abnormal) Collected:  02/07/20 0048    Specimen:  Blood Updated:  02/07/20 0136     WBC 8.87 10*3/mm3      RBC 3.05 10*6/mm3      Hemoglobin 9.2 g/dL      Hematocrit 28.0 %      MCV 91.8 fL      MCH 30.2 pg      MCHC 32.9 g/dL      RDW 14.9 %      RDW-SD 47.8 fl      MPV 10.2 fL      Platelets 234 10*3/mm3     Manual Differential [[id number]]  (Abnormal) Collected:  02/07/20 0048    Specimen:  Blood Updated:  02/07/20 0136     Neutrophil % 83.2 %      Lymphocyte % 5.9 %      Monocyte % 7.9 %      Metamyelocyte % 1.0 %      Myelocyte % 2.0 %      Neutrophils Absolute 7.38 10*3/mm3      Lymphocytes Absolute 0.52 10*3/mm3      Monocytes Absolute 0.70 10*3/mm3      Anisocytosis Slight/1+     Microcytes Slight/1+     Polychromasia Slight/1+     WBC Morphology Normal     Clumped Platelets Present     Giant Platelets Large/3+    Comprehensive Metabolic Panel [[id number]]  (Abnormal) Collected:  02/07/20 0048    Specimen:  Blood Updated:  02/07/20 0120     Glucose 215 mg/dL      BUN 11 mg/dL      Creatinine 0.75 mg/dL      Sodium 137 mmol/L      Potassium 3.9 mmol/L      Chloride 101 mmol/L      CO2 23.0 mmol/L      Calcium 9.4 mg/dL      Total Protein 6.4 g/dL      Albumin 3.40 g/dL      ALT (SGPT) 11 U/L      AST (SGOT) 11 U/L      Alkaline Phosphatase 99 U/L      Total Bilirubin 0.2 mg/dL      eGFR Non African Amer 103 mL/min/1.73      Globulin 3.0 gm/dL      A/G Ratio 1.1 g/dL      BUN/Creatinine Ratio 14.7     Anion Gap 13.0 mmol/L     Narrative:       GFR Normal >60  Chronic Kidney Disease <60  Kidney Failure <15      BNP [[id number]]  (Abnormal) Collected:  02/07/20 0048    Specimen:  Blood Updated:  02/07/20 0120     proBNP 2,117.0 pg/mL     Narrative:       Among patients with dyspnea, NT-proBNP is highly sensitive for the detection of acute congestive heart failure. In addition NT-proBNP of <300 pg/ml effectively rules out acute congestive heart failure with 99% negative predictive value.    Results may  be falsely decreased if patient taking Biotin.      Troponin [[id number]]  (Normal) Collected:  02/07/20 0048    Specimen:  Blood Updated:  02/07/20 0120     Troponin T <0.010 ng/mL     Narrative:       Troponin T Reference Range:  <= 0.03 ng/mL-   Negative for AMI  >0.03 ng/mL-     Abnormal for myocardial necrosis.  Clinicians would have to utilize clinical acumen, EKG, Troponin and serial changes to determine if it is an Acute Myocardial Infarction or myocardial injury due to an underlying chronic condition.       Results may be falsely decreased if patient taking Biotin.      Gray Top - Ice [[id number]] Collected:  02/07/20 0048    Specimen:  Blood Updated:  02/07/20 0116     Extra Tube Hold for add-ons.     Comment: Auto resulted.       Blood Gas, Arterial [[id number]]  (Abnormal) Collected:  02/07/20 0100    Specimen:  Arterial Blood Updated:  02/07/20 0106     Site Right Radial     Irivn's Test Positive     pH, Arterial 7.445 pH units      pCO2, Arterial 34.2 mm Hg      Comment: 84 Value below reference range        pO2, Arterial 74.6 mm Hg      Comment: 84 Value below reference range        HCO3, Arterial 23.4 mmol/L      Base Excess, Arterial -0.4 mmol/L      Comment: 84 Value below reference range        O2 Saturation, Arterial 93.6 %      Comment: 84 Value below reference range        Temperature 37.0 C      Barometric Pressure for Blood Gas 741 mmHg      Modality Nasal Cannula     Flow Rate 6.0 lpm      Ventilator Mode NA     Collected by [id number]     Comment: Meter: [id number]     :  [id number]              Cultures:  No results found for: BLOODCX, URINECX, WOUNDCX, MRSACX, RESPCX, STOOLCX    Radiology Data:    Imaging Results (Last 24 Hours)     Procedure Component Value Units Date/Time    CT Angiogram Chest [[id number]] Collected:  02/07/20 0720     Updated:  02/07/20 0728    Narrative:       EXAMINATION: CT ANGIOGRAM CHEST-  2/7/2020 7:20 AM CST     HISTORY:Chest pain and shortness of air.  Elevated d-dimer     COMPARISON: 11/30/2019 CT angiogram chest     TECHNIQUE:     CT evaluation of the chest was performed with intravenous contrast. 2 mm  transaxial images were obtained.. Coronal reconstruction maximum  intensity projection images of the pulmonary arteries and aorta were  also generated.     Radiation dose equals  mGy-cm.  Automated exposure control dose  reduction technique was implemented.        FINDINGS:     [Examination was sent to [facility] for preliminary interpretation.     The pulmonary arteries opacify with iodinated contrast without  intraluminal filling defects or CT angiographic evidence of pulmonary  embolus.     Again identified are multiple moderate size pleural effusions layering  posteriorly, likely stable.     The left perihilar mass is again identified with associated patchy  interstitial and airspace opacities involving both the upper and lower  left lobe.     There is diffuse groundglass in the lungs bilaterally observed similarly  on the previous study.     There is cardiomegaly. The hiatal hernia identified.     There is no mediastinal or hilar lymph node enlargement.     There is no axillary lymphadenopathy.     Limited imaging the upper abdomen demonstrate no acute abnormality.]       Impression:       1. No CT angiographic evidence of pulmonary embolus or acute aortic  pathology.     2. Similar CT angiographic appearance of the chest dated 11/30/2019  examination. Persistent bilateral pleural effusions. Left perihilar mass  with patchy associated airspace opacities in the left perihilar region.  Diffuse bilateral groundglass opacities.  This report was finalized on 02/07/2020 07:25 by Dr. Bo Her MD.    XR Chest 2 View [[id number]] Collected:  02/07/20 0716     Updated:  02/07/20 0722    Narrative:       HISTORY: Shortness of air     CXR: 2 views the chest are obtained.     COMPARISON: 02/04/2019     FINDINGS: There are increasing diffuse bilateral pulmonary  opacities  worrisome for edema. There is left suprahilar masslike opacification  that are seen on CT exam with associated volume loss. There is a small  left pleural effusion with a trace right pleural effusion. Heart appears  enlarged. Right subclavian port tip projects over the SVC. No  appreciable pneumothorax. No acute bony pathology.       Impression:       1. Increasing diffuse bilateral pulmonary opacities favorable for edema.  Pneumonitis considered.   2. Left suprahilar masslike opacity. Please refer to CT chest report.  3. Small pleural effusions, left greater than right.  This report was finalized on 02/07/2020 07:19 by Dr. Deborah Chávez MD.          Allergies   Allergen Reactions   • Lactose Intolerance (Gi) Diarrhea       Scheduled meds:     atorvastatin 40 mg Oral Daily   budesonide-formoterol 2 puff Inhalation BID - RT   folic acid 400 mcg Oral Daily   insulin lispro 2-7 Units Subcutaneous 4x Daily With Meals & Nightly   ipratropium-albuterol 3 mL Nebulization Q4H - RT   lansoprazole 30 mg Nasogastric Q AM   linagliptin 5 mg Oral Daily   metoprolol tartrate 25 mg Oral BID   sodium chloride 10 mL Intravenous Q12H   sucralfate 1 g Oral 4x Daily       PRN meds:  •  acetaminophen  •  ALPRAZolam  •  dextrose  •  dextrose  •  glucagon (human recombinant)  •  hydrOXYzine  •  ondansetron  •  sodium chloride  •  sodium chloride    Assessment/Plan       Controlled type 2 diabetes mellitus with hyperglycemia, without long-term current use of insulin (CMS/HCC)    Respiratory distress, acute    Acute respiratory failure with hypoxia and hypercapnia (CMS/HCC)    Adenocarcinoma, lung (CMS/HCC)    Type 2 diabetes mellitus with hyperglycemia, with long-term current use of insulin (CMS/HCC)    Acute pulmonary edema (CMS/HCC)    PUD (peptic ulcer disease)    Colon cancer (CMS/HCC)      Plan:    Shortness of breath/hypoxia/COPD/cough/obstructive due to lung mass.  Continue Symbicort.  Continue duo nebs.  Start Zosyn  antibiotics.  CTA chest- no evidence of embolus or acute aortic pathology, persistent bilateral pleural effusion and left perihilar mass with patchy associated airspace opacity in the left perihilar region, diffuse bilateral groundglass opacity-similar to CTA 2019.    Acute pulmonary edema-fluid overload/edema/pleural effusion/hypertension/CHF.  BNP 2000.  Continue Lipitor.  Continue Lopressor.  Troponins negative x2 sets.  Echo cardiogram 12/3/2019- ejection fraction 55%, mild concentric hypertrophy, diastolic dysfunction, no evidence of pulmonary hypertension.    History of lung cancer.  Ongoing treatment for Dr. Fishman.    Diabetes.  Sliding scale.  Continue Tradjenta.  Hemoglobin A1c 7.3.    Hypo-magnesium.  2 g magnesium.  Check magnesium level after.    Anemia.  Hemoglobin stable.  No sign of acute bleed.  Anemia panel.    Anxiety.  Continue Atarax.    Nausea/vomiting-diarrhea.  Pepcid.  Zofran.  Continue Carafate.  No sign of diarrhea this morning.  GI panel and C. difficile pending.    Nutrition.  Continue folic acid.  Consistent carbohydrate diet    Deconditioning.  PT consult.    Discharge Plannin-6 days.  Patient is currently take oxygen nasal cannula between 4 to 6 L at home.    Conor Smith MD   20   12:41 PM

## 2020-02-07 NOTE — CONSULTS
PULMONARY & CRITICAL CARE CONSULT - [facility]    20, 9:01 AM  Patient Care Team:  Eleazar Turner DO as PCP - General (Internal Medicine)  Jose Cruz, MING Restrepo as Nurse Practitioner (Pulmonary Disease)  Name: Romel Rosario  : 1950  MRN: [id number]  Contact Serial Number [id number]    Chief complaint: Shortness of breath and pleural effusions  HPI:  We have been consulted by Conor Smith MD to see this 69 y.o. male born on 1950.  Patient complains of moderate worsening continuous shortness of breath in the chest for several days in the context of pleural effusions and non-small cell lung cancer which is been treated medically.  He has had bilateral pleural effusions in the past and they were treated with diuresis and improved.  He has been admitted through the ED overnight with hypoxia.  CT imaging has reidentified pleural effusions.  We are asked to see him.  He does have chronic respiratory failure at home and uses 4 to 6 L/min of oxygen.  He is also known to have some diastolic dysfunction but does not have pulmonary hypertension.  Past Medical History:   has a past medical history of Adenocarcinoma, lung (CMS/HCC), Cerumen impaction, Cervical disc disease, Colon cancer (CMS/HCC), Diabetes (CMS/HCC), Elevated cholesterol, Eustachian tube dysfunction, GERD (gastroesophageal reflux disease), colonic polyps, Hypertension, PUD (peptic ulcer disease), and Sensorineural hearing loss.   has a past surgical history that includes Colon surgery; Stomach surgery; Knee surgery; Colonoscopy w/ polypectomy (2013); Esophagogastroduodenoscopy (2013); Colonoscopy (N/A, 2017); Bronchoscopy (Left, 2019); and Venous Access Device (Port) (N/A, 9/3/2019).  Allergies   Allergen Reactions   • Lactose Intolerance (Gi) Diarrhea     Medications:    atorvastatin 40 mg Oral Daily   budesonide-formoterol 2 puff Inhalation BID - RT   folic acid 400 mcg Oral Daily    insulin lispro 2-7 Units Subcutaneous 4x Daily With Meals & Nightly   ipratropium-albuterol 3 mL Nebulization Q4H - RT   lansoprazole 30 mg Nasogastric Q AM   linagliptin 5 mg Oral Daily   metoprolol tartrate 25 mg Oral BID   sodium chloride 10 mL Intravenous Q12H   sucralfate 1 g Oral 4x Daily        Family History:  Family History   Problem Relation Age of Onset   • Heart disease Mother    • Diabetes Mother    • Alzheimer's disease Father    • Colon cancer Neg Hx    • Colon polyps Neg Hx      Social History:   reports that he quit smoking about 12 years ago. He quit after 45.00 years of use. He has never used smokeless tobacco. He reports that he does not drink alcohol or use drugs.  Review of Systems:  Review of Systems   Constitutional: Positive for fatigue. Negative for appetite change, diaphoresis and fever.   HENT: Negative for congestion and trouble swallowing.    Eyes: Negative for visual disturbance.   Respiratory: Positive for shortness of breath. Negative for choking, chest tightness, wheezing and stridor.    Cardiovascular: Negative for leg swelling.   Gastrointestinal: Negative for abdominal distention, abdominal pain, constipation, diarrhea, nausea and vomiting.   Endocrine: Negative for heat intolerance.   Genitourinary: Negative for hematuria.   Musculoskeletal: Negative for joint swelling.   Skin: Negative for color change and rash.   Neurological: Negative for dizziness, seizures and syncope.   Hematological: Negative for adenopathy.   Psychiatric/Behavioral: Negative for agitation.      Physical Exam:  Temp:  [97.2 °F (36.2 °C)-98.4 °F (36.9 °C)] 97.2 °F (36.2 °C)  Heart Rate:  [63-76] 74  Resp:  [14-28] 22  BP: (115-145)/(62-77) 145/62    Intake/Output Summary (Last 24 hours) at 2/7/2020 0901  Last data filed at 2/7/2020 0700  Gross per 24 hour   Intake --   Output 300 ml   Net -300 ml         02/07/20  0042 02/07/20  0600   Weight: 86.2 kg (190 lb) 83.6 kg (184 lb 3.2 oz)     SpO2 Percentage     02/07/20 0600 02/07/20 0730 02/07/20 0827   SpO2: 98% (!) 88% 92%     Physical Exam   Constitutional: He is oriented to person, place, and time. He appears well-developed. He is active. He does not have a sickly appearance. He appears ill. No distress. Nasal cannula in place.   Eyes: EOM are normal. No scleral icterus.   Neck: No JVD present. No tracheal deviation present.   Cardiovascular: Normal rate and regular rhythm.   Pulmonary/Chest: No respiratory distress.   Abdominal: Soft. There is no tenderness. There is no guarding.   Musculoskeletal: He exhibits no edema.   Neurological: He is alert and oriented to person, place, and time.   Skin: Skin is warm and dry. He is not diaphoretic.   Psychiatric: He has a normal mood and affect. His behavior is normal.     Results from last 7 days   Lab Units 02/07/20  0048   WBC 10*3/mm3 8.87   HEMOGLOBIN g/dL 9.2*   PLATELETS 10*3/mm3 234     Results from last 7 days   Lab Units 02/07/20  0749 02/07/20  0048   SODIUM mmol/L 136 137   POTASSIUM mmol/L 4.1 3.9   CO2 mmol/L 22.0 23.0   BUN mg/dL 11 11   CREATININE mg/dL 0.77 0.75*   MAGNESIUM mg/dL 0.8*  --    PHOSPHORUS mg/dL 3.9  --    GLUCOSE mg/dL 208* 215*     Results from last 7 days   Lab Units 02/07/20  0100   PH, ARTERIAL pH units 7.445   PCO2, ARTERIAL mm Hg 34.2*   PO2 ART mm Hg 74.6*     Lab Results   Component Value Date    PROBNP 2,117.0 (H) 02/07/2020     No results found for: BLOODCX, URINECX, WOUNDCX, MRSACX, RESPCX, STOOLCX  Recent radiology:   Imaging Results (Last 72 Hours)     Procedure Component Value Units Date/Time    CT Angiogram Chest [[id number]] Collected:  02/07/20 0720     Updated:  02/07/20 0728    Narrative:       EXAMINATION: CT ANGIOGRAM CHEST-  2/7/2020 7:20 AM CST     HISTORY:Chest pain and shortness of air. Elevated d-dimer     COMPARISON: 11/30/2019 CT angiogram chest     TECHNIQUE:     CT evaluation of the chest was performed with intravenous contrast. 2 mm  transaxial images were  obtained.. Coronal reconstruction maximum  intensity projection images of the pulmonary arteries and aorta were  also generated.     Radiation dose equals  mGy-cm.  Automated exposure control dose  reduction technique was implemented.        FINDINGS:     [Examination was sent to [facility] for preliminary interpretation.     The pulmonary arteries opacify with iodinated contrast without  intraluminal filling defects or CT angiographic evidence of pulmonary  embolus.     Again identified are multiple moderate size pleural effusions layering  posteriorly, likely stable.     The left perihilar mass is again identified with associated patchy  interstitial and airspace opacities involving both the upper and lower  left lobe.     There is diffuse groundglass in the lungs bilaterally observed similarly  on the previous study.     There is cardiomegaly. The hiatal hernia identified.     There is no mediastinal or hilar lymph node enlargement.     There is no axillary lymphadenopathy.     Limited imaging the upper abdomen demonstrate no acute abnormality.]       Impression:       1. No CT angiographic evidence of pulmonary embolus or acute aortic  pathology.     2. Similar CT angiographic appearance of the chest dated 11/30/2019  examination. Persistent bilateral pleural effusions. Left perihilar mass  with patchy associated airspace opacities in the left perihilar region.  Diffuse bilateral groundglass opacities.  This report was finalized on 02/07/2020 07:25 by Dr. Bo Her MD.    XR Chest 2 View [[id number]] Collected:  02/07/20 0716     Updated:  02/07/20 0722    Narrative:       HISTORY: Shortness of air     CXR: 2 views the chest are obtained.     COMPARISON: 02/04/2019     FINDINGS: There are increasing diffuse bilateral pulmonary opacities  worrisome for edema. There is left suprahilar masslike opacification  that are seen on CT exam with associated volume loss. There is a small  left pleural effusion with a  trace right pleural effusion. Heart appears  enlarged. Right subclavian port tip projects over the SVC. No  appreciable pneumothorax. No acute bony pathology.       Impression:       1. Increasing diffuse bilateral pulmonary opacities favorable for edema.  Pneumonitis considered.   2. Left suprahilar masslike opacity. Please refer to CT chest report.  3. Small pleural effusions, left greater than right.  This report was finalized on 02/07/2020 07:19 by Dr. Deborah Chávez MD.        My radiograph interpretation/independent review of imaging: Bilateral pleural effusions.  These were present on a prior CT a couple of months ago  Other test results (not lab or imaging): Results for orders placed during the hospital encounter of 11/30/19   Adult Transthoracic Echo Complete W/ Cont if Necessary Per Protocol    Narrative · Estimated EF = 55%.  · Left ventricular systolic function is normal.  · Left ventricular wall thickness is consistent with mild concentric   hypertrophy.  · Left ventricular diastolic dysfunction.  · No evidence of pulmonary hypertension is present.           Problem List as identified by Epic (may contain historical, inactive problems)  Patient Active Problem List   Diagnosis   • Controlled type 2 diabetes mellitus with hyperglycemia, without long-term current use of insulin (CMS/HCC)   • HTN (hypertension), benign   • Hx of colonic polyps   • Hx of colon cancer, stage I   • Sensorineural hearing loss   • Tinnitus of right ear   • Dysfunction of right eustachian tube   • Mass of upper lobe of left lung   • Encounter for consultation   • Former smoker   • Shortness of breath   • Hypoxia   • Respiratory distress, acute   • Acute respiratory failure with hypoxia and hypercapnia (CMS/HCC)   • Mediastinal adenopathy   • Adenocarcinoma, lung (CMS/HCC)   • Hypomagnesemia   • Pleural effusion   • Acute on chronic respiratory failure with hypoxia (CMS/HCC)   • Atelectasis   • Type 2 diabetes mellitus with  hyperglycemia, with long-term current use of insulin (CMS/HCC)   • Anemia due to chemotherapy   • Essential hypertension   • Hypokalemia   • SVT (supraventricular tachycardia) (CMS/HCC)   • Acute pulmonary edema (CMS/HCC)     Pulmonary Assessment:  New problem (to me), with additional workup planned: Bilateral pleural effusions, perhaps worsening and symptomatic  New problem (to me), no additional workup planned: Lung cancer, treated per oncology  Other problems either stable, failing to improve or worsenin. Atelectasis related to pleural effusions  2. Personal history of pulmonary edema and diastolic dysfunction  3. Elevated BNP    Recommend/plan:   · Diuresis  · Continue oxygen  · Follow-up x-ray imaging  · Effusions are small to moderate size.  Not large amount in my opinion to warrant doing a thoracentesis right now    Thank you for this consult.  We will follow along.  Electronically signed by Jere Brumfield MD, 20, 9:01 AM.

## 2020-02-07 NOTE — PROGRESS NOTES
Continued Stay Note   [facility]     Patient Name: Romel Rosario  MRN: [id number]  Today's Date: 2/7/2020    Admit Date: 2/7/2020    Discharge Plan     Row Name 02/07/20 1339       Plan    Plan  Attempted to screen patient and lab was seeing him.  Patient's spouse was present and appeared to be asleep in chair.  Will attempt to screen patient at another time.        Discharge Codes    No documentation.             ZBIGNIEW Arzola

## 2020-02-07 NOTE — CONSULTS
MEDICAL ONCOLOGY CONSULTATION      Pt Name: Romel Rosario  YOB: 1950  MRN: [id number]  Room: CCU-5    Date of admission: 2/7/2020  Date of evaluation: 2/7/2020  Referring Physician: Tisha Guy Do  [address] [place] [address]   Reason for Consultation: NSCLC, continuity of care    History Obtained From: History obtained from chart review and the patient.    CHIEF COMPLAINT:    Chief Complaint   Patient presents with   • Shortness of Breath     HISTORY OF PRESENT ILLNESS:   Romel Rosario is a 69 y.o.  gentleman who holds a diagnosis of non-small cell adenocarcinoma of the lung at initial presentation with stage III, now with interval progression, stage IV disease.   Initially he received palliative radiation therapy to the chest due to obstructive symptoms with great improvement. Romel is presently receiving palliative maintenance treatment with Alimta and Keytruda.  Last dose given 1/24/20, which was cycle #6.    Romel has multiple comorbidities including COPD with O2 dependency, atrial fibrillation and congestive heart failure.   He last had CT scans on 11/30/2019 that indicated interval response to treatment, small-moderate bilateral pleural effusions.     Romel is admitted to CCU at [facility] 2/7/2020 for complaint of acute respiratory distress with progressive shortness of breath and hypoxia.  Initially he required bag mask for O2 improvement.  He is currently wearing O2 6L via NC (he states he wears from 4-6L via NC at home) with O2 sat of 93%.    He feels much better following IV Lasix and states reports he is voiding a lot.  He denies worsening cough. He denies fever.  Romel has a mild productive (white) cough, that is unchanged from baseline.    Oncology consultation is requested for continuity of care.    ONCOLOGIC HISTORY:  Diagnosis  · Non-small cell lung cancer, adenocarcinoma, July 2019  · Stage IV   · PD-L1 unknown  · Guardant 360-pending     Treatment  summary   · Status chest palliative RT 13 fractions completed August 2019  · 9/5/2019-1 time dose of carboplatin AUC 2/Taxol 45 mg/m².  · 9/12/2019- Initiated on carboplatin AUC 5/Alimta 500 mg/m² and Keytruda every 3 weeks, anticipate maintenance Alimta and Keytruda     Cancer history  Mr Romel Rosario was first seen by me on 6/28/2019 referred by his primary care provider for further evaluation of a lung mass and mediastinal adenopathy. The patient had complains of shortness of breath at exertion and therefore was recommended further imaging.  · 6/13/2019-CT chest with contrast showed a 6.3 x 5.6 x 7 cm left upper lobe lung mass, centrally necrotic, with compressive atelectasis. Questionable satellite nodule within the lingula measured 7 mm. Noncalcified 11 mm left lower lobe nodule concerning for pulmonary metastases. 5 mm subpleural right lower lobe nodule of uncertain significance. A small hiatal hernia. No adrenal nodules. Mediastinal adenopathy measuring up to 2 cm. Subcarina lymph nodes measure up to 1.3 cm.   · 6/24/2019-Initial oncology consultation   · 7/3/2019-bone scan showed no convincing evidence of osseous metastasis.   · 7/3/2019- CT chest, abdomen, pelvis showed no evidence of intra-abdominal metastatic disease. Noncalcified nodule in the right middle lobe  · 7/19/2019- bronchoscopy/FNA biopsy consistent with non-small cell lung cancer favoring adenocarcinoma subtype.PD-L1 could not be performed on scant material from FNA biopsy.  · 8/7/2019- MRI brain unremarkable for metastatic disease  · 8/29/2019- recommend a repeat CT chest abdomen pelvis Mediport placement and concurrent chemoradiation with carboplatin/Taxol to be followed by immunotherapy 1 year. Stage III non-small cell lung cancer. Will obtain guardant 360 liquid biopsy. Molecular studies could not be performed on FNA specimen.  · 9/4/2019-CT chest abdomen pelvis showed interval improvement of metastatic adenopathy and right upper lobe  mass but interval progression of bilateral pulmonary metastasis. No evidence of intra-abdominal metastatic disease. Specifically, tumor currently measuring 4.8 x 4.4 cm (previously 6.3 x 5.6 cm). Again identified are multiple lymph nodes in the prevascular space adjacent to the aorta. There has been mild  interval decrease in the size of these nodes, largest now measures 1.8 x  2 cm, previously measured 2.4 x 2 cm. Also noted is interval decrease the size of right paratracheal nodes middle mediastinal region, largest node measuring 1.6 cm in greatest dimension previosly measured 2.2 cm. No developing lymphadenopathy observed. The pulmonary nodule in the left lower lobe, anterolaterally, pleural-based measures 1.4 cm greatest transaxial projection previously measured 1.1 cm. In the lingula, near the major fissure there is a 9 mm nodule appreciated, axial image #92, previously measured less than 5 mm. In the left lower lobe there are several developing pulmonary nodules appreciated, the largest appreciated centrally, axial image #110 measuring nearly 14 mm in greatest dimension, previously measured 9 mm. In the left upper lobe, anteriorly and laterally, axial image #57 there is a developing 6 mm pulmonary nodule appreciated.  The right upper lobe, axial image #24 a developing 4 mm nodule is  observed. More inferiorly, axial image #38 pleural-based 8 mm nodule is  now appreciated. In the right middle lobe, inferiorly and laterally, axial image #75 9 mm pulmonary nodule, pleural-based is now identified. There are multiple developing pulmonary nodules in the right lower lobe,  largest appreciated posteriorly and inferiorly, axial image #115  measuring 1 cm in greatest dimension.  · 9/5/2018-he received 1 cycle of carboplatin AUC 2/Taxol 50 mg/m²  · 9/5/ 2019- recommended carboplatin Alimta and Keytruda due to findings of stage IV disease today on CT scans.  · 9/12/2019- Initiated on carboplatin AUC 5/Alimta 500 mg/m² and  Keytruda every 3 weeks  · 9/29/2019- Guardant 360 identified a KRAS G12V alteration   · 11/30/2019- CTA showed new small to moderate bilateral pleural effusions with scattered pulmonary opacities and bandlike atelectasis of the right middle lobe compared to 09/04/2019. There has been improvement in the primary left suprahilar mass and scattered bilateral pulmonary nodules (limitations in assessment as described above). There is interval improvement in the mediastinal lymphadenopathy when compared to 09/04/2019.  · December 2019-hospitalization at [facility] with severe anemia secondary to chemotherapy, COPD exacerbation and diastolic heart failure, A. fib with rapid ventricular response.  · 12/20/2019- he was seen after hospitalization. Recommended maintenance with Alimta and Keytruda.    History  Past Medical History:   Diagnosis Date   • Adenocarcinoma, lung (CMS/HCC)    • Cerumen impaction    • Cervical disc disease    • Colon cancer (CMS/HCC)    • Diabetes (CMS/HCC)    • Elevated cholesterol    • Eustachian tube dysfunction    • GERD (gastroesophageal reflux disease)    • Hx of colonic polyps    • Hypertension    • PUD (peptic ulcer disease)    • Sensorineural hearing loss      Past Surgical History:   Procedure Laterality Date   • BRONCHOSCOPY Left 7/19/2019    Procedure: BRONCHOSCOPY WITH ENDOBRONCHIAL ULTRASOUND AND TRANSBRONCHIAL BIOPSY at 1: 30;  Surgeon: Jere Brumfield MD;  Location: [facility] OR;  Service: Pulmonary   • COLON SURGERY      Due to colon cancer   • COLONOSCOPY N/A 6/26/2017    Procedure: COLONOSCOPY WITH ANESTHESIA;  Surgeon: Barry Banks MD;  Location: [facility] ENDOSCOPY;  Service:    • COLONOSCOPY W/ POLYPECTOMY  08/05/2013    Tubular adenoma transverse x 2, Diverticulosis repeat exam in 3 years   • ENDOSCOPY  08/05/2013    HH, Billroth anastomisis in the gastric antrum   • KNEE SURGERY     • STOMACH SURGERY      Due to ulcer   • VENOUS ACCESS DEVICE (PORT) INSERTION N/A 9/3/2019     Procedure: SINGLE LUMEN PORT PLACEMENT;  Surgeon: Jelena Huntley MD;  Location: [facility] OR;  Service: General     Family History   Problem Relation Age of Onset   • Heart disease Mother    • Diabetes Mother    • Alzheimer's disease Father    • Colon cancer Neg Hx    • Colon polyps Neg Hx       Social History     Tobacco Use   • Smoking status: Former Smoker     Years: 45.00     Last attempt to quit: 2007     Years since quittin.4   • Smokeless tobacco: Never Used   Substance Use Topics   • Alcohol use: No   • Drug use: No     Medications Prior to Admission   Medication Sig Dispense Refill Last Dose   • albuterol sulfate  (90 Base) MCG/ACT inhaler Inhale 2 puffs Every 4 (Four) Hours As Needed for Wheezing.   2020 at Unknown time   • acetaminophen (TYLENOL) 325 MG tablet Take 2 tablets by mouth Every 4 (Four) Hours As Needed for Mild Pain .   Past Month at Unknown time   • ALPRAZolam (XANAX) 1 MG tablet Take 1 tablet by mouth Every 6 (Six) Hours As Needed for Anxiety (Agitation).   Past Month at Unknown time   • atorvastatin (LIPITOR) 40 MG tablet Take 40 mg by mouth Daily.   2019 at Unknown time   • budesonide-formoterol (SYMBICORT) 160-4.5 MCG/ACT inhaler Inhale 2 puffs 2 (Two) Times a Day.  12 2019 at Unknown time   • Ergocalciferol (VITAMIN D2) 2000 units tablet Take 1 tablet by mouth Daily.   2019 at Unknown time   • esomeprazole (nexIUM) 40 MG capsule Take 40 mg by mouth Every Morning Before Breakfast.   2019 at Unknown time   • folic acid (FOLVITE) 400 MCG tablet Take 400 mcg by mouth Daily.   2019 at Unknown time   • hydrOXYzine (ATARAX) 10 MG tablet Take 10 mg by mouth 4 (Four) Times a Day As Needed for Anxiety.   Past Week at Unknown time   • JANUVIA 50 MG tablet Take 50 mg by mouth Daily.   2019 at Unknown time   • metoprolol tartrate (LOPRESSOR) 25 MG tablet Take 1 tablet by mouth 2 (Two) Times a Day. 60 tablet 0    • ondansetron (ZOFRAN) 8 MG tablet  Take 8 mg by mouth Every 8 (Eight) Hours As Needed for Nausea or Vomiting.   Past Month at Unknown time   • sucralfate (CARAFATE) 1 g tablet Take 1 g by mouth 4 (Four) Times a Day.   11/29/2019 at Unknown time      Scheduled Meds:    atorvastatin 40 mg Oral Daily   budesonide-formoterol 2 puff Inhalation BID - RT   folic acid 400 mcg Oral Daily   insulin lispro 2-7 Units Subcutaneous 4x Daily With Meals & Nightly   ipratropium-albuterol 3 mL Nebulization Q4H - RT   lansoprazole 30 mg Nasogastric Q AM   linagliptin 5 mg Oral Daily   metoprolol tartrate 25 mg Oral BID   sodium chloride 10 mL Intravenous Q12H   sucralfate 1 g Oral 4x Daily     PRN Meds:  •  acetaminophen  •  ALPRAZolam  •  dextrose  •  dextrose  •  glucagon (human recombinant)  •  hydrOXYzine  •  ondansetron  •  sodium chloride  •  sodium chloride   Allergies:  Lactose intolerance (gi)      Subjective   REVIEW OF SYSTEMS:   Review of Systems   Constitutional: Positive for activity change and fatigue. Negative for chills.        No night sweats       HENT: Negative for dental problem, hearing loss, mouth sores, nosebleeds, sore throat and trouble swallowing.    Eyes:        No blurring of vision, no double vision   Respiratory: Positive for cough (chronic, unchanged) and shortness of breath (better after IV lasix). Negative for wheezing.         Chronic O2 dependency       Cardiovascular: Negative for chest pain, palpitations and leg swelling.   Gastrointestinal: Positive for diarrhea (x1). Negative for abdominal pain, constipation, nausea and vomiting.   Endocrine: Negative for cold intolerance, heat intolerance, polydipsia and polyuria.   Genitourinary: Negative for difficulty urinating, dysuria, hematuria and urgency.   Musculoskeletal: Positive for arthralgias. Negative for joint swelling and myalgias.   Skin: Negative for pallor and rash.   Allergic/Immunologic: Negative for immunocompromised state.   Neurological: Negative for seizures, syncope  "and numbness.   Hematological: Negative for adenopathy. Does not bruise/bleed easily.   Psychiatric/Behavioral: Negative for confusion and suicidal ideas.     Objective   PHYSICAL EXAM:  Physical Exam   Constitutional: He is oriented to person, place, and time. He appears well-developed. No distress.   Appears chronically ill   HENT:   Head: Normocephalic and atraumatic.   Eyes: EOM are normal. No scleral icterus.   Neck: Neck supple. No tracheal deviation present.   Cardiovascular: Normal rate and regular rhythm.   Pulmonary/Chest: Effort normal. No respiratory distress. He has decreased breath sounds in the right lower field and the left lower field. He has no wheezes. He has no rales.   Supplemental O2 in use   Abdominal: Soft. Bowel sounds are normal. He exhibits no distension and no mass.   No hepatosplenomegaly     Genitourinary:   Genitourinary Comments: Exam deferred.     Musculoskeletal: He exhibits no edema or tenderness.   Normal ROM to all 4 extremities, generalized weakness   Lymphadenopathy:   No bulky palpable cervical, clavicular, axillary or inguinal adenopathies on the left or right.     Neurological: He is alert and oriented to person, place, and time.   Follows commands. Non-focal.     Skin: Skin is warm and dry. No rash noted.   Psychiatric: He has a normal mood and affect. His behavior is normal. Thought content normal.   Vitals reviewed.      Vital Signs   /62 (BP Location: Left arm, Patient Position: Lying)   Pulse 68   Temp 97.2 °F (36.2 °C) (Oral)   Resp 23   Ht 170.2 cm (67\")   Wt 83.6 kg (184 lb 3.2 oz)   SpO2 (!) 88%   BMI 28.85 kg/m²     Intake/Output Summary (Last 24 hours) at 2/7/2020 0809  Last data filed at 2/7/2020 0700  Gross per 24 hour   Intake --   Output 300 ml   Net -300 ml       Labs:  CBC  Results from last 7 days   Lab Units 02/07/20  0048   WBC 10*3/mm3 8.87   HEMOGLOBIN g/dL 9.2*   HEMATOCRIT % 28.0*   PLATELETS 10*3/mm3 234       Lab Results   Component " Value Date     02/07/2020    K 3.9 02/07/2020     02/07/2020    CO2 23.0 02/07/2020    BUN 11 02/07/2020    CREATININE 0.75 (L) 02/07/2020    GLUCOSE 215 (H) 02/07/2020    CALCIUM 9.4 02/07/2020    BILITOT 0.2 02/07/2020    ALKPHOS 99 02/07/2020    AST 11 02/07/2020    ALT 11 02/07/2020    AGRATIO 1.1 02/07/2020    GLOB 3.0 02/07/2020       Lab Results   Component Value Date    INR 0.96 12/03/2019    PROTIME 13.1 12/03/2019       Cultures:  Lab Results   Component Value Date    BLOODCX No growth at 5 days 11/30/2019     No components found for: URINCX    ASSESSMENT/PLAN:    1. Stage IV Non-small cell lung cancer, adenocarcinoma.   Cycle #6 of maintenance Alimta and Keytruda received 1/24/20  Next dose due 2/14/20.    2.  Volume overload    CXR 2/7/2020: increasing diffuse bilateral pulmonary opacities, favorable for edema - consider pneumonitis    CTA chest 2/7/2020:   · no evidence of PE  · Multiple moderate sized pleural effusion, layering posteriorly  · Left perihilar mass with patchy associated airspace opacities in the left perihilar region  · Diffuse bilateral groundglass opacities  · Cardiomegaly    Echocardiogram from 12/3/2019:  · EF 55%  · Normal LV systolic function  · LV thickness c/w mild concentric hypertrophy  · LV diastolic dysfunction  · No evidence of pulmonary HTN    proBNP - 2,117    S/p Lasix 40 mg IV x1  S/p solumedrol 125 mg IV x1    Symptoms improved with lasix. Voiding well.  Findings most consistent with volume overload.  Pulmonary to see as well.  Request TSH in am.    3. Anemia r/t chemotherapy  Hgb 9.2, MCV 91.8  Stable, follow      Discussed with patient.    MING Shannon  02/07/20  8:09 AM    Romel Rosario was seen and examined with MING Truong   Information was obtained from the patient and the medical record.  I agree with the recommendations and plans as developed with MING Truong as outlined.   I will dictate a  consultation  confirming the above assessment, recommendations and plan to comply with CMS guidelines and regulations.    Electronically signed by Claudio Nevarez MD  02/07/20   3:12 PM

## 2020-02-07 NOTE — CONSULTS
"Palliative Care Initial Consult   Attending Physician: Conor Smith MD  Referring Provider: Li Muñoz RN/Conor Smith MD    Reason for Referral: assistance with clarification of goals of care  Family/Support: spouse at bedside  Goals of Care: TBD.  Code Status and Medical Interventions:   Ordered at: 02/07/20 0442     Level Of Support Discussed With:    Patient     Code Status:    CPR     Medical Interventions (Level of Support Prior to Arrest):    Full         HPI:   69 y.o. 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 Fishman, has received palliative radiation therapy and now receiving palliative chemotherapy (Alimta and Keytruda), chronic oxygen use 4 to 6 L.  Additional history positive for colon cancer, diastolic dysfunction-EF 55%, COPD, and atrial fibrillation.  Recent hospitalization in December 2019 due to severe anemia secondary to chemotherapy, COPD exacerbation, diastolic heart failure, atrial fibrillation with RVR. Patient presented to [facility] on 2/7/2020 related to shortness of breath and orthopnea. Palliative Care Spoke With: patient and family reports his quality of life and functional status have declined significantly over the last 12 months and more so over the last 1 to 2 months.  He reports minimal exertion even turning in the bed he becomes extremely dyspneic.  This escalates his anxiety. Due to the Palliative Care Topics Discussed: palliative care, goals of care, care options, resuscitation status and [facility] we will establish an advance care plan.   Advance Care Planning   Advance Care Planning Discussion: Discussed patient's awareness of overall prognosis and decline in health related to stage IV lung cancer.  Spouse verbalizes further decline after starting palliative chemotherapy.  As such, we explored CODE STATUS and care interventions.  Patient verbalizes no CPR and no intubation adding that \"I do not want my " "life prolonged artificially\".  Patient is having difficulty discussing care measures and would like to reconvene on Monday.  After leaving the room approach by spouse who requests more information with regard to end-of-life care options.  As such, we discussed hospice. She states I feel like the palliative chemotherapy is \"bringing him down even more\" and \"this is no quality of life\". Appreciative for information and patient and spouse will plan to discuss further over the weekend.     Review of Systems   Constitution: Positive for malaise/fatigue.   Cardiovascular: Positive for orthopnea.   Respiratory: Positive for cough and shortness of breath.    Skin: Negative for rash.   Musculoskeletal: Negative for myalgias.   Gastrointestinal: Positive for diarrhea and nausea.   Genitourinary: Negative for dysuria.   Neurological: Positive for weakness.   Psychiatric/Behavioral: The patient is nervous/anxious.        Past Medical History:   Diagnosis Date   • Adenocarcinoma, lung (CMS/HCC)    • Cerumen impaction    • Cervical disc disease    • Colon cancer (CMS/HCC)    • Diabetes (CMS/HCC)    • Elevated cholesterol    • Eustachian tube dysfunction    • GERD (gastroesophageal reflux disease)    • Hx of colonic polyps    • Hypertension    • PUD (peptic ulcer disease)    • Sensorineural hearing loss      Past Surgical History:   Procedure Laterality Date   • BRONCHOSCOPY Left 7/19/2019    Procedure: BRONCHOSCOPY WITH ENDOBRONCHIAL ULTRASOUND AND TRANSBRONCHIAL BIOPSY at 1: 30;  Surgeon: Jere Brumfield MD;  Location: [facility] OR;  Service: Pulmonary   • COLON SURGERY      Due to colon cancer   • COLONOSCOPY N/A 6/26/2017    Procedure: COLONOSCOPY WITH ANESTHESIA;  Surgeon: Barry Banks MD;  Location: [facility] ENDOSCOPY;  Service:    • COLONOSCOPY W/ POLYPECTOMY  08/05/2013    Tubular adenoma transverse x 2, Diverticulosis repeat exam in 3 years   • ENDOSCOPY  08/05/2013    HH, Billroth anastomisis in the gastric antrum   • " KNEE SURGERY     • STOMACH SURGERY      Due to ulcer   • VENOUS ACCESS DEVICE (PORT) INSERTION N/A 9/3/2019    Procedure: SINGLE LUMEN PORT PLACEMENT;  Surgeon: Jelena Huntley MD;  Location: [facility];  Service: General     Social History     Socioeconomic History   • Marital status:      Spouse name: Not on file   • Number of children: Not on file   • Years of education: Not on file   • Highest education level: Not on file   Tobacco Use   • Smoking status: Former Smoker     Years: 45.00     Last attempt to quit: 2007     Years since quittin.4   • Smokeless tobacco: Never Used   Substance and Sexual Activity   • Alcohol use: No   • Drug use: No   • Sexual activity: Defer       Allergies   Allergen Reactions   • Lactose Intolerance (Gi) Diarrhea       Current medication reviewed for route, type, dose and frequency and are current per MAR at time of dictation.      Diagnostics: Reviewed    Patient Active Problem List   Diagnosis   • Controlled type 2 diabetes mellitus with hyperglycemia, without long-term current use of insulin (CMS/HCC)   • HTN (hypertension), benign   • Hx of colonic polyps   • Hx of colon cancer, stage I   • Sensorineural hearing loss   • Tinnitus of right ear   • Dysfunction of right eustachian tube   • Mass of upper lobe of left lung   • Encounter for consultation   • Former smoker   • Shortness of breath   • Hypoxia   • Respiratory distress, acute   • Acute respiratory failure with hypoxia and hypercapnia (CMS/HCC)   • Mediastinal adenopathy   • Adenocarcinoma, lung (CMS/HCC)   • Hypomagnesemia   • Pleural effusion   • Acute on chronic respiratory failure with hypoxia (CMS/HCC)   • Atelectasis   • Type 2 diabetes mellitus with hyperglycemia, with long-term current use of insulin (CMS/HCC)   • Anemia due to chemotherapy   • Essential hypertension   • Hypokalemia   • SVT (supraventricular tachycardia) (CMS/HCC)   • Acute pulmonary edema (CMS/HCC)   • PUD (peptic ulcer disease)  "  • Colon cancer (CMS/HCC)       Physical Exam:    /57   Pulse 67   Temp 98.9 °F (37.2 °C) (Axillary)   Resp 19   Ht 170.2 cm (67\")   Wt 83.6 kg (184 lb 3.2 oz)   SpO2 96%   BMI 28.85 kg/m²     Physical Exam   Constitutional: He is oriented to person, place, and time. He appears well-developed and well-nourished. He has a sickly appearance. He appears ill. He appears distressed. Nasal cannula in place.   HENT:   Head: Normocephalic and atraumatic.   Eyes: Pupils are equal, round, and reactive to light.   Neck: Normal range of motion.   Cardiovascular: Normal rate, regular rhythm and normal heart sounds.   Pulmonary/Chest: Accessory muscle usage present. He has decreased breath sounds.   Abdominal: Soft. Bowel sounds are normal.   Musculoskeletal: Normal range of motion.   Neurological: He is alert and oriented to person, place, and time.   Skin: Skin is warm and dry.   Psychiatric: His mood appears anxious. Cognition and memory are normal.     Patient status: Disease state: Controlled with current treatments.  Functional status: Palliative Performance Scale Score: Performance 40% based on the following measures: Ambulation: Mainly in bed, Activity and Evidence of Disease: Unable to do any work, extensive evidence of disease, Self-Care: Mainly assistance required,  Intake: Normal or reduced, LOC: Full, drowsy or confusion   ECOG Status(3) Capable of limited self-care, confined to bed or chair > 50% of waking hours.  Nutritional status: Albumin 3.10. Body mass index is 28.85 kg/m².         Family support: The patient receives support from his wife..  POA/Healthcare surrogate-no advance directive on file    Impression/Problem List:    1.  Stage IV non-small cell adenocarcinoma of the lung   2.  Acute pulmonary edema  3.  COPD  4.  Diabetes mellitus type 2  5.  Anemia, related to chemotherapy  6.  Hypertension  7.  Bilateral pleural effusions  8.  Atrial fibrillation  9.  History of colon " cancer      Recommendations/Plan:  1. plan: Goals of care include CODE STATUS no CPR/limited interventions.     2.  Palliative care encounter  -CODE STATUS changes as delineated per conversation above  -Discussed de-escalation's and transitions of care to include hospice.  We will plan to continue conversation on Monday.  -Will plan to address advance directive/living will on Monday per patient request.  -Would benefit from completion of medical orders for scope of treatment (MOST) form to further delineate care goals after discharge.    Thank you for this consult and allowing us to participate in patient's plan of care. Palliative Care Team will continue to follow patient.     Time spent: 70 minutes spent reviewing medical and medication records, assessing and examining patient, discussing with family, answering questions, providing some guidance about a plan and documentation of care, and coordinating care with other healthcare members, with > 50% time spent face to face.   25 minutes spent on advance care planning.    Angela Calhoun, APRN  2/7/2020

## 2020-02-07 NOTE — ED PROVIDER NOTES
Subjective   History of Present Illness  69-year-old male presents with a chief complaint of shortness of breath.  The patient reports he was walking to the bathroom tonight and felt very short of breath and could not breathe.  EMS was called and found his sats to be in the 60s.  The patient reports that he routinely feels short of breath on exertion but today was the worst it is ever been.  He has a history of lung cancer.  Actively being treated with maintenance chemotherapy.  Afebrile no chest pain nausea vomiting diarrhea  Review of Systems   All other systems reviewed and are negative.      Past Medical History:   Diagnosis Date   • Adenocarcinoma, lung (CMS/HCC)    • Cerumen impaction    • Cervical disc disease    • Colon cancer (CMS/HCC)    • Diabetes (CMS/HCC)    • Elevated cholesterol    • Eustachian tube dysfunction    • GERD (gastroesophageal reflux disease)    • Hx of colonic polyps    • Hypertension    • PUD (peptic ulcer disease)    • Sensorineural hearing loss        Allergies   Allergen Reactions   • Lactose Intolerance (Gi) Diarrhea       Past Surgical History:   Procedure Laterality Date   • BRONCHOSCOPY Left 7/19/2019    Procedure: BRONCHOSCOPY WITH ENDOBRONCHIAL ULTRASOUND AND TRANSBRONCHIAL BIOPSY at 1: 30;  Surgeon: Jere Brumfield MD;  Location: [facility] OR;  Service: Pulmonary   • COLON SURGERY      Due to colon cancer   • COLONOSCOPY N/A 6/26/2017    Procedure: COLONOSCOPY WITH ANESTHESIA;  Surgeon: Barry Banks MD;  Location: [facility] ENDOSCOPY;  Service:    • COLONOSCOPY W/ POLYPECTOMY  08/05/2013    Tubular adenoma transverse x 2, Diverticulosis repeat exam in 3 years   • ENDOSCOPY  08/05/2013    HH, Billroth anastomisis in the gastric antrum   • KNEE SURGERY     • STOMACH SURGERY      Due to ulcer   • VENOUS ACCESS DEVICE (PORT) INSERTION N/A 9/3/2019    Procedure: SINGLE LUMEN PORT PLACEMENT;  Surgeon: Jelena Huntley MD;  Location: [facility] OR;  Service: General       Family History    Problem Relation Age of Onset   • Heart disease Mother    • Diabetes Mother    • Alzheimer's disease Father    • Colon cancer Neg Hx    • Colon polyps Neg Hx        Social History     Socioeconomic History   • Marital status:      Spouse name: Not on file   • Number of children: Not on file   • Years of education: Not on file   • Highest education level: Not on file   Tobacco Use   • Smoking status: Former Smoker     Years: 45.00     Last attempt to quit: 2007     Years since quittin.4   • Smokeless tobacco: Never Used   Substance and Sexual Activity   • Alcohol use: No   • Drug use: No   • Sexual activity: Defer           Objective   Physical Exam   Constitutional: He is oriented to person, place, and time. He appears distressed.   HENT:   Head: Normocephalic and atraumatic.   Eyes: Pupils are equal, round, and reactive to light. EOM are normal.   Neck: Normal range of motion. Neck supple.   Cardiovascular: Normal rate and regular rhythm.   Pulmonary/Chest: He has rales.   Abdominal: Soft. Bowel sounds are normal.   Musculoskeletal: Normal range of motion.        Right lower leg: Normal.        Left lower leg: Normal.   Neurological: He is alert and oriented to person, place, and time.   Skin: Skin is warm. Capillary refill takes less than 2 seconds.   Psychiatric: He has a normal mood and affect. His behavior is normal.   Nursing note and vitals reviewed.      Procedures           ED Course           Labs Reviewed   COMPREHENSIVE METABOLIC PANEL - Abnormal; Notable for the following components:       Result Value    Glucose 215 (*)     Creatinine 0.75 (*)     Albumin 3.40 (*)     All other components within normal limits    Narrative:     GFR Normal >60  Chronic Kidney Disease <60  Kidney Failure <15     BNP (IN-HOUSE) - Abnormal; Notable for the following components:    proBNP 2,117.0 (*)     All other components within normal limits    Narrative:     Among patients with dyspnea, NT-proBNP is  highly sensitive for the detection of acute congestive heart failure. In addition NT-proBNP of <300 pg/ml effectively rules out acute congestive heart failure with 99% negative predictive value.    Results may be falsely decreased if patient taking Biotin.     CBC WITH AUTO DIFFERENTIAL - Abnormal; Notable for the following components:    RBC 3.05 (*)     Hemoglobin 9.2 (*)     Hematocrit 28.0 (*)     All other components within normal limits   BLOOD GAS, ARTERIAL - Abnormal; Notable for the following components:    pCO2, Arterial 34.2 (*)     pO2, Arterial 74.6 (*)     Base Excess, Arterial -0.4 (*)     O2 Saturation, Arterial 93.6 (*)     All other components within normal limits   D-DIMER, QUANTITATIVE - Abnormal; Notable for the following components:    D-Dimer, Quantitative 6.36 (*)     All other components within normal limits    Narrative:     Reference Range is 0-0.50 mg/L FEU. However, results <0.50 mg/L FEU tends to rule out DVT or PE. Results >0.50 mg/L FEU are not useful in predicting absence or presence of DVT or PE.     MANUAL DIFFERENTIAL - Abnormal; Notable for the following components:    Neutrophil % 83.2 (*)     Lymphocyte % 5.9 (*)     Metamyelocyte % 1.0 (*)     Myelocyte % 2.0 (*)     Neutrophils Absolute 7.38 (*)     Lymphocytes Absolute 0.52 (*)     All other components within normal limits   TROPONIN (IN-HOUSE) - Normal    Narrative:     Troponin T Reference Range:  <= 0.03 ng/mL-   Negative for AMI  >0.03 ng/mL-     Abnormal for myocardial necrosis.  Clinicians would have to utilize clinical acumen, EKG, Troponin and serial changes to determine if it is an Acute Myocardial Infarction or myocardial injury due to an underlying chronic condition.       Results may be falsely decreased if patient taking Biotin.     RAINBOW DRAW    Narrative:     The following orders were created for panel order [employer] Draw.  Procedure                               Abnormality         Status                      ---------                               -----------         ------                     Light Blue Top[[id number]]                                   Final result               Green Top (Gel)[[id number]]                                  Final result               Lavender Top[[id number]]                                     Final result               Red Top[[id number]]                                          Final result               [place] Blood Culture Christoph...[[id number]]                      Final result               Gray Top - Ice[[id number]]                                   Final result                 Please view results for these tests on the individual orders.   GRAY TOP - ICE   BLOOD GAS, ARTERIAL   LIGHT BLUE TOP   GREEN TOP   LAVENDER TOP   RED TOP   RAINBOW BLOOD CULTURE BOTTLES - 1 SET   CBC AND DIFFERENTIAL    Narrative:     The following orders were created for panel order CBC & Differential.  Procedure                               Abnormality         Status                     ---------                               -----------         ------                     CBC Auto Differential[[id number]]        Abnormal            Final result                 Please view results for these tests on the individual orders.     XR Chest 2 View    (Results Pending)   CT Angiogram Chest    (Results Pending)                [place] Coma Scale Score: 15                          MDM  Number of Diagnoses or Management Options  Acute pulmonary edema (CMS/HCC): new and requires workup  Pleural effusion: new and requires workup  Diagnosis management comments: Patient had shifted in the bed while a CAT scan and his sats dropped to 60s and 70s despite being on 6 L.  This caused the patient to be bagged.  The patient immediately went up to 90s.  He is at rest now.  He will be admitted to the unit given his guarded condition       Amount and/or Complexity of Data Reviewed  Clinical lab tests: reviewed and ordered  Tests in the  radiology section of CPT®: reviewed and ordered  Tests in the medicine section of CPT®: ordered and reviewed  Decide to obtain previous medical records or to obtain history from someone other than the patient: yes    Risk of Complications, Morbidity, and/or Mortality  Presenting problems: high  Diagnostic procedures: high  Management options: high    Patient Progress  Patient progress: (guarded)      Final diagnoses:   Acute pulmonary edema (CMS/HCC)   Pleural effusion            Gerry Mann PA-C  02/07/20 0336

## 2020-02-07 NOTE — H&P
[facility] Medicine Services  HISTORY AND PHYSICAL    Date of Admission: 2/7/2020  Primary Care Physician: Eleazar Turner DO    Subjective     Chief Complaint: Dyspnea    History of Present Illness  69-year-old  male who presents to the emergency department with shortness of breath.  The patient states that he is chronically short of breath, but tonight when he went to the bathroom and tried to lie on the couch he became worse, and described a smothering type of feeling.  Patient states that he has lung cancer, and is currently undergoing treatment with Dr. Sadaf PALACIOS.  He states his next chemo treatment is this coming Friday.  He states that he vomited one time 3 days ago.  He had an episode of diarrhea  on the way to CAT scan.  Apparently the patient got off the stretcher, went to the bathroom, and had a large diarrheal stool.  At that time the patient's O2 saturation dropped down to 60% and he had to have bag mask in order to improve his O2.  The patient noted no blood in his stool.  Patient states that his urine has been okay.  He states he has had 1 admission in the past to the hospital for fluid overload.  He denies any lower extremity edema.  He does have a mild cough, but nonproductive.  He has had no fever or chills.  He uses between 4 and 6 L of nasal cannula oxygen at home.  The patient states that he is feels weak.    Cardiac echo 12/3/2019:  · Estimated EF = 55%.  · Left ventricular systolic function is normal.  · Left ventricular wall thickness is consistent with mild concentric hypertrophy.  · Left ventricular diastolic dysfunction.  · No evidence of pulmonary hypertension is present.    Review of Systems   Dyspnea  Orthopnea    Otherwise complete ROS reviewed and negative except as mentioned in the HPI.    Past Medical History:   Past Medical History:   Diagnosis Date   • Adenocarcinoma, lung (CMS/HCC)    • Cerumen impaction    • Cervical disc  disease    • Colon cancer (CMS/HCC)    • Diabetes (CMS/HCC)    • Elevated cholesterol    • Eustachian tube dysfunction    • GERD (gastroesophageal reflux disease)    • Hx of colonic polyps    • Hypertension    • PUD (peptic ulcer disease)    • Sensorineural hearing loss      Past Surgical History:  Past Surgical History:   Procedure Laterality Date   • BRONCHOSCOPY Left 7/19/2019    Procedure: BRONCHOSCOPY WITH ENDOBRONCHIAL ULTRASOUND AND TRANSBRONCHIAL BIOPSY at 1: 30;  Surgeon: Jere Brumfield MD;  Location: [facility] OR;  Service: Pulmonary   • COLON SURGERY      Due to colon cancer   • COLONOSCOPY N/A 6/26/2017    Procedure: COLONOSCOPY WITH ANESTHESIA;  Surgeon: Barry Banks MD;  Location: [facility] ENDOSCOPY;  Service:    • COLONOSCOPY W/ POLYPECTOMY  08/05/2013    Tubular adenoma transverse x 2, Diverticulosis repeat exam in 3 years   • ENDOSCOPY  08/05/2013    HH, Billroth anastomisis in the gastric antrum   • KNEE SURGERY     • STOMACH SURGERY      Due to ulcer   • VENOUS ACCESS DEVICE (PORT) INSERTION N/A 9/3/2019    Procedure: SINGLE LUMEN PORT PLACEMENT;  Surgeon: Jelena Huntley MD;  Location: [facility] OR;  Service: General     Social History:  reports that he quit smoking about 12 years ago. He quit after 45.00 years of use. He has never used smokeless tobacco. He reports that he does not drink alcohol or use drugs.    Family History: family history includes Alzheimer's disease in his father; Diabetes in his mother; Heart disease in his mother.       Allergies:  Allergies   Allergen Reactions   • Lactose Intolerance (Gi) Diarrhea     Medications:  Prior to Admission medications    Medication Sig Start Date End Date Taking? Authorizing Provider   acetaminophen (TYLENOL) 325 MG tablet Take 2 tablets by mouth Every 4 (Four) Hours As Needed for Mild Pain . 7/25/19   Aram Salas MD   albuterol sulfate  (90 Base) MCG/ACT inhaler Inhale 2 puffs Every 4 (Four) Hours As Needed for  "Wheezing.    Rozina Shaw MD   ALPRAZolam (XANAX) 1 MG tablet Take 1 tablet by mouth Every 6 (Six) Hours As Needed for Anxiety (Agitation). 7/25/19   Aram Salas MD   atorvastatin (LIPITOR) 40 MG tablet Take 40 mg by mouth Daily. 4/13/17   Rozina Shaw MD   budesonide-formoterol (SYMBICORT) 160-4.5 MCG/ACT inhaler Inhale 2 puffs 2 (Two) Times a Day. 7/25/19   Aram Salas MD   Ergocalciferol (VITAMIN D2) 2000 units tablet Take 1 tablet by mouth Daily.    Rozina Shaw MD   esomeprazole (nexIUM) 40 MG capsule Take 40 mg by mouth Every Morning Before Breakfast.    Rozina Shaw MD   folic acid (FOLVITE) 400 MCG tablet Take 400 mcg by mouth Daily.    Rozina Shaw MD   hydrOXYzine (ATARAX) 10 MG tablet Take 10 mg by mouth 4 (Four) Times a Day As Needed for Anxiety.    Rozina Shaw MD   JANUVIA 50 MG tablet Take 50 mg by mouth Daily. 4/21/17   Rozina Shaw MD   metoprolol tartrate (LOPRESSOR) 25 MG tablet Take 1 tablet by mouth 2 (Two) Times a Day. 12/6/19   Rush Alberto DO   ondansetron (ZOFRAN) 8 MG tablet Take 8 mg by mouth Every 8 (Eight) Hours As Needed for Nausea or Vomiting.    Rozina Shaw MD   sucralfate (CARAFATE) 1 g tablet Take 1 g by mouth 4 (Four) Times a Day.    Rozina Shaw MD     Objective     Vital Signs: /66   Pulse 67   Temp 98.4 °F (36.9 °C) (Oral)   Resp 22   Ht 175.3 cm (69\")   Wt 86.2 kg (190 lb)   SpO2 92%   BMI 28.06 kg/m²   Physical Exam   Constitutional:   The patient appears anxious   HENT:   Head: Normocephalic and atraumatic.   Nose: Nose normal.   Oral mucosa is dry   Eyes: Conjunctivae and EOM are normal.   Neck: Normal range of motion. Neck supple.   Cardiovascular: Normal rate, regular rhythm and normal heart sounds.   Pulmonary/Chest: Effort normal. He has rales.   Abdominal: Soft. He exhibits no distension. There is no tenderness.   Hernia palpated on abdominal " exam.   Musculoskeletal: He exhibits no edema or tenderness.   Neurological: He is alert. No cranial nerve deficit.   Skin: Skin is warm and dry.   Psychiatric: He has a normal mood and affect.   Vitals reviewed.          Results Reviewed:  Lab Results (last 24 hours)     Procedure Component Value Units Date/Time    [place] Draw [[id number]] Collected:  02/07/20 0048    Specimen:  Blood Updated:  02/07/20 0201    Narrative:       The following orders were created for panel order [place] Draw.  Procedure                               Abnormality         Status                     ---------                               -----------         ------                     Light Blue Top[[id number]]                                   Final result               Green Top (Gel)[[id number]]                                  Final result               Lavender Top[[id number]]                                     Final result               Red Top[[id number]]                                          Final result               [place] Blood Culture Christoph...[[id number]]                      Final result               Gray Top - Ice[[id number]]                                   Final result                 Please view results for these tests on the individual orders.    Light Blue Top [[id number]] Collected:  02/07/20 0048    Specimen:  Blood Updated:  02/07/20 0201     Extra Tube hold for add-on     Comment: Auto resulted       Green Top (Gel) [[id number]] Collected:  02/07/20 0048    Specimen:  Blood Updated:  02/07/20 0201     Extra Tube Hold for add-ons.     Comment: Auto resulted.       Lavender Top [[id number]] Collected:  02/07/20 0048    Specimen:  Blood Updated:  02/07/20 0201     Extra Tube hold for add-on     Comment: Auto resulted       Red Top [[id number]] Collected:  02/07/20 0048    Specimen:  Blood Updated:  02/07/20 0201     Extra Tube Hold for add-ons.     Comment: Auto resulted.       [facility] Blood Culture Bottle Set [[id number]]  Collected:  02/07/20 0048    Specimen:  Blood from Wrist, Right Updated:  02/07/20 0201     Extra Tube Hold for add-ons.     Comment: Auto resulted.       D-dimer, Quantitative [[id number]]  (Abnormal) Collected:  02/07/20 0048    Specimen:  Blood Updated:  02/07/20 0142     D-Dimer, Quantitative 6.36 mg/L (FEU)     Narrative:       Reference Range is 0-0.50 mg/L FEU. However, results <0.50 mg/L FEU tends to rule out DVT or PE. Results >0.50 mg/L FEU are not useful in predicting absence or presence of DVT or PE.      CBC & Differential [[id number]] Collected:  02/07/20 0048    Specimen:  Blood Updated:  02/07/20 0136    Narrative:       The following orders were created for panel order CBC & Differential.  Procedure                               Abnormality         Status                     ---------                               -----------         ------                     CBC Auto Differential[[id number]]        Abnormal            Final result                 Please view results for these tests on the individual orders.    CBC Auto Differential [[id number]]  (Abnormal) Collected:  02/07/20 0048    Specimen:  Blood Updated:  02/07/20 0136     WBC 8.87 10*3/mm3      RBC 3.05 10*6/mm3      Hemoglobin 9.2 g/dL      Hematocrit 28.0 %      MCV 91.8 fL      MCH 30.2 pg      MCHC 32.9 g/dL      RDW 14.9 %      RDW-SD 47.8 fl      MPV 10.2 fL      Platelets 234 10*3/mm3     Manual Differential [[id number]]  (Abnormal) Collected:  02/07/20 0048    Specimen:  Blood Updated:  02/07/20 0136     Neutrophil % 83.2 %      Lymphocyte % 5.9 %      Monocyte % 7.9 %      Metamyelocyte % 1.0 %      Myelocyte % 2.0 %      Neutrophils Absolute 7.38 10*3/mm3      Lymphocytes Absolute 0.52 10*3/mm3      Monocytes Absolute 0.70 10*3/mm3      Anisocytosis Slight/1+     Microcytes Slight/1+     Polychromasia Slight/1+     WBC Morphology Normal     Clumped Platelets Present     Giant Platelets Large/3+    Comprehensive Metabolic Panel  [[id number]]  (Abnormal) Collected:  02/07/20 0048    Specimen:  Blood Updated:  02/07/20 0120     Glucose 215 mg/dL      BUN 11 mg/dL      Creatinine 0.75 mg/dL      Sodium 137 mmol/L      Potassium 3.9 mmol/L      Chloride 101 mmol/L      CO2 23.0 mmol/L      Calcium 9.4 mg/dL      Total Protein 6.4 g/dL      Albumin 3.40 g/dL      ALT (SGPT) 11 U/L      AST (SGOT) 11 U/L      Alkaline Phosphatase 99 U/L      Total Bilirubin 0.2 mg/dL      eGFR Non African Amer 103 mL/min/1.73      Globulin 3.0 gm/dL      A/G Ratio 1.1 g/dL      BUN/Creatinine Ratio 14.7     Anion Gap 13.0 mmol/L     Narrative:       GFR Normal >60  Chronic Kidney Disease <60  Kidney Failure <15      BNP [[id number]]  (Abnormal) Collected:  02/07/20 0048    Specimen:  Blood Updated:  02/07/20 0120     proBNP 2,117.0 pg/mL     Narrative:       Among patients with dyspnea, NT-proBNP is highly sensitive for the detection of acute congestive heart failure. In addition NT-proBNP of <300 pg/ml effectively rules out acute congestive heart failure with 99% negative predictive value.    Results may be falsely decreased if patient taking Biotin.      Troponin [[id number]]  (Normal) Collected:  02/07/20 0048    Specimen:  Blood Updated:  02/07/20 0120     Troponin T <0.010 ng/mL     Narrative:       Troponin T Reference Range:  <= 0.03 ng/mL-   Negative for AMI  >0.03 ng/mL-     Abnormal for myocardial necrosis.  Clinicians would have to utilize clinical acumen, EKG, Troponin and serial changes to determine if it is an Acute Myocardial Infarction or myocardial injury due to an underlying chronic condition.       Results may be falsely decreased if patient taking Biotin.      Gray Top - Ice [[id number]] Collected:  02/07/20 0048    Specimen:  Blood Updated:  02/07/20 0116     Extra Tube Hold for add-ons.     Comment: Auto resulted.       Blood Gas, Arterial [[id number]]  (Abnormal) Collected:  02/07/20 0100    Specimen:  Arterial Blood Updated:  02/07/20 0106      Site Right Radial     Irvin's Test Positive     pH, Arterial 7.445 pH units      pCO2, Arterial 34.2 mm Hg      Comment: 84 Value below reference range        pO2, Arterial 74.6 mm Hg      Comment: 84 Value below reference range        HCO3, Arterial 23.4 mmol/L      Base Excess, Arterial -0.4 mmol/L      Comment: 84 Value below reference range        O2 Saturation, Arterial 93.6 %      Comment: 84 Value below reference range        Temperature 37.0 C      Barometric Pressure for Blood Gas 741 mmHg      Modality Nasal Cannula     Flow Rate 6.0 lpm      Ventilator Mode NA     Collected by [id number]     Comment: Meter: [id number]     :  [id number]           Imaging Results (Last 24 Hours)     Procedure Component Value Units Date/Time    CT Angiogram Chest [[id number]] Resulted:  02/07/20 0250     Updated:  02/07/20 0314    XR Chest 2 View [[id number]] Resulted:  02/07/20 0116     Updated:  02/07/20 0117        I have personally reviewed and interpreted the radiology studies and ECG obtained at time of admission.     Assessment / Plan     Assessment:   Active Hospital Problems    Diagnosis   • Acute pulmonary edema (CMS/HCC)     Impression:  1.  Moderate loculated pleural effusions  2.  Mild pulmonary edema  3.  Lung cancer with worsening dyspnea  4.  Diabetes mellitus type 2, orally controlled  5.  Elevated BNP, 2 months ago BNP was 2065.  6.  Anemia  7.  Hypertension    Plan:   1.  Patient was given IV Lasix in the hospital  2.  Consult pulmonology for review, patient may need thoracentesis for his loculated pleural effusions  3.  Consult oncology, Dr. Sadaf PALACIOS normally follows with this patient  4.  Sliding scale insulin with Accu-Cheks  5.  Resume other home medications   6.  Labs in the morning   7.  Duo nebs        Code Status: Full     I discussed the patient's findings and my recommendations with the patient    Estimated length of stay 3 to 4 days    Patient seen and examined by me on 2/7/2020.    Tisha  Evon Guy DO   02/07/20   4:45 AM

## 2020-02-07 NOTE — CONSULTS
MEDICAL ONCOLOGY CONSULTATION    Pt Name: Romel Rosario  MRN: [id number]  [id number]  YOB: 1950  Date of evaluation: 2/7/2020    Reason for Consultation:    Continuity of care regarding treatment for lung cancer    Requesting Physician:  Tisha Guy Do    History Obtained From:  PATIENT and CHART    HISTORY OF PRESENT ILLNESS:      The patient is a 69 y.o.  gentleman with stage IV non-small cell carcinoma of the lung, managed from the oncology standpoint by Dr.Wedeson Fishman, most recently with maintenance Alimta Keytruda.    The most recent CT scan of the chest on 11/30/2019 indicated an interval  favorable response to treatment, however he continues to have small to moderate bilateral pleural effusions.     Romel is admitted through the emergency room to CCU at [facility] on 2/7/2020 for complaint of acute respiratory distress with progressive shortness of breath and hypoxia.  Initially he required bag mask for O2 improvement.  He is currently wearing O2 6L via NC (he states he wears from 4-6L via NC at home) with O2 sat of 93%.    His pulmonologist, Dr. Jere Brumfield has recommended initial approach of aggressive diuresis to assist with the bilateral pleural effusions and an attempt to improve his respiratory status.  Romel appears to be improving symptomatically.      Oncology consultation requested in continuity of care.      Past Medical History:    Past Medical History:   Diagnosis Date   • Adenocarcinoma, lung (CMS/HCC)    • Cerumen impaction    • Cervical disc disease    • Colon cancer (CMS/HCC)    • Diabetes (CMS/HCC)    • Elevated cholesterol    • Eustachian tube dysfunction    • GERD (gastroesophageal reflux disease)    • Hx of colonic polyps    • Hypertension    • PUD (peptic ulcer disease)    • Sensorineural hearing loss        Past Surgical History:    Past Surgical History:   Procedure Laterality Date   • BRONCHOSCOPY Left 7/19/2019    Procedure: BRONCHOSCOPY WITH  ENDOBRONCHIAL ULTRASOUND AND TRANSBRONCHIAL BIOPSY at 1: 30;  Surgeon: Jere Brumfield MD;  Location: [facility] OR;  Service: Pulmonary   • COLON SURGERY      Due to colon cancer   • COLONOSCOPY N/A 6/26/2017    Procedure: COLONOSCOPY WITH ANESTHESIA;  Surgeon: Barry Banks MD;  Location: [facility] ENDOSCOPY;  Service:    • COLONOSCOPY W/ POLYPECTOMY  08/05/2013    Tubular adenoma transverse x 2, Diverticulosis repeat exam in 3 years   • ENDOSCOPY  08/05/2013    HH, Billroth anastomisis in the gastric antrum   • KNEE SURGERY     • STOMACH SURGERY      Due to ulcer   • VENOUS ACCESS DEVICE (PORT) INSERTION N/A 9/3/2019    Procedure: SINGLE LUMEN PORT PLACEMENT;  Surgeon: Jelena Huntley MD;  Location: [facility] OR;  Service: General       Current Hospital Medications:      Current Facility-Administered Medications:   •  acetaminophen (TYLENOL) tablet 650 mg, 650 mg, Oral, Q4H PRN, Tisha Guy DO  •  ALPRAZolam (XANAX) tablet 1 mg, 1 mg, Oral, Q6H PRN, Tisha Guy DO, 1 mg at 02/07/20 1336  •  atorvastatin (LIPITOR) tablet 40 mg, 40 mg, Oral, Daily, Tisha Guy DO, 40 mg at 02/07/20 0922  •  budesonide-formoterol (SYMBICORT) 160-4.5 MCG/ACT inhaler 2 puff, 2 puff, Inhalation, BID - RT, Tisha Guy DO, 2 puff at 02/07/20 0827  •  dextrose (D50W) 25 g/ 50mL Intravenous Solution 25 g, 25 g, Intravenous, Q15 Min PRN, Tisha Guy DO  •  dextrose (GLUTOSE) oral gel 15 g, 15 g, Oral, Q15 Min PRN, Tisha Guy DO  •  famotidine (PEPCID) injection 20 mg, 20 mg, Intravenous, BID, Conor Smith MD, 20 mg at 02/07/20 1301  •  folic acid (FOLVITE) tablet 400 mcg, 400 mcg, Oral, Daily, Tisha Guy DO, 400 mcg at 02/07/20 0922  •  [START ON 2/8/2020] furosemide (LASIX) injection 20 mg, 20 mg, Intravenous, Q12H, Conor Smith MD  •  glucagon (human recombinant) (GLUCAGEN DIAGNOSTIC) injection 1 mg, 1 mg, Subcutaneous, Q15 Min PRN, Tisha Guy DO  •  hydrOXYzine  (ATARAX) tablet 10 mg, 10 mg, Oral, 4x Daily PRN, Tisha Guy DO, 10 mg at 20 1254  •  insulin lispro (humaLOG) injection 2-7 Units, 2-7 Units, Subcutaneous, 4x Daily With Meals & Nightly, Tisha Guy DO, 5 Units at 20 1153  •  ipratropium-albuterol (DUO-NEB) nebulizer solution 3 mL, 3 mL, Nebulization, Q4H - RT, Tisha Guy DO, 3 mL at 20 1424  •  linagliptin (TRADJENTA) tablet 5 mg, 5 mg, Oral, Daily, Tisha Guy DO, 5 mg at 20 0923  •  metoprolol tartrate (LOPRESSOR) tablet 25 mg, 25 mg, Oral, BID, Tisha Guy DO, 25 mg at 20 0922  •  ondansetron (ZOFRAN) tablet 8 mg, 8 mg, Oral, Q8H PRN, Tisha Guy DO  •  piperacillin-tazobactam (ZOSYN) 3.375 g in iso-osmotic dextrose 50 ml (premix), 3.375 g, Intravenous, Once, Conor Smith MD  •  piperacillin-tazobactam (ZOSYN) 3.375 g in iso-osmotic dextrose 50 ml (premix), 3.375 g, Intravenous, Q8H, Conor Smith MD  •  sodium chloride 0.9 % flush 10 mL, 10 mL, Intravenous, PRN, Kermit Zamudio MD  •  sodium chloride 0.9 % flush 10 mL, 10 mL, Intravenous, Q12H, Tisha Guy DO, 10 mL at 20 0924  •  sodium chloride 0.9 % flush 10 mL, 10 mL, Intravenous, PRN, Tisha Guy DO  •  sucralfate (CARAFATE) tablet 1 g, 1 g, Oral, 4x Daily, Tisha Guy DO, 1 g at 20 1153    Allergies:   Allergies   Allergen Reactions   • Lactose Intolerance (Gi) Diarrhea       Social History:    Social History     Socioeconomic History   • Marital status:      Spouse name: Not on file   • Number of children: Not on file   • Years of education: Not on file   • Highest education level: Not on file   Tobacco Use   • Smoking status: Former Smoker     Years: 45.00     Last attempt to quit: 2007     Years since quittin.4   • Smokeless tobacco: Never Used   Substance and Sexual Activity   • Alcohol use: No   • Drug use: No   • Sexual activity: Defer       Family History:  "  Family History   Problem Relation Age of Onset   • Heart disease Mother    • Diabetes Mother    • Alzheimer's disease Father    • Colon cancer Neg Hx    • Colon polyps Neg Hx        REVIEW OF SYSTEMS:    Constitutional: Fever fatigue shortness of breath with dyspnea wheezing shortness of breath  ring difficulty, no tinnitus,no ulceration, no dental caries, no dysphagia     Lungs: dyspnea wheezing  CVS: no palpitation, no chest pain, no shortness of breath  GI: no abdominal pain, no nausea , no vomiting, no constipation  MARNI: no dysuria, frequency and urgency, no hematuria, no kidney stones  Musculoskeletal: no joint pain, swelling , stiffness  Endocrine: no polyuria, polydipsia, no cold or heat intolerance  Hematology: Chemotherapy-induced anemia  Dermatology: no skin rash, no eczema, no pruritus  Psychiatry: no depression, no anxiety,no panic attacks, no suicide ideation  Neurology: no syncope, no seizures, no numbness or tingling of hands, no numbness or tingling of feet, no paresis    Vitals:    /57   Pulse 72   Temp 98.9 °F (37.2 °C) (Axillary)   Resp 18   Ht 170.2 cm (67.01\")   Wt 83.6 kg (184 lb 4.9 oz)   SpO2 95%   BMI 28.86 kg/m²     PHYSICAL EXAM:    CONSTITUTIONAL: Alert, appropriate, currently in no acute distress  EYES: Nonicteric, EOM intact, pupils equal round   ENT: Mucous membranes moist, no oropharyngeal lesions   NECK: Supple, no masses   CHEST/LUNGS: Scattered rales, mild wheezing with decreased breath sounds at bases  CARDIOVASCULAR: RRR, no murmurs, decreased breath sounds at bases consistent with the known bilateral pleural effusions  ABDOMEN: soft non-tender, active bowel sounds, no HSM  EXTREMITIES: warm, moves all fours  SKIN: warm, dry with no rashes or lesions  LYMPH: No cervical, clavicular, axillary, or inguinal lymphadenopathy  NEUROLOGIC: follows commands, non focal   PSYCH: mood and affect appropriate    CBC  Results from last 7 days   Lab Units 02/07/20  0920 " 02/07/20  0048   WBC 10*3/mm3 7.97 8.87   HEMOGLOBIN g/dL 9.7* 9.2*   HEMATOCRIT % 29.6* 28.0*   PLATELETS 10*3/mm3 247 234         Lab Results   Component Value Date     02/07/2020    K 4.1 02/07/2020    CL 98 02/07/2020    CO2 22.0 02/07/2020    BUN 11 02/07/2020    CREATININE 0.77 02/07/2020    GLUCOSE 208 (H) 02/07/2020    CALCIUM 9.7 02/07/2020    BILITOT 0.3 02/07/2020    ALKPHOS 99 02/07/2020    AST 11 02/07/2020    ALT 12 02/07/2020    AGRATIO 0.8 02/07/2020    GLOB 4.0 02/07/2020       Lab Results   Component Value Date    INR 0.96 12/03/2019    PROTIME 13.1 12/03/2019       Cultures:    Lab Results   Component Value Date    BLOODCX No growth at 5 days 11/30/2019     No components found for: URINCX    ASSESSMENT/PLAN:  The patient is a 69 y.o.  gentleman with stage IV non-small cell carcinoma of the lung, managed from the oncology standpoint by Dr.Wedeson Fishman, most recently with maintenance Alimta Keytruda.    The most recent CT scan of the chest on 11/30/2019 indicated an interval  favorable response to treatment, however he continues to have small to moderate bilateral pleural effusions.     Romel is admitted through the emergency room to CCU at [facility] on 2/7/2020 for complaint of acute respiratory distress with progressive shortness of breath and hypoxia.  Initially he required bag mask for O2 improvement.  He is currently wearing O2 6L via NC (he states he wears from 4-6L via NC at home) with O2 sat of 93%.    His pulmonologist, Dr. Jere Brumfield has recommended initial approach of aggressive diuresis to assist with the bilateral pleural effusions and an attempt to improve his respiratory status.  Romel appears to be improving symptomatically.      Oncology consultation requested in continuity of care.    #1.   Stage IV Non-small cell lung cancer, adenocarcinoma.   Cycle #6 of maintenance Alimta and Keytruda received 1/24/20  Next dose due 2/14/20.     #2.    Volume overload     CXR  2/7/2020: increasing diffuse bilateral pulmonary opacities, favorable for edema - consider pneumonitis    CTA chest 2/7/2020:   · no evidence of PE  · Multiple moderate sized pleural effusion, layering posteriorly  · Left perihilar mass with patchy associated airspace opacities in the left perihilar region  · Diffuse bilateral groundglass opacities  · Cardiomegaly     Echocardiogram from 12/3/2019:  · EF 55%  · Normal LV systolic function  · LV thickness c/w mild concentric hypertrophy  · LV diastolic dysfunction  · No evidence of pulmonary HTN     proBNP - 2,117     S/p Lasix 40 mg IV x1  S/p solumedrol 125 mg IV x1     Symptoms improved with lasix. Voiding well.  Findings most consistent with volume overload.  Pulmonary to see as well.  Request TSH in am.     #3.   Anemia r/t chemotherapy  Hgb 9.2, MCV 91.8  Stable, follow    #4  End-of-life issues were discussed with the patient.  He had previously discussed this with the palliative care nurse.  Discussions are ongoing regarding redirecting energies to palliative measures rather than chemotherapy.  Mr. Rosario states that he does not desire intubation or CPR procedures at this time but wants to discuss this further with family regarding final decisions.  Hospice was discussed with the patient but he is ambivalent about this at present.   will be covering the weekend and will be able to discuss this and any potential treatment possibilitiesClaudino further over the next couple of days.    Claudio Nevarez MD    02/07/20  3:13 PM

## 2020-02-07 NOTE — ED NOTES
Called charge nurse for non re breather do to pts 02 sat dropping to 77% NC at 6L. Placed non re breather on and 02 sat went up yo 96%. Informed charge nurse about situation, there fore she informed provider. Pt stable and returning to er and respiratory aware.        Florin Crews Jr.  02/07/20 0339

## 2020-02-08 NOTE — PROGRESS NOTES
[facility] Medicine Services  INPATIENT PROGRESS NOTE    Length of Stay: 1  Date of Admission: 2/7/2020  Primary Care Physician: Eleazar Turner DO    Subjective   Chief Complaint: Shortness of breath/weakness.    HPI   Overall patient better.  Patient still with epigastric discomfort.  CODE STATUS being changed to DNR and DNI.  Plan for palliative care consult on Monday.  Plan to go home with hospice on Monday.    Review of Systems   Constitutional: Positive for activity change, appetite change and fatigue. Negative for chills and fever.   HENT: Negative for hearing loss, nosebleeds, tinnitus and trouble swallowing.    Eyes: Negative for visual disturbance.   Respiratory: Positive for cough, shortness of breath and wheezing. Negative for chest tightness.    Cardiovascular: Negative for chest pain, palpitations and leg swelling.   Gastrointestinal: Positive for  nausea. Negative for abdominal distention, abdominal pain, blood in stool, constipation and vomiting.   Endocrine: Negative for cold intolerance, heat intolerance, polydipsia, polyphagia and polyuria.   Genitourinary: Negative for decreased urine volume, difficulty urinating, dysuria, flank pain, frequency and hematuria.   Musculoskeletal: Negative for arthralgias, joint swelling and myalgias.   Skin: Negative for rash.   Allergic/Immunologic: Negative for immunocompromised state.   Neurological: Positive for weakness. Negative for dizziness, syncope, light-headedness and headaches.   Hematological: Negative for adenopathy. Does not bruise/bleed easily.   Psychiatric/Behavioral: Negative for confusion and sleep disturbance. The patient is not nervous/anxious.    All pertinent negatives and positives are as above. All other systems have been reviewed and are negative unless otherwise stated.     Objective    Temp:  [97.9 °F (36.6 °C)-98.1 °F (36.7 °C)] 97.9 °F (36.6 °C)  Heart Rate:  [45-83] 70  Resp:  [12-21] 14  BP:  ()/(44-91) 122/74    Intake/Output Summary (Last 24 hours) at 2/8/2020 1059  Last data filed at 2/8/2020 1000  Gross per 24 hour   Intake 100 ml   Output 1900 ml   Net -1800 ml     Physical Exam  Constitutional: He is oriented to person, place, and time. He appears well-developed.   HENT:   Head: Normocephalic.   Eyes: Pupils are equal, round, and reactive to light. Conjunctivae are normal.   Neck: Neck supple. No JVD present.   Cardiovascular: Normal rate, regular rhythm, normal heart sounds and intact distal pulses. Exam reveals no gallop and no friction rub.   No murmur heard.  Pulmonary/Chest: No respiratory distress. He has wheezes. He has no rales. He exhibits no tenderness.   Rhonchi bilateral.  Diminished breath sound.   Abdominal: Soft. Bowel sounds are normal. He exhibits no distension. There is no tenderness. There is no rebound and no guarding.   Musculoskeletal: Normal range of motion. He exhibits no edema, tenderness or deformity.   Neurological: He is alert and oriented to person, place, and time. He displays normal reflexes. No cranial nerve deficit. He exhibits abnormal muscle tone. Coordination abnormal.   Skin: Skin is warm and dry. Capillary refill takes 2 to 3 seconds. No rash noted.   Psychiatric: He has a normal mood and affect. His behavior is normal. Thought content normal.   Nursing note and vitals reviewed.  Results Review:  Lab Results (last 24 hours)     Procedure Component Value Units Date/Time    POC Glucose Once [[id number]]  (Abnormal) Collected:  02/08/20 0756    Specimen:  Blood Updated:  02/08/20 0808     Glucose 163 mg/dL      Comment: : [id number] Toni Avila ID: [id number]       Comprehensive Metabolic Panel [[id number]]  (Abnormal) Collected:  02/08/20 0156    Specimen:  Blood Updated:  02/08/20 0326     Glucose 165 mg/dL      BUN 15 mg/dL      Creatinine 0.91 mg/dL      Sodium 138 mmol/L      Potassium 3.7 mmol/L      Chloride 97 mmol/L      CO2 30.0 mmol/L       Calcium 9.7 mg/dL      Total Protein 6.7 g/dL      Albumin 3.20 g/dL      ALT (SGPT) 10 U/L      AST (SGOT) 11 U/L      Alkaline Phosphatase 93 U/L      Total Bilirubin 0.2 mg/dL      eGFR Non African Amer 82 mL/min/1.73      Globulin 3.5 gm/dL      A/G Ratio 0.9 g/dL      BUN/Creatinine Ratio 16.5     Anion Gap 11.0 mmol/L     Narrative:       GFR Normal >60  Chronic Kidney Disease <60  Kidney Failure <15      Iron Profile [[id number]]  (Abnormal) Collected:  02/08/20 0156    Specimen:  Blood Updated:  02/08/20 0310     Iron 50 mcg/dL      Iron Saturation 17 %      Transferrin 194 mg/dL      TIBC 289 mcg/dL     Lipid Panel [[id number]]  (Abnormal) Collected:  02/08/20 0156    Specimen:  Blood Updated:  02/08/20 0310     Total Cholesterol 112 mg/dL      Triglycerides 57 mg/dL      HDL Cholesterol 37 mg/dL      LDL Cholesterol  64 mg/dL      VLDL Cholesterol 11.4 mg/dL      LDL/HDL Ratio 1.72    Narrative:       Cholesterol Reference Ranges  (U.S. Department of Health and Human Services ATP III Classifications)    Desirable          <200 mg/dL  Borderline High    200-239 mg/dL  High Risk          >240 mg/dL      Triglyceride Reference Ranges  (U.S. Department of Health and Human Services ATP III Classifications)    Normal           <150 mg/dL  Borderline High  150-199 mg/dL  High             200-499 mg/dL  Very High        >500 mg/dL    HDL Reference Ranges  (U.S. Department of Health and Human Services ATP III Classifcations)    Low     <40 mg/dl (major risk factor for CHD)  High    >60 mg/dl ('negative' risk factor for CHD)        LDL Reference Ranges  (U.S. Department of Health and Human Services ATP III Classifcations)    Optimal          <100 mg/dL  Near Optimal     100-129 mg/dL  Borderline High  130-159 mg/dL  High             160-189 mg/dL  Very High        >189 mg/dL    TSH [[id number]]  (Normal) Collected:  02/08/20 0156    Specimen:  Blood Updated:  02/08/20 0309     TSH 0.294 uIU/mL     Ferritin  [[id number]]  (Abnormal) Collected:  02/08/20 0156    Specimen:  Blood Updated:  02/08/20 0309     Ferritin 643.40 ng/mL     Narrative:       Results may be falsely decreased if patient taking Biotin.      Magnesium [[id number]]  (Normal) Collected:  02/08/20 0156    Specimen:  Blood Updated:  02/08/20 0309     Magnesium 1.7 mg/dL     CBC & Differential [[id number]] Collected:  02/08/20 0156    Specimen:  Blood Updated:  02/08/20 0252    Narrative:       The following orders were created for panel order CBC & Differential.  Procedure                               Abnormality         Status                     ---------                               -----------         ------                     CBC Auto Differential[[id number]]        Abnormal            Final result                 Please view results for these tests on the individual orders.    CBC Auto Differential [[id number]]  (Abnormal) Collected:  02/08/20 0156    Specimen:  Blood Updated:  02/08/20 0252     WBC 12.16 10*3/mm3      RBC 3.01 10*6/mm3      Hemoglobin 8.9 g/dL      Hematocrit 27.8 %      MCV 92.4 fL      MCH 29.6 pg      MCHC 32.0 g/dL      RDW 15.0 %      RDW-SD 48.6 fl      MPV 10.5 fL      Platelets 269 10*3/mm3      Neutrophil % 74.9 %      Lymphocyte % 8.1 %      Monocyte % 14.5 %      Eosinophil % 0.0 %      Basophil % 0.2 %      Immature Grans % 2.3 %      Neutrophils, Absolute 9.12 10*3/mm3      Lymphocytes, Absolute 0.98 10*3/mm3      Monocytes, Absolute 1.76 10*3/mm3      Eosinophils, Absolute 0.00 10*3/mm3      Basophils, Absolute 0.02 10*3/mm3      Immature Grans, Absolute 0.28 10*3/mm3      nRBC 0.2 /100 WBC     Vitamin B12 [[id number]] Collected:  02/08/20 0156    Specimen:  Blood Updated:  02/08/20 0237    Folate [[id number]] Collected:  02/08/20 0156    Specimen:  Blood Updated:  02/08/20 0237    POC Glucose Once [[id number]]  (Abnormal) Collected:  02/07/20 2026    Specimen:  Blood Updated:  02/07/20 2037     Glucose 294 mg/dL       Comment: : [id number] Dat AmandaMeter ID: [id number]       POC Glucose Once [[id number]]  (Abnormal) Collected:  02/07/20 1648    Specimen:  Blood Updated:  02/07/20 1718     Glucose 342 mg/dL      Comment: : [id number] Sonja Harper  JMeter ID: [id number]       Magnesium [[id number]]  (Abnormal) Collected:  02/07/20 1335    Specimen:  Blood Updated:  02/07/20 1406     Magnesium 1.4 mg/dL     POC Glucose Once [[id number]]  (Abnormal) Collected:  02/07/20 1140    Specimen:  Blood Updated:  02/07/20 1152     Glucose 319 mg/dL      Comment: : [id number] Toni Simon  AMeter ID: [id number]              Cultures:  No results found for: BLOODCX, URINECX, WOUNDCX, MRSACX, RESPCX, STOOLCX    Radiology Data:    Imaging Results (Last 24 Hours)     Procedure Component Value Units Date/Time    XR Chest 1 View [[id number]] Resulted:  02/08/20 1045     Updated:  02/08/20 1046          Allergies   Allergen Reactions   • Lactose Intolerance (Gi) Diarrhea       Scheduled meds:     atorvastatin 40 mg Oral Daily   budesonide-formoterol 2 puff Inhalation BID - RT   famotidine 20 mg Intravenous BID   folic acid 400 mcg Oral Daily   furosemide 20 mg Intravenous Q12H   insulin lispro 2-7 Units Subcutaneous 4x Daily With Meals & Nightly   ipratropium-albuterol 3 mL Nebulization Q4H - RT   linagliptin 5 mg Oral Daily   metoprolol tartrate 25 mg Oral BID   piperacillin-tazobactam 3.375 g Intravenous Q8H   sodium chloride 10 mL Intravenous Q12H   sucralfate 1 g Oral 4x Daily AC & at Bedtime       PRN meds:  •  acetaminophen  •  ALPRAZolam  •  dextrose  •  dextrose  •  glucagon (human recombinant)  •  hydrOXYzine  •  Morphine  •  ondansetron  •  sodium chloride  •  sodium chloride    Assessment/Plan       Controlled type 2 diabetes mellitus with hyperglycemia, without long-term current use of insulin (CMS/HCC)    Respiratory distress, acute    Acute respiratory failure with hypoxia and hypercapnia (CMS/HCC)    Adenocarcinoma, lung  (CMS/[facility])    Type 2 diabetes mellitus with hyperglycemia, with long-term current use of insulin (CMS/[facility])    Acute pulmonary edema (CMS/[facility])    PUD (peptic ulcer disease)    Colon cancer (CMS/[facility])      Plan:  Shortness of breath/hypoxia/COPD/cough/obstructive due to lung mass.  Continue Symbicort.  Continue duo nebs.  Cont Zosyn antibiotics.  CTA chest- no evidence of embolus or acute aortic pathology, persistent bilateral pleural effusion and left perihilar mass with patchy associated airspace opacity in the left perihilar region, diffuse bilateral groundglass opacity-similar to CTA 2019.     Acute pulmonary edema-fluid overload/edema/pleural effusion/hypertension/CHF.  BNP 2000.  Continue Lipitor.  Continue Lopressor.  Troponins negative x2 sets.  Echo cardiogram 12/3/2019- ejection fraction 55%, mild concentric hypertrophy, diastolic dysfunction, no evidence of pulmonary hypertension.     History of lung cancer/lung mass.  Ongoing treatment for Dr. Fishman.  Plan for palliative care and hospice on Monday.  Patient go home with hospice.     Diabetes.  Sliding scale.  Continue Tradjenta.  Hemoglobin A1c 7.3.     Hypo-magnesium.  2 g magnesium.  Check magnesium level after.     Anemia.  Hemoglobin stable.  No sign of acute bleed.  Anemia panel.     Anxiety.  Continue Atarax.     Nausea/vomiting-diarrhea.  Nausea improving. Pepcid.  Zofran.  Continue Carafate.  No sign of diarrhea this morning.       Nutrition.  Continue folic acid.  Consistent carbohydrate diet     Deconditioning.  PT consult.     Discharge Plannin-3 days.  Patient is currently take oxygen nasal cannula between 4 to 6 L at home.  Plan for palliative care.  Plan for hospice on Monday.  CODE STATUS changed to DNR and DNI.  Transfer to medical floor.    Conor Smith MD   20   10:59 AM

## 2020-02-08 NOTE — PROGRESS NOTES
MEDICAL ONCOLOGY PROGRESS NOTE    Pt Name: Romel Rosario  MRN: [id number]  YOB: 1950  Date of evaluation: 2/8/2020    SUBJECTIVE: still having a hard time breathing      HISTORY OF PRESENT ILLNESS:        The patient is a 69 y.o.  gentleman with stage IV non-small cell carcinoma of the lung, managed from the oncology standpoint by Dr.Wedeson Fishman, most recently with maintenance Alimta Keytruda.     The most recent CT scan of the chest on 11/30/2019 indicated an interval  favorable response to treatment, however he continues to have small to moderate bilateral pleural effusions.      Romel is admitted through the emergency room to CCU at [facility] on 2/7/2020 for complaint of acute respiratory distress with progressive shortness of breath and hypoxia.  Initially he required bag mask for O2 improvement.  He is currently wearing O2 6L via NC (he states he wears from 4-6L via NC at home) with O2 sat of 93%.     His pulmonologist, Dr. Jere Brumfield has recommended initial approach of aggressive diuresis to assist with the bilateral pleural effusions and an attempt to improve his respiratory status.  Romel appears to be improving symptomatically.       Oncology consultation requested in continuity of care.           Past Medical History:    Past Medical History:   Diagnosis Date   • Adenocarcinoma, lung (CMS/HCC)    • Cerumen impaction    • Cervical disc disease    • Colon cancer (CMS/HCC)    • Diabetes (CMS/HCC)    • Elevated cholesterol    • Eustachian tube dysfunction    • GERD (gastroesophageal reflux disease)    • Hx of colonic polyps    • Hypertension    • PUD (peptic ulcer disease)    • Sensorineural hearing loss        Past Surgical History:    Past Surgical History:   Procedure Laterality Date   • BRONCHOSCOPY Left 7/19/2019    Procedure: BRONCHOSCOPY WITH ENDOBRONCHIAL ULTRASOUND AND TRANSBRONCHIAL BIOPSY at 1: 30;  Surgeon: Jere Brumfield MD;  Location: [facility] OR;  Service: Pulmonary    • COLON SURGERY      Due to colon cancer   • COLONOSCOPY N/A 2017    Procedure: COLONOSCOPY WITH ANESTHESIA;  Surgeon: Barry Banks MD;  Location: [facility] ENDOSCOPY;  Service:    • COLONOSCOPY W/ POLYPECTOMY  2013    Tubular adenoma transverse x 2, Diverticulosis repeat exam in 3 years   • ENDOSCOPY  2013    HH, Billroth anastomisis in the gastric antrum   • KNEE SURGERY     • STOMACH SURGERY      Due to ulcer   • VENOUS ACCESS DEVICE (PORT) INSERTION N/A 9/3/2019    Procedure: SINGLE LUMEN PORT PLACEMENT;  Surgeon: Jelena Huntley MD;  Location: [facility] OR;  Service: General       Social History:    Social History     Socioeconomic History   • Marital status:      Spouse name: Not on file   • Number of children: Not on file   • Years of education: Not on file   • Highest education level: Not on file   Tobacco Use   • Smoking status: Former Smoker     Years: 45.00     Last attempt to quit: 2007     Years since quittin.4   • Smokeless tobacco: Never Used   Substance and Sexual Activity   • Alcohol use: No   • Drug use: No   • Sexual activity: Defer       Family History:   Family History   Problem Relation Age of Onset   • Heart disease Mother    • Diabetes Mother    • Alzheimer's disease Father    • Colon cancer Neg Hx    • Colon polyps Neg Hx        Current Hospital Medications:      Current Facility-Administered Medications:   •  acetaminophen (TYLENOL) tablet 650 mg, 650 mg, Oral, Q4H PRN, Tisha Guy DO  •  ALPRAZolam (XANAX) tablet 1 mg, 1 mg, Oral, Q6H PRN, Tisha Guy DO, 1 mg at 20  •  atorvastatin (LIPITOR) tablet 40 mg, 40 mg, Oral, Daily, Tisha Guy DO, 40 mg at 20 0922  •  budesonide-formoterol (SYMBICORT) 160-4.5 MCG/ACT inhaler 2 puff, 2 puff, Inhalation, BID - RT, Tisha Guy DO, 2 puff at 20 0731  •  dextrose (D50W) 25 g/ 50mL Intravenous Solution 25 g, 25 g, Intravenous, Q15 Min PRN, Tisha Guy DO  •   dextrose (GLUTOSE) oral gel 15 g, 15 g, Oral, Q15 Min PRN, Tisha Guy DO  •  famotidine (PEPCID) injection 20 mg, 20 mg, Intravenous, BID, Conor Smith MD, 20 mg at 02/07/20 2003  •  folic acid (FOLVITE) tablet 400 mcg, 400 mcg, Oral, Daily, Tisha Guy DO, 400 mcg at 02/07/20 0922  •  furosemide (LASIX) injection 20 mg, 20 mg, Intravenous, Q12H, Conor Smith MD  •  glucagon (human recombinant) (GLUCAGEN DIAGNOSTIC) injection 1 mg, 1 mg, Subcutaneous, Q15 Min PRN, Tisha Guy DO  •  hydrOXYzine (ATARAX) tablet 10 mg, 10 mg, Oral, 4x Daily PRN, Tisha Guy DO, 10 mg at 02/07/20 1254  •  insulin lispro (humaLOG) injection 2-7 Units, 2-7 Units, Subcutaneous, 4x Daily With Meals & Nightly, Tisha Guy DO, 4 Units at 02/07/20 2032  •  ipratropium-albuterol (DUO-NEB) nebulizer solution 3 mL, 3 mL, Nebulization, Q4H - RT, Tisha Guy DO, 3 mL at 02/08/20 0446  •  linagliptin (TRADJENTA) tablet 5 mg, 5 mg, Oral, Daily, Tisha Guy DO, 5 mg at 02/07/20 0923  •  metoprolol tartrate (LOPRESSOR) tablet 25 mg, 25 mg, Oral, BID, Tisha Guy DO, 25 mg at 02/07/20 2003  •  ondansetron (ZOFRAN) tablet 8 mg, 8 mg, Oral, Q8H PRN, Tisha Guy DO  •  piperacillin-tazobactam (ZOSYN) 3.375 g in iso-osmotic dextrose 50 ml (premix), 3.375 g, Intravenous, Q8H, Conor Smith MD, 3.375 g at 02/08/20 0545  •  sodium chloride 0.9 % flush 10 mL, 10 mL, Intravenous, PRN, Kermit Zamudio MD  •  sodium chloride 0.9 % flush 10 mL, 10 mL, Intravenous, Q12H, Tisha Guy DO, 10 mL at 02/07/20 2004  •  sodium chloride 0.9 % flush 10 mL, 10 mL, Intravenous, PRN, Tisha Guy DO  •  sucralfate (CARAFATE) tablet 1 g, 1 g, Oral, 4x Daily AC & at Bedtime, Conor Smith MD, 1 g at 02/07/20 2003    Allergies:   Allergies   Allergen Reactions   • Lactose Intolerance (Gi) Diarrhea         Subjective   REVIEW OF SYSTEMS:   CONSTITUTIONAL: no fever, no night  "sweats, fatigue; malaise,   HEENT: no blurring of vision, no double vision, no hearing difficulty, no tinnitus, no ulceration, no dysplasia, no epistaxis;  LUNGS: no cough, no hemoptysis, no wheeze,  shortness of breath;  CARDIOVASCULAR: no palpitation, no chest pain,  shortness of breath;  GI: no abdominal pain,  nausea, no vomiting, diarrhea, no constipation;  MARNI: no dysuria, no hematuria, no frequency or urgency, no nephrolithiasis;  MUSCULOSKELETAL: no joint pain, no swelling, no stiffness; generalized weakness  ENDOCRINE: no polyuria, no polydipsia, no cold or heat intolerance;  HEMATOLOGY: no easy bruising or bleeding, no history of clotting disorder;  DERMATOLOGY: no skin rash, no eczema, no pruritus;  PSYCHIATRY: no depression,  anxiety, no panic attacks, no suicidal ideation, no homicidal ideation;  NEUROLOGY: no syncope, no seizures, no numbness or tingling of hands, no numbness or tingling of feet, no paresis;    Objective   /60   Pulse 72   Temp 97.9 °F (36.6 °C) (Axillary)   Resp 17   Ht 170.2 cm (67.01\")   Wt 84.5 kg (186 lb 3.2 oz)   SpO2 93%   BMI 29.16 kg/m²     PHYSICAL EXAM:  CONSTITUTIONAL: Alert, appropriate, ill appearing  EYES: Non icteric, EOM intact, pupils equal round   ENT: Mucus membranes moist, no oral pharyngeal lesions, external inspection of ears and nose are normal. Nasal canula in place  NECK: Supple, no masses.  No palpable thyroid mass  CHEST/LUNGS: wheezing, decreased breath sounds lung bases, tachypneic   CARDIOVASCULAR: RRR, no murmurs.  No lower extremity edema  ABDOMEN: soft non-tender, active bowel sounds, no HSM.  No palpable masses  EXTREMITIES: warm, full ROM in all 4 extremities, no focal weakness.  SKIN: warm, dry with no rashes or lesions  LYMPH: No cervical, clavicular, axillary, or inguinal lymphadenopathy  NEUROLOGIC: follows commands, non focal   PSYCH: anxious        LABORATORY RESULTS REVIEWED BY ME:  No results found for: BLOODCX, URINECX, WOUNDCX, " MRSACX, RESPCX, STOOLCX    Lab Results   Component Value Date     02/08/2020    K 3.7 02/08/2020    CL 97 (L) 02/08/2020    CO2 30.0 (H) 02/08/2020    BUN 15 02/08/2020    CREATININE 0.91 02/08/2020    GLUCOSE 165 (H) 02/08/2020    CALCIUM 9.7 02/08/2020    BILITOT 0.2 02/08/2020    ALKPHOS 93 02/08/2020    AST 11 02/08/2020    ALT 10 02/08/2020    AGRATIO 0.9 02/08/2020    GLOB 3.5 02/08/2020       Lab Results   Component Value Date    INR 0.96 12/03/2019    PROTIME 13.1 12/03/2019       RADIOLOGY STUDIES REPORT/REVIEWED AND INTERPRETED BY ME:  Xr Chest 2 View    Result Date: 2/7/2020  Narrative: HISTORY: Shortness of air  CXR: 2 views the chest are obtained.  COMPARISON: 02/04/2019  FINDINGS: There are increasing diffuse bilateral pulmonary opacities worrisome for edema. There is left suprahilar masslike opacification that are seen on CT exam with associated volume loss. There is a small left pleural effusion with a trace right pleural effusion. Heart appears enlarged. Right subclavian port tip projects over the SVC. No appreciable pneumothorax. No acute bony pathology.      Impression: 1. Increasing diffuse bilateral pulmonary opacities favorable for edema. Pneumonitis considered. 2. Left suprahilar masslike opacity. Please refer to CT chest report. 3. Small pleural effusions, left greater than right. This report was finalized on 02/07/2020 07:19 by Dr. Deborah Chávez MD.    Ct Angiogram Chest    Result Date: 2/7/2020  Narrative: EXAMINATION: CT ANGIOGRAM CHEST-  2/7/2020 7:20 AM CST  HISTORY:Chest pain and shortness of air. Elevated d-dimer  COMPARISON: 11/30/2019 CT angiogram chest  TECHNIQUE:  CT evaluation of the chest was performed with intravenous contrast. 2 mm transaxial images were obtained.. Coronal reconstruction maximum intensity projection images of the pulmonary arteries and aorta were also generated.  Radiation dose equals  mGy-cm.  Automated exposure control dose reduction technique  was implemented.   FINDINGS:  [Examination was sent to [facility] for preliminary interpretation.  The pulmonary arteries opacify with iodinated contrast without intraluminal filling defects or CT angiographic evidence of pulmonary embolus.  Again identified are multiple moderate size pleural effusions layering posteriorly, likely stable.  The left perihilar mass is again identified with associated patchy interstitial and airspace opacities involving both the upper and lower left lobe.  There is diffuse groundglass in the lungs bilaterally observed similarly on the previous study.  There is cardiomegaly. The hiatal hernia identified.  There is no mediastinal or hilar lymph node enlargement.  There is no axillary lymphadenopathy.  Limited imaging the upper abdomen demonstrate no acute abnormality.]      Impression: 1. No CT angiographic evidence of pulmonary embolus or acute aortic pathology.  2. Similar CT angiographic appearance of the chest dated 11/30/2019 examination. Persistent bilateral pleural effusions. Left perihilar mass with patchy associated airspace opacities in the left perihilar region. Diffuse bilateral groundglass opacities. This report was finalized on 02/07/2020 07:25 by Dr. Bo Her MD.        My interpretation:bilateral pleural effusions.       ASSESSMENT:  The patient is a 69 y.o.  gentleman with stage IV non-small cell carcinoma of the lung, managed from the oncology standpoint by Dr.Wedeson Fishman, most recently with maintenance Alimta Keytruda.     The most recent CT scan of the chest on 11/30/2019 indicated an interval  favorable response to treatment, however he continues to have small to moderate bilateral pleural effusions.      Romel is admitted through the emergency room to CCU at [facility] on 2/7/2020 for complaint of acute respiratory distress with progressive shortness of breath and hypoxia.  Initially he required bag mask for O2 improvement.  He is currently wearing O2 6L  via NC (he states he wears from 4-6L via NC at home) with O2 sat of 93%.     His pulmonologist, Dr. Jere Brumfield has recommended initial approach of aggressive diuresis to assist with the bilateral pleural effusions and an attempt to improve his respiratory status.  Romel appears to be improving symptomatically.       Oncology consultation requested in continuity of care.     #1.   Stage IV Non-small cell lung cancer, adenocarcinoma.   Cycle #6 of maintenance Alimta and Keytruda received 1/24/20  Next dose due 2/14/20.     #2.    Volume overload     CXR 2/7/2020: increasing diffuse bilateral pulmonary opacities, favorable for edema - consider pneumonitis    CTA chest 2/7/2020:   · no evidence of PE  · Multiple moderate sized pleural effusion, layering posteriorly  · Left perihilar mass with patchy associated airspace opacities in the left perihilar region  · Diffuse bilateral groundglass opacities  · Cardiomegaly     Echocardiogram from 12/3/2019:  · EF 55%  · Normal LV systolic function  · LV thickness c/w mild concentric hypertrophy  · LV diastolic dysfunction  · No evidence of pulmonary HTN     proBNP - 2,117     S/p Lasix 40 mg IV x1  S/p solumedrol 125 mg IV x1     Symptoms improved with lasix. Voiding well.  Findings most consistent with volume overload.  Pulmonary to see as well.  Request TSH in am.     #3.   Anemia r/t chemotherapy  Hgb 9.2, MCV 91.8  Stable, follow        #4  End-of-life issues were discussed with the patient.  He had previously discussed this with the palliative care nurse. Discussions are ongoing regarding redirecting energies to palliative measures rather than chemotherapy.  Mr. Rosario states that he does not desire intubation or CPR. He is leaning towards going home with hospice.      02/08/20  7:58 AM

## 2020-02-08 NOTE — PLAN OF CARE
Patient stable and rested comfortably through the evening without any complaints of pain or shortness of air. Requested and received Xanax 1mg x1. Voided per urinal with 300 ml urinary output.

## 2020-02-08 NOTE — PROGRESS NOTES
"    PULMONARY AND CRITICAL CARE PROGRESS NOTE - [facility]    Patient: Romel Rosario  1950   MR# [id number]   Acct# [id number]  02/08/20   9:43 AM  Referring Provider: Conor Smith MD    Chief Complaint: Shortness of breath and Pleural effusions- bilateral   Interval history: Patient is laid back in the bedside chair.  He reports that his breathing might be a little better this morning however he has continued shortness of breath.  He is on 6 L of oxygen via nasal cannula.  His sat is 97%.  He complains of overall weakness.  He states getting just from the bed to the bedside chair made him very weak.  He states he has not walked as of yet.  He reports that his appetite is okay and he has had a bowel movement this morning.  He also states, \"I just had a pee pill\".  His hemoglobin was 8.9 this morning down from 9.7 the day prior.  Vital signs are otherwise stable.  No new imaging this morning.  Meds:    atorvastatin 40 mg Oral Daily   budesonide-formoterol 2 puff Inhalation BID - RT   famotidine 20 mg Intravenous BID   folic acid 400 mcg Oral Daily   furosemide 20 mg Intravenous Q12H   insulin lispro 2-7 Units Subcutaneous 4x Daily With Meals & Nightly   ipratropium-albuterol 3 mL Nebulization Q4H - RT   linagliptin 5 mg Oral Daily   metoprolol tartrate 25 mg Oral BID   piperacillin-tazobactam 3.375 g Intravenous Q8H   sodium chloride 10 mL Intravenous Q12H   sucralfate 1 g Oral 4x Daily AC & at Bedtime        Review of Systems:   Review of Systems   Constitutional: Positive for fatigue. Negative for chills and fever.   HENT: Negative for congestion and sore throat.    Eyes: Negative for discharge and itching.   Respiratory: Positive for shortness of breath.    Cardiovascular: Negative for chest pain and leg swelling.   Gastrointestinal: Negative for nausea and vomiting.   Genitourinary: Negative for difficulty urinating and dysuria.   Musculoskeletal: Negative for joint swelling and neck " pain.   Skin: Negative for rash and wound.   Neurological: Positive for weakness. Negative for dizziness.   Psychiatric/Behavioral: Negative for agitation and behavioral problems.     Physical Exam:  SpO2 Percentage    02/08/20 0500 02/08/20 0600 02/08/20 0731   SpO2: 95% 94% 93%     Temp:  [97.9 °F (36.6 °C)-98.1 °F (36.7 °C)] 97.9 °F (36.6 °C)  Heart Rate:  [45-78] 72  Resp:  [12-21] 17  BP: ()/(44-91) 114/60    Intake/Output Summary (Last 24 hours) at 2/8/2020 0943  Last data filed at 2/8/2020 0600  Gross per 24 hour   Intake 100 ml   Output 1700 ml   Net -1600 ml     Physical Exam   Constitutional: Vital signs are normal. He appears ill.   HENT:   Head: Normocephalic and atraumatic.   Nasal cannula in place    Eyes: Pupils are equal, round, and reactive to light. Conjunctivae are normal.   Neck: Normal range of motion. Neck supple.   Cardiovascular: Normal rate, regular rhythm and normal heart sounds. Exam reveals no gallop and no friction rub.   No murmur heard.  Pulmonary/Chest: Effort normal. He has decreased breath sounds. He has no wheezes.   Abdominal: Soft. Bowel sounds are normal.   Musculoskeletal: He exhibits no edema or deformity.   Neurological: He is alert.   Skin: Skin is warm and dry.   Psychiatric: He has a normal mood and affect. His behavior is normal. Judgment and thought content normal.     Laboratory Data:  Results from last 7 days   Lab Units 02/08/20  0156 02/07/20  0920 02/07/20  0048   WBC 10*3/mm3 12.16* 7.97 8.87   HEMOGLOBIN g/dL 8.9* 9.7* 9.2*   PLATELETS 10*3/mm3 269 247 234     Results from last 7 days   Lab Units 02/08/20  0156 02/07/20  0749 02/07/20  0048   SODIUM mmol/L 138 136 137   POTASSIUM mmol/L 3.7 4.1 3.9   BUN mg/dL 15 11 11   CREATININE mg/dL 0.91 0.77 0.75*     Results from last 7 days   Lab Units 02/07/20  0100   PH, ARTERIAL pH units 7.445   PCO2, ARTERIAL mm Hg 34.2*   PO2 ART mm Hg 74.6*     No results found for: BLOODCX, URINECX, WOUNDCX, MRSACX, RESPCX,  "STOOLCX  Recent films:  Xr Chest 2 View    Result Date: 2020  1. Increasing diffuse bilateral pulmonary opacities favorable for edema. Pneumonitis considered. 2. Left suprahilar masslike opacity. Please refer to CT chest report. 3. Small pleural effusions, left greater than right. This report was finalized on 2020 07:19 by Dr. Deborah Chávez MD.    Ct Angiogram Chest    Result Date: 2020  1. No CT angiographic evidence of pulmonary embolus or acute aortic pathology.  2. Similar CT angiographic appearance of the chest dated 2019 examination. Persistent bilateral pleural effusions. Left perihilar mass with patchy associated airspace opacities in the left perihilar region. Diffuse bilateral groundglass opacities. This report was finalized on 2020 07:25 by Dr. Bo Her MD.    Films reviewed personally by me.  My interpretation: no new imaging     Pulmonary Assessment:  New problem (to me), with additional workup planned:   1. Bilateral pleural effusions, perhaps worsening and symptomatic  New problem (to me), no additional workup planned:   1. Lung cancer, treated per oncology  Other problems either stable, failing to improve or worsenin. Atelectasis related to pleural effusions  2. Personal history of pulmonary edema and diastolic dysfunction  3. Elevated BNP     Recommend/plan:   · Continue Diuresis  · Continue oxygen supplementation and wean as tolerated   · Follow-up x-ray imaging-ordered portable for this am   · Per Dr. Brumfield's consult note \"Effusions are small to moderate size.  Not large amount in my opinion to warrant doing a thoracentesis right now\"         Electronically signed by MING Romano, 20, 9:43 AM     Physician substantive contribution:  Pertinent symptoms/interval history include: The patient states he has felt worse since he started on his Keytruda therapy.  He and his wife at this point are ready to discontinue any specific therapy for his " lung cancer.  Respiratory exam shows pertinent findings of decreased breath sounds.  Plan includes: I had advanced care planning's discussions with the patient and his wife for 20 minutes and his goal at this point is comfort and I certainly think that is reasonable.  I have seen and examined patient personally, performing a face-to-face diagnostic evaluation with plan of care reviewed and developed with APRN and nursing staff. I have addended and/or modified the above history of present illness, physical examination, and assessment and plan to reflect my findings and impressions. Essential elements of the care plan were discussed with APRN above.  Agree with findings and assessment/plan as documented above.    Electronically signed by Adi Ordonez MD, on 2/8/2020, 5:53 PM

## 2020-02-09 NOTE — PROGRESS NOTES
PULMONARY AND CRITICAL CARE PROGRESS NOTE - [facility]    Patient: Romel Rosario  1950   MR# [id number]   Acct# [id number]  02/09/20   11:55 AM  Referring Provider: Conor Smith MD    Chief Complaint: Shortness of breath and Pleural effusions- bilateral   Interval history: He is resting in the bed with his wife at the bedside.  He has no complaints today.  He is afebrile, vital signs stable, 94% on nasal cannula.  Plans are that he is to discharge home with hospice on Monday.    Meds:    atorvastatin 40 mg Oral Daily   budesonide-formoterol 2 puff Inhalation BID - RT   famotidine 20 mg Intravenous BID   folic acid 400 mcg Oral Daily   furosemide 20 mg Intravenous Q12H   insulin lispro 2-7 Units Subcutaneous 4x Daily With Meals & Nightly   ipratropium-albuterol 3 mL Nebulization Q4H - RT   linagliptin 5 mg Oral Daily   metoprolol tartrate 25 mg Oral BID   piperacillin-tazobactam 3.375 g Intravenous Q8H   sodium chloride 10 mL Intravenous Q12H   sucralfate 1 g Oral 4x Daily AC & at Bedtime        Review of Systems:   Review of Systems   Constitutional: Positive for fatigue. Negative for chills and fever.   HENT: Negative for congestion and sore throat.    Eyes: Negative for discharge and itching.   Respiratory: Positive for shortness of breath.    Cardiovascular: Negative for chest pain and leg swelling.   Gastrointestinal: Negative for nausea and vomiting.   Genitourinary: Negative for difficulty urinating and dysuria.   Musculoskeletal: Negative for joint swelling and neck pain.   Skin: Negative for rash and wound.   Neurological: Positive for weakness. Negative for dizziness.   Psychiatric/Behavioral: Negative for agitation and behavioral problems.     Physical Exam:  SpO2 Percentage    02/09/20 1034 02/09/20 1041 02/09/20 1100   SpO2: 98% 97% 94%     Temp:  [97.6 °F (36.4 °C)-98.3 °F (36.8 °C)] 97.7 °F (36.5 °C)  Heart Rate:  [] 45  Resp:  [16-20] 16  BP: ()/(57-77)  117/57    Intake/Output Summary (Last 24 hours) at 2/9/2020 1155  Last data filed at 2/9/2020 1100  Gross per 24 hour   Intake 240 ml   Output 1150 ml   Net -910 ml     Physical Exam   Constitutional: Vital signs are normal. He appears ill.   HENT:   Head: Normocephalic and atraumatic.   Nasal cannula in place    Eyes: Pupils are equal, round, and reactive to light. Conjunctivae are normal.   Neck: Normal range of motion. Neck supple.   Cardiovascular: Normal rate, regular rhythm and normal heart sounds. Exam reveals no gallop and no friction rub.   No murmur heard.  Pulmonary/Chest: Effort normal. He has decreased breath sounds. He has no wheezes.   Abdominal: Soft. Bowel sounds are normal.   Musculoskeletal: He exhibits no edema or deformity.   Neurological: He is alert.   Skin: Skin is warm and dry.   Psychiatric: He has a normal mood and affect. His behavior is normal. Judgment and thought content normal.     Laboratory Data:  Results from last 7 days   Lab Units 02/09/20  0408 02/08/20  0156 02/07/20  0920   WBC 10*3/mm3 16.58* 12.16* 7.97   HEMOGLOBIN g/dL 10.2* 8.9* 9.7*   PLATELETS 10*3/mm3 360 269 247     Results from last 7 days   Lab Units 02/09/20  0408 02/08/20  0156 02/07/20  0749   SODIUM mmol/L 141 138 136   POTASSIUM mmol/L 4.1 3.7 4.1   BUN mg/dL 22 15 11   CREATININE mg/dL 0.80 0.91 0.77     Results from last 7 days   Lab Units 02/07/20  0100   PH, ARTERIAL pH units 7.445   PCO2, ARTERIAL mm Hg 34.2*   PO2 ART mm Hg 74.6*     No results found for: BLOODCX, URINECX, WOUNDCX, MRSACX, RESPCX, STOOLCX  Recent films:  Xr Chest 1 View    Result Date: 2/8/2020   1.  Decreased/resolved bilateral interstitial opacity. 2.  Unchanged left hilar masslike opacity. 3.  No change in small pleural effusions. This report was finalized on 02/08/2020 10:58 by Dr. Hardeep Bettencourt MD.    Films reviewed personally by me.  My interpretation: no new imaging     Pulmonary Assessment:  New problem (to me), with additional  workup planned:   1. Bilateral pleural effusions, perhaps worsening and symptomatic  New problem (to me), no additional workup planned:   1. Lung cancer, treated per oncology  Other problems either stable, failing to improve or worsenin. Atelectasis related to pleural effusions  2. Personal history of pulmonary edema and diastolic dysfunction  3. Elevated BNP     Recommend/plan:   · Continue Diuresis  · Continue oxygen supplementation   · Pulmonology signing off         Electronically signed by MING Romano, 20, 11:55 AM   Physician substantive contribution:  Pertinent symptoms/interval history include: The patient and his wife had some questions about hospice care.  I did try to answer these to the best of my ability.  I certainly think that hospice is a very reasonable option as his goal at this time is quality of life.  He has not done well with his recent Keytruda therapy.  Respiratory exam shows pertinent findings of decreased breath sounds.  Plan includes: I agree with hospice care.  Pulmonary will sign off.  I have seen and examined patient personally, performing a face-to-face diagnostic evaluation with plan of care reviewed and developed with APRN and nursing staff. I have addended and/or modified the above history of present illness, physical examination, and assessment and plan to reflect my findings and impressions. Essential elements of the care plan were discussed with APRN above.  Agree with findings and assessment/plan as documented above.    Electronically signed by Adi Ordonez MD, on 2020, 3:13 PM

## 2020-02-09 NOTE — PROGRESS NOTES
[facility]  INPATIENT PROGRESS NOTE    Length of Stay: 2  Date of Admission: 2/7/2020  Primary Care Physician: Eleazar Turner DO    Subjective   Chief Complaint: Shortness of breath/weakness.    HPI   Patient overall stable.  Patient asking for pain pain medication and anxiety medication.  Plan to go home with hospice tomorrow.    Review of Systems   Constitutional: Positive for activity change, appetite change and fatigue. Negative for chills and fever.   HENT: Negative for hearing loss, nosebleeds, tinnitus and trouble swallowing.    Eyes: Negative for visual disturbance.   Respiratory: Positive for cough, shortness of breath and wheezing. Negative for chest tightness.    Cardiovascular: Negative for chest pain, palpitations and leg swelling.   Gastrointestinal: Positive for  nausea. Negative for abdominal distention, abdominal pain, blood in stool, constipation and vomiting.   Endocrine: Negative for cold intolerance, heat intolerance, polydipsia, polyphagia and polyuria.   Genitourinary: Negative for decreased urine volume, difficulty urinating, dysuria, flank pain, frequency and hematuria.   Musculoskeletal: Negative for arthralgias, joint swelling and myalgias.   Skin: Negative for rash.   Allergic/Immunologic: Negative for immunocompromised state.   Neurological: Positive for weakness. Negative for dizziness, syncope, light-headedness and headaches.   Hematological: Negative for adenopathy. Does not bruise/bleed easily.   Psychiatric/Behavioral: Negative for confusion and sleep disturbance. The patient is not nervous/anxious.    All pertinent negatives and positives are as above. All other systems have been reviewed and are negative unless otherwise stated.     Objective    Temp:  [97.6 °F (36.4 °C)-98.3 °F (36.8 °C)] 97.7 °F (36.5 °C)  Heart Rate:  [45-81] 67  Resp:  [16-19] 18  BP: (107-127)/(57-66) 117/57    Intake/Output Summary (Last 24 hours)  at 2/9/2020 1602  Last data filed at 2/9/2020 1100  Gross per 24 hour   Intake 240 ml   Output 1150 ml   Net -910 ml     Physical Exam  Constitutional: He is oriented to person, place, and time. He appears well-developed.   HENT:   Head: Normocephalic.   Eyes: Pupils are equal, round, and reactive to light. Conjunctivae are normal.   Neck: Neck supple. No JVD present.   Cardiovascular: Normal rate, regular rhythm, normal heart sounds and intact distal pulses. Exam reveals no gallop and no friction rub.   No murmur heard.  Pulmonary/Chest: No respiratory distress. He has wheezes. He has no rales. He exhibits no tenderness.   Rhonchi bilateral.  Diminished breath sound.   Abdominal: Soft. Bowel sounds are normal. He exhibits no distension. There is no tenderness. There is no rebound and no guarding.   Musculoskeletal: Normal range of motion. He exhibits no edema, tenderness or deformity.   Neurological: He is alert and oriented to person, place, and time. He displays normal reflexes. No cranial nerve deficit. He exhibits abnormal muscle tone. Coordination abnormal.   Skin: Skin is warm and dry. Capillary refill takes 2 to 3 seconds. No rash noted.   Psychiatric: He has a normal mood and affect. His behavior is normal. Thought content normal.   Nursing note and vitals reviewed.  Results Review:  Lab Results (last 24 hours)     Procedure Component Value Units Date/Time    POC Glucose Once [[id number]]  (Abnormal) Collected:  02/09/20 1114    Specimen:  Blood Updated:  02/09/20 1125     Glucose 177 mg/dL      Comment: : [id number] Giuseppe AprilMeter ID: [id number]       POC Glucose Once [[id number]]  (Abnormal) Collected:  02/09/20 0720    Specimen:  Blood Updated:  02/09/20 0733     Glucose 146 mg/dL      Comment: : [id number] Giuseppe AprilMeter ID: [id number]       Comprehensive Metabolic Panel [[id number]]  (Abnormal) Collected:  02/09/20 0408    Specimen:  Blood Updated:  02/09/20 0459     Glucose 148 mg/dL      BUN  22 mg/dL      Creatinine 0.80 mg/dL      Sodium 141 mmol/L      Potassium 4.1 mmol/L      Chloride 98 mmol/L      CO2 32.0 mmol/L      Calcium 10.2 mg/dL      Total Protein 7.2 g/dL      Albumin 3.80 g/dL      ALT (SGPT) 11 U/L      AST (SGOT) 17 U/L      Alkaline Phosphatase 109 U/L      Total Bilirubin 0.2 mg/dL      eGFR Non African Amer 95 mL/min/1.73      Globulin 3.4 gm/dL      A/G Ratio 1.1 g/dL      BUN/Creatinine Ratio 27.5     Anion Gap 11.0 mmol/L     Narrative:       GFR Normal >60  Chronic Kidney Disease <60  Kidney Failure <15      CBC & Differential [[id number]] Collected:  02/09/20 0408    Specimen:  Blood Updated:  02/09/20 0437    Narrative:       The following orders were created for panel order CBC & Differential.  Procedure                               Abnormality         Status                     ---------                               -----------         ------                     CBC Auto Differential[[id number]]        Abnormal            Final result                 Please view results for these tests on the individual orders.    CBC Auto Differential [[id number]]  (Abnormal) Collected:  02/09/20 0408    Specimen:  Blood Updated:  02/09/20 0437     WBC 16.58 10*3/mm3      RBC 3.47 10*6/mm3      Hemoglobin 10.2 g/dL      Hematocrit 32.9 %      MCV 94.8 fL      MCH 29.4 pg      MCHC 31.0 g/dL      RDW 15.5 %      RDW-SD 51.6 fl      MPV 10.5 fL      Platelets 360 10*3/mm3      Neutrophil % 80.9 %      Lymphocyte % 6.9 %      Monocyte % 8.5 %      Eosinophil % 0.6 %      Basophil % 0.4 %      Immature Grans % 2.7 %      Neutrophils, Absolute 13.41 10*3/mm3      Lymphocytes, Absolute 1.14 10*3/mm3      Monocytes, Absolute 1.41 10*3/mm3      Eosinophils, Absolute 0.10 10*3/mm3      Basophils, Absolute 0.07 10*3/mm3      Immature Grans, Absolute 0.45 10*3/mm3      nRBC 0.1 /100 WBC     POC Glucose Once [[id number]]  (Abnormal) Collected:  02/08/20 2010    Specimen:  Blood Updated:  02/08/20 2021      Glucose 197 mg/dL      Comment: : [id number] Roland CraigMeter ID: [id number]       POC Glucose Once [[id number]]  (Abnormal) Collected:  02/08/20 1605    Specimen:  Blood Updated:  02/08/20 1625     Glucose 260 mg/dL      Comment: : [id number] Karel aGrciaMeter ID: [id number]              Cultures:  No results found for: BLOODCX, URINECX, WOUNDCX, MRSACX, RESPCX, STOOLCX    Radiology Data:    Imaging Results (Last 24 Hours)     ** No results found for the last 24 hours. **          Allergies   Allergen Reactions   • Lactose Intolerance (Gi) Diarrhea       Scheduled meds:     atorvastatin 40 mg Oral Daily   budesonide-formoterol 2 puff Inhalation BID - RT   famotidine 20 mg Intravenous BID   folic acid 400 mcg Oral Daily   furosemide 20 mg Intravenous Q12H   insulin lispro 2-7 Units Subcutaneous 4x Daily With Meals & Nightly   ipratropium-albuterol 3 mL Nebulization Q4H - RT   linagliptin 5 mg Oral Daily   metoprolol tartrate 25 mg Oral BID   piperacillin-tazobactam 3.375 g Intravenous Q8H   sodium chloride 10 mL Intravenous Q12H   sucralfate 1 g Oral 4x Daily AC & at Bedtime       PRN meds:  •  acetaminophen  •  ALPRAZolam  •  dextrose  •  dextrose  •  glucagon (human recombinant)  •  hydrOXYzine  •  Morphine  •  ondansetron  •  sodium chloride  •  sodium chloride    Assessment/Plan       Controlled type 2 diabetes mellitus with hyperglycemia, without long-term current use of insulin (CMS/HCC)    Respiratory distress, acute    Acute respiratory failure with hypoxia and hypercapnia (CMS/HCC)    Adenocarcinoma, lung (CMS/HCC)    Type 2 diabetes mellitus with hyperglycemia, with long-term current use of insulin (CMS/HCC)    Acute pulmonary edema (CMS/HCC)    PUD (peptic ulcer disease)    Colon cancer (CMS/HCC)      Plan:  Shortness of breath/hypoxia/COPD/cough/obstructive due to lung mass.  Continue Symbicort.  Continue duo nebs.  Cont Zosyn antibiotics.  CTA chest- no evidence of embolus or acute aortic  pathology, persistent bilateral pleural effusion and left perihilar mass with patchy associated airspace opacity in the left perihilar region, diffuse bilateral groundglass opacity-similar to CTA 2019.     Acute pulmonary edema-fluid overload/edema/pleural effusion/hypertension/CHF.  BNP 2000.  Continue Lipitor.  Continue Lopressor.  Troponins negative x2 sets.  Echo cardiogram 12/3/2019- ejection fraction 55%, mild concentric hypertrophy, diastolic dysfunction, no evidence of pulmonary hypertension.     History of lung cancer/lung mass.  Ongoing treatment for Dr. Fishman.  Plan for palliative care and hospice on Monday.  Patient go home with hospice.  Morphine 2 mg every 2 hours as needed.     Diabetes.  Sliding scale.  Continue Tradjenta.  Hemoglobin A1c 7.3.     Hypo-magnesium. Resolved.      Anemia.  Hemoglobin stable.  No sign of acute bleed.       Anxiety.  Ativan 0.5 every 4 hours as needed.     Nausea/vomiting-diarrhea.  Nausea improving. Pepcid.  Zofran.  Continue Carafate.  No sign of diarrhea this morning.       Nutrition.  Continue folic acid.  Consistent carbohydrate diet     Deconditioning.  PT consult.     Discharge Plannin-3 days.  Patient is currently take oxygen nasal cannula between 4 to 6 L at home.  Plan for palliative care.  Plan for hospice on Monday.  CODE STATUS changed to DNR and DNI.      Conor Smith MD   20   4:02 PM

## 2020-02-09 NOTE — PLAN OF CARE
Problem: Patient Care Overview  Goal: Plan of Care Review  Outcome: Ongoing (interventions implemented as appropriate)  Flowsheets (Taken 2/9/2020 0534)  Progress: improving  Plan of Care Reviewed With: patient  Outcome Summary: Pt c/o abdominal pain. given prn morphine with relief. Prn xanax given for c/o anxiety. o2 6L nasal cannula on. VSS. Up with stand by assist.  Home with hospice monday. Continue to monitor.     Problem: Patient Care Overview  Goal: Individualization and Mutuality  Outcome: Ongoing (interventions implemented as appropriate)  Flowsheets (Taken 2/9/2020 0534)  Patient Specific Goals (Include Timeframe): Pain control  Patient Specific Preferences: Likes orange juice

## 2020-02-09 NOTE — PLAN OF CARE
Problem: Patient Care Overview  Goal: Plan of Care Review  Outcome: Ongoing (interventions implemented as appropriate)  Flowsheets (Taken 2/8/2020 5255)  Progress: no change  Plan of Care Reviewed With: patient  Outcome Summary: pt co pain, medication given. o2 at 6L per nc. home with hopice monday. cont to monitor.

## 2020-02-09 NOTE — PROGRESS NOTES
MEDICAL ONCOLOGY PROGRESS NOTE    Pt Name: Romel Rosario  MRN: [id number]  YOB: 1950  Date of evaluation: 2/9/2020    SUBJECTIVE: continue to have difficulty breathing, although better than yesterday.     HISTORY OF PRESENT ILLNESS:      The patient is a 69 y.o.  gentleman with stage IV non-small cell carcinoma of the lung, managed from the oncology standpoint by Dr.Wedeson Fishman, most recently with maintenance Alimta Keytruda.     The most recent CT scan of the chest on 11/30/2019 indicated an interval  favorable response to treatment, however he continues to have small to moderate bilateral pleural effusions.      Romel is admitted through the emergency room to CCU at [facility] on 2/7/2020 for complaint of acute respiratory distress with progressive shortness of breath and hypoxia.  Initially he required bag mask for O2 improvement.  He is currently wearing O2 6L via NC (he states he wears from 4-6L via NC at home) with O2 sat of 93%.     His pulmonologist, Dr. Jere Brumfield has recommended initial approach of aggressive diuresis to assist with the bilateral pleural effusions and an attempt to improve his respiratory status.  Romel appears to be improving symptomatically.       Oncology consultation requested in continuity of care.           Past Medical History:    Past Medical History:   Diagnosis Date   • Adenocarcinoma, lung (CMS/HCC)    • Cerumen impaction    • Cervical disc disease    • Colon cancer (CMS/HCC)    • Diabetes (CMS/HCC)    • Elevated cholesterol    • Eustachian tube dysfunction    • GERD (gastroesophageal reflux disease)    • Hx of colonic polyps    • Hypertension    • PUD (peptic ulcer disease)    • Sensorineural hearing loss        Past Surgical History:    Past Surgical History:   Procedure Laterality Date   • BRONCHOSCOPY Left 7/19/2019    Procedure: BRONCHOSCOPY WITH ENDOBRONCHIAL ULTRASOUND AND TRANSBRONCHIAL BIOPSY at 1: 30;  Surgeon: Jere Brumfield MD;  Location:   [facility] OR;  Service: Pulmonary   • COLON SURGERY      Due to colon cancer   • COLONOSCOPY N/A 2017    Procedure: COLONOSCOPY WITH ANESTHESIA;  Surgeon: Barry Banks MD;  Location: [facility] ENDOSCOPY;  Service:    • COLONOSCOPY W/ POLYPECTOMY  2013    Tubular adenoma transverse x 2, Diverticulosis repeat exam in 3 years   • ENDOSCOPY  2013    HH, Billroth anastomisis in the gastric antrum   • KNEE SURGERY     • STOMACH SURGERY      Due to ulcer   • VENOUS ACCESS DEVICE (PORT) INSERTION N/A 9/3/2019    Procedure: SINGLE LUMEN PORT PLACEMENT;  Surgeon: Jelena Huntley MD;  Location:  [facility] OR;  Service: General       Social History:    Social History     Socioeconomic History   • Marital status:      Spouse name: Not on file   • Number of children: Not on file   • Years of education: Not on file   • Highest education level: Not on file   Tobacco Use   • Smoking status: Former Smoker     Years: 45.00     Last attempt to quit: 2007     Years since quittin.4   • Smokeless tobacco: Never Used   Substance and Sexual Activity   • Alcohol use: No   • Drug use: No   • Sexual activity: Defer       Family History:   Family History   Problem Relation Age of Onset   • Heart disease Mother    • Diabetes Mother    • Alzheimer's disease Father    • Colon cancer Neg Hx    • Colon polyps Neg Hx        Current Hospital Medications:      Current Facility-Administered Medications:   •  acetaminophen (TYLENOL) tablet 650 mg, 650 mg, Oral, Q4H PRN, Conor Smith MD  •  ALPRAZolam (XANAX) tablet 1 mg, 1 mg, Oral, Q6H PRN, Conor Smith MD, 1 mg at 20 0414  •  atorvastatin (LIPITOR) tablet 40 mg, 40 mg, Oral, Daily, Conor Smith MD, 40 mg at 20 0810  •  budesonide-formoterol (SYMBICORT) 160-4.5 MCG/ACT inhaler 2 puff, 2 puff, Inhalation, BID - RT, Conor Smith MD, 2 puff at 20 0731  •  dextrose (D50W) 25 g/ 50mL Intravenous Solution 25 g, 25 g, Intravenous, Q15 Min PRN, Luis  Conor ONEILL MD  •  dextrose (GLUTOSE) oral gel 15 g, 15 g, Oral, Q15 Min PRN, Conor Smith MD  •  famotidine (PEPCID) injection 20 mg, 20 mg, Intravenous, BID, Conor Smith MD, 20 mg at 02/08/20 2210  •  folic acid (FOLVITE) tablet 400 mcg, 400 mcg, Oral, Daily, Conor Smith MD, 400 mcg at 02/08/20 0810  •  furosemide (LASIX) injection 20 mg, 20 mg, Intravenous, Q12H, Conor Smith MD, 20 mg at 02/08/20 0819  •  glucagon (human recombinant) (GLUCAGEN DIAGNOSTIC) injection 1 mg, 1 mg, Subcutaneous, Q15 Min PRN, Conor Smith MD  •  hydrOXYzine (ATARAX) tablet 10 mg, 10 mg, Oral, 4x Daily PRN, Conor Smith MD, 10 mg at 02/07/20 1254  •  insulin lispro (humaLOG) injection 2-7 Units, 2-7 Units, Subcutaneous, 4x Daily With Meals & Nightly, Conor Smith MD, 2 Units at 02/08/20 2025  •  ipratropium-albuterol (DUO-NEB) nebulizer solution 3 mL, 3 mL, Nebulization, Q4H - RT, Conor Smith MD, 3 mL at 02/08/20 1938  •  linagliptin (TRADJENTA) tablet 5 mg, 5 mg, Oral, Daily, Conor Smith MD, 5 mg at 02/08/20 0810  •  metoprolol tartrate (LOPRESSOR) tablet 25 mg, 25 mg, Oral, BID, Conor Smith MD, 25 mg at 02/08/20 2026  •  morphine injection 3 mg, 3 mg, Intravenous, Q4H PRN, Conor Smith MD, 3 mg at 02/09/20 0357  •  ondansetron (ZOFRAN) tablet 8 mg, 8 mg, Oral, Q8H PRN, Conor Smiht MD  •  piperacillin-tazobactam (ZOSYN) 3.375 g in iso-osmotic dextrose 50 ml (premix), 3.375 g, Intravenous, Q8H, Conro Smith MD, 3.375 g at 02/09/20 0414  •  sodium chloride 0.9 % flush 10 mL, 10 mL, Intravenous, PRN, Conor Smith MD  •  sodium chloride 0.9 % flush 10 mL, 10 mL, Intravenous, Q12H, Conor Smith MD, 10 mL at 02/08/20 2026  •  sodium chloride 0.9 % flush 10 mL, 10 mL, Intravenous, PRN, Conor Smith MD  •  sucralfate (CARAFATE) tablet 1 g, 1 g, Oral, 4x Daily AC & at Bedtime, Conor Smith MD, 1 g at 02/08/20 2025    Allergies:   Allergies   Allergen Reactions   • Lactose Intolerance (Gi)  "Diarrhea         Subjective   REVIEW OF SYSTEMS:   CONSTITUTIONAL: no fever, no night sweats, fatigue; malaise,   HEENT: no blurring of vision, no double vision, no hearing difficulty, no tinnitus, no ulceration, no dysplasia, no epistaxis;  LUNGS: no cough, no hemoptysis, no wheeze,  shortness of breath;  CARDIOVASCULAR: no palpitation, no chest pain,  shortness of breath;  GI: no abdominal pain,  nausea, no vomiting, diarrhea, no constipation;  MARNI: no dysuria, no hematuria, no frequency or urgency, no nephrolithiasis;  MUSCULOSKELETAL: no joint pain, no swelling, no stiffness; generalized weakness  ENDOCRINE: no polyuria, no polydipsia, no cold or heat intolerance;  HEMATOLOGY: no easy bruising or bleeding, no history of clotting disorder;  DERMATOLOGY: no skin rash, no eczema, no pruritus;  PSYCHIATRY: no depression,  anxiety, no panic attacks, no suicidal ideation, no homicidal ideation;  NEUROLOGY: no syncope, no seizures, no numbness or tingling of hands, no numbness or tingling of feet, no paresis;    Objective   /62 (BP Location: Left arm, Patient Position: Lying)   Pulse 65   Temp 97.7 °F (36.5 °C) (Oral)   Resp 18   Ht 170.2 cm (67.01\")   Wt 84.5 kg (186 lb 3.2 oz)   SpO2 95%   BMI 29.16 kg/m²     PHYSICAL EXAM:  CONSTITUTIONAL: Alert, appropriate, ill appearing  EYES: Non icteric, EOM intact, pupils equal round   ENT: Mucus membranes moist, no oral pharyngeal lesions, external inspection of ears and nose are normal. Nasal canula in place  NECK: Supple, no masses.  No palpable thyroid mass  CHEST/LUNGS: wheezing, decreased breath sounds lung bases, tachypneic   CARDIOVASCULAR: RRR, no murmurs.  No lower extremity edema  ABDOMEN: soft non-tender, active bowel sounds, no HSM.  No palpable masses  EXTREMITIES: warm, full ROM in all 4 extremities, no focal weakness.  SKIN: warm, dry with no rashes or lesions  LYMPH: No cervical, clavicular, axillary, or inguinal lymphadenopathy  NEUROLOGIC: " follows commands, non focal   PSYCH: anxious        LABORATORY RESULTS REVIEWED BY ME:  No results found for: BLOODCX, URINECX, WOUNDCX, MRSACX, RESPCX, STOOLCX    Lab Results   Component Value Date     02/09/2020    K 4.1 02/09/2020    CL 98 02/09/2020    CO2 32.0 (H) 02/09/2020    BUN 22 02/09/2020    CREATININE 0.80 02/09/2020    GLUCOSE 148 (H) 02/09/2020    CALCIUM 10.2 02/09/2020    BILITOT 0.2 02/09/2020    ALKPHOS 109 02/09/2020    AST 17 02/09/2020    ALT 11 02/09/2020    AGRATIO 1.1 02/09/2020    GLOB 3.4 02/09/2020       Lab Results   Component Value Date    INR 0.96 12/03/2019    PROTIME 13.1 12/03/2019       RADIOLOGY STUDIES REPORT/REVIEWED AND INTERPRETED BY ME:  Xr Chest 2 View    Result Date: 2/7/2020  Narrative: HISTORY: Shortness of air  CXR: 2 views the chest are obtained.  COMPARISON: 02/04/2019  FINDINGS: There are increasing diffuse bilateral pulmonary opacities worrisome for edema. There is left suprahilar masslike opacification that are seen on CT exam with associated volume loss. There is a small left pleural effusion with a trace right pleural effusion. Heart appears enlarged. Right subclavian port tip projects over the SVC. No appreciable pneumothorax. No acute bony pathology.      Impression: 1. Increasing diffuse bilateral pulmonary opacities favorable for edema. Pneumonitis considered. 2. Left suprahilar masslike opacity. Please refer to CT chest report. 3. Small pleural effusions, left greater than right. This report was finalized on 02/07/2020 07:19 by Dr. Deborah Chávez MD.    Xr Chest 1 View    Result Date: 2/8/2020  Narrative: XR CHEST 1 VW-  Indication: pleural effusions; J81.0-Acute pulmonary edema; D47-Lhxujjw effusion, not elsewhere classified  Comparison: 02/07/2020  Findings:  Cardiac silhouette is enlarged but stable. Interstitial opacity appears resolved. Unchanged left hilar masslike opacity. Small bilateral pleural effusions, greatest on the left, appear  unchanged. No visible pneumothorax. Port catheter tip overlying the cavoatrial junction is stable in position. No suspicious osseous findings.      Impression:  1.  Decreased/resolved bilateral interstitial opacity. 2.  Unchanged left hilar masslike opacity. 3.  No change in small pleural effusions. This report was finalized on 02/08/2020 10:58 by Dr. Hardeep Bettencourt MD.    Ct Angiogram Chest    Result Date: 2/7/2020  Narrative: EXAMINATION: CT ANGIOGRAM CHEST-  2/7/2020 7:20 AM CST  HISTORY:Chest pain and shortness of air. Elevated d-dimer  COMPARISON: 11/30/2019 CT angiogram chest  TECHNIQUE:  CT evaluation of the chest was performed with intravenous contrast. 2 mm transaxial images were obtained.. Coronal reconstruction maximum intensity projection images of the pulmonary arteries and aorta were also generated.  Radiation dose equals  mGy-cm.  Automated exposure control dose reduction technique was implemented.   FINDINGS:  [Examination was sent to [facility] for preliminary interpretation.  The pulmonary arteries opacify with iodinated contrast without intraluminal filling defects or CT angiographic evidence of pulmonary embolus.  Again identified are multiple moderate size pleural effusions layering posteriorly, likely stable.  The left perihilar mass is again identified with associated patchy interstitial and airspace opacities involving both the upper and lower left lobe.  There is diffuse groundglass in the lungs bilaterally observed similarly on the previous study.  There is cardiomegaly. The hiatal hernia identified.  There is no mediastinal or hilar lymph node enlargement.  There is no axillary lymphadenopathy.  Limited imaging the upper abdomen demonstrate no acute abnormality.]      Impression: 1. No CT angiographic evidence of pulmonary embolus or acute aortic pathology.  2. Similar CT angiographic appearance of the chest dated 11/30/2019 examination. Persistent bilateral pleural effusions. Left  perihilar mass with patchy associated airspace opacities in the left perihilar region. Diffuse bilateral groundglass opacities. This report was finalized on 02/07/2020 07:25 by Dr. Bo Her MD.        My interpretation:bilateral pleural effusions.       ASSESSMENT:  The patient is a 69 y.o.  gentleman with stage IV non-small cell carcinoma of the lung, managed from the oncology standpoint by Dr.Wedeson Fishman, most recently with maintenance Alimta Keytruda.     The most recent CT scan of the chest on 11/30/2019 indicated an interval  favorable response to treatment, however he continues to have small to moderate bilateral pleural effusions.      Romel is admitted through the emergency room to CCU at [facility] on 2/7/2020 for complaint of acute respiratory distress with progressive shortness of breath and hypoxia.  Initially he required bag mask for O2 improvement.  He is currently wearing O2 6L via NC (he states he wears from 4-6L via NC at home) with O2 sat of 93%.     His pulmonologist, Dr. Jere Brumfield has recommended initial approach of aggressive diuresis to assist with the bilateral pleural effusions and an attempt to improve his respiratory status.  Romel appears to be improving symptomatically.       Oncology consultation requested for continuity of care.     #1.   Stage IV Non-small cell lung cancer, adenocarcinoma.   Cycle #6 of maintenance Alimta and Keytruda received 1/24/20  Next dose due 2/14/20.     #2.    Volume overload     CXR 2/7/2020: increasing diffuse bilateral pulmonary opacities, favorable for edema - consider pneumonitis    CTA chest 2/7/2020:   · no evidence of PE  · Multiple moderate sized pleural effusion, layering posteriorly  · Left perihilar mass with patchy associated airspace opacities in the left perihilar region  · Diffuse bilateral groundglass opacities  · Cardiomegaly     Echocardiogram from 12/3/2019:  · EF 55%  · Normal LV systolic function  · LV thickness c/w mild  concentric hypertrophy  · LV diastolic dysfunction  · No evidence of pulmonary HTN     proBNP - 2,117     S/p Lasix 40 mg IV x1  S/p solumedrol 125 mg IV x1     Symptoms improved with lasix. Voiding well.  Findings most consistent with volume overload.  Pulmonary to see as well.  Request TSH in am.     #3.   Anemia r/t chemotherapy  Hgb 10.2 MCV 94  Stable, follow        #4  End-of-life issues were discussed with the patient.  He had previously discussed this with the palliative care nurse. His quality of life has declined significant with chemotherapy treatment.  Mr. Rosario states that he does not desire intubation or CPR. He has decided going home with hospiceupon discharge.      02/09/20  7:46 AM

## 2020-02-10 NOTE — PLAN OF CARE
Problem: Patient Care Overview  Goal: Plan of Care Review  Outcome: Ongoing (interventions implemented as appropriate)  Flowsheets (Taken 2/9/2020 1816)  Progress: no change  Plan of Care Reviewed With: patient;spouse  Outcome Summary: Patient continue to c/o abdominal pain, generalized pain, given morphine as ordered. Patient was very anxious also. Dr. Smith dc'd xanax and started him on ativan q 4hrs prn. Good urine output. had 4 bowel movements today. Continue abx. Hospice consult for tomorrow and possible dc home.      2019

## 2020-02-10 NOTE — PLAN OF CARE
Problem: Patient Care Overview  Goal: Plan of Care Review  Outcome: Ongoing (interventions implemented as appropriate)  Flowsheets (Taken 2/10/2020 0401)  Progress: improving  Plan of Care Reviewed With: patient  Outcome Summary: Pt c/o abdominal pain, given prn morphine with relief. Ativan given for anxiety. VSS. No tele. Pt up to bedside commode with assist x1. 6L nasal cannula on and cont pulse ox. Hopsice consult today. Continue to monitor.     Problem: Patient Care Overview  Goal: Individualization and Mutuality  Outcome: Ongoing (interventions implemented as appropriate)  Flowsheets (Taken 2/10/2020 0401)  Patient Specific Goals (Include Timeframe): Pain control  Patient Specific Interventions: Administer pain medication  Patient Specific Preferences: Pain medication

## 2020-02-10 NOTE — PROGRESS NOTES
160 MEDICAL ONCOLOGY PROGRESS NOTE    Pt Name: Romel Rosario  MRN: [id number]  YOB: 1950  Date of evaluation: 2/10/2020    SUBJECTIVE: Complains of diarrhea, 4 watery bowel movements per nursing last night.  He also complains of cough    HISTORY OF PRESENT ILLNESS:      The patient is a 69 y.o.  gentleman with stage IV non-small cell carcinoma of the lung, managed from the oncology standpoint by Dr.Wedeson Fishman, most recently with maintenance Alimta Keytruda.     The most recent CT scan of the chest on 11/30/2019 indicated an interval  favorable response to treatment, however he continues to have small to moderate bilateral pleural effusions.      Romel is admitted through the emergency room to CCU at [facility] on 2/7/2020 for complaint of acute respiratory distress with progressive shortness of breath and hypoxia.  Initially he required bag mask for O2 improvement.  He is currently wearing O2 6L via NC (he states he wears from 4-6L via NC at home) with O2 sat of 93%.     His pulmonologist, Dr. Jere Brumfield has recommended initial approach of aggressive diuresis to assist with the bilateral pleural effusions and an attempt to improve his respiratory status.  Romel appears to be improving symptomatically.       Oncology consultation requested in continuity of care.           Past Medical History:    Past Medical History:   Diagnosis Date   • Adenocarcinoma, lung (CMS/HCC)    • Cerumen impaction    • Cervical disc disease    • Colon cancer (CMS/HCC)    • Diabetes (CMS/HCC)    • Elevated cholesterol    • Eustachian tube dysfunction    • GERD (gastroesophageal reflux disease)    • Hx of colonic polyps    • Hypertension    • PUD (peptic ulcer disease)    • Sensorineural hearing loss        Past Surgical History:    Past Surgical History:   Procedure Laterality Date   • BRONCHOSCOPY Left 7/19/2019    Procedure: BRONCHOSCOPY WITH ENDOBRONCHIAL ULTRASOUND AND TRANSBRONCHIAL BIOPSY at 1: 30;  Surgeon:  Jere Brumfield MD;  Location: [facility] OR;  Service: Pulmonary   • COLON SURGERY      Due to colon cancer   • COLONOSCOPY N/A 2017    Procedure: COLONOSCOPY WITH ANESTHESIA;  Surgeon: Barry Banks MD;  Location: [facility] ENDOSCOPY;  Service:    • COLONOSCOPY W/ POLYPECTOMY  2013    Tubular adenoma transverse x 2, Diverticulosis repeat exam in 3 years   • ENDOSCOPY  2013    HH, Billroth anastomisis in the gastric antrum   • KNEE SURGERY     • STOMACH SURGERY      Due to ulcer   • VENOUS ACCESS DEVICE (PORT) INSERTION N/A 9/3/2019    Procedure: SINGLE LUMEN PORT PLACEMENT;  Surgeon: Jelena Huntley MD;  Location:  [facility] OR;  Service: General       Social History:    Social History     Socioeconomic History   • Marital status:      Spouse name: Not on file   • Number of children: Not on file   • Years of education: Not on file   • Highest education level: Not on file   Tobacco Use   • Smoking status: Former Smoker     Years: 45.00     Last attempt to quit: 2007     Years since quittin.4   • Smokeless tobacco: Never Used   Substance and Sexual Activity   • Alcohol use: No   • Drug use: No   • Sexual activity: Defer       Family History:   Family History   Problem Relation Age of Onset   • Heart disease Mother    • Diabetes Mother    • Alzheimer's disease Father    • Colon cancer Neg Hx    • Colon polyps Neg Hx        Current Hospital Medications:      Current Facility-Administered Medications:   •  acetaminophen (TYLENOL) tablet 650 mg, 650 mg, Oral, Q4H PRN, Conor Smith MD  •  atorvastatin (LIPITOR) tablet 40 mg, 40 mg, Oral, Daily, Conor Smith MD, 40 mg at 20 0809  •  budesonide-formoterol (SYMBICORT) 160-4.5 MCG/ACT inhaler 2 puff, 2 puff, Inhalation, BID - RT, Conor Smith MD, 2 puff at 02/10/20 0635  •  dextrose (D50W) 25 g/ 50mL Intravenous Solution 25 g, 25 g, Intravenous, Q15 Min PRN, Conor Smith MD  •  dextrose (GLUTOSE) oral gel 15 g, 15 g, Oral, Q15  Min PRN, Conor Smith MD  •  famotidine (PEPCID) injection 20 mg, 20 mg, Intravenous, BID, Conor Smith MD, 20 mg at 02/09/20 2014  •  folic acid (FOLVITE) tablet 400 mcg, 400 mcg, Oral, Daily, Conor Smith MD, 400 mcg at 02/09/20 0809  •  furosemide (LASIX) injection 20 mg, 20 mg, Intravenous, Q12H, Conor Smith MD, 20 mg at 02/09/20 0809  •  glucagon (human recombinant) (GLUCAGEN DIAGNOSTIC) injection 1 mg, 1 mg, Subcutaneous, Q15 Min PRN, Conor Smith MD  •  hydrOXYzine (ATARAX) tablet 10 mg, 10 mg, Oral, 4x Daily PRN, Conor Smith MD, 10 mg at 02/07/20 1254  •  insulin lispro (humaLOG) injection 2-7 Units, 2-7 Units, Subcutaneous, 4x Daily With Meals & Nightly, Conor Smith MD, 2 Units at 02/09/20 2013  •  ipratropium-albuterol (DUO-NEB) nebulizer solution 3 mL, 3 mL, Nebulization, Q4H - RT, Conor Smith MD, 3 mL at 02/10/20 0635  •  linagliptin (TRADJENTA) tablet 5 mg, 5 mg, Oral, Daily, Conor Smith MD, 5 mg at 02/09/20 0809  •  LORazepam (ATIVAN) tablet 0.5 mg, 0.5 mg, Oral, Q4H PRN, Conor Smith MD, 0.5 mg at 02/10/20 0300  •  metoprolol tartrate (LOPRESSOR) tablet 25 mg, 25 mg, Oral, BID, Conor Smith MD, 25 mg at 02/09/20 2013  •  Morphine sulfate (PF) injection 2 mg, 2 mg, Intravenous, Q2H PRN, Conor Smith MD, 2 mg at 02/10/20 0213  •  ondansetron (ZOFRAN) tablet 8 mg, 8 mg, Oral, Q8H PRN, Conor Smith MD, 8 mg at 02/09/20 1232  •  piperacillin-tazobactam (ZOSYN) 3.375 g in iso-osmotic dextrose 50 ml (premix), 3.375 g, Intravenous, Q8H, Conor Smith MD, Last Rate: 0 mL/hr at 02/09/20 0800, 3.375 g at 02/10/20 0506  •  sodium chloride 0.9 % flush 10 mL, 10 mL, Intravenous, PRN, Conor Smith MD  •  sodium chloride 0.9 % flush 10 mL, 10 mL, Intravenous, Q12H, Conor Smith MD, 10 mL at 02/09/20 2020  •  sodium chloride 0.9 % flush 10 mL, 10 mL, Intravenous, PRN, Conor Smith MD  •  sucralfate (CARAFATE) tablet 1 g, 1 g, Oral, 4x Daily AC & at Bedtime,  "Conor Smith MD, 1 g at 02/09/20 2014    Allergies:   Allergies   Allergen Reactions   • Lactose Intolerance (Gi) Diarrhea         Subjective   REVIEW OF SYSTEMS:   CONSTITUTIONAL: no fever, no night sweats, fatigue; malaise,   HEENT: no blurring of vision, no double vision, no hearing difficulty, no tinnitus, no ulceration, no dysplasia, no epistaxis;  LUNGS: no cough, no hemoptysis, no wheeze,  shortness of breath;  CARDIOVASCULAR: no palpitation, no chest pain,  shortness of breath;  GI: no abdominal pain,  nausea, no vomiting, diarrhea, no constipation;  MARNI: no dysuria, no hematuria, no frequency or urgency, no nephrolithiasis;  MUSCULOSKELETAL: no joint pain, no swelling, no stiffness; generalized weakness  ENDOCRINE: no polyuria, no polydipsia, no cold or heat intolerance;  HEMATOLOGY: no easy bruising or bleeding, no history of clotting disorder;  DERMATOLOGY: no skin rash, no eczema, no pruritus;  PSYCHIATRY: no depression,  anxiety, no panic attacks, no suicidal ideation, no homicidal ideation;  NEUROLOGY: no syncope, no seizures, no numbness or tingling of hands, no numbness or tingling of feet, no paresis;    Objective   /51 (BP Location: Right arm, Patient Position: Lying)   Pulse 83   Temp 97 °F (36.1 °C) (Oral)   Resp 16   Ht 170.2 cm (67.01\")   Wt 83.6 kg (184 lb 6.4 oz)   SpO2 95%   BMI 28.87 kg/m²     PHYSICAL EXAM:  CONSTITUTIONAL: Alert, appropriate, ill appearing  EYES: Non icteric, EOM intact, pupils equal round   ENT: Mucus membranes moist, no oral pharyngeal lesions, external inspection of ears and nose are normal. Nasal canula in place  NECK: Supple, no masses.  No palpable thyroid mass  CHEST/LUNGS: wheezing, decreased breath sounds lung bases, tachypneic   CARDIOVASCULAR: RRR, no murmurs.  No lower extremity edema  ABDOMEN: soft non-tender, active bowel sounds, no HSM.  No palpable masses  EXTREMITIES: warm, full ROM in all 4 extremities, no focal weakness.  SKIN: warm, dry " with no rashes or lesions  LYMPH: No cervical, clavicular, axillary, or inguinal lymphadenopathy  NEUROLOGIC: follows commands, non focal   PSYCH: anxious        LABORATORY RESULTS REVIEWED BY ME:  No results found for: BLOODCX, URINECX, WOUNDCX, MRSACX, RESPCX, STOOLCX    Lab Results   Component Value Date     02/09/2020    K 4.1 02/09/2020    CL 98 02/09/2020    CO2 32.0 (H) 02/09/2020    BUN 22 02/09/2020    CREATININE 0.80 02/09/2020    GLUCOSE 148 (H) 02/09/2020    CALCIUM 10.2 02/09/2020    BILITOT 0.2 02/09/2020    ALKPHOS 109 02/09/2020    AST 17 02/09/2020    ALT 11 02/09/2020    AGRATIO 1.1 02/09/2020    GLOB 3.4 02/09/2020       Lab Results   Component Value Date    INR 0.96 12/03/2019    PROTIME 13.1 12/03/2019       RADIOLOGY STUDIES REPORT/REVIEWED AND INTERPRETED BY ME:  Xr Chest 2 View    Result Date: 2/7/2020  Narrative: HISTORY: Shortness of air  CXR: 2 views the chest are obtained.  COMPARISON: 02/04/2019  FINDINGS: There are increasing diffuse bilateral pulmonary opacities worrisome for edema. There is left suprahilar masslike opacification that are seen on CT exam with associated volume loss. There is a small left pleural effusion with a trace right pleural effusion. Heart appears enlarged. Right subclavian port tip projects over the SVC. No appreciable pneumothorax. No acute bony pathology.      Impression: 1. Increasing diffuse bilateral pulmonary opacities favorable for edema. Pneumonitis considered. 2. Left suprahilar masslike opacity. Please refer to CT chest report. 3. Small pleural effusions, left greater than right. This report was finalized on 02/07/2020 07:19 by Dr. Deborah Chávez MD.    Xr Chest 1 View    Result Date: 2/8/2020  Narrative: XR CHEST 1 VW-  Indication: pleural effusions; J81.0-Acute pulmonary edema; P67-Ameoflo effusion, not elsewhere classified  Comparison: 02/07/2020  Findings:  Cardiac silhouette is enlarged but stable. Interstitial opacity appears resolved.  Unchanged left hilar masslike opacity. Small bilateral pleural effusions, greatest on the left, appear unchanged. No visible pneumothorax. Port catheter tip overlying the cavoatrial junction is stable in position. No suspicious osseous findings.      Impression:  1.  Decreased/resolved bilateral interstitial opacity. 2.  Unchanged left hilar masslike opacity. 3.  No change in small pleural effusions. This report was finalized on 02/08/2020 10:58 by Dr. Hardeep Bettencourt MD.    Ct Angiogram Chest    Result Date: 2/7/2020  Narrative: EXAMINATION: CT ANGIOGRAM CHEST-  2/7/2020 7:20 AM CST  HISTORY:Chest pain and shortness of air. Elevated d-dimer  COMPARISON: 11/30/2019 CT angiogram chest  TECHNIQUE:  CT evaluation of the chest was performed with intravenous contrast. 2 mm transaxial images were obtained.. Coronal reconstruction maximum intensity projection images of the pulmonary arteries and aorta were also generated.  Radiation dose equals  mGy-cm.  Automated exposure control dose reduction technique was implemented.   FINDINGS:  [Examination was sent to [facility] for preliminary interpretation.  The pulmonary arteries opacify with iodinated contrast without intraluminal filling defects or CT angiographic evidence of pulmonary embolus.  Again identified are multiple moderate size pleural effusions layering posteriorly, likely stable.  The left perihilar mass is again identified with associated patchy interstitial and airspace opacities involving both the upper and lower left lobe.  There is diffuse groundglass in the lungs bilaterally observed similarly on the previous study.  There is cardiomegaly. The hiatal hernia identified.  There is no mediastinal or hilar lymph node enlargement.  There is no axillary lymphadenopathy.  Limited imaging the upper abdomen demonstrate no acute abnormality.]      Impression: 1. No CT angiographic evidence of pulmonary embolus or acute aortic pathology.  2. Similar CT angiographic  appearance of the chest dated 11/30/2019 examination. Persistent bilateral pleural effusions. Left perihilar mass with patchy associated airspace opacities in the left perihilar region. Diffuse bilateral groundglass opacities. This report was finalized on 02/07/2020 07:25 by Dr. Bo Her MD.          ASSESSMENT:  The patient is a 69 y.o.  gentleman with stage IV non-small cell carcinoma of the lung, managed from the oncology standpoint by Dr.Wedeson Fishman, most recently with maintenance Alimta Keytruda.     The most recent CT scan of the chest on 11/30/2019 indicated an interval  favorable response to treatment, however he continues to have small to moderate bilateral pleural effusions.      Romel was admitted through the emergency room to CCU at [facility] on 2/7/2020 for complaint of acute respiratory distress with progressive shortness of breath and hypoxia.  Initially he required bag mask for O2 improvement.  He is currently wearing O2 6L via NC (he states he wears from 4-6L via NC at home) with O2 sat of 93%.     His pulmonologist, Dr. Jere Brumfield has recommended initial approach of aggressive diuresis to assist with the bilateral pleural effusions and an attempt to improve his respiratory status.  Romel appears to be improving symptomatically.       Oncology consultation requested for continuity of care.     #1.   Stage IV Non-small cell lung cancer, adenocarcinoma.   Cycle #6 of maintenance Alimta and Keytruda received 1/24/20  Next dose due 2/14/20.     #2.    Volume overload     CXR 2/7/2020: increasing diffuse bilateral pulmonary opacities, favorable for edema - consider pneumonitis    CTA chest 2/7/2020:   · no evidence of PE  · Multiple moderate sized pleural effusion, layering posteriorly  · Left perihilar mass with patchy associated airspace opacities in the left perihilar region  · Diffuse bilateral groundglass opacities  · Cardiomegaly     Echocardiogram from 12/3/2019:  · EF 55%  · Normal  LV systolic function  · LV thickness c/w mild concentric hypertrophy  · LV diastolic dysfunction  · No evidence of pulmonary HTN     proBNP - 2,117     S/p Lasix 40 mg IV x1  S/p solumedrol 125 mg IV x1     Symptoms improved with lasix. Voiding well.  Findings most consistent with volume overload.  Pulmonary to see as well.  TSH - 0.294 on 2/8/20     #3.   Anemia r/t chemotherapy  Hgb 10.2 MCV 94 on 2/9/20  Stable, follow        #4  End-of-life issues were discussed with the patient.  He had previously discussed this with the palliative care nurse. His quality of life has declined significant with chemotherapy treatment.  Mr. Rosario states that he does not desire intubation or CPR. He has decided going home with hospiceupon discharge.      #5  Diarrhea  C diff negative  imodium ordered    02/10/20  7:15 AM       Physicians attestation and contribution:    I, Claudio Nevarez, personally and independently performed an evaluation on Romel Rosario  I have reviewed relevant medical information/data to include but not limited to the medication list, relevant appropriate lab work and imaging when applicable.  I reviewed other physician's notes, ancillary services and nurses assessments.  I have reviewed the above documentation completed by Sukhdev Escobar PA-C  Please see my additional addended and/or modified contributions to the history of present illness, physical examination and assessment/medical decision-making and plan that reflects my findings and impressions.  I discussed the essential elements of the care plan with Sukhdev Escobar PA-C and the patient.  I have encouraged and answered all the questions raised to the patient's understanding and satisfaction.  I concur with the above stated.    Subjective: Awake, alert, breathing much better but had diarrhea all night     Objective: Lungs are much clearer but with scattered rales bilaterally in upper lower lung fields anteriorly and posteriorly.      Assessment/plan: Antidiarrheals prescribed.  Clinically stable.  His decision regarding hospice when he goes home is noted.  This is reasonable.      Claudio Nevarez MD  2/10/2020 8:42 AM

## 2020-02-10 NOTE — PLAN OF CARE
Problem: Patient Care Overview  Goal: Plan of Care Review  Outcome: Ongoing (interventions implemented as appropriate)  Flowsheets (Taken 2/10/2020 7131)  Progress: no change  Plan of Care Reviewed With: patient  Outcome Summary: VSS. Complains of pain, PRN morphine given as well as PRN ativan. Plans to discharge home with hospice, possibly tomorrow. Recieving IV zoysn. Safety maintained, no falls. Will cont to monitor and notify MD of any changes.

## 2020-02-10 NOTE — PROGRESS NOTES
"    [facility]  INPATIENT PROGRESS NOTE    Patient Name: Romel Rosario  Date of Admission: 2/7/2020  Today's Date: 02/10/20  Length of Stay: 3  Primary Care Physician: Eleazar Turner DO    Subjective   Chief Complaint: \"I want to go home.\"  HPI   Doing ok.  No CP or SOA.  Afebrile.  Wants to go home with Hospice        Review of Systems   Constitutional: Positive for fatigue. Negative for fever.   HENT: Negative for congestion and ear pain.    Eyes: Negative for redness and visual disturbance.   Respiratory: Negative for cough, shortness of breath and wheezing.    Cardiovascular: Negative for chest pain and palpitations.   Gastrointestinal: Negative for abdominal pain, diarrhea, nausea and vomiting.   Endocrine: Negative for cold intolerance and heat intolerance.   Genitourinary: Negative for dysuria and frequency.   Musculoskeletal: Negative for arthralgias and back pain.   Skin: Negative for rash and wound.   Neurological: Negative for dizziness and headaches.   Psychiatric/Behavioral: Negative for confusion. The patient is not nervous/anxious.         All pertinent negatives and positives are as above. All other systems have been reviewed and are negative unless otherwise stated.     Objective    Temp:  [97 °F (36.1 °C)-98.7 °F (37.1 °C)] 98.7 °F (37.1 °C)  Heart Rate:  [51-90] 55  Resp:  [16-18] 18  BP: (105-119)/(51-80) 119/80  Physical Exam   Constitutional: He is oriented to person, place, and time. He appears well-developed and well-nourished. Nasal cannula in place.   HENT:   Head: Normocephalic and atraumatic.   Right Ear: External ear normal.   Left Ear: External ear normal.   Nose: Nose normal.   Mouth/Throat: Oropharynx is clear and moist.   Eyes: Pupils are equal, round, and reactive to light. Conjunctivae and EOM are normal. Right eye exhibits no discharge. Left eye exhibits no discharge. No scleral icterus.   Neck: Normal range of motion. " Neck supple. No tracheal deviation present. No thyromegaly present.   Cardiovascular: Normal rate, regular rhythm, normal heart sounds and intact distal pulses. Exam reveals no gallop and no friction rub.   No murmur heard.  Pulmonary/Chest: Effort normal and breath sounds normal. No stridor. No respiratory distress. He has no wheezes. He has no rales. He exhibits no tenderness.   Abdominal: Soft. Bowel sounds are normal. He exhibits no distension and no mass. There is no tenderness. There is no rebound and no guarding. No hernia.   Musculoskeletal: Normal range of motion. He exhibits no edema or deformity.   Lymphadenopathy:     He has no cervical adenopathy.   Neurological: He is alert and oriented to person, place, and time. He has normal reflexes. He displays normal reflexes. No cranial nerve deficit. He exhibits normal muscle tone. Coordination normal.   Skin: Skin is warm and dry. No rash noted. No erythema. No pallor.   Psychiatric: He has a normal mood and affect. His behavior is normal. Judgment and thought content normal.   Vitals reviewed.          Results Review:  I have reviewed the labs, radiology results, and diagnostic studies.    Laboratory Data:   Results from last 7 days   Lab Units 02/09/20  0408 02/08/20  0156 02/07/20  0920   WBC 10*3/mm3 16.58* 12.16* 7.97   HEMOGLOBIN g/dL 10.2* 8.9* 9.7*   HEMATOCRIT % 32.9* 27.8* 29.6*   PLATELETS 10*3/mm3 360 269 247        Results from last 7 days   Lab Units 02/09/20  0408 02/08/20  0156 02/07/20  0749   SODIUM mmol/L 141 138 136   POTASSIUM mmol/L 4.1 3.7 4.1   CHLORIDE mmol/L 98 97* 98   CO2 mmol/L 32.0* 30.0* 22.0   BUN mg/dL 22 15 11   CREATININE mg/dL 0.80 0.91 0.77   CALCIUM mg/dL 10.2 9.7 9.7   BILIRUBIN mg/dL 0.2 0.2 0.3   ALK PHOS U/L 109 93 99   ALT (SGPT) U/L 11 10 12   AST (SGOT) U/L 17 11 11   GLUCOSE mg/dL 148* 165* 208*       Culture Data:   No results found for: BLOODCX, URINECX, WOUNDCX, MRSACX, RESPCX, STOOLCX    Radiology Data:    Imaging Results (Last 24 Hours)     ** No results found for the last 24 hours. **          I have reviewed the patient's current medications.     Assessment/Plan     Active Hospital Problems    Diagnosis   • Acute pulmonary edema (CMS/HCC)   • PUD (peptic ulcer disease)   • Colon cancer (CMS/HCC)   • Type 2 diabetes mellitus with hyperglycemia, with long-term current use of insulin (CMS/HCC)   • Adenocarcinoma, lung (CMS/HCC)   • Respiratory distress, acute   • Acute respiratory failure with hypoxia and hypercapnia (CMS/HCC)   • Controlled type 2 diabetes mellitus with hyperglycemia, without long-term current use of insulin (CMS/HCC)       Continue present care  Home with Hospice in AM          Discharge Planning: I expect the patient to be discharged to home with Hospice in AM    J Carlos Bartlett MD   02/10/20   5:00 PM

## 2020-02-10 NOTE — PLAN OF CARE
Problem: Patient Care Overview  Goal: Plan of Care Review  Outcome: Ongoing (interventions implemented as appropriate)  Flowsheets (Taken 2/10/2020 1340)  Progress: no change  Plan of Care Reviewed With: patient  Outcome Summary: Pt refused PT evaluation x2. Pt and wife states he is mobile to bathroom in hospital room. Pt states he has no safety concerns. PT to sign off at this time. Please consult with any change in pt status.

## 2020-02-10 NOTE — PROGRESS NOTES
Continued Stay Note   [place]     Patient Name: Romel Rosario  MRN: [id number]  Today's Date: 2/10/2020    Admit Date: 2/7/2020    Discharge Plan     Row Name 02/10/20 1422       Plan    Plan  HOME WITH [facility]    Provided post acute provider list?  Yes    Post Acute Provider List  Hospice    Delivered To  Patient;Support Person    Support Person  KEREN, WIFE    Method of Delivery  In person    Patient/Family in Agreement with Plan  yes    Plan Comments  PT. HAS DECIDED HE WANTS TO GO HOME WITH [facility].  REFERRAL WAS CALLED AND FAXED TO ANDREW AT [facility].  WILL FOLLOW FOR INTAKE AND DME SET UP AT HOME VIA HOSPICE.          Discharge Codes    No documentation.             CORRY Weiss

## 2020-02-10 NOTE — PLAN OF CARE
Problem: Patient Care Overview  Goal: Plan of Care Review  Outcome: Ongoing (interventions implemented as appropriate)  Flowsheets (Taken 2/10/2020 8133)  Progress: no change  Plan of Care Reviewed With: patient  Outcome Summary: Initial RDN eval. Pt reports good appetite; states food has been excellent. He is on a CCHO diet with PO intake avg 75%/6 meals. No nutritional needs identified at this time. Will continue to follow.

## 2020-02-10 NOTE — PROGRESS NOTES
Palliative Care Daily Progress Note     goals of care/advanced care planning and support for patient/family    Code Status:   Code Status and Medical Interventions:   Ordered at: 02/07/20 1437     Limited Support to NOT Include:    Intubation     Level Of Support Discussed With:    Patient     Code Status:    No CPR     Medical Interventions (Level of Support Prior to Arrest):    Limited      Advanced Directives: Advance Directive Status: Patient does not have advance directive   Goals of Care: Ongoing.     S: Medical record reviewed. Events noted.  Patient sitting up on edge of bed without apparent distress.  Reports his breathing is improved compared to last few days.  Both patient and spouse upset as hospice representative has not met with them today as planned.  Spouse reports she spoke with social work over the weekend and plans for representative to be there at 9 AM this morning.  As such, patient is upset that he will not be able to go home as planned today.  Patient will need DME.  Discussed with social workPastora who has also contacted hospice and anticipating a liaison to meet with family today. Due to the Palliative Care Topics Discussed: resuscitation status and [facility] we will establish an advance care plan.   Advance Care Planning   Advance Care Planning Discussion: With plans for discharge home with hospice and need for EMS transportation, spouse and patient has requested a DO NOT RESUSCITATE order.  As such, we reviewed and initiated medical orders for scope of treatment (MOST) form to further delineate patient's care goals to include no CPR and limited interventions with no intubation.  Attending to complete notified via electronic message.  Nursing notified as well.    Review of Systems   Constitution: Positive for malaise/fatigue.   Respiratory: Positive for shortness of breath.    Musculoskeletal: Positive for muscle weakness.   Gastrointestinal: Positive for diarrhea.   Neurological: Positive  "for weakness.   Psychiatric/Behavioral: The patient is nervous/anxious.         O:       Intake/Output Summary (Last 24 hours) at 2/10/2020 1526  Last data filed at 2/10/2020 1300  Gross per 24 hour   Intake 1200 ml   Output --   Net 1200 ml       Diagnostics: Reviewed    Patient Active Problem List   Diagnosis   • Controlled type 2 diabetes mellitus with hyperglycemia, without long-term current use of insulin (CMS/HCC)   • HTN (hypertension), benign   • Hx of colonic polyps   • Hx of colon cancer, stage I   • Sensorineural hearing loss   • Tinnitus of right ear   • Dysfunction of right eustachian tube   • Mass of upper lobe of left lung   • Encounter for consultation   • Former smoker   • Shortness of breath   • Hypoxia   • Respiratory distress, acute   • Acute respiratory failure with hypoxia and hypercapnia (CMS/HCC)   • Mediastinal adenopathy   • Adenocarcinoma, lung (CMS/HCC)   • Hypomagnesemia   • Pleural effusion   • Acute on chronic respiratory failure with hypoxia (CMS/HCC)   • Atelectasis   • Type 2 diabetes mellitus with hyperglycemia, with long-term current use of insulin (CMS/HCC)   • Anemia due to chemotherapy   • Essential hypertension   • Hypokalemia   • SVT (supraventricular tachycardia) (CMS/HCC)   • Acute pulmonary edema (CMS/HCC)   • PUD (peptic ulcer disease)   • Colon cancer (CMS/HCC)       Physical Exam:    /54 (BP Location: Left arm, Patient Position: Lying)   Pulse 84   Temp 97.8 °F (36.6 °C) (Oral)   Resp 18   Ht 170.2 cm (67.01\")   Wt 83.6 kg (184 lb 6.4 oz)   SpO2 94%   BMI 28.87 kg/m²     Physical Exam   Constitutional: He is oriented to person, place, and time. He appears well-developed and well-nourished. He has a sickly appearance. He appears ill. Nasal cannula in place.   HENT:   Head: Normocephalic and atraumatic.   Eyes: Pupils are equal, round, and reactive to light.   Neck: Normal range of motion.   Cardiovascular: Normal rate, regular rhythm and normal heart sounds. "   Pulmonary/Chest: He has decreased breath sounds.   Abdominal: Soft. Bowel sounds are normal.   Musculoskeletal: Normal range of motion.   Neurological: He is alert and oriented to person, place, and time.   Skin: Skin is warm and dry.   Psychiatric: Cognition and memory are normal.      Patient status: Disease state: Controlled with current treatments.  Functional status: Palliative Performance Scale Score: Performance 40% based on the following measures: Ambulation: Mainly in bed, Activity and Evidence of Disease: Unable to do any work, extensive evidence of disease, Self-Care: Mainly assistance required,  Intake: Normal or reduced, LOC: Full, drowsy or confusion   ECOG Status(3) Capable of limited self-care, confined to bed or chair > 50% of waking hours.  Nutritional status: Albumin 3.10. Body mass index is 28.85 kg/m².      Family support: The patient receives support from his wife..  POA/Healthcare surrogate-no advance directive on file     Impression/Problem List:     1.  Stage IV non-small cell adenocarcinoma of the lung   2.  Acute pulmonary edema  3.  COPD  4.  Diabetes mellitus type 2  5.  Anemia, related to chemotherapy  6.  Hypertension  7.  Bilateral pleural effusions  8.  Atrial fibrillation  9.  History of colon cancer        Recommendations/Plan:  1. plan: Goals of care include CODE STATUS no CPR/limited interventions.    2.  Palliative care encounter  -Plan to discharge home with hospice.  Awaiting hospice liaison to meet with family.  -Requesting EMS transportation.  Discussed with social work.  -Reviewed and initiated MOST form to further delineate care goals after discharge.  Attending to complete.  Relayed needs to attending and nursing.      Thank you for allowing us to participate in patient's plan of care. Palliative Care Team will continue to follow patient.     Time spent: 35 minutes spent reviewing medical and medication records, assessing and examining patient, discussing with family,  answering questions, providing some guidance about a plan and documentation of care, and coordinating care with other healthcare members, with > 50% time spent face to face.   20 minutes spent on advance care planning.    Angela Calhoun, APRN  2/10/2020

## 2020-02-11 NOTE — PROGRESS NOTES
"Palliative Care Daily Progress Note     support for patient/family    Code Status:   Code Status and Medical Interventions:   Ordered at: 02/07/20 1435     Limited Support to NOT Include:    Intubation     Level Of Support Discussed With:    Patient     Code Status:    No CPR     Medical Interventions (Level of Support Prior to Arrest):    Limited      Advanced Directives: Advance Directive Status: Patient does not have advance directive   Goals of Care: Ongoing.     S: Medical record reviewed. Events noted. Sitting up in bed without apparent distress. No family at bedside. States \"I am ready to go home\". Plans for home with hospice today likely. MOST form completed by attending and scanned to EMR. Patient provided with original and copies of MOST. Discussed with charge nurse.     Review of Systems   Constitution: Positive for malaise/fatigue.   Respiratory: Positive for shortness of breath.    Musculoskeletal: Positive for muscle weakness.   Gastrointestinal: Positive for diarrhea.   Neurological: Positive for weakness.   Psychiatric/Behavioral: The patient is nervous/anxious.      O:       Intake/Output Summary (Last 24 hours) at 2/11/2020 0921  Last data filed at 2/11/2020 0857  Gross per 24 hour   Intake 600 ml   Output 1100 ml   Net -500 ml       Diagnostics: Reviewed    Patient Active Problem List   Diagnosis   • Controlled type 2 diabetes mellitus with hyperglycemia, without long-term current use of insulin (CMS/HCC)   • HTN (hypertension), benign   • Hx of colonic polyps   • Hx of colon cancer, stage I   • Sensorineural hearing loss   • Tinnitus of right ear   • Dysfunction of right eustachian tube   • Mass of upper lobe of left lung   • Encounter for consultation   • Former smoker   • Shortness of breath   • Hypoxia   • Respiratory distress, acute   • Acute respiratory failure with hypoxia and hypercapnia (CMS/HCC)   • Mediastinal adenopathy   • Adenocarcinoma, lung (CMS/HCC)   • Hypomagnesemia   • Pleural " "effusion   • Acute on chronic respiratory failure with hypoxia (CMS/HCC)   • Atelectasis   • Type 2 diabetes mellitus with hyperglycemia, with long-term current use of insulin (CMS/HCC)   • Anemia due to chemotherapy   • Essential hypertension   • Hypokalemia   • SVT (supraventricular tachycardia) (CMS/HCC)   • Acute pulmonary edema (CMS/HCC)   • PUD (peptic ulcer disease)   • Colon cancer (CMS/HCC)       Physical Exam:    /64 (BP Location: Left arm, Patient Position: Lying)   Pulse 87   Temp 98 °F (36.7 °C) (Oral)   Resp 16   Ht 170.2 cm (67.01\")   Wt 84.8 kg (187 lb)   SpO2 97%   BMI 29.28 kg/m²     Physical Exam   Constitutional: He is oriented to person, place, and time. He appears well-developed and well-nourished. He has a sickly appearance. He appears ill. Nasal cannula in place.   HENT:   Head: Normocephalic and atraumatic.   Eyes: Pupils are equal, round, and reactive to light.   Neck: Normal range of motion.   Cardiovascular: Normal rate, regular rhythm and normal heart sounds.   Pulmonary/Chest: He has decreased breath sounds.   Abdominal: Soft. Bowel sounds are normal.   Musculoskeletal: Normal range of motion.   Neurological: He is alert and oriented to person, place, and time.   Skin: Skin is warm and dry.   Psychiatric: Cognition and memory are normal.      Patient status: Disease state: Controlled with current treatments.  Functional status: Palliative Performance Scale Score: Performance 40% based on the following measures: Ambulation: Mainly in bed, Activity and Evidence of Disease: Unable to do any work, extensive evidence of disease, Self-Care: Mainly assistance required,  Intake: Normal or reduced, LOC: Full, drowsy or confusion   ECOG Status(3) Capable of limited self-care, confined to bed or chair > 50% of waking hours.  Nutritional status: Albumin 3.10. Body mass index is 28.85 kg/m².     Family support: The patient receives support from his wife..  POA/Healthcare surrogate-no " advance directive on file     Impression/Problem List:     1.  Stage IV non-small cell adenocarcinoma of the lung   2.  Acute pulmonary edema  3.  COPD  4.  Diabetes mellitus type 2  5.  Anemia, related to chemotherapy  6.  Hypertension  7.  Bilateral pleural effusions  8.  Atrial fibrillation  9.  History of colon cancer        Recommendations/Plan:  1. plan: Goals of care include CODE STATUS no CPR/limited interventions.     2.  Palliative care encounter  -Plan to discharge home with hospice, likely today.    -Completed MOST form to further delineate care goals after discharge.          Thank you for allowing us to participate in patient's plan of care. Palliative Care Team will continue to follow patient.     Time spent: 25 minutes spent reviewing medical and medication records, assessing and examining patient, discussing with family, answering questions, providing some guidance about a plan and documentation of care, and coordinating care with other healthcare members, with > 50% time spent face to face.       Angela Calhoun, APRN  2/11/2020

## 2020-02-11 NOTE — PROGRESS NOTES
MEDICAL ONCOLOGY PROGRESS NOTE    Pt Name: Romel Rosario  MRN: [id number]  YOB: 1950  Date of evaluation: 2/11/2020    SUBJECTIVE: He did still have some diarrhea last night-4 watery stools however not as bad.  He reports bowel movements yesterday were better with Imodium.  He has not had any Imodium since 8 yesterday morning.    HISTORY OF PRESENT ILLNESS:      The patient is a 69 y.o.  gentleman with stage IV non-small cell carcinoma of the lung, managed from the oncology standpoint by Dr.Wedeson Fishman, most recently with maintenance Alimta Keytruda.     The most recent CT scan of the chest on 11/30/2019 indicated an interval  favorable response to treatment, however he continues to have small to moderate bilateral pleural effusions.      Romel is admitted through the emergency room to CCU at [facility] on 2/7/2020 for complaint of acute respiratory distress with progressive shortness of breath and hypoxia.  Initially he required bag mask for O2 improvement.  He is currently wearing O2 6L via NC (he states he wears from 4-6L via NC at home) with O2 sat of 93%.     His pulmonologist, Dr. Jere Brumfield has recommended initial approach of aggressive diuresis to assist with the bilateral pleural effusions and an attempt to improve his respiratory status.  Romel appears to be improving symptomatically.       Oncology consultation requested in continuity of care.           Past Medical History:    Past Medical History:   Diagnosis Date   • Adenocarcinoma, lung (CMS/HCC)    • Cerumen impaction    • Cervical disc disease    • Colon cancer (CMS/HCC)    • Diabetes (CMS/HCC)    • Elevated cholesterol    • Eustachian tube dysfunction    • GERD (gastroesophageal reflux disease)    • Hx of colonic polyps    • Hypertension    • PUD (peptic ulcer disease)    • Sensorineural hearing loss        Past Surgical History:    Past Surgical History:   Procedure Laterality Date   • BRONCHOSCOPY Left 7/19/2019     Procedure: BRONCHOSCOPY WITH ENDOBRONCHIAL ULTRASOUND AND TRANSBRONCHIAL BIOPSY at 1: 30;  Surgeon: Jere Brumfield MD;  Location: [facility] OR;  Service: Pulmonary   • COLON SURGERY      Due to colon cancer   • COLONOSCOPY N/A 2017    Procedure: COLONOSCOPY WITH ANESTHESIA;  Surgeon: Barry Banks MD;  Location: [facility] ENDOSCOPY;  Service:    • COLONOSCOPY W/ POLYPECTOMY  2013    Tubular adenoma transverse x 2, Diverticulosis repeat exam in 3 years   • ENDOSCOPY  2013    HH, Billroth anastomisis in the gastric antrum   • KNEE SURGERY     • STOMACH SURGERY      Due to ulcer   • VENOUS ACCESS DEVICE (PORT) INSERTION N/A 9/3/2019    Procedure: SINGLE LUMEN PORT PLACEMENT;  Surgeon: Jelena Huntley MD;  Location: [facility] OR;  Service: General       Social History:    Social History     Socioeconomic History   • Marital status:      Spouse name: Not on file   • Number of children: Not on file   • Years of education: Not on file   • Highest education level: Not on file   Tobacco Use   • Smoking status: Former Smoker     Years: 45.00     Last attempt to quit: 2007     Years since quittin.4   • Smokeless tobacco: Never Used   Substance and Sexual Activity   • Alcohol use: No   • Drug use: No   • Sexual activity: Defer       Family History:   Family History   Problem Relation Age of Onset   • Heart disease Mother    • Diabetes Mother    • Alzheimer's disease Father    • Colon cancer Neg Hx    • Colon polyps Neg Hx        Current Hospital Medications:      Current Facility-Administered Medications:   •  acetaminophen (TYLENOL) tablet 650 mg, 650 mg, Oral, Q4H PRN, Conor Smith MD  •  atorvastatin (LIPITOR) tablet 40 mg, 40 mg, Oral, Daily, Conor Smith MD, 40 mg at 02/10/20 0808  •  budesonide-formoterol (SYMBICORT) 160-4.5 MCG/ACT inhaler 2 puff, 2 puff, Inhalation, BID - RT, Conor Smith MD, 2 puff at 20 0630  •  dextrose (D50W) 25 g/ 50mL Intravenous Solution 25 g, 25 g,  Intravenous, Q15 Min PRN, Conor Smith MD  •  dextrose (GLUTOSE) oral gel 15 g, 15 g, Oral, Q15 Min PRN, Conor Smith MD  •  famotidine (PEPCID) injection 20 mg, 20 mg, Intravenous, BID, Conor Smith MD, 20 mg at 02/10/20 2014  •  folic acid (FOLVITE) tablet 400 mcg, 400 mcg, Oral, Daily, Conor Smith MD, 400 mcg at 02/10/20 0808  •  furosemide (LASIX) injection 20 mg, 20 mg, Intravenous, Q12H, Conor Smith MD, 20 mg at 02/10/20 0808  •  glucagon (human recombinant) (GLUCAGEN DIAGNOSTIC) injection 1 mg, 1 mg, Subcutaneous, Q15 Min PRN, Conor Smith MD  •  guaifenesin-dextromethorphan (MUCINEX DM) tablet 1 tablet, 1 tablet, Oral, BID, Sukhdev Escobar PA, 1 tablet at 02/10/20 2014  •  hydrOXYzine (ATARAX) tablet 10 mg, 10 mg, Oral, 4x Daily PRN, Conor Smith MD, 10 mg at 02/07/20 1254  •  insulin lispro (humaLOG) injection 2-7 Units, 2-7 Units, Subcutaneous, 4x Daily With Meals & Nightly, Conor Smith MD, 2 Units at 02/10/20 2013  •  ipratropium-albuterol (DUO-NEB) nebulizer solution 3 mL, 3 mL, Nebulization, Q4H - RT, Conor Smith MD, 3 mL at 02/11/20 0630  •  linagliptin (TRADJENTA) tablet 5 mg, 5 mg, Oral, Daily, Conor Smith MD, 5 mg at 02/10/20 0808  •  loperamide (IMODIUM) capsule 2 mg, 2 mg, Oral, 4x Daily PRN, Sukhdev Escobar PA, 2 mg at 02/10/20 0826  •  LORazepam (ATIVAN) tablet 0.5 mg, 0.5 mg, Oral, Q4H PRN, Conor Smith MD, 0.5 mg at 02/11/20 0523  •  metoprolol tartrate (LOPRESSOR) tablet 25 mg, 25 mg, Oral, BID, Conor Smith MD, 25 mg at 02/10/20 2013  •  Morphine sulfate (PF) injection 2 mg, 2 mg, Intravenous, Q2H PRN, Conor Smith MD, 2 mg at 02/11/20 0514  •  ondansetron (ZOFRAN) tablet 8 mg, 8 mg, Oral, Q8H PRN, Conor Smith MD, 8 mg at 02/09/20 1232  •  piperacillin-tazobactam (ZOSYN) 3.375 g in iso-osmotic dextrose 50 ml (premix), 3.375 g, Intravenous, Q8H, Conor Smith MD, Last Rate: 0 mL/hr at 02/09/20 0800, 3.375 g at 02/11/20 0514  •  sodium chloride  "0.9 % flush 10 mL, 10 mL, Intravenous, PRN, Conor Smith MD  •  sodium chloride 0.9 % flush 10 mL, 10 mL, Intravenous, Q12H, Conor Smith MD, 10 mL at 02/10/20 2021  •  sodium chloride 0.9 % flush 10 mL, 10 mL, Intravenous, PRN, Conor Smith MD  •  sucralfate (CARAFATE) tablet 1 g, 1 g, Oral, 4x Daily AC & at Bedtime, Conro Smith MD, 1 g at 02/10/20 2013    Allergies:   Allergies   Allergen Reactions   • Lactose Intolerance (Gi) Diarrhea         Subjective   REVIEW OF SYSTEMS:   CONSTITUTIONAL: no fever, no night sweats, fatigue; malaise,   HEENT: no blurring of vision, no double vision, no hearing difficulty, no tinnitus, no ulceration, no dysplasia, no epistaxis;  LUNGS: no cough, no hemoptysis, no wheeze,  shortness of breath;  CARDIOVASCULAR: no palpitation, no chest pain,  shortness of breath;  GI: no abdominal pain,  nausea, no vomiting, diarrhea, no constipation;  MARNI: no dysuria, no hematuria, no frequency or urgency, no nephrolithiasis;  MUSCULOSKELETAL: no joint pain, no swelling, no stiffness; generalized weakness  ENDOCRINE: no polyuria, no polydipsia, no cold or heat intolerance;  HEMATOLOGY: no easy bruising or bleeding, no history of clotting disorder;  DERMATOLOGY: no skin rash, no eczema, no pruritus;  PSYCHIATRY: no depression,  anxiety, no panic attacks, no suicidal ideation, no homicidal ideation;  NEUROLOGY: no syncope, no seizures, no numbness or tingling of hands, no numbness or tingling of feet, no paresis;    Objective   /49 (BP Location: Left arm, Patient Position: Lying)   Pulse 71   Temp 97.9 °F (36.6 °C) (Oral)   Resp 16   Ht 170.2 cm (67.01\")   Wt 84.8 kg (187 lb)   SpO2 96%   BMI 29.28 kg/m²     PHYSICAL EXAM:  CONSTITUTIONAL: Alert, appropriate, ill appearing  EYES: Non icteric, EOM intact, pupils equal round   ENT: Mucus membranes moist, no oral pharyngeal lesions, external inspection of ears and nose are normal. Nasal canula in place  NECK: Supple, no " masses.  No palpable thyroid mass  CHEST/LUNGS: wheezing, decreased breath sounds lung bases, tachypneic   CARDIOVASCULAR: RRR, no murmurs.  No lower extremity edema  ABDOMEN: soft non-tender, active bowel sounds, no HSM.  No palpable masses  EXTREMITIES: warm, full ROM in all 4 extremities, no focal weakness.  SKIN: warm, dry with no rashes or lesions  LYMPH: No cervical, clavicular, axillary, or inguinal lymphadenopathy  NEUROLOGIC: follows commands, non focal   PSYCH: anxious        LABORATORY RESULTS REVIEWED BY ME:  No results found for: BLOODCX, URINECX, WOUNDCX, MRSACX, RESPCX, STOOLCX    Lab Results   Component Value Date     02/09/2020    K 4.1 02/09/2020    CL 98 02/09/2020    CO2 32.0 (H) 02/09/2020    BUN 22 02/09/2020    CREATININE 0.80 02/09/2020    GLUCOSE 148 (H) 02/09/2020    CALCIUM 10.2 02/09/2020    BILITOT 0.2 02/09/2020    ALKPHOS 109 02/09/2020    AST 17 02/09/2020    ALT 11 02/09/2020    AGRATIO 1.1 02/09/2020    GLOB 3.4 02/09/2020       Lab Results   Component Value Date    INR 0.96 12/03/2019    PROTIME 13.1 12/03/2019       RADIOLOGY STUDIES REPORT/REVIEWED AND INTERPRETED BY ME:  Xr Chest 2 View    Result Date: 2/7/2020  Narrative: HISTORY: Shortness of air  CXR: 2 views the chest are obtained.  COMPARISON: 02/04/2019  FINDINGS: There are increasing diffuse bilateral pulmonary opacities worrisome for edema. There is left suprahilar masslike opacification that are seen on CT exam with associated volume loss. There is a small left pleural effusion with a trace right pleural effusion. Heart appears enlarged. Right subclavian port tip projects over the SVC. No appreciable pneumothorax. No acute bony pathology.      Impression: 1. Increasing diffuse bilateral pulmonary opacities favorable for edema. Pneumonitis considered. 2. Left suprahilar masslike opacity. Please refer to CT chest report. 3. Small pleural effusions, left greater than right. This report was finalized on 02/07/2020  07:19 by Dr. Deborah Chávez MD.    Xr Chest 1 View    Result Date: 2/8/2020  Narrative: XR CHEST 1 VW-  Indication: pleural effusions; J81.0-Acute pulmonary edema; L18-[place]bu[place] effusion, not elsewhere classified  Comparison: 02/07/2020  Findings:  Cardiac silhouette is enlarged but stable. Interstitial opacity appears resolved. Unchanged left hilar masslike opacity. Small bilateral pleural effusions, greatest on the left, appear unchanged. No visible pneumothorax. Port catheter tip overlying the cavoatrial junction is stable in position. No suspicious osseous findings.      Impression:  1.  Decreased/resolved bilateral interstitial opacity. 2.  Unchanged left hilar masslike opacity. 3.  No change in small pleural effusions. This report was finalized on 02/08/2020 10:58 by Dr. Hardeep Bettencourt MD.    Ct Angiogram Chest    Result Date: 2/7/2020  Narrative: EXAMINATION: CT ANGIOGRAM CHEST-  2/7/2020 7:20 AM CST  HISTORY:Chest pain and shortness of air. Elevated d-dimer  COMPARISON: 11/30/2019 CT angiogram chest  TECHNIQUE:  CT evaluation of the chest was performed with intravenous contrast. 2 mm transaxial images were obtained.. Coronal reconstruction maximum intensity projection images of the pulmonary arteries and aorta were also generated.  Radiation dose equals  mGy-cm.  Automated exposure control dose reduction technique was implemented.   FINDINGS:  [Examination was sent to [facility] for preliminary interpretation.  The pulmonary arteries opacify with iodinated contrast without intraluminal filling defects or CT angiographic evidence of pulmonary embolus.  Again identified are multiple moderate size pleural effusions layering posteriorly, likely stable.  The left perihilar mass is again identified with associated patchy interstitial and airspace opacities involving both the upper and lower left lobe.  There is diffuse groundglass in the lungs bilaterally observed similarly on the previous study.  There is  cardiomegaly. The hiatal hernia identified.  There is no mediastinal or hilar lymph node enlargement.  There is no axillary lymphadenopathy.  Limited imaging the upper abdomen demonstrate no acute abnormality.]      Impression: 1. No CT angiographic evidence of pulmonary embolus or acute aortic pathology.  2. Similar CT angiographic appearance of the chest dated 11/30/2019 examination. Persistent bilateral pleural effusions. Left perihilar mass with patchy associated airspace opacities in the left perihilar region. Diffuse bilateral groundglass opacities. This report was finalized on 02/07/2020 07:25 by Dr. Bo Her MD.          ASSESSMENT:  The patient is a 69 y.o.  gentleman with stage IV non-small cell carcinoma of the lung, managed from the oncology standpoint by Dr.Wedeson Fishman, most recently with maintenance Alimta Keytruda.     The most recent CT scan of the chest on 11/30/2019 indicated an interval  favorable response to treatment, however he continues to have small to moderate bilateral pleural effusions.      Romel was admitted through the emergency room to CCU at [facility] on 2/7/2020 for complaint of acute respiratory distress with progressive shortness of breath and hypoxia.  Initially he required bag mask for O2 improvement.  He is currently wearing O2 6L via NC (he states he wears from 4-6L via NC at home) with O2 sat of 93%.     His pulmonologist, Dr. Jere Brumfield has recommended initial approach of aggressive diuresis to assist with the bilateral pleural effusions and an attempt to improve his respiratory status.  Romel appears to be improving symptomatically.       Oncology consultation requested for continuity of care.     #1.   Stage IV Non-small cell lung cancer, adenocarcinoma.   Cycle #6 of maintenance Alimta and Keytruda received 1/24/20  Next dose due 2/14/20.     #2.    Volume overload     CXR 2/7/2020: increasing diffuse bilateral pulmonary opacities, favorable for edema -  consider pneumonitis    CTA chest 2/7/2020:   · no evidence of PE  · Multiple moderate sized pleural effusion, layering posteriorly  · Left perihilar mass with patchy associated airspace opacities in the left perihilar region  · Diffuse bilateral groundglass opacities  · Cardiomegaly     Echocardiogram from 12/3/2019:  · EF 55%  · Normal LV systolic function  · LV thickness c/w mild concentric hypertrophy  · LV diastolic dysfunction  · No evidence of pulmonary HTN     proBNP - 2,117     S/p Lasix 40 mg IV x1  S/p solumedrol 125 mg IV x1    pCXR 2/8/2020: Decreased/resolved bilateral interstitial opacity     Symptoms improved with lasix. Voiding well.  Findings most consistent with volume overload.  Pulmonary to see as well.  TSH - 0.294 on 2/8/20     #3.   Anemia r/t chemotherapy  Hgb 10.2 MCV 94 on 2/9/20  Stable, follow        #4  End-of-life issues were discussed with the patient.  He had previously discussed this with the palliative care nurse. His quality of life has declined significant with chemotherapy treatment.  Mr. Rosario states that he does not desire intubation or CPR. He has decided going home with hospice upon discharge.      #5  Diarrhea  C diff negative  imodium ordered -discussed with nursing this morning      OK home with Hospice with us    02/11/20  6:48 AM     Physicians attestation and contribution:    I, Claudio Nevarez, personally and independently performed an evaluation on Romel Rosario  I have reviewed relevant medical information/data to include but not limited to the medication list, relevant appropriate lab work and imaging when applicable.  I reviewed other physician's notes, ancillary services and nurses assessments.  I have reviewed the above documentation completed by Sukhdev Escobar PA-C  Please see my additional addended and/or modified contributions to the history of present illness, physical examination and assessment/medical decision-making and plan that reflects my  findings and impressions.  I discussed the essential elements of the care plan with Sukhdev Escobar PA-C and the patient.  I have encouraged and answered all the questions raised to the patient's understanding and satisfaction.  I concur with the above stated.    Subjective: Diarrhea resolved, only 2 BMs last night.    Objective: Lungs remarkably clear    Assessment/plan:  Home with hospice, he is comfortable with this and desires to go home with comfort measures and supportive care only.    Claudio Nevarez MD  2/11/2020 7:55 AM

## 2020-02-11 NOTE — PROGRESS NOTES
Case Management Discharge Note      Final Note: NOTIFIED JERSEY CAMPUZANO RN [facility] HOME CARE OF PT'S D/C TODAY.      Provided post acute provider list?: Yes  Post Acute Provider List: Hospice  Delivered To: Patient, Support Person  Support Person: KEREN, WIFE  Method of Delivery: In person    Destination      No service has been selected for the patient.      Durable Medical Equipment      No service has been selected for the patient.      Dialysis/Infusion      No service has been selected for the patient.      Home Medical Care      Service Provider Request Status Selected Services Address Phone Number Fax Number    [facility] [address] [facility] [address] [phone]      Therapy      No service has been selected for the patient.      Community Resources      No service has been selected for the patient.             Final Discharge Disposition Code: 50 - home with hospice

## 2020-02-11 NOTE — DISCHARGE SUMMARY
"    [facility] Medicine Services  DISCHARGE SUMMARY       Date of Admission: 2/7/2020  Date of Discharge:  2/11/2020  Primary Care Physician: Eleazar Turner DO    Presenting Problem/History of Present Illness:  Acute pulmonary edema (CMS/HCC) [J81.0]     Final Discharge Diagnoses:  Active Hospital Problems    Diagnosis   • Acute pulmonary edema (CMS/HCC)   • PUD (peptic ulcer disease)   • Colon cancer (CMS/HCC)   • Type 2 diabetes mellitus with hyperglycemia, with long-term current use of insulin (CMS/HCC)   • Adenocarcinoma, lung (CMS/HCC)   • Respiratory distress, acute   • Acute respiratory failure with hypoxia and hypercapnia (CMS/HCC)   • Controlled type 2 diabetes mellitus with hyperglycemia, without long-term current use of insulin (CMS/HCC)       Consults: Heme-onc, Pulmonology, Palliative care    Procedures Performed: N/A    Pertinent Test Results: N/A    Chief Complaint on Day of Discharge:   \"I want to go home.\"    History of Present Illness on Day of Discharge:   Doing ok.  Wants to go home, pain controlled    Hospital Course:  The patient is a 69 y.o. male who presented to [facility] with SOA.  He was found to be volume overloaded.  He was treated with IV Lasix.  He also had loculated pleural effusions.     Given his respiratory issues, pulmonology was consulted.  They recommended continued diuresis and titrating his oxygen as tolerated.  They didn't feel the effusions were significant enough to be drained.      Heme-onc was consulted for continuity of care given his lung cancer.   They discussed palliative measures.      Palliative care was consulted as well.  The patient and his wife have elected to go home with Hospice.          Condition on Discharge:  Stable    Physical Exam on Discharge:  /66 (BP Location: Left arm, Patient Position: Sitting)   Pulse 61   Temp 98.4 °F (36.9 °C) (Oral)   Resp 16   Ht 170.2 cm (67.01\")   Wt 84.8 kg (187 " lb)   SpO2 99%   BMI 29.28 kg/m²   Physical Exam   Constitutional: He is oriented to person, place, and time. He appears well-developed and well-nourished. Nasal cannula in place.   HENT:   Head: Normocephalic and atraumatic.   Right Ear: External ear normal.   Left Ear: External ear normal.   Nose: Nose normal.   Mouth/Throat: Oropharynx is clear and moist.   Eyes: Pupils are equal, round, and reactive to light. Conjunctivae and EOM are normal. Right eye exhibits no discharge. Left eye exhibits no discharge. No scleral icterus.   Neck: Normal range of motion. Neck supple. No tracheal deviation present. No thyromegaly present.   Cardiovascular: Normal rate, regular rhythm, normal heart sounds and intact distal pulses. Exam reveals no gallop and no friction rub.   No murmur heard.  Pulmonary/Chest: Effort normal. No stridor. No respiratory distress. He has decreased breath sounds. He has no wheezes. He has no rales. He exhibits no tenderness.   Abdominal: Soft. Bowel sounds are normal. He exhibits no distension and no mass. There is no tenderness. There is no rebound and no guarding. No hernia.   Musculoskeletal: Normal range of motion. He exhibits no edema or deformity.   Lymphadenopathy:     He has no cervical adenopathy.   Neurological: He is alert and oriented to person, place, and time. He has normal reflexes. He displays normal reflexes. No cranial nerve deficit. He exhibits normal muscle tone. Coordination normal.   Skin: Skin is warm and dry. No rash noted. No erythema. No pallor.   Psychiatric: He has a normal mood and affect. His behavior is normal. Judgment and thought content normal.   Vitals reviewed.        Discharge Disposition:  Home with Hospice  Hospice/Medical Facility (DC - External)    Discharge Medications:     Discharge Medications      New Medications      Instructions Start Date   furosemide 20 MG tablet  Commonly known as:  LASIX   20 mg, Oral, Daily      morphine 20 MG/ML concentrated  solution   10 mg, Oral, Every 2 Hours PRN         Changes to Medications      Instructions Start Date   LORazepam 0.5 MG tablet  Commonly known as:  ATIVAN  What changed:  Another medication with the same name was added. Make sure you understand how and when to take each.   0.5 mg, Oral, Every 8 Hours PRN      LORazepam 0.5 MG tablet  Commonly known as:  ATIVAN  What changed:  You were already taking a medication with the same name, and this prescription was added. Make sure you understand how and when to take each.   0.5 mg, Oral, Every 4 Hours PRN      metoprolol tartrate 25 MG tablet  Commonly known as:  LOPRESSOR  What changed:  how much to take   25 mg, Oral, 2 Times Daily         Continue These Medications      Instructions Start Date   albuterol sulfate  (90 Base) MCG/ACT inhaler  Commonly known as:  PROVENTIL HFA;VENTOLIN HFA;PROAIR HFA   2 puffs, Inhalation, Every 4 Hours PRN      atorvastatin 40 MG tablet  Commonly known as:  LIPITOR   40 mg, Oral, Daily      budesonide-formoterol 160-4.5 MCG/ACT inhaler  Commonly known as:  SYMBICORT   2 puffs, Inhalation, 2 Times Daily - RT      busPIRone 7.5 MG tablet  Commonly known as:  BUSPAR   7.5 mg, Oral, 2 Times Daily      escitalopram 10 MG tablet  Commonly known as:  LEXAPRO   10 mg, Oral, Daily      esomeprazole 40 MG capsule  Commonly known as:  nexIUM   40 mg, Oral, Every Morning Before Breakfast      fluticasone 50 MCG/ACT nasal spray  Commonly known as:  FLONASE   1 spray, Nasal, Daily      folic acid 400 MCG tablet  Commonly known as:  FOLVITE   400 mcg, Oral, Daily      hydrOXYzine 10 MG tablet  Commonly known as:  ATARAX   10 mg, Oral, 4 Times Daily PRN      ipratropium-albuterol 0.5-2.5 mg/3 ml nebulizer  Commonly known as:  DUO-NEB   3 mL, Nebulization, 4 Times Daily - RT      JANUVIA 50 MG tablet  Generic drug:  SITagliptin   50 mg, Oral, Daily      lisinopril-hydrochlorothiazide 20-25 MG per tablet  Commonly known as:  PRINZIDE,ZESTORETIC   1  tablet, Oral, Daily      mometasone-formoterol 200-5 MCG/ACT inhaler  Commonly known as:  DULERA 200   2 puffs, Inhalation, 2 Times Daily - RT      ondansetron 8 MG tablet  Commonly known as:  ZOFRAN   8 mg, Oral, Every 8 Hours PRN      sucralfate 1 g tablet  Commonly known as:  CARAFATE   1 g, Oral, 4 Times Daily      Vitamin D2 50 MCG (2000 UT) tablet   1 tablet, Oral, Daily         Stop These Medications    ALPRAZolam 1 MG tablet  Commonly known as:  XANAX        ASK your doctor about these medications      Instructions Start Date   acetaminophen 325 MG tablet  Commonly known as:  TYLENOL   650 mg, Oral, Every 4 Hours PRN             Discharge Diet: regular    Activity at Discharge: as tolerated    Discharge Care Plan/Instructions: follow up with Hospice    Follow-up Appointments:   Future Appointments   Date Time Provider Department Center   3/24/2020  9:00 AM Ronald Campo MD [facility]       Test Results Pending at Discharge: N/A    J Carlos Bartlett MD  02/11/20  12:14 PM    Time: 40 minutes

## 2020-02-11 NOTE — PLAN OF CARE
Problem: Patient Care Overview  Goal: Plan of Care Review  Outcome: Ongoing (interventions implemented as appropriate)  Flowsheets (Taken 2/11/2020 [id number])  Progress: improving  Plan of Care Reviewed With: patient  Outcome Summary: Pt has slept most of the night with no c/o pain. VSS. No tele.IV zosyn cont. 6L nasal cannula, cont pulse ox on. Up assist x1. Possible d/c today with hospice. Continue to monitor.

## 2020-11-12 NOTE — DISCHARGE PLACEMENT REQUEST
"Romel Rivera (69 y.o. Male)     Date of Birth Social Security Number Address Home Phone MRN    1950  [address] [place], [place] [id number] [phone] [id number]    Congregation Marital Status          Voodoo        Admission Date Admission Type Admitting Provider Attending Provider Department, Room/Bed    2/7/20 Emergency J Carlos Bartlett MD Moore, Jonathan Scott, MD [facility] 4B, 409/1    Discharge Date Discharge Disposition Discharge Destination                       Attending Provider:  J Carlos Bartlett MD    Allergies:  Lactose Intolerance (Gi)    Isolation:  None   Infection:  C.difficile (rule out) (02/07/20)   Code Status:  No CPR    Ht:  170.2 cm (67.01\")   Wt:  83.6 kg (184 lb 6.4 oz)    Admission Cmt:  None   Principal Problem:  None                Active Insurance as of 2/7/2020     Primary Coverage     Payor Plan Insurance Group Employer/Plan Group    MEDICARE MEDICARE A & B      Payor Plan Address Payor Plan Phone Number Payor Plan Fax Number Effective Dates    [address] [phone]  2/1/2000 - None Entered    [place] [id number]       Subscriber Name Subscriber Birth Date Member ID       ROMEL RIVERA 1950 [id number]                 Emergency Contacts      (Rel.) Home Phone Work Phone Mobile Phone    Alissa Rivera (Spouse) [phone] -- --               History & Physical      Tisha Guy DO at 02/07/20 0445              [facility]  HISTORY AND PHYSICAL    Date of Admission: 2/7/2020  Primary Care Physician: Eleazar Turner DO    Subjective     Chief Complaint: Dyspnea    History of Present Illness  69-year-old  male who presents to the emergency department with shortness of breath.  The patient states that he is chronically short of breath, but tonight when he went to the bathroom and tried to lie on the couch he became worse, and described a " smothering type of feeling.  Patient states that he has lung cancer, and is currently undergoing treatment with Dr. Sadaf PALACIOS.  He states his next chemo treatment is this coming Friday.  He states that he vomited one time 3 days ago.  He had an episode of diarrhea  on the way to CAT scan.  Apparently the patient got off the stretcher, went to the bathroom, and had a large diarrheal stool.  At that time the patient's O2 saturation dropped down to 60% and he had to have bag mask in order to improve his O2.  The patient noted no blood in his stool.  Patient states that his urine has been okay.  He states he has had 1 admission in the past to the hospital for fluid overload.  He denies any lower extremity edema.  He does have a mild cough, but nonproductive.  He has had no fever or chills.  He uses between 4 and 6 L of nasal cannula oxygen at home.  The patient states that he is feels weak.    Cardiac echo 12/3/2019:  · Estimated EF = 55%.  · Left ventricular systolic function is normal.  · Left ventricular wall thickness is consistent with mild concentric hypertrophy.  · Left ventricular diastolic dysfunction.  · No evidence of pulmonary hypertension is present.    Review of Systems   Dyspnea  Orthopnea    Otherwise complete ROS reviewed and negative except as mentioned in the HPI.    Past Medical History:   Past Medical History:   Diagnosis Date   • Adenocarcinoma, lung (CMS/HCC)    • Cerumen impaction    • Cervical disc disease    • Colon cancer (CMS/HCC)    • Diabetes (CMS/HCC)    • Elevated cholesterol    • Eustachian tube dysfunction    • GERD (gastroesophageal reflux disease)    • Hx of colonic polyps    • Hypertension    • PUD (peptic ulcer disease)    • Sensorineural hearing loss      Past Surgical History:  Past Surgical History:   Procedure Laterality Date   • BRONCHOSCOPY Left 7/19/2019    Procedure: BRONCHOSCOPY WITH ENDOBRONCHIAL ULTRASOUND AND TRANSBRONCHIAL BIOPSY at 1: 30;  Surgeon: Jere Brumfield  MD Yan;  Location: [facility] OR;  Service: Pulmonary   • COLON SURGERY      Due to colon cancer   • COLONOSCOPY N/A 6/26/2017    Procedure: COLONOSCOPY WITH ANESTHESIA;  Surgeon: Barry Banks MD;  Location: [facility] ENDOSCOPY;  Service:    • COLONOSCOPY W/ POLYPECTOMY  08/05/2013    Tubular adenoma transverse x 2, Diverticulosis repeat exam in 3 years   • ENDOSCOPY  08/05/2013    HH, Billroth anastomisis in the gastric antrum   • KNEE SURGERY     • STOMACH SURGERY      Due to ulcer   • VENOUS ACCESS DEVICE (PORT) INSERTION N/A 9/3/2019    Procedure: SINGLE LUMEN PORT PLACEMENT;  Surgeon: Jelena Huntley MD;  Location:  [facility] OR;  Service: General     Social History:  reports that he quit smoking about 12 years ago. He quit after 45.00 years of use. He has never used smokeless tobacco. He reports that he does not drink alcohol or use drugs.    Family History: family history includes Alzheimer's disease in his father; Diabetes in his mother; Heart disease in his mother.       Allergies:  Allergies   Allergen Reactions   • Lactose Intolerance (Gi) Diarrhea     Medications:  Prior to Admission medications    Medication Sig Start Date End Date Taking? Authorizing Provider   acetaminophen (TYLENOL) 325 MG tablet Take 2 tablets by mouth Every 4 (Four) Hours As Needed for Mild Pain . 7/25/19   Aram Salas MD   albuterol sulfate  (90 Base) MCG/ACT inhaler Inhale 2 puffs Every 4 (Four) Hours As Needed for Wheezing.    ProviderRozina MD   ALPRAZolam (XANAX) 1 MG tablet Take 1 tablet by mouth Every 6 (Six) Hours As Needed for Anxiety (Agitation). 7/25/19   Aram Salas MD   atorvastatin (LIPITOR) 40 MG tablet Take 40 mg by mouth Daily. 4/13/17   Provider, MD Rozina   budesonide-formoterol (SYMBICORT) 160-4.5 MCG/ACT inhaler Inhale 2 puffs 2 (Two) Times a Day. 7/25/19   Aram Salas MD   Ergocalciferol (VITAMIN D2) 2000 units tablet Take 1 tablet by mouth Daily.     "Rozina Shaw MD   esomeprazole (nexIUM) 40 MG capsule Take 40 mg by mouth Every Morning Before Breakfast.    Rozina Shaw MD   folic acid (FOLVITE) 400 MCG tablet Take 400 mcg by mouth Daily.    Rozina Shaw MD   hydrOXYzine (ATARAX) 10 MG tablet Take 10 mg by mouth 4 (Four) Times a Day As Needed for Anxiety.    Rozina Shaw MD   JANUVIA 50 MG tablet Take 50 mg by mouth Daily. 4/21/17   Rozina Shaw MD   metoprolol tartrate (LOPRESSOR) 25 MG tablet Take 1 tablet by mouth 2 (Two) Times a Day. 12/6/19   Rush Alberto DO   ondansetron (ZOFRAN) 8 MG tablet Take 8 mg by mouth Every 8 (Eight) Hours As Needed for Nausea or Vomiting.    Rozina Shaw MD   sucralfate (CARAFATE) 1 g tablet Take 1 g by mouth 4 (Four) Times a Day.    Rozina Shaw MD     Objective     Vital Signs: /66   Pulse 67   Temp 98.4 °F (36.9 °C) (Oral)   Resp 22   Ht 175.3 cm (69\")   Wt 86.2 kg (190 lb)   SpO2 92%   BMI 28.06 kg/m²    Physical Exam   Constitutional:   The patient appears anxious   HENT:   Head: Normocephalic and atraumatic.   Nose: Nose normal.   Oral mucosa is dry   Eyes: Conjunctivae and EOM are normal.   Neck: Normal range of motion. Neck supple.   Cardiovascular: Normal rate, regular rhythm and normal heart sounds.   Pulmonary/Chest: Effort normal. He has rales.   Abdominal: Soft. He exhibits no distension. There is no tenderness.   Hernia palpated on abdominal exam.   Musculoskeletal: He exhibits no edema or tenderness.   Neurological: He is alert. No cranial nerve deficit.   Skin: Skin is warm and dry.   Psychiatric: He has a normal mood and affect.   Vitals reviewed.          Results Reviewed:  Lab Results (last 24 hours)     Procedure Component Value Units Date/Time    [place] Draw [[id number]] Collected:  02/07/20 0048    Specimen:  Blood Updated:  02/07/20 0201    Narrative:       The following orders were created for panel order [place] " Draw.  Procedure                               Abnormality         Status                     ---------                               -----------         ------                     Light Blue Top[[id number]]                                   Final result               Green Top (Gel)[[id number]]                                  Final result               Lavender Top[[id number]]                                     Final result               Red Top[[id number]]                                          Final result               [facility] Blood [facility] Christoph...[[id number]]                      Final result               Gray Top - Ice[[id number]]                                   Final result                 Please view results for these tests on the individual orders.    Light Blue Top [[id number]] Collected:  02/07/20 0048    Specimen:  Blood Updated:  02/07/20 0201     Extra Tube hold for add-on     Comment: Auto resulted       Green Top (Gel) [[id number]] Collected:  02/07/20 0048    Specimen:  Blood Updated:  02/07/20 0201     Extra Tube Hold for add-ons.     Comment: Auto resulted.       Lavender Top [[id number]] Collected:  02/07/20 0048    Specimen:  Blood Updated:  02/07/20 0201     Extra Tube hold for add-on     Comment: Auto resulted       Red Top [[id number]] Collected:  02/07/20 0048    Specimen:  Blood Updated:  02/07/20 0201     Extra Tube Hold for add-ons.     Comment: Auto resulted.       [facility] Blood Culture Bottle Set [[id number]] Collected:  02/07/20 0048    Specimen:  Blood from Wrist, Right Updated:  02/07/20 0201     Extra Tube Hold for add-ons.     Comment: Auto resulted.       D-dimer, Quantitative [[id number]]  (Abnormal) Collected:  02/07/20 0048    Specimen:  Blood Updated:  02/07/20 0142     D-Dimer, Quantitative 6.36 mg/L (FEU)     Narrative:       Reference Range is 0-0.50 mg/L FEU. However, results <0.50 mg/L FEU tends to rule out DVT or PE. Results >0.50 mg/L FEU are not useful in predicting absence  or presence of DVT or PE.      CBC & Differential [[id number]] Collected:  02/07/20 0048    Specimen:  Blood Updated:  02/07/20 0136    Narrative:       The following orders were created for panel order CBC & Differential.  Procedure                               Abnormality         Status                     ---------                               -----------         ------                     CBC Auto Differential[[id number]]        Abnormal            Final result                 Please view results for these tests on the individual orders.    CBC Auto Differential [[id number]]  (Abnormal) Collected:  02/07/20 0048    Specimen:  Blood Updated:  02/07/20 0136     WBC 8.87 10*3/mm3      RBC 3.05 10*6/mm3      Hemoglobin 9.2 g/dL      Hematocrit 28.0 %      MCV 91.8 fL      MCH 30.2 pg      MCHC 32.9 g/dL      RDW 14.9 %      RDW-SD 47.8 fl      MPV 10.2 fL      Platelets 234 10*3/mm3     Manual Differential [[id number]]  (Abnormal) Collected:  02/07/20 0048    Specimen:  Blood Updated:  02/07/20 0136     Neutrophil % 83.2 %      Lymphocyte % 5.9 %      Monocyte % 7.9 %      Metamyelocyte % 1.0 %      Myelocyte % 2.0 %      Neutrophils Absolute 7.38 10*3/mm3      Lymphocytes Absolute 0.52 10*3/mm3      Monocytes Absolute 0.70 10*3/mm3      Anisocytosis Slight/1+     Microcytes Slight/1+     Polychromasia Slight/1+     WBC Morphology Normal     Clumped Platelets Present     Giant Platelets Large/3+    Comprehensive Metabolic Panel [[id number]]  (Abnormal) Collected:  02/07/20 0048    Specimen:  Blood Updated:  02/07/20 0120     Glucose 215 mg/dL      BUN 11 mg/dL      Creatinine 0.75 mg/dL      Sodium 137 mmol/L      Potassium 3.9 mmol/L      Chloride 101 mmol/L      CO2 23.0 mmol/L      Calcium 9.4 mg/dL      Total Protein 6.4 g/dL      Albumin 3.40 g/dL      ALT (SGPT) 11 U/L      AST (SGOT) 11 U/L      Alkaline Phosphatase 99 U/L      Total Bilirubin 0.2 mg/dL      eGFR Non African Amer 103 mL/min/1.73      Globulin  3.0 gm/dL      A/G Ratio 1.1 g/dL      BUN/Creatinine Ratio 14.7     Anion Gap 13.0 mmol/L     Narrative:       GFR Normal >60  Chronic Kidney Disease <60  Kidney Failure <15      BNP [[id number]]  (Abnormal) Collected:  02/07/20 0048    Specimen:  Blood Updated:  02/07/20 0120     proBNP 2,117.0 pg/mL     Narrative:       Among patients with dyspnea, NT-proBNP is highly sensitive for the detection of acute congestive heart failure. In addition NT-proBNP of <300 pg/ml effectively rules out acute congestive heart failure with 99% negative predictive value.    Results may be falsely decreased if patient taking Biotin.      Troponin [[id number]]  (Normal) Collected:  02/07/20 0048    Specimen:  Blood Updated:  02/07/20 0120     Troponin T <0.010 ng/mL     Narrative:       Troponin T Reference Range:  <= 0.03 ng/mL-   Negative for AMI  >0.03 ng/mL-     Abnormal for myocardial necrosis.  Clinicians would have to utilize clinical acumen, EKG, Troponin and serial changes to determine if it is an Acute Myocardial Infarction or myocardial injury due to an underlying chronic condition.       Results may be falsely decreased if patient taking Biotin.      Gray Top - Ice [[id number]] Collected:  02/07/20 0048    Specimen:  Blood Updated:  02/07/20 0116     Extra Tube Hold for add-ons.     Comment: Auto resulted.       Blood Gas, Arterial [[id number]]  (Abnormal) Collected:  02/07/20 0100    Specimen:  Arterial Blood Updated:  02/07/20 0106     Site Right Radial     Irvin's Test Positive     pH, Arterial 7.445 pH units      pCO2, Arterial 34.2 mm Hg      Comment: 84 Value below reference range        pO2, Arterial 74.6 mm Hg      Comment: 84 Value below reference range        HCO3, Arterial 23.4 mmol/L      Base Excess, Arterial -0.4 mmol/L      Comment: 84 Value below reference range        O2 Saturation, Arterial 93.6 %      Comment: 84 Value below reference range        Temperature 37.0 C      Barometric Pressure for Blood Gas  741 mmHg      Modality Nasal Cannula     Flow Rate 6.0 lpm      Ventilator Mode NA     Collected by [id number]     Comment: Meter: [id number]     :  [id number]           Imaging Results (Last 24 Hours)     Procedure Component Value Units Date/Time    CT Angiogram Chest [[id number]] Resulted:  02/07/20 0250     Updated:  02/07/20 0314    XR Chest 2 View [[id number]] Resulted:  02/07/20 0116     Updated:  02/07/20 0117        I have personally reviewed and interpreted the radiology studies and ECG obtained at time of admission.     Assessment / Plan     Assessment:   Active Hospital Problems    Diagnosis   • Acute pulmonary edema (CMS/HCC)     Impression:  1.  Moderate loculated pleural effusions  2.  Mild pulmonary edema  3.  Lung cancer with worsening dyspnea  4.  Diabetes mellitus type 2, orally controlled  5.  Elevated BNP, 2 months ago BNP was 2065.  6.  Anemia  7.  Hypertension    Plan:   1.  Patient was given IV Lasix in the hospital  2.  Consult pulmonology for review, patient may need thoracentesis for his loculated pleural effusions  3.  Consult oncology, Dr. Sadaf PALACIOS normally follows with this patient  4.  Sliding scale insulin with Accu-Cheks  5.  Resume other home medications   6.  Labs in the morning   7.  Duo nebs        Code Status: Full     I discussed the patient's findings and my recommendations with the patient    Estimated length of stay 3 to 4 days    Patient seen and examined by me on 2/7/2020.    Tisha Guy DO   02/07/20   4:45 AM              Electronically signed by Tisha Guy DO at 02/07/20 0501         Current Facility-Administered Medications   Medication Dose Route Frequency Provider Last Rate Last Dose   • acetaminophen (TYLENOL) tablet 650 mg  650 mg Oral Q4H PRN Conor Smith MD       • atorvastatin (LIPITOR) tablet 40 mg  40 mg Oral Daily Conor Smith MD   40 mg at 02/10/20 0808   • budesonide-formoterol (SYMBICORT) 160-4.5 MCG/ACT inhaler 2 puff  2 puff  Inhalation BID - RT Conor Smith MD   2 puff at 02/10/20 0635   • dextrose (D50W) 25 g/ 50mL Intravenous Solution 25 g  25 g Intravenous Q15 Min PRN Conor Smith MD       • dextrose (GLUTOSE) oral gel 15 g  15 g Oral Q15 Min PRN Conor Smith MD       • famotidine (PEPCID) injection 20 mg  20 mg Intravenous BID Conor Smith MD   20 mg at 02/10/20 0808   • folic acid (FOLVITE) tablet 400 mcg  400 mcg Oral Daily Conor Smith MD   400 mcg at 02/10/20 0808   • furosemide (LASIX) injection 20 mg  20 mg Intravenous Q12H Conor Smith MD   20 mg at 02/10/20 0808   • glucagon (human recombinant) (GLUCAGEN DIAGNOSTIC) injection 1 mg  1 mg Subcutaneous Q15 Min PRN Conor Smith MD       • guaifenesin-dextromethorphan (MUCINEX DM) tablet 1 tablet  1 tablet Oral BID Sukhdev Escobar PA   1 tablet at 02/10/20 0808   • hydrOXYzine (ATARAX) tablet 10 mg  10 mg Oral 4x Daily PRN Conor Smith MD   10 mg at 02/07/20 1254   • insulin lispro (humaLOG) injection 2-7 Units  2-7 Units Subcutaneous 4x Daily With Meals & Nightly Conor Smith MD   2 Units at 02/09/20 2013   • ipratropium-albuterol (DUO-NEB) nebulizer solution 3 mL  3 mL Nebulization Q4H - RT Conor Smith MD   3 mL at 02/10/20 1029   • linagliptin (TRADJENTA) tablet 5 mg  5 mg Oral Daily Conor Smith MD   5 mg at 02/10/20 0808   • loperamide (IMODIUM) capsule 2 mg  2 mg Oral 4x Daily PRN Sukhdev Escobar PA   2 mg at 02/10/20 0826   • LORazepam (ATIVAN) tablet 0.5 mg  0.5 mg Oral Q4H PRN Conor Smith MD   0.5 mg at 02/10/20 1317   • metoprolol tartrate (LOPRESSOR) tablet 25 mg  25 mg Oral BID Conor Smith MD   25 mg at 02/10/20 0808   • Morphine sulfate (PF) injection 2 mg  2 mg Intravenous Q2H PRN Conor Smith MD   2 mg at 02/10/20 1226   • ondansetron (ZOFRAN) tablet 8 mg  8 mg Oral Q8H PRN Conor Smith MD   8 mg at 02/09/20 1232   • piperacillin-tazobactam (ZOSYN) 3.375 g in iso-osmotic dextrose 50 ml (premix)  3.375 g Intravenous Q8H  Conor Smith MD 0 mL/hr at 02/09/20 0800 3.375 g at 02/10/20 1226   • sodium chloride 0.9 % flush 10 mL  10 mL Intravenous PRN Conor Smith MD       • sodium chloride 0.9 % flush 10 mL  10 mL Intravenous Q12H Conor Smith MD   10 mL at 02/10/20 0809   • sodium chloride 0.9 % flush 10 mL  10 mL Intravenous PRN Conor Smith MD       • sucralfate (CARAFATE) tablet 1 g  1 g Oral 4x Daily AC & at Bedtime Conor Smith MD   1 g at 02/10/20 1226       Lab Results (last 24 hours)     Procedure Component Value Units Date/Time    POC Glucose Once [[id number]]  (Abnormal) Collected:  02/10/20 1115    Specimen:  Blood Updated:  02/10/20 1146     Glucose 151 mg/dL      Comment: : [id number] Pedro HaynesonnaMeter ID: [id number]       POC Glucose Once [[id number]]  (Abnormal) Collected:  02/10/20 0727    Specimen:  Blood Updated:  02/10/20 0803     Glucose 147 mg/dL      Comment: : [id number] Pedro LadonnaMeter ID: [id number]       POC Glucose Once [[id number]]  (Abnormal) Collected:  02/09/20 2008    Specimen:  Blood Updated:  02/09/20 2026     Glucose 164 mg/dL      Comment: : [id number] Jeremiah ChauyMeter ID: [id number]       POC Glucose Once [[id number]]  (Abnormal) Collected:  02/09/20 1553    Specimen:  Blood Updated:  02/09/20 1604     Glucose 134 mg/dL      Comment: : [id number] Giuseppe [facility] Charlotteer ID: [id number]              Physician Progress Notes (last 24 hours) (Notes from 02/09/20 1420 through 02/10/20 1420)      Claudio Nevarez MD at 02/10/20 0715          160 MEDICAL ONCOLOGY PROGRESS NOTE    Pt Name: Romel Rosario  MRN: [id number]  YOB: 1950  Date of evaluation: 2/10/2020    SUBJECTIVE: Complains of diarrhea, 4 watery bowel movements per nursing last night.  He also complains of cough    HISTORY OF PRESENT ILLNESS:      The patient is a 69 y.o.  gentleman with stage IV non-small cell carcinoma of the lung, managed from the oncology standpoint by   Claudino, most recently with maintenance Alimta Keytruda.     The most recent CT scan of the chest on 11/30/2019 indicated an interval  favorable response to treatment, however he continues to have small to moderate bilateral pleural effusions.      Romel is admitted through the emergency room to CCU at [facility] on 2/7/2020 for complaint of acute respiratory distress with progressive shortness of breath and hypoxia.  Initially he required bag mask for O2 improvement.  He is currently wearing O2 6L via NC (he states he wears from 4-6L via NC at home) with O2 sat of 93%.     His pulmonologist, Dr. Jere Brumfield has recommended initial approach of aggressive diuresis to assist with the bilateral pleural effusions and an attempt to improve his respiratory status.  Romel appears to be improving symptomatically.       Oncology consultation requested in continuity of care.           Past Medical History:    Past Medical History:   Diagnosis Date   • Adenocarcinoma, lung (CMS/HCC)    • Cerumen impaction    • Cervical disc disease    • Colon cancer (CMS/HCC)    • Diabetes (CMS/HCC)    • Elevated cholesterol    • Eustachian tube dysfunction    • GERD (gastroesophageal reflux disease)    • Hx of colonic polyps    • Hypertension    • PUD (peptic ulcer disease)    • Sensorineural hearing loss        Past Surgical History:    Past Surgical History:   Procedure Laterality Date   • BRONCHOSCOPY Left 7/19/2019    Procedure: BRONCHOSCOPY WITH ENDOBRONCHIAL ULTRASOUND AND TRANSBRONCHIAL BIOPSY at 1: 30;  Surgeon: Jere Brumfield MD;  Location: [facility] OR;  Service: Pulmonary   • COLON SURGERY      Due to colon cancer   • COLONOSCOPY N/A 6/26/2017    Procedure: COLONOSCOPY WITH ANESTHESIA;  Surgeon: Barry Banks MD;  Location: [facility] ENDOSCOPY;  Service:    • COLONOSCOPY W/ POLYPECTOMY  08/05/2013    Tubular adenoma transverse x 2, Diverticulosis repeat exam in 3 years   • ENDOSCOPY  08/05/2013    HH, Billroth anastomisis in the  gastric antrum   • KNEE SURGERY     • STOMACH SURGERY      Due to ulcer   • VENOUS ACCESS DEVICE (PORT) INSERTION N/A 9/3/2019    Procedure: SINGLE LUMEN PORT PLACEMENT;  Surgeon: Jelena Huntley MD;  Location: [facility];  Service: General       Social History:    Social History     Socioeconomic History   • Marital status:      Spouse name: Not on file   • Number of children: Not on file   • Years of education: Not on file   • Highest education level: Not on file   Tobacco Use   • Smoking status: Former Smoker     Years: 45.00     Last attempt to quit: 2007     Years since quittin.4   • Smokeless tobacco: Never Used   Substance and Sexual Activity   • Alcohol use: No   • Drug use: No   • Sexual activity: Defer       Family History:   Family History   Problem Relation Age of Onset   • Heart disease Mother    • Diabetes Mother    • Alzheimer's disease Father    • Colon cancer Neg Hx    • Colon polyps Neg Hx        Current Hospital Medications:      Current Facility-Administered Medications:   •  acetaminophen (TYLENOL) tablet 650 mg, 650 mg, Oral, Q4H PRN, Conor Smith MD  •  atorvastatin (LIPITOR) tablet 40 mg, 40 mg, Oral, Daily, Conor Smith MD, 40 mg at 20  •  budesonide-formoterol (SYMBICORT) 160-4.5 MCG/ACT inhaler 2 puff, 2 puff, Inhalation, BID - RT, Conor Smith MD, 2 puff at 02/10/20 06  •  dextrose (D50W) 25 g/ 50mL Intravenous Solution 25 g, 25 g, Intravenous, Q15 Min PRN, Conor Smith MD  •  dextrose (GLUTOSE) oral gel 15 g, 15 g, Oral, Q15 Min PRN, Conor Smith MD  •  famotidine (PEPCID) injection 20 mg, 20 mg, Intravenous, BID, Conor Smith MD, 20 mg at 20  •  folic acid (FOLVITE) tablet 400 mcg, 400 mcg, Oral, Daily, Conor Smith MD, 400 mcg at 20  •  furosemide (LASIX) injection 20 mg, 20 mg, Intravenous, Q12H, Conor Smith MD, 20 mg at 20  •  glucagon (human recombinant) (GLUCAGEN DIAGNOSTIC) injection 1 mg, 1 mg,  Subcutaneous, Q15 Min PRN, Conor Smith MD  •  hydrOXYzine (ATARAX) tablet 10 mg, 10 mg, Oral, 4x Daily PRN, Conor Smith MD, 10 mg at 02/07/20 1254  •  insulin lispro (humaLOG) injection 2-7 Units, 2-7 Units, Subcutaneous, 4x Daily With Meals & Nightly, Conor Smith MD, 2 Units at 02/09/20 2013  •  ipratropium-albuterol (DUO-NEB) nebulizer solution 3 mL, 3 mL, Nebulization, Q4H - RT, Conor Smith MD, 3 mL at 02/10/20 0635  •  linagliptin (TRADJENTA) tablet 5 mg, 5 mg, Oral, Daily, Conor Smith MD, 5 mg at 02/09/20 0809  •  LORazepam (ATIVAN) tablet 0.5 mg, 0.5 mg, Oral, Q4H PRN, Conor Smith MD, 0.5 mg at 02/10/20 0300  •  metoprolol tartrate (LOPRESSOR) tablet 25 mg, 25 mg, Oral, BID, Conor Smith MD, 25 mg at 02/09/20 2013  •  Morphine sulfate (PF) injection 2 mg, 2 mg, Intravenous, Q2H PRN, Conor Smith MD, 2 mg at 02/10/20 0213  •  ondansetron (ZOFRAN) tablet 8 mg, 8 mg, Oral, Q8H PRN, Conor Smith MD, 8 mg at 02/09/20 1232  •  piperacillin-tazobactam (ZOSYN) 3.375 g in iso-osmotic dextrose 50 ml (premix), 3.375 g, Intravenous, Q8H, Conor Smith MD, Last Rate: 0 mL/hr at 02/09/20 0800, 3.375 g at 02/10/20 0506  •  sodium chloride 0.9 % flush 10 mL, 10 mL, Intravenous, PRN, Conor Smith MD  •  sodium chloride 0.9 % flush 10 mL, 10 mL, Intravenous, Q12H, Conor Smith MD, 10 mL at 02/09/20 2020  •  sodium chloride 0.9 % flush 10 mL, 10 mL, Intravenous, PRN, Conor Smith MD  •  sucralfate (CARAFATE) tablet 1 g, 1 g, Oral, 4x Daily AC & at Bedtime, Conor Smith MD, 1 g at 02/09/20 2014    Allergies:   Allergies   Allergen Reactions   • Lactose Intolerance (Gi) Diarrhea         Subjective   REVIEW OF SYSTEMS:   CONSTITUTIONAL: no fever, no night sweats, fatigue; malaise,   HEENT: no blurring of vision, no double vision, no hearing difficulty, no tinnitus, no ulceration, no dysplasia, no epistaxis;  LUNGS: no cough, no hemoptysis, no wheeze,  shortness of  "breath;  CARDIOVASCULAR: no palpitation, no chest pain,  shortness of breath;  GI: no abdominal pain,  nausea, no vomiting, diarrhea, no constipation;  MARNI: no dysuria, no hematuria, no frequency or urgency, no nephrolithiasis;  MUSCULOSKELETAL: no joint pain, no swelling, no stiffness; generalized weakness  ENDOCRINE: no polyuria, no polydipsia, no cold or heat intolerance;  HEMATOLOGY: no easy bruising or bleeding, no history of clotting disorder;  DERMATOLOGY: no skin rash, no eczema, no pruritus;  PSYCHIATRY: no depression,  anxiety, no panic attacks, no suicidal ideation, no homicidal ideation;  NEUROLOGY: no syncope, no seizures, no numbness or tingling of hands, no numbness or tingling of feet, no paresis;    Objective   /51 (BP Location: Right arm, Patient Position: Lying)   Pulse 83   Temp 97 °F (36.1 °C) (Oral)   Resp 16   Ht 170.2 cm (67.01\")   Wt 83.6 kg (184 lb 6.4 oz)   SpO2 95%   BMI 28.87 kg/m²      PHYSICAL EXAM:  CONSTITUTIONAL: Alert, appropriate, ill appearing  EYES: Non icteric, EOM intact, pupils equal round   ENT: Mucus membranes moist, no oral pharyngeal lesions, external inspection of ears and nose are normal. Nasal canula in place  NECK: Supple, no masses.  No palpable thyroid mass  CHEST/LUNGS: wheezing, decreased breath sounds lung bases, tachypneic   CARDIOVASCULAR: RRR, no murmurs.  No lower extremity edema  ABDOMEN: soft non-tender, active bowel sounds, no HSM.  No palpable masses  EXTREMITIES: warm, full ROM in all 4 extremities, no focal weakness.  SKIN: warm, dry with no rashes or lesions  LYMPH: No cervical, clavicular, axillary, or inguinal lymphadenopathy  NEUROLOGIC: follows commands, non focal   PSYCH: anxious        LABORATORY RESULTS REVIEWED BY ME:  No results found for: BLOODCX, URINECX, WOUNDCX, MRSACX, RESPCX, STOOLCX    Lab Results   Component Value Date     02/09/2020    K 4.1 02/09/2020    CL 98 02/09/2020    CO2 32.0 (H) 02/09/2020    BUN 22 " 02/09/2020    CREATININE 0.80 02/09/2020    GLUCOSE 148 (H) 02/09/2020    CALCIUM 10.2 02/09/2020    BILITOT 0.2 02/09/2020    ALKPHOS 109 02/09/2020    AST 17 02/09/2020    ALT 11 02/09/2020    AGRATIO 1.1 02/09/2020    GLOB 3.4 02/09/2020       Lab Results   Component Value Date    INR 0.96 12/03/2019    PROTIME 13.1 12/03/2019       RADIOLOGY STUDIES REPORT/REVIEWED AND INTERPRETED BY ME:  Xr Chest 2 View    Result Date: 2/7/2020  Narrative: HISTORY: Shortness of air  CXR: 2 views the chest are obtained.  COMPARISON: 02/04/2019  FINDINGS: There are increasing diffuse bilateral pulmonary opacities worrisome for edema. There is left suprahilar masslike opacification that are seen on CT exam with associated volume loss. There is a small left pleural effusion with a trace right pleural effusion. Heart appears enlarged. Right subclavian port tip projects over the SVC. No appreciable pneumothorax. No acute bony pathology.      Impression: 1. Increasing diffuse bilateral pulmonary opacities favorable for edema. Pneumonitis considered. 2. Left suprahilar masslike opacity. Please refer to CT chest report. 3. Small pleural effusions, left greater than right. This report was finalized on 02/07/2020 07:19 by Dr. Deborah Chávez MD.    Xr Chest 1 View    Result Date: 2/8/2020  Narrative: XR CHEST 1 VW-  Indication: pleural effusions; J81.0-Acute pulmonary edema; L46-Sgjjaup effusion, not elsewhere classified  Comparison: 02/07/2020  Findings:  Cardiac silhouette is enlarged but stable. Interstitial opacity appears resolved. Unchanged left hilar masslike opacity. Small bilateral pleural effusions, greatest on the left, appear unchanged. No visible pneumothorax. Port catheter tip overlying the cavoatrial junction is stable in position. No suspicious osseous findings.      Impression:  1.  Decreased/resolved bilateral interstitial opacity. 2.  Unchanged left hilar masslike opacity. 3.  No change in small pleural effusions. This  report was finalized on 02/08/2020 10:58 by Dr. Hardeep Bettencourt MD.    Ct Angiogram Chest    Result Date: 2/7/2020  Narrative: EXAMINATION: CT ANGIOGRAM CHEST-  2/7/2020 7:20 AM CST  HISTORY:Chest pain and shortness of air. Elevated d-dimer  COMPARISON: 11/30/2019 CT angiogram chest  TECHNIQUE:  CT evaluation of the chest was performed with intravenous contrast. 2 mm transaxial images were obtained.. Coronal reconstruction maximum intensity projection images of the pulmonary arteries and aorta were also generated.  Radiation dose equals  mGy-cm.  Automated exposure control dose reduction technique was implemented.   FINDINGS:  [Examination was sent to [facility] for preliminary interpretation.  The pulmonary arteries opacify with iodinated contrast without intraluminal filling defects or CT angiographic evidence of pulmonary embolus.  Again identified are multiple moderate size pleural effusions layering posteriorly, likely stable.  The left perihilar mass is again identified with associated patchy interstitial and airspace opacities involving both the upper and lower left lobe.  There is diffuse groundglass in the lungs bilaterally observed similarly on the previous study.  There is cardiomegaly. The hiatal hernia identified.  There is no mediastinal or hilar lymph node enlargement.  There is no axillary lymphadenopathy.  Limited imaging the upper abdomen demonstrate no acute abnormality.]      Impression: 1. No CT angiographic evidence of pulmonary embolus or acute aortic pathology.  2. Similar CT angiographic appearance of the chest dated 11/30/2019 examination. Persistent bilateral pleural effusions. Left perihilar mass with patchy associated airspace opacities in the left perihilar region. Diffuse bilateral groundglass opacities. This report was finalized on 02/07/2020 07:25 by Dr. Bo Her MD.          ASSESSMENT:  The patient is a 69 y.o.  gentleman with stage IV non-small cell carcinoma of  the lung, managed from the oncology standpoint by Dr.Wedeson Fishman, most recently with maintenance Alimta Keytruda.     The most recent CT scan of the chest on 11/30/2019 indicated an interval  favorable response to treatment, however he continues to have small to moderate bilateral pleural effusions.      Romel was admitted through the emergency room to CCU at [facility] on 2/7/2020 for complaint of acute respiratory distress with progressive shortness of breath and hypoxia.  Initially he required bag mask for O2 improvement.  He is currently wearing O2 6L via NC (he states he wears from 4-6L via NC at home) with O2 sat of 93%.     His pulmonologist, Dr. Jere Brumfield has recommended initial approach of aggressive diuresis to assist with the bilateral pleural effusions and an attempt to improve his respiratory status.  Romel appears to be improving symptomatically.       Oncology consultation requested for continuity of care.     #1.   Stage IV Non-small cell lung cancer, adenocarcinoma.   Cycle #6 of maintenance Alimta and Keytruda received 1/24/20  Next dose due 2/14/20.     #2.    Volume overload     CXR 2/7/2020: increasing diffuse bilateral pulmonary opacities, favorable for edema - consider pneumonitis    CTA chest 2/7/2020:   · no evidence of PE  · Multiple moderate sized pleural effusion, layering posteriorly  · Left perihilar mass with patchy associated airspace opacities in the left perihilar region  · Diffuse bilateral groundglass opacities  · Cardiomegaly     Echocardiogram from 12/3/2019:  · EF 55%  · Normal LV systolic function  · LV thickness c/w mild concentric hypertrophy  · LV diastolic dysfunction  · No evidence of pulmonary HTN     proBNP - 2,117     S/p Lasix 40 mg IV x1  S/p solumedrol 125 mg IV x1     Symptoms improved with lasix. Voiding well.  Findings most consistent with volume overload.  Pulmonary to see as well.  TSH - 0.294 on 2/8/20     #3.   Anemia r/t chemotherapy  Hgb 10.2 MCV 94 on  2/9/20  Stable, follow        #4  End-of-life issues were discussed with the patient.  He had previously discussed this with the palliative care nurse. His quality of life has declined significant with chemotherapy treatment.  Mr. Rosario states that he does not desire intubation or CPR. He has decided going home with hospiceupon discharge.      #5  Diarrhea  C diff negative  imodium ordered    02/10/20  7:15 AM       Physicians attestation and contribution:    I, Claudio Nevarez, personally and independently performed an evaluation on Romel Rosario  I have reviewed relevant medical information/data to include but not limited to the medication list, relevant appropriate lab work and imaging when applicable.  I reviewed other physician's notes, ancillary services and nurses assessments.  I have reviewed the above documentation completed by Sukhdev Escobar PA-C  Please see my additional addended and/or modified contributions to the history of present illness, physical examination and assessment/medical decision-making and plan that reflects my findings and impressions.  I discussed the essential elements of the care plan with Sukhdev Escobar PA-C and the patient.  I have encouraged and answered all the questions raised to the patient's understanding and satisfaction.  I concur with the above stated.    Subjective: Awake, alert, breathing much better but had diarrhea all night     Objective: Lungs are much clearer but with scattered rales bilaterally in upper lower lung fields anteriorly and posteriorly.     Assessment/plan: Antidiarrheals prescribed.  Clinically stable.  His decision regarding hospice when he goes home is noted.  This is reasonable.      Claudio Nevarez MD  2/10/2020 8:42 AM          Electronically signed by Claudio Nevarez MD at 02/10/20 0843     Conor Smith MD at 02/09/20 1602              [facility]  INPATIENT PROGRESS  NOTE    Length of Stay: 2  Date of Admission: 2/7/2020  Primary Care Physician: Eleazar Turner DO    Subjective   Chief Complaint: Shortness of breath/weakness.    HPI   Patient overall stable.  Patient asking for pain pain medication and anxiety medication.  Plan to go home with hospice tomorrow.    Review of Systems   Constitutional: Positive for activity change, appetite change and fatigue. Negative for chills and fever.   HENT: Negative for hearing loss, nosebleeds, tinnitus and trouble swallowing.    Eyes: Negative for visual disturbance.   Respiratory: Positive for cough, shortness of breath and wheezing. Negative for chest tightness.    Cardiovascular: Negative for chest pain, palpitations and leg swelling.   Gastrointestinal: Positive for  nausea. Negative for abdominal distention, abdominal pain, blood in stool, constipation and vomiting.   Endocrine: Negative for cold intolerance, heat intolerance, polydipsia, polyphagia and polyuria.   Genitourinary: Negative for decreased urine volume, difficulty urinating, dysuria, flank pain, frequency and hematuria.   Musculoskeletal: Negative for arthralgias, joint swelling and myalgias.   Skin: Negative for rash.   Allergic/Immunologic: Negative for immunocompromised state.   Neurological: Positive for weakness. Negative for dizziness, syncope, light-headedness and headaches.   Hematological: Negative for adenopathy. Does not bruise/bleed easily.   Psychiatric/Behavioral: Negative for confusion and sleep disturbance. The patient is not nervous/anxious.    All pertinent negatives and positives are as above. All other systems have been reviewed and are negative unless otherwise stated.     Objective    Temp:  [97.6 °F (36.4 °C)-98.3 °F (36.8 °C)] 97.7 °F (36.5 °C)  Heart Rate:  [45-81] 67  Resp:  [16-19] 18  BP: (107-127)/(57-66) 117/57    Intake/Output Summary (Last 24 hours) at 2/9/2020 1602  Last data filed at 2/9/2020 1100  Gross per 24 hour   Intake 240 ml    Output 1150 ml   Net -910 ml     Physical Exam  Constitutional: He is oriented to person, place, and time. He appears well-developed.   HENT:   Head: Normocephalic.   Eyes: Pupils are equal, round, and reactive to light. Conjunctivae are normal.   Neck: Neck supple. No JVD present.   Cardiovascular: Normal rate, regular rhythm, normal heart sounds and intact distal pulses. Exam reveals no gallop and no friction rub.   No murmur heard.  Pulmonary/Chest: No respiratory distress. He has wheezes. He has no rales. He exhibits no tenderness.   Rhonchi bilateral.  Diminished breath sound.   Abdominal: Soft. Bowel sounds are normal. He exhibits no distension. There is no tenderness. There is no rebound and no guarding.   Musculoskeletal: Normal range of motion. He exhibits no edema, tenderness or deformity.   Neurological: He is alert and oriented to person, place, and time. He displays normal reflexes. No cranial nerve deficit. He exhibits abnormal muscle tone. Coordination abnormal.   Skin: Skin is warm and dry. Capillary refill takes 2 to 3 seconds. No rash noted.   Psychiatric: He has a normal mood and affect. His behavior is normal. Thought content normal.   Nursing note and vitals reviewed.  Results Review:  Lab Results (last 24 hours)     Procedure Component Value Units Date/Time    POC Glucose Once [[id number]]  (Abnormal) Collected:  02/09/20 1114    Specimen:  Blood Updated:  02/09/20 1125     Glucose 177 mg/dL      Comment: : [id number] Giuseppe AprilMeter ID: [id number]       POC Glucose Once [[id number]]  (Abnormal) Collected:  02/09/20 0720    Specimen:  Blood Updated:  02/09/20 0733     Glucose 146 mg/dL      Comment: : [id number] Giuseppe AprilMeter ID: [id number]       Comprehensive Metabolic Panel [[id number]]  (Abnormal) Collected:  02/09/20 0408    Specimen:  Blood Updated:  02/09/20 0459     Glucose 148 mg/dL      BUN 22 mg/dL      Creatinine 0.80 mg/dL      Sodium 141 mmol/L      Potassium 4.1 mmol/L       Chloride 98 mmol/L      CO2 32.0 mmol/L      Calcium 10.2 mg/dL      Total Protein 7.2 g/dL      Albumin 3.80 g/dL      ALT (SGPT) 11 U/L      AST (SGOT) 17 U/L      Alkaline Phosphatase 109 U/L      Total Bilirubin 0.2 mg/dL      eGFR Non African Amer 95 mL/min/1.73      Globulin 3.4 gm/dL      A/G Ratio 1.1 g/dL      BUN/Creatinine Ratio 27.5     Anion Gap 11.0 mmol/L     Narrative:       GFR Normal >60  Chronic Kidney Disease <60  Kidney Failure <15      CBC & Differential [[id number]] Collected:  02/09/20 0408    Specimen:  Blood Updated:  02/09/20 0437    Narrative:       The following orders were created for panel order CBC & Differential.  Procedure                               Abnormality         Status                     ---------                               -----------         ------                     CBC Auto Differential[[id number]]        Abnormal            Final result                 Please view results for these tests on the individual orders.    CBC Auto Differential [[id number]]  (Abnormal) Collected:  02/09/20 0408    Specimen:  Blood Updated:  02/09/20 0437     WBC 16.58 10*3/mm3      RBC 3.47 10*6/mm3      Hemoglobin 10.2 g/dL      Hematocrit 32.9 %      MCV 94.8 fL      MCH 29.4 pg      MCHC 31.0 g/dL      RDW 15.5 %      RDW-SD 51.6 fl      MPV 10.5 fL      Platelets 360 10*3/mm3      Neutrophil % 80.9 %      Lymphocyte % 6.9 %      Monocyte % 8.5 %      Eosinophil % 0.6 %      Basophil % 0.4 %      Immature Grans % 2.7 %      Neutrophils, Absolute 13.41 10*3/mm3      Lymphocytes, Absolute 1.14 10*3/mm3      Monocytes, Absolute 1.41 10*3/mm3      Eosinophils, Absolute 0.10 10*3/mm3      Basophils, Absolute 0.07 10*3/mm3      Immature Grans, Absolute 0.45 10*3/mm3      nRBC 0.1 /100 WBC     POC Glucose Once [[id number]]  (Abnormal) Collected:  02/08/20 2010    Specimen:  Blood Updated:  02/08/20 2021     Glucose 197 mg/dL      Comment: : [id number] Roland CraigMeter ID: [id number]        POC Glucose Once [[id number]]  (Abnormal) Collected:  02/08/20 1605    Specimen:  Blood Updated:  02/08/20 1625     Glucose 260 mg/dL      Comment: : [id number] Karel GarciaMeter ID: [id number]              Cultures:  No results found for: BLOODCX, URINECX, WOUNDCX, MRSACX, RESPCX, STOOLCX    Radiology Data:    Imaging Results (Last 24 Hours)     ** No results found for the last 24 hours. **          Allergies   Allergen Reactions   • Lactose Intolerance (Gi) Diarrhea       Scheduled meds:     atorvastatin 40 mg Oral Daily   budesonide-formoterol 2 puff Inhalation BID - RT   famotidine 20 mg Intravenous BID   folic acid 400 mcg Oral Daily   furosemide 20 mg Intravenous Q12H   insulin lispro 2-7 Units Subcutaneous 4x Daily With Meals & Nightly   ipratropium-albuterol 3 mL Nebulization Q4H - RT   linagliptin 5 mg Oral Daily   metoprolol tartrate 25 mg Oral BID   piperacillin-tazobactam 3.375 g Intravenous Q8H   sodium chloride 10 mL Intravenous Q12H   sucralfate 1 g Oral 4x Daily AC & at Bedtime       PRN meds:  •  acetaminophen  •  ALPRAZolam  •  dextrose  •  dextrose  •  glucagon (human recombinant)  •  hydrOXYzine  •  Morphine  •  ondansetron  •  sodium chloride  •  sodium chloride    Assessment/Plan       Controlled type 2 diabetes mellitus with hyperglycemia, without long-term current use of insulin (CMS/HCC)    Respiratory distress, acute    Acute respiratory failure with hypoxia and hypercapnia (CMS/HCC)    Adenocarcinoma, lung (CMS/HCC)    Type 2 diabetes mellitus with hyperglycemia, with long-term current use of insulin (CMS/HCC)    Acute pulmonary edema (CMS/HCC)    PUD (peptic ulcer disease)    Colon cancer (CMS/HCC)      Plan:  Shortness of breath/hypoxia/COPD/cough/obstructive due to lung mass.  Continue Symbicort.  Continue duo nebs.  Cont Zosyn antibiotics.  CTA chest- no evidence of embolus or acute aortic pathology, persistent bilateral pleural effusion and left perihilar mass with patchy  associated airspace opacity in the left perihilar region, diffuse bilateral groundglass opacity-similar to CTA 2019.     Acute pulmonary edema-fluid overload/edema/pleural effusion/hypertension/CHF.  BNP 2000.  Continue Lipitor.  Continue Lopressor.  Troponins negative x2 sets.  Echo cardiogram 12/3/2019- ejection fraction 55%, mild concentric hypertrophy, diastolic dysfunction, no evidence of pulmonary hypertension.     History of lung cancer/lung mass.  Ongoing treatment for Dr. Fishman.  Plan for palliative care and hospice on Monday.  Patient go home with hospice.  Morphine 2 mg every 2 hours as needed.     Diabetes.  Sliding scale.  Continue Tradjenta.  Hemoglobin A1c 7.3.     Hypo-magnesium. Resolved.      Anemia.  Hemoglobin stable.  No sign of acute bleed.       Anxiety.  Ativan 0.5 every 4 hours as needed.     Nausea/vomiting-diarrhea.  Nausea improving. Pepcid.  Zofran.  Continue Carafate.  No sign of diarrhea this morning.       Nutrition.  Continue folic acid.  Consistent carbohydrate diet     Deconditioning.  PT consult.     Discharge Plannin-3 days.  Patient is currently take oxygen nasal cannula between 4 to 6 L at home.  Plan for palliative care.  Plan for hospice on Monday.  CODE STATUS changed to DNR and DNI.      Conor Smith MD   20   4:02 PM                    Electronically signed by Conor Smith MD at 20 [id number]        Dupixent Counseling: I discussed with the patient the risks of dupilumab including but not limited to eye infection and irritation, cold sores, injection site reactions, worsening of asthma, allergic reactions and increased risk of parasitic infection.  Live vaccines should be avoided while taking dupilumab. Dupilumab will also interact with certain medications such as warfarin and cyclosporine. The patient understands that monitoring is required and they must alert us or the primary physician if symptoms of infection or other concerning signs are noted.

## (undated) DEVICE — SPNG GZ 2S 2X2 8PLY STRL PK/2

## (undated) DEVICE — THE CHANNEL CLEANING BRUSH IS A NYLON FLEXI BRUSH ATTACHED TO A FLEXIBLE PLASTIC SHEATH DESIGNED TO SAFELY REMOVE DEBRIS FROM FLEXIBLE ENDOSCOPES.

## (undated) DEVICE — ANTIBACTERIAL UNDYED BRAIDED (POLYGLACTIN 910), SYNTHETIC ABSORBABLE SUTURE: Brand: COATED VICRYL

## (undated) DEVICE — ENDOGATOR AUXILIARY WATER JET CONNECTOR: Brand: ENDOGATOR

## (undated) DEVICE — PAD MINOR UNIVERSAL: Brand: MEDLINE INDUSTRIES, INC.

## (undated) DEVICE — DRSNG SURESITE WNDW 4X4.5

## (undated) DEVICE — SINGLE USE SUCTION VALVE MAJ-209: Brand: SINGLE USE SUCTION VALVE (STERILE)

## (undated) DEVICE — SENSR O2 OXIMAX FNGR A/ 18IN NONSTR

## (undated) DEVICE — TRAP,MUCUS SPECIMEN, 80CC: Brand: MEDLINE

## (undated) DEVICE — SYR SLP TP 10ML DISP

## (undated) DEVICE — THE SINGLE USE ETRAP – POLYP TRAP IS USED FOR SUCTION RETRIEVAL OF ENDOSCOPICALLY REMOVED POLYPS.: Brand: ETRAP

## (undated) DEVICE — SUT PROLN 2/0 SH 36IN 8523H

## (undated) DEVICE — ADHS LIQ MASTISOL 2/3ML

## (undated) DEVICE — CUFF,BP,DISP,1 TUBE,ADULT,HP: Brand: MEDLINE

## (undated) DEVICE — PK TURNOVER RM ADV

## (undated) DEVICE — APPL CHLORAPREP W/TINT 26ML ORNG

## (undated) DEVICE — TBG PRESS EXT

## (undated) DEVICE — KT ASP VIZISHOT 5SYRG 5BIOPSY/VLV 22G

## (undated) DEVICE — ADAPT SWVL FIBROPTIC BRONCH

## (undated) DEVICE — Device: Brand: BALLOON

## (undated) DEVICE — 3M™ STERI-STRIP™ REINFORCED ADHESIVE SKIN CLOSURES, R1546, 1/4 IN X 4 IN (6 MM X 100 MM), 10 STRIPS/ENVELOPE: Brand: 3M™ STERI-STRIP™

## (undated) DEVICE — TBG SMPL FLTR LINE NASL 02/C02 A/ BX/100

## (undated) DEVICE — Device: Brand: DEFENDO AIR/WATER/SUCTION AND BIOPSY VALVE

## (undated) DEVICE — MSK O2 MD CONCENTR A/ LF 7FT 1P/U

## (undated) DEVICE — SNAR POLYP SENSATION MICRO OVL 13 240X40

## (undated) DEVICE — CVR UNIV C/ARM

## (undated) DEVICE — SINGLE USE BIOPSY VALVE MAJ-210: Brand: SINGLE USE BIOPSY VALVE (STERILE)

## (undated) DEVICE — GLV SURG BIOGEL M LTX PF 7 1/2